# Patient Record
Sex: FEMALE | Race: BLACK OR AFRICAN AMERICAN | Employment: OTHER | URBAN - METROPOLITAN AREA
[De-identification: names, ages, dates, MRNs, and addresses within clinical notes are randomized per-mention and may not be internally consistent; named-entity substitution may affect disease eponyms.]

---

## 2017-06-24 ENCOUNTER — HOSPITAL ENCOUNTER (OUTPATIENT)
Dept: MRI IMAGING | Age: 62
End: 2017-06-24
Payer: COMMERCIAL

## 2017-06-24 ENCOUNTER — HOSPITAL ENCOUNTER (OUTPATIENT)
Dept: MRI IMAGING | Age: 62
Discharge: HOME | End: 2017-06-24
Payer: COMMERCIAL

## 2017-06-24 ENCOUNTER — HOSPITAL ENCOUNTER (OUTPATIENT)
Dept: ULTRASOUND IMAGING | Age: 62
Discharge: HOME | End: 2017-06-24
Payer: COMMERCIAL

## 2017-06-24 DIAGNOSIS — R42 DIZZINESS: ICD-10-CM

## 2017-06-24 DIAGNOSIS — R42 VERTIGO: ICD-10-CM

## 2017-06-24 PROCEDURE — 70551 MRI BRAIN STEM W/O DYE: CPT

## 2017-06-24 PROCEDURE — 93880 EXTRACRANIAL BILAT STUDY: CPT

## 2017-06-24 PROCEDURE — 70544 MR ANGIOGRAPHY HEAD W/O DYE: CPT

## 2017-07-29 ENCOUNTER — HOSPITAL ENCOUNTER (OUTPATIENT)
Dept: GENERAL RADIOLOGY | Age: 62
Discharge: HOME | End: 2017-07-29
Payer: COMMERCIAL

## 2017-07-29 DIAGNOSIS — R05.9 COUGH: ICD-10-CM

## 2017-07-29 PROCEDURE — 71020 XR CHEST STANDARD TWO VW: CPT

## 2018-03-30 ENCOUNTER — HOSPITAL ENCOUNTER (OUTPATIENT)
Dept: PREADMISSION TESTING | Age: 63
Discharge: HOME | End: 2018-04-03
Payer: COMMERCIAL

## 2018-03-30 VITALS
WEIGHT: 265.4 LBS | HEART RATE: 87 BPM | HEIGHT: 61 IN | SYSTOLIC BLOOD PRESSURE: 155 MMHG | BODY MASS INDEX: 50.11 KG/M2 | OXYGEN SATURATION: 96 % | TEMPERATURE: 98.6 F | DIASTOLIC BLOOD PRESSURE: 80 MMHG | RESPIRATION RATE: 16 BRPM

## 2018-03-30 DIAGNOSIS — L98.9 LESION OF NECK: ICD-10-CM

## 2018-03-30 DIAGNOSIS — Q21.12 PATENT FORAMEN OVALE: ICD-10-CM

## 2018-03-30 DIAGNOSIS — J34.89 LESION OF NOSE: ICD-10-CM

## 2018-03-30 DIAGNOSIS — Z95.828 PORT CATHETER IN PLACE: ICD-10-CM

## 2018-03-30 LAB
ANION GAP SERPL CALCULATED.3IONS-SCNC: 13 MEQ/L (ref 7–13)
BUN BLDV-MCNC: 14 MG/DL (ref 8–23)
CALCIUM SERPL-MCNC: 10.4 MG/DL (ref 8.6–10.2)
CHLORIDE BLD-SCNC: 105 MEQ/L (ref 98–107)
CO2: 24 MEQ/L (ref 22–29)
CREAT SERPL-MCNC: 0.54 MG/DL (ref 0.5–0.9)
EKG ATRIAL RATE: 84 BPM
EKG P AXIS: 53 DEGREES
EKG P-R INTERVAL: 150 MS
EKG Q-T INTERVAL: 350 MS
EKG QRS DURATION: 78 MS
EKG QTC CALCULATION (BAZETT): 413 MS
EKG R AXIS: -6 DEGREES
EKG T AXIS: 22 DEGREES
EKG VENTRICULAR RATE: 84 BPM
GFR AFRICAN AMERICAN: >60
GFR NON-AFRICAN AMERICAN: >60
GLUCOSE BLD-MCNC: 103 MG/DL (ref 74–109)
HCT VFR BLD CALC: 37.8 % (ref 37–47)
HEMOGLOBIN: 12.3 G/DL (ref 12–16)
MCH RBC QN AUTO: 25.6 PG (ref 27–31.3)
MCHC RBC AUTO-ENTMCNC: 32.5 % (ref 33–37)
MCV RBC AUTO: 78.8 FL (ref 82–100)
PDW BLD-RTO: 16 % (ref 11.5–14.5)
PLATELET # BLD: 154 K/UL (ref 130–400)
POTASSIUM SERPL-SCNC: 4.5 MEQ/L (ref 3.5–5.1)
RBC # BLD: 4.79 M/UL (ref 4.2–5.4)
SODIUM BLD-SCNC: 142 MEQ/L (ref 132–144)
WBC # BLD: 8.9 K/UL (ref 4.8–10.8)

## 2018-03-30 PROCEDURE — 85027 COMPLETE CBC AUTOMATED: CPT

## 2018-03-30 PROCEDURE — 93005 ELECTROCARDIOGRAM TRACING: CPT

## 2018-03-30 PROCEDURE — 80048 BASIC METABOLIC PNL TOTAL CA: CPT

## 2018-03-30 RX ORDER — SODIUM CHLORIDE 0.9 % (FLUSH) 0.9 %
10 SYRINGE (ML) INJECTION PRN
Status: CANCELLED | OUTPATIENT
Start: 2018-03-30

## 2018-03-30 RX ORDER — LIDOCAINE HYDROCHLORIDE 10 MG/ML
1 INJECTION, SOLUTION EPIDURAL; INFILTRATION; INTRACAUDAL; PERINEURAL
Status: CANCELLED | OUTPATIENT
Start: 2018-03-30 | End: 2018-03-30

## 2018-03-30 RX ORDER — SODIUM CHLORIDE, SODIUM LACTATE, POTASSIUM CHLORIDE, CALCIUM CHLORIDE 600; 310; 30; 20 MG/100ML; MG/100ML; MG/100ML; MG/100ML
INJECTION, SOLUTION INTRAVENOUS CONTINUOUS
Status: CANCELLED | OUTPATIENT
Start: 2018-03-30

## 2018-03-30 RX ORDER — SODIUM CHLORIDE 0.9 % (FLUSH) 0.9 %
10 SYRINGE (ML) INJECTION EVERY 12 HOURS SCHEDULED
Status: CANCELLED | OUTPATIENT
Start: 2018-03-30

## 2018-03-30 RX ORDER — ALBUTEROL SULFATE 90 UG/1
2 AEROSOL, METERED RESPIRATORY (INHALATION) EVERY 6 HOURS PRN
COMMUNITY
End: 2020-08-20

## 2018-03-30 ASSESSMENT — ENCOUNTER SYMPTOMS
HEARTBURN: 0
BACK PAIN: 0
VOMITING: 0
EYES NEGATIVE: 1
CONSTIPATION: 0
BLURRED VISION: 0
DIARRHEA: 0
STRIDOR: 0
SHORTNESS OF BREATH: 0
WHEEZING: 0
SORE THROAT: 0
ABDOMINAL PAIN: 0
DOUBLE VISION: 0
COUGH: 0
NAUSEA: 0

## 2018-04-02 PROCEDURE — 93010 ELECTROCARDIOGRAM REPORT: CPT | Performed by: INTERNAL MEDICINE

## 2018-04-06 ENCOUNTER — ANESTHESIA EVENT (OUTPATIENT)
Dept: OPERATING ROOM | Age: 63
End: 2018-04-06
Payer: COMMERCIAL

## 2018-04-06 ENCOUNTER — ANESTHESIA (OUTPATIENT)
Dept: OPERATING ROOM | Age: 63
End: 2018-04-06
Payer: COMMERCIAL

## 2018-04-06 ENCOUNTER — HOSPITAL ENCOUNTER (OUTPATIENT)
Age: 63
Setting detail: OUTPATIENT SURGERY
Discharge: HOME | End: 2018-04-06
Attending: SURGERY | Admitting: SURGERY
Payer: COMMERCIAL

## 2018-04-06 VITALS — DIASTOLIC BLOOD PRESSURE: 80 MMHG | SYSTOLIC BLOOD PRESSURE: 137 MMHG | OXYGEN SATURATION: 100 %

## 2018-04-06 VITALS
HEIGHT: 61 IN | OXYGEN SATURATION: 95 % | SYSTOLIC BLOOD PRESSURE: 147 MMHG | TEMPERATURE: 97.9 F | WEIGHT: 265 LBS | BODY MASS INDEX: 50.03 KG/M2 | RESPIRATION RATE: 16 BRPM | DIASTOLIC BLOOD PRESSURE: 71 MMHG | HEART RATE: 81 BPM

## 2018-04-06 LAB
INR BLD: 1
PROTHROMBIN TIME: 10.7 SEC (ref 8.1–13.7)

## 2018-04-06 PROCEDURE — 2500000003 HC RX 250 WO HCPCS: Performed by: NURSE ANESTHETIST, CERTIFIED REGISTERED

## 2018-04-06 PROCEDURE — 2580000003 HC RX 258: Performed by: SURGERY

## 2018-04-06 PROCEDURE — 6370000000 HC RX 637 (ALT 250 FOR IP): Performed by: NURSE ANESTHETIST, CERTIFIED REGISTERED

## 2018-04-06 PROCEDURE — 6360000002 HC RX W HCPCS: Performed by: NURSE ANESTHETIST, CERTIFIED REGISTERED

## 2018-04-06 PROCEDURE — 3700000001 HC ADD 15 MINUTES (ANESTHESIA): Performed by: SURGERY

## 2018-04-06 PROCEDURE — 2580000003 HC RX 258: Performed by: NURSE PRACTITIONER

## 2018-04-06 PROCEDURE — 85610 PROTHROMBIN TIME: CPT

## 2018-04-06 PROCEDURE — 7100000011 HC PHASE II RECOVERY - ADDTL 15 MIN: Performed by: SURGERY

## 2018-04-06 PROCEDURE — 2500000003 HC RX 250 WO HCPCS

## 2018-04-06 PROCEDURE — 3600000002 HC SURGERY LEVEL 2 BASE: Performed by: SURGERY

## 2018-04-06 PROCEDURE — 7100000001 HC PACU RECOVERY - ADDTL 15 MIN: Performed by: SURGERY

## 2018-04-06 PROCEDURE — 3700000000 HC ANESTHESIA ATTENDED CARE: Performed by: SURGERY

## 2018-04-06 PROCEDURE — 7100000010 HC PHASE II RECOVERY - FIRST 15 MIN: Performed by: SURGERY

## 2018-04-06 PROCEDURE — 2580000003 HC RX 258: Performed by: ANESTHESIOLOGY

## 2018-04-06 PROCEDURE — 3600000012 HC SURGERY LEVEL 2 ADDTL 15MIN: Performed by: SURGERY

## 2018-04-06 PROCEDURE — 6360000002 HC RX W HCPCS: Performed by: ANESTHESIOLOGY

## 2018-04-06 PROCEDURE — 7100000000 HC PACU RECOVERY - FIRST 15 MIN: Performed by: SURGERY

## 2018-04-06 RX ORDER — LIDOCAINE HYDROCHLORIDE 20 MG/ML
INJECTION, SOLUTION INFILTRATION; PERINEURAL PRN
Status: DISCONTINUED | OUTPATIENT
Start: 2018-04-06 | End: 2018-04-06 | Stop reason: SDUPTHER

## 2018-04-06 RX ORDER — PROPOFOL 10 MG/ML
INJECTION, EMULSION INTRAVENOUS PRN
Status: DISCONTINUED | OUTPATIENT
Start: 2018-04-06 | End: 2018-04-06 | Stop reason: SDUPTHER

## 2018-04-06 RX ORDER — SODIUM CHLORIDE, SODIUM LACTATE, POTASSIUM CHLORIDE, CALCIUM CHLORIDE 600; 310; 30; 20 MG/100ML; MG/100ML; MG/100ML; MG/100ML
INJECTION, SOLUTION INTRAVENOUS CONTINUOUS
Status: DISCONTINUED | OUTPATIENT
Start: 2018-04-06 | End: 2018-04-06 | Stop reason: HOSPADM

## 2018-04-06 RX ORDER — SODIUM CHLORIDE 0.9 % (FLUSH) 0.9 %
10 SYRINGE (ML) INJECTION PRN
Status: DISCONTINUED | OUTPATIENT
Start: 2018-04-06 | End: 2018-04-06 | Stop reason: HOSPADM

## 2018-04-06 RX ORDER — ALBUTEROL SULFATE 90 UG/1
AEROSOL, METERED RESPIRATORY (INHALATION) PRN
Status: DISCONTINUED | OUTPATIENT
Start: 2018-04-06 | End: 2018-04-06 | Stop reason: SDUPTHER

## 2018-04-06 RX ORDER — LIDOCAINE HYDROCHLORIDE 10 MG/ML
INJECTION, SOLUTION EPIDURAL; INFILTRATION; INTRACAUDAL; PERINEURAL
Status: COMPLETED
Start: 2018-04-06 | End: 2018-04-06

## 2018-04-06 RX ORDER — DIPHENHYDRAMINE HYDROCHLORIDE 50 MG/ML
12.5 INJECTION INTRAMUSCULAR; INTRAVENOUS
Status: DISCONTINUED | OUTPATIENT
Start: 2018-04-06 | End: 2018-04-06 | Stop reason: HOSPADM

## 2018-04-06 RX ORDER — LIDOCAINE HYDROCHLORIDE 10 MG/ML
1 INJECTION, SOLUTION EPIDURAL; INFILTRATION; INTRACAUDAL; PERINEURAL
Status: DISCONTINUED | OUTPATIENT
Start: 2018-04-06 | End: 2018-04-06 | Stop reason: HOSPADM

## 2018-04-06 RX ORDER — CIPROFLOXACIN 500 MG/5ML
500 KIT ORAL 2 TIMES DAILY
COMMUNITY
End: 2019-01-11 | Stop reason: ALTCHOICE

## 2018-04-06 RX ORDER — FENTANYL CITRATE 50 UG/ML
INJECTION, SOLUTION INTRAMUSCULAR; INTRAVENOUS PRN
Status: DISCONTINUED | OUTPATIENT
Start: 2018-04-06 | End: 2018-04-06 | Stop reason: SDUPTHER

## 2018-04-06 RX ORDER — DEXAMETHASONE SODIUM PHOSPHATE 10 MG/ML
INJECTION INTRAMUSCULAR; INTRAVENOUS PRN
Status: DISCONTINUED | OUTPATIENT
Start: 2018-04-06 | End: 2018-04-06 | Stop reason: SDUPTHER

## 2018-04-06 RX ORDER — FENTANYL CITRATE 50 UG/ML
50 INJECTION, SOLUTION INTRAMUSCULAR; INTRAVENOUS EVERY 10 MIN PRN
Status: DISCONTINUED | OUTPATIENT
Start: 2018-04-06 | End: 2018-04-06 | Stop reason: HOSPADM

## 2018-04-06 RX ORDER — SODIUM CHLORIDE 0.9 % (FLUSH) 0.9 %
10 SYRINGE (ML) INJECTION EVERY 12 HOURS SCHEDULED
Status: DISCONTINUED | OUTPATIENT
Start: 2018-04-06 | End: 2018-04-06 | Stop reason: HOSPADM

## 2018-04-06 RX ORDER — ROCURONIUM BROMIDE 10 MG/ML
INJECTION, SOLUTION INTRAVENOUS PRN
Status: DISCONTINUED | OUTPATIENT
Start: 2018-04-06 | End: 2018-04-06 | Stop reason: SDUPTHER

## 2018-04-06 RX ORDER — MEPERIDINE HYDROCHLORIDE 25 MG/ML
12.5 INJECTION INTRAMUSCULAR; INTRAVENOUS; SUBCUTANEOUS EVERY 5 MIN PRN
Status: DISCONTINUED | OUTPATIENT
Start: 2018-04-06 | End: 2018-04-06 | Stop reason: HOSPADM

## 2018-04-06 RX ORDER — METOCLOPRAMIDE HYDROCHLORIDE 5 MG/ML
10 INJECTION INTRAMUSCULAR; INTRAVENOUS
Status: DISCONTINUED | OUTPATIENT
Start: 2018-04-06 | End: 2018-04-06 | Stop reason: HOSPADM

## 2018-04-06 RX ORDER — ONDANSETRON 2 MG/ML
4 INJECTION INTRAMUSCULAR; INTRAVENOUS
Status: DISCONTINUED | OUTPATIENT
Start: 2018-04-06 | End: 2018-04-06 | Stop reason: HOSPADM

## 2018-04-06 RX ORDER — ONDANSETRON 2 MG/ML
INJECTION INTRAMUSCULAR; INTRAVENOUS PRN
Status: DISCONTINUED | OUTPATIENT
Start: 2018-04-06 | End: 2018-04-06 | Stop reason: SDUPTHER

## 2018-04-06 RX ORDER — MAGNESIUM HYDROXIDE 1200 MG/15ML
LIQUID ORAL CONTINUOUS PRN
Status: DISCONTINUED | OUTPATIENT
Start: 2018-04-06 | End: 2018-04-06 | Stop reason: HOSPADM

## 2018-04-06 RX ORDER — MIDAZOLAM HYDROCHLORIDE 1 MG/ML
INJECTION INTRAMUSCULAR; INTRAVENOUS PRN
Status: DISCONTINUED | OUTPATIENT
Start: 2018-04-06 | End: 2018-04-06 | Stop reason: SDUPTHER

## 2018-04-06 RX ORDER — SODIUM CHLORIDE, SODIUM LACTATE, POTASSIUM CHLORIDE, CALCIUM CHLORIDE 600; 310; 30; 20 MG/100ML; MG/100ML; MG/100ML; MG/100ML
INJECTION, SOLUTION INTRAVENOUS
Status: DISCONTINUED
Start: 2018-04-06 | End: 2018-04-06 | Stop reason: HOSPADM

## 2018-04-06 RX ORDER — LIDOCAINE HYDROCHLORIDE 10 MG/ML
1 INJECTION, SOLUTION EPIDURAL; INFILTRATION; INTRACAUDAL; PERINEURAL
Status: COMPLETED | OUTPATIENT
Start: 2018-04-06 | End: 2018-04-06

## 2018-04-06 RX ADMIN — SODIUM CHLORIDE, POTASSIUM CHLORIDE, SODIUM LACTATE AND CALCIUM CHLORIDE: 600; 310; 30; 20 INJECTION, SOLUTION INTRAVENOUS at 09:00

## 2018-04-06 RX ADMIN — SUGAMMADEX 200 MG: 100 INJECTION, SOLUTION INTRAVENOUS at 10:55

## 2018-04-06 RX ADMIN — LIDOCAINE HYDROCHLORIDE 0.1 ML: 10 INJECTION, SOLUTION EPIDURAL; INFILTRATION; INTRACAUDAL; PERINEURAL at 09:07

## 2018-04-06 RX ADMIN — ALBUTEROL SULFATE 3 PUFF: 90 AEROSOL, METERED RESPIRATORY (INHALATION) at 11:15

## 2018-04-06 RX ADMIN — ONDANSETRON 4 MG: 2 INJECTION INTRAMUSCULAR; INTRAVENOUS at 10:47

## 2018-04-06 RX ADMIN — ROCURONIUM BROMIDE 50 MG: 10 INJECTION INTRAVENOUS at 09:56

## 2018-04-06 RX ADMIN — SODIUM CHLORIDE, POTASSIUM CHLORIDE, SODIUM LACTATE AND CALCIUM CHLORIDE: 600; 310; 30; 20 INJECTION, SOLUTION INTRAVENOUS at 08:56

## 2018-04-06 RX ADMIN — LIDOCAINE HYDROCHLORIDE 100 MG: 20 INJECTION, SOLUTION INFILTRATION; PERINEURAL at 09:56

## 2018-04-06 RX ADMIN — DEXAMETHASONE SODIUM PHOSPHATE 10 MG: 10 INJECTION INTRAMUSCULAR; INTRAVENOUS at 10:04

## 2018-04-06 RX ADMIN — MIDAZOLAM HYDROCHLORIDE 2 MG: 1 INJECTION, SOLUTION INTRAMUSCULAR; INTRAVENOUS at 09:51

## 2018-04-06 RX ADMIN — FENTANYL CITRATE 50 MCG: 50 INJECTION, SOLUTION INTRAMUSCULAR; INTRAVENOUS at 11:51

## 2018-04-06 RX ADMIN — SUGAMMADEX 200 MG: 100 INJECTION, SOLUTION INTRAVENOUS at 11:19

## 2018-04-06 RX ADMIN — FENTANYL CITRATE 100 MCG: 50 INJECTION, SOLUTION INTRAMUSCULAR; INTRAVENOUS at 09:56

## 2018-04-06 RX ADMIN — PROPOFOL 200 MG: 10 INJECTION, EMULSION INTRAVENOUS at 09:56

## 2018-04-06 ASSESSMENT — PULMONARY FUNCTION TESTS
PIF_VALUE: 0
PIF_VALUE: 30
PIF_VALUE: 26
PIF_VALUE: 0
PIF_VALUE: 25
PIF_VALUE: 23
PIF_VALUE: 25
PIF_VALUE: 25
PIF_VALUE: 0
PIF_VALUE: 23
PIF_VALUE: 25
PIF_VALUE: 25
PIF_VALUE: 32
PIF_VALUE: 24
PIF_VALUE: 0
PIF_VALUE: 23
PIF_VALUE: 25
PIF_VALUE: 0
PIF_VALUE: 1
PIF_VALUE: 24
PIF_VALUE: 33
PIF_VALUE: 0
PIF_VALUE: 24
PIF_VALUE: 30
PIF_VALUE: 0
PIF_VALUE: 24
PIF_VALUE: 0
PIF_VALUE: 24
PIF_VALUE: 23
PIF_VALUE: 29
PIF_VALUE: 0
PIF_VALUE: 23
PIF_VALUE: 25
PIF_VALUE: 2
PIF_VALUE: 0
PIF_VALUE: 0
PIF_VALUE: 1
PIF_VALUE: 23
PIF_VALUE: 25
PIF_VALUE: 26
PIF_VALUE: 24
PIF_VALUE: 23
PIF_VALUE: 2
PIF_VALUE: 0
PIF_VALUE: 23
PIF_VALUE: 25
PIF_VALUE: 0
PIF_VALUE: 0
PIF_VALUE: 25
PIF_VALUE: 0
PIF_VALUE: 23
PIF_VALUE: 0
PIF_VALUE: 0
PIF_VALUE: 25
PIF_VALUE: 25
PIF_VALUE: 29
PIF_VALUE: 23
PIF_VALUE: 1
PIF_VALUE: 24
PIF_VALUE: 25
PIF_VALUE: 29
PIF_VALUE: 23
PIF_VALUE: 0
PIF_VALUE: 31
PIF_VALUE: 32
PIF_VALUE: 41
PIF_VALUE: 3
PIF_VALUE: 2
PIF_VALUE: 29
PIF_VALUE: 24
PIF_VALUE: 25
PIF_VALUE: 23
PIF_VALUE: 0
PIF_VALUE: 24
PIF_VALUE: 30
PIF_VALUE: 25
PIF_VALUE: 29
PIF_VALUE: 23
PIF_VALUE: 23
PIF_VALUE: 0
PIF_VALUE: 24
PIF_VALUE: 25
PIF_VALUE: 0
PIF_VALUE: 27
PIF_VALUE: 24
PIF_VALUE: 23
PIF_VALUE: 3
PIF_VALUE: 31
PIF_VALUE: 24
PIF_VALUE: 24

## 2018-04-06 ASSESSMENT — PAIN DESCRIPTION - PAIN TYPE
TYPE: SURGICAL PAIN
TYPE: SURGICAL PAIN

## 2018-04-06 ASSESSMENT — PAIN DESCRIPTION - LOCATION
LOCATION: NECK
LOCATION: NECK

## 2018-04-06 ASSESSMENT — PAIN SCALES - GENERAL
PAINLEVEL_OUTOF10: 5
PAINLEVEL_OUTOF10: 5
PAINLEVEL_OUTOF10: 2

## 2018-04-06 ASSESSMENT — PAIN DESCRIPTION - ORIENTATION
ORIENTATION: RIGHT
ORIENTATION: RIGHT

## 2018-06-07 ENCOUNTER — HOSPITAL ENCOUNTER (OUTPATIENT)
Dept: ULTRASOUND IMAGING | Age: 63
Discharge: HOME | End: 2018-06-09
Payer: COMMERCIAL

## 2018-06-07 ENCOUNTER — HOSPITAL ENCOUNTER (OUTPATIENT)
Dept: CT IMAGING | Age: 63
Discharge: HOME | End: 2018-06-09
Payer: COMMERCIAL

## 2018-06-07 DIAGNOSIS — R10.13 ABDOMINAL PAIN, EPIGASTRIC: ICD-10-CM

## 2018-06-07 DIAGNOSIS — R10.11 ABDOMINAL PAIN, RIGHT UPPER QUADRANT: ICD-10-CM

## 2018-06-07 DIAGNOSIS — R10.11 RUQ ABDOMINAL PAIN: ICD-10-CM

## 2018-06-07 PROCEDURE — 74176 CT ABD & PELVIS W/O CONTRAST: CPT

## 2018-06-07 PROCEDURE — 76705 ECHO EXAM OF ABDOMEN: CPT

## 2018-10-01 PROBLEM — D05.02 LOBULAR CARCINOMA IN SITU OF LEFT BREAST: Status: ACTIVE | Noted: 2018-10-01

## 2018-10-01 PROBLEM — C50.412 MALIGNANT NEOPLASM OF UPPER-OUTER QUADRANT OF LEFT FEMALE BREAST (HCC): Status: ACTIVE | Noted: 2018-10-01

## 2018-10-01 PROBLEM — Z17.0 ESTROGEN RECEPTOR POSITIVE: Status: ACTIVE | Noted: 2018-10-01

## 2018-10-01 PROBLEM — C50.111 MALIGNANT NEOPLASM OF CENTRAL PORTION OF RIGHT FEMALE BREAST (HCC): Status: ACTIVE | Noted: 2018-10-01

## 2018-10-01 PROBLEM — D50.0 IRON DEFICIENCY ANEMIA SECONDARY TO BLOOD LOSS (CHRONIC): Status: ACTIVE | Noted: 2018-10-01

## 2018-12-19 ENCOUNTER — HOSPITAL ENCOUNTER (OUTPATIENT)
Dept: GENERAL RADIOLOGY | Age: 63
Discharge: HOME | End: 2018-12-21
Payer: COMMERCIAL

## 2018-12-19 DIAGNOSIS — R05.9 COUGH: ICD-10-CM

## 2018-12-19 PROCEDURE — 71046 X-RAY EXAM CHEST 2 VIEWS: CPT

## 2019-01-11 DIAGNOSIS — Z17.0 ESTROGEN RECEPTOR POSITIVE: ICD-10-CM

## 2019-01-11 DIAGNOSIS — Z17.0 MALIGNANT NEOPLASM OF UPPER-OUTER QUADRANT OF LEFT BREAST IN FEMALE, ESTROGEN RECEPTOR POSITIVE (HCC): ICD-10-CM

## 2019-01-11 DIAGNOSIS — D05.02 LOBULAR CARCINOMA IN SITU OF LEFT BREAST: ICD-10-CM

## 2019-01-11 DIAGNOSIS — C50.412 MALIGNANT NEOPLASM OF UPPER-OUTER QUADRANT OF LEFT BREAST IN FEMALE, ESTROGEN RECEPTOR POSITIVE (HCC): ICD-10-CM

## 2019-01-11 DIAGNOSIS — Z17.0 MALIGNANT NEOPLASM OF CENTRAL PORTION OF RIGHT BREAST IN FEMALE, ESTROGEN RECEPTOR POSITIVE (HCC): ICD-10-CM

## 2019-01-11 DIAGNOSIS — D50.0 IRON DEFICIENCY ANEMIA SECONDARY TO BLOOD LOSS (CHRONIC): ICD-10-CM

## 2019-01-11 DIAGNOSIS — C50.111 MALIGNANT NEOPLASM OF CENTRAL PORTION OF RIGHT BREAST IN FEMALE, ESTROGEN RECEPTOR POSITIVE (HCC): ICD-10-CM

## 2019-01-11 LAB
ALBUMIN SERPL-MCNC: 4 G/DL (ref 3.9–4.9)
ALP BLD-CCNC: 159 U/L (ref 40–130)
ALT SERPL-CCNC: 11 U/L (ref 0–33)
ANION GAP SERPL CALCULATED.3IONS-SCNC: 10 MEQ/L (ref 7–13)
AST SERPL-CCNC: 15 U/L (ref 0–35)
BILIRUB SERPL-MCNC: <0.2 MG/DL (ref 0–1.2)
BUN BLDV-MCNC: 10 MG/DL (ref 8–23)
CALCIUM SERPL-MCNC: 10 MG/DL (ref 8.6–10.2)
CHLORIDE BLD-SCNC: 108 MEQ/L (ref 98–107)
CO2: 22 MEQ/L (ref 22–29)
CREAT SERPL-MCNC: 0.57 MG/DL (ref 0.5–0.9)
FERRITIN: 45 NG/ML (ref 13–150)
GFR AFRICAN AMERICAN: >60
GFR NON-AFRICAN AMERICAN: >60
GLOBULIN: 2.8 G/DL (ref 2.3–3.5)
GLUCOSE BLD-MCNC: 129 MG/DL (ref 74–109)
IRON SATURATION: 12 % (ref 11–46)
IRON: 35 UG/DL (ref 37–145)
POTASSIUM SERPL-SCNC: 4 MEQ/L (ref 3.5–5.1)
SODIUM BLD-SCNC: 140 MEQ/L (ref 132–144)
TOTAL IRON BINDING CAPACITY: 297 UG/DL (ref 178–450)
TOTAL PROTEIN: 6.8 G/DL (ref 6.4–8.1)

## 2019-01-15 LAB — CA 27-29: 22 U/ML (ref 0–38)

## 2019-03-11 PROBLEM — Q21.12 PATENT FORAMEN OVALE: Status: ACTIVE | Noted: 2019-03-11

## 2019-04-05 PROBLEM — Q21.12 PATENT FORAMEN OVALE: Status: RESOLVED | Noted: 2018-03-30 | Resolved: 2019-04-05

## 2019-04-05 PROBLEM — Z95.828 PORT-A-CATH IN PLACE: Status: RESOLVED | Noted: 2018-03-30 | Resolved: 2019-04-05

## 2019-04-05 PROBLEM — J34.89 LESION OF NOSE: Status: RESOLVED | Noted: 2018-03-30 | Resolved: 2019-04-05

## 2019-04-05 PROBLEM — L98.9 LESION OF NECK: Status: RESOLVED | Noted: 2018-03-30 | Resolved: 2019-04-05

## 2019-04-17 ENCOUNTER — HOSPITAL ENCOUNTER (OUTPATIENT)
Dept: GENERAL RADIOLOGY | Age: 64
Discharge: HOME | End: 2019-04-19
Payer: COMMERCIAL

## 2019-04-17 ENCOUNTER — HOSPITAL ENCOUNTER (OUTPATIENT)
Dept: WOMENS IMAGING | Age: 64
Discharge: HOME | End: 2019-04-19
Payer: COMMERCIAL

## 2019-04-17 DIAGNOSIS — M81.0 SENILE OSTEOPOROSIS: ICD-10-CM

## 2019-04-17 DIAGNOSIS — M25.551 PAIN OF BOTH HIP JOINTS: ICD-10-CM

## 2019-04-17 DIAGNOSIS — M25.552 PAIN OF BOTH HIP JOINTS: ICD-10-CM

## 2019-04-17 PROCEDURE — 77080 DXA BONE DENSITY AXIAL: CPT

## 2019-04-17 PROCEDURE — 73521 X-RAY EXAM HIPS BI 2 VIEWS: CPT

## 2019-04-19 DIAGNOSIS — D50.0 IRON DEFICIENCY ANEMIA SECONDARY TO BLOOD LOSS (CHRONIC): ICD-10-CM

## 2019-08-29 ENCOUNTER — HOSPITAL ENCOUNTER (OUTPATIENT)
Dept: PHYSICAL THERAPY | Age: 64
Setting detail: THERAPIES SERIES
Discharge: HOME | End: 2019-08-29
Payer: COMMERCIAL

## 2019-08-29 PROCEDURE — 97162 PT EVAL MOD COMPLEX 30 MIN: CPT

## 2019-08-29 ASSESSMENT — PAIN DESCRIPTION - ORIENTATION: ORIENTATION: RIGHT;LEFT

## 2019-08-29 ASSESSMENT — PAIN SCALES - GENERAL: PAINLEVEL_OUTOF10: 8

## 2019-08-29 ASSESSMENT — PAIN DESCRIPTION - DESCRIPTORS: DESCRIPTORS: ACHING

## 2019-08-29 ASSESSMENT — PAIN DESCRIPTION - LOCATION: LOCATION: LEG;BACK

## 2019-08-29 NOTE — PROGRESS NOTES
0930  Minutes: 42  Timed Code Treatment Minutes: 0 Minutes  Procedure Minutes: 42 PT evaluation minutes     Timed Activity Minutes Units   Ther Ex  0    Manual   0        Electronically signed by Mahin Law PT on 8/29/19 at 1:03 PM

## 2019-09-09 ENCOUNTER — HOSPITAL ENCOUNTER (OUTPATIENT)
Dept: PHYSICAL THERAPY | Age: 64
Setting detail: THERAPIES SERIES
Discharge: HOME | End: 2019-09-09
Payer: MEDICARE

## 2019-09-09 PROCEDURE — 97113 AQUATIC THERAPY/EXERCISES: CPT

## 2019-09-09 ASSESSMENT — PAIN DESCRIPTION - DESCRIPTORS: DESCRIPTORS: SORE

## 2019-09-09 ASSESSMENT — PAIN SCALES - GENERAL: PAINLEVEL_OUTOF10: 5

## 2019-09-09 ASSESSMENT — PAIN DESCRIPTION - ORIENTATION: ORIENTATION: RIGHT;LEFT;UPPER

## 2019-09-09 ASSESSMENT — PAIN DESCRIPTION - LOCATION: LOCATION: LEG

## 2019-09-09 NOTE — PROGRESS NOTES
[address] [address]  Outpatient Physical Therapy    Treatment Note        Date: 2019  Patient: Romeo Martel  : 1955  ACCT #: [de-identified]  Referring Practitioner: Caitlyn Torres MD  Diagnosis: Fibromyalgia    Visit Information:  PT Visit Information  Onset Date: (2019)  PT Insurance Information: [facility]  Total # of Visits to Date: 2  Plan of Care/Certification Expiration Date: 19  No Show: 1  Canceled Appointment: 0  Progress Note Counter:     Subjective: Pt reports showed up 1hr late for last appt. Today 5/10 pain both legs. HEP Compliance:  [] Good [x] Fair [] Poor [] Reports not doing due to:    Vital Signs  Patient Currently in Pain: Yes   Pain Screening  Patient Currently in Pain: Yes  Pain Assessment  Pain Level: 5  Pain Location: Leg  Pain Orientation: Right;Left;Upper  Pain Descriptors: Sore    OBJECTIVE:   Exercises  Exercise 1: Aquatics:  Exercise 2: ambulation: forward, lateral, retro x 3  Exercise 3: sink ex x 10  Exercise 4: hip circles*  Exercise 5: hamstring stretch B 30 sec x 3  Exercise 8: piriformis stretch 30 sec x 3        *Indicates exercise, modality, or manual techniques to be initiated when appropriate    Assessment: Body structures, Functions, Activity limitations: Decreased functional mobility , Decreased ROM, Increased Pain, Decreased strength, Decreased endurance  Assessment: Initiated aquatice exercises to improveFunctional mobility. Good tolerance to treatment. Verbal cues for core posture. Treatment Diagnosis: bilateral LE pain, decreased LE strength, impaired gait          Goals:  Short term goals  Time Frame for Short term goals: 4 weeks  Short term goal 1: Patient will report 50% improvement with ambulation in community. Short term goal 2: Patient will be independent with HEP.     Long term goals  Time Frame for Long term goals : 6 weeks  Long term goal 1: Patient will increase right SLR and

## 2019-09-13 ENCOUNTER — HOSPITAL ENCOUNTER (OUTPATIENT)
Dept: PHYSICAL THERAPY | Age: 64
Setting detail: THERAPIES SERIES
Discharge: HOME | End: 2019-09-13
Payer: MEDICARE

## 2019-09-13 PROCEDURE — 97113 AQUATIC THERAPY/EXERCISES: CPT

## 2019-09-13 ASSESSMENT — PAIN DESCRIPTION - LOCATION: LOCATION: LEG

## 2019-09-13 ASSESSMENT — PAIN DESCRIPTION - ORIENTATION: ORIENTATION: RIGHT;LEFT;UPPER

## 2019-09-13 ASSESSMENT — PAIN DESCRIPTION - DESCRIPTORS: DESCRIPTORS: CONSTANT;ACHING;SORE

## 2019-09-13 NOTE — PROGRESS NOTES
goals  Time Frame for Short term goals: 4 weeks  Short term goal 1: Patient will report 50% improvement with ambulation in community. Short term goal 2: Patient will be independent with HEP. Long term goals  Time Frame for Long term goals : 6 weeks  Long term goal 1: Patient will increase right SLR and internal and external rotation >/= 5 degrees for improved functional tolerance. Long term goal 2: Patient will increase strength in bilateral LEs >/= 4+/5 for improved functional tolerance. Long term goal 3: LEFS >/= 40/80 to demonstrate functional improvements. Progress toward goals:rom, strength    POST-PAIN       Pain Rating (0-10 pain scale):  0 /10   Location and pain description same as pre-treatment unless indicated. Action: [x] NA   [] Perform HEP  [] Meds as prescribed  [] Modalities as prescribed   [] Call Physician     Frequency/Duration:  Plan  Times per week: 2  Plan weeks: 6  Specific instructions for Next Treatment: aquatics for 4 weeks than progress to land  Current Treatment Recommendations: Strengthening, ROM, Functional Mobility Training, Endurance Training, Neuromuscular Re-education, Manual Therapy - Joint Manipulation, Manual Therapy - Soft Tissue Mobilization, Home Exercise Program, Safety Education & Training, Modalities, Patient/Caregiver Education & Training, Aquatics  Plan Comment: transfer care to Harri PT     Pt to continue current HEP. See objective section for any therapeutic exercise changes, additions or modifications this date.          PT Individual Minutes  Time In: 1301  Time Out: 2994  Minutes: 39  Timed Code Treatment Minutes: 39 Minutes  Procedure Minutes:0     Timed Activity Minutes Units   Aquatics 39 3            Signature:  Electronically signed by Tarsha Jarrett PTA on 9/13/19 at 1:48 PM

## 2019-09-16 ENCOUNTER — HOSPITAL ENCOUNTER (OUTPATIENT)
Dept: PHYSICAL THERAPY | Age: 64
Setting detail: THERAPIES SERIES
Discharge: HOME | End: 2019-09-16
Payer: MEDICARE

## 2019-09-16 PROCEDURE — 97113 AQUATIC THERAPY/EXERCISES: CPT

## 2019-09-16 ASSESSMENT — PAIN DESCRIPTION - DESCRIPTORS: DESCRIPTORS: CONSTANT;ACHING;SORE

## 2019-09-16 ASSESSMENT — PAIN SCALES - GENERAL: PAINLEVEL_OUTOF10: 5

## 2019-09-16 ASSESSMENT — PAIN DESCRIPTION - LOCATION: LOCATION: LEG

## 2019-09-16 ASSESSMENT — PAIN DESCRIPTION - ORIENTATION: ORIENTATION: LEFT;RIGHT;UPPER

## 2019-09-16 NOTE — PROGRESS NOTES
[address] [address]  Outpatient Physical Therapy    Treatment Note        Date: 2019  Patient: Brannon Singh  : 1955  ACCT #: [de-identified]  Referring Practitioner: Quay Romberg MD  Diagnosis: Fibromyalgia    Visit Information:  PT Visit Information  Onset Date: ((2019))  PT Insurance Information: [facility]  Total # of Visits to Date: 4  Plan of Care/Certification Expiration Date: 19  No Show: 1  Progress Note Due Date: 19  Canceled Appointment: 0  Progress Note Counter:     Subjective: Pt reporting pain level of 5/10 in LB and B LE's. Pt reports some soreness into the following day after pool sessions. HEP Compliance:  [x] Good [] Fair [] Poor [] Reports not doing due to:    Vital Signs  Patient Currently in Pain: Yes   Pain Screening  Patient Currently in Pain: Yes  Pain Assessment  Pain Assessment: 0-10  Pain Level: 5  Pain Location: Leg  Pain Orientation: Left;Right;Upper  Pain Descriptors: Constant; Aching; Sore    OBJECTIVE:   Exercises  Exercise 1: Aquatics:  Exercise 2: ambulation: forward, lateral, retro x 3  Exercise 3: sink ex x 12  Exercise 4: hip circles x 12 ea  Exercise 5: hamstring stretch B 30 sec x 3 in HT  Exercise 6: SLS, 10\" x 5, b/l, FT w/o ue support> 30\",Semi tandem approx 30\" x 1, b/l  Exercise 7: step ups x 10 ea F/L, B   Exercise 8: piriformis stretch 30 sec x 3 in HT     Strength: [x] NT  [] MMT completed:     ROM: [x] NT  [] ROM measurements:    Assessment: Body structures, Functions, Activity limitations: Decreased functional mobility , Decreased ROM, Increased Pain, Decreased strength, Decreased endurance  Assessment: Inc reps w/ most exs as well as added lat step ups to further challenge muscle endurance and strength. Pt w/ good bharat though expressed knee discomfort during high knee marches. Vcing provided to perform exs within comfortable range.    Treatment Diagnosis: bilateral LE pain, decreased LE

## 2019-09-19 ENCOUNTER — HOSPITAL ENCOUNTER (OUTPATIENT)
Dept: PHYSICAL THERAPY | Age: 64
Setting detail: THERAPIES SERIES
Discharge: HOME | End: 2019-09-19
Payer: MEDICARE

## 2019-09-19 PROCEDURE — 97113 AQUATIC THERAPY/EXERCISES: CPT

## 2019-09-19 ASSESSMENT — PAIN DESCRIPTION - ORIENTATION: ORIENTATION: RIGHT;LEFT

## 2019-09-19 ASSESSMENT — PAIN DESCRIPTION - LOCATION: LOCATION: LEG

## 2019-09-19 ASSESSMENT — PAIN DESCRIPTION - PAIN TYPE: TYPE: CHRONIC PAIN

## 2019-09-19 ASSESSMENT — PAIN SCALES - GENERAL: PAINLEVEL_OUTOF10: 3

## 2019-09-19 ASSESSMENT — PAIN DESCRIPTION - DESCRIPTORS: DESCRIPTORS: ACHING;SORE

## 2019-09-21 ENCOUNTER — HOSPITAL ENCOUNTER (OUTPATIENT)
Dept: MRI IMAGING | Age: 64
Discharge: HOME | End: 2019-09-23
Payer: MEDICARE

## 2019-09-21 DIAGNOSIS — M54.5 LOW BACK PAIN, UNSPECIFIED BACK PAIN LATERALITY, UNSPECIFIED CHRONICITY, WITH SCIATICA PRESENCE UNSPECIFIED: ICD-10-CM

## 2019-09-21 PROCEDURE — 72148 MRI LUMBAR SPINE W/O DYE: CPT

## 2019-09-23 ENCOUNTER — HOSPITAL ENCOUNTER (OUTPATIENT)
Dept: PHYSICAL THERAPY | Age: 64
Setting detail: THERAPIES SERIES
Discharge: HOME | End: 2019-09-23
Payer: MEDICARE

## 2019-09-23 PROCEDURE — 97113 AQUATIC THERAPY/EXERCISES: CPT

## 2019-09-23 ASSESSMENT — PAIN DESCRIPTION - ORIENTATION: ORIENTATION: LEFT;RIGHT

## 2019-09-23 ASSESSMENT — PAIN DESCRIPTION - DESCRIPTORS: DESCRIPTORS: SORE

## 2019-09-23 ASSESSMENT — PAIN DESCRIPTION - LOCATION: LOCATION: LEG

## 2019-09-23 ASSESSMENT — PAIN SCALES - GENERAL: PAINLEVEL_OUTOF10: 3

## 2019-09-26 ENCOUNTER — HOSPITAL ENCOUNTER (OUTPATIENT)
Dept: PHYSICAL THERAPY | Age: 64
Setting detail: THERAPIES SERIES
Discharge: HOME | End: 2019-09-26
Payer: MEDICARE

## 2019-09-26 PROCEDURE — 97113 AQUATIC THERAPY/EXERCISES: CPT

## 2019-09-26 ASSESSMENT — PAIN DESCRIPTION - DESCRIPTORS: DESCRIPTORS: SORE

## 2019-09-26 ASSESSMENT — PAIN DESCRIPTION - PAIN TYPE: TYPE: CHRONIC PAIN

## 2019-09-26 ASSESSMENT — PAIN DESCRIPTION - ORIENTATION: ORIENTATION: LEFT;RIGHT

## 2019-09-26 ASSESSMENT — PAIN DESCRIPTION - LOCATION: LOCATION: LEG

## 2019-09-26 ASSESSMENT — PAIN SCALES - GENERAL: PAINLEVEL_OUTOF10: 3

## 2019-09-26 NOTE — PROGRESS NOTES
[address] [address]  Outpatient Physical Therapy    Treatment Note        Date: 2019  Patient: Denton Jolly  : 1955  ACCT #: [de-identified]  Referring Practitioner: Marya Costello MD  Diagnosis: Fibromyalgia    Visit Information:  PT Visit Information  Onset Date: ((2019))  PT Insurance Information: [facility]  Total # of Visits to Date: 7  Plan of Care/Certification Expiration Date: 19  No Show: 1  Canceled Appointment: 0  Progress Note Counter:     Subjective: Pt reports 3/10 pain in bilateral thighs. No complaints of soreness following last session. HEP Compliance:  [x] Good [] Fair [] Poor [] Reports not doing due to:    Vital Signs  Patient Currently in Pain: Yes   Pain Screening  Patient Currently in Pain: Yes  Pain Assessment  Pain Level: 3  Pain Type: Chronic pain  Pain Location: Leg  Pain Orientation: Left;Right  Pain Descriptors: Sore    OBJECTIVE:   Exercises  Exercise 1: Aquatics:  Exercise 2: ambulation: forward, lateral, retro, march w/hold x 3  Exercise 3: sink ex x 18  Exercise 4: hip circles x 18 ea  Exercise 6: SLS, 10\" x 5, b/l, FT w/o ue support> 30\",Semi tandem approx 30\" x 1, b/l  Exercise 7: step ups x 12 ea F/L, B               *Indicates exercise, modality, or manual techniques to be initiated when appropriate    Assessment: Body structures, Functions, Activity limitations: Decreased functional mobility , Decreased ROM, Increased pain, Decreased endurance  Assessment: Increased laps, sink, circles and stepups with good tolerance. Verbal cues for core posture. Treatment Diagnosis: bilateral LE pain, decreased LE strength, impaired gait          Goals:  Short term goals  Time Frame for Short term goals: 4 weeks  Short term goal 1: Patient will report 50% improvement with ambulation in community. Short term goal 2: Patient will be independent with HEP.     Long term goals  Time Frame for Long term goals : 6

## 2019-09-30 ENCOUNTER — HOSPITAL ENCOUNTER (OUTPATIENT)
Dept: PHYSICAL THERAPY | Age: 64
Setting detail: THERAPIES SERIES
Discharge: HOME | End: 2019-09-30
Payer: MEDICARE

## 2019-09-30 PROCEDURE — 97110 THERAPEUTIC EXERCISES: CPT

## 2019-09-30 ASSESSMENT — PAIN DESCRIPTION - PAIN TYPE: TYPE: CHRONIC PAIN

## 2019-09-30 ASSESSMENT — PAIN DESCRIPTION - DESCRIPTORS: DESCRIPTORS: SORE

## 2019-09-30 ASSESSMENT — PAIN DESCRIPTION - ORIENTATION: ORIENTATION: RIGHT;LEFT

## 2019-09-30 ASSESSMENT — PAIN SCALES - GENERAL: PAINLEVEL_OUTOF10: 4

## 2019-09-30 ASSESSMENT — PAIN DESCRIPTION - LOCATION: LOCATION: LEG

## 2019-10-03 ENCOUNTER — HOSPITAL ENCOUNTER (OUTPATIENT)
Dept: PHYSICAL THERAPY | Age: 64
Setting detail: THERAPIES SERIES
Discharge: HOME | End: 2019-10-03
Payer: MEDICARE

## 2019-10-03 PROCEDURE — 97110 THERAPEUTIC EXERCISES: CPT

## 2019-10-03 PROCEDURE — 97140 MANUAL THERAPY 1/> REGIONS: CPT

## 2019-10-03 ASSESSMENT — PAIN SCALES - GENERAL: PAINLEVEL_OUTOF10: 3

## 2019-10-03 ASSESSMENT — PAIN DESCRIPTION - LOCATION: LOCATION: LEG

## 2019-10-03 ASSESSMENT — PAIN DESCRIPTION - DESCRIPTORS: DESCRIPTORS: SORE;ACHING

## 2019-10-03 ASSESSMENT — PAIN DESCRIPTION - PAIN TYPE: TYPE: CHRONIC PAIN

## 2019-10-03 ASSESSMENT — PAIN DESCRIPTION - ORIENTATION: ORIENTATION: LEFT;RIGHT

## 2019-10-07 ENCOUNTER — HOSPITAL ENCOUNTER (OUTPATIENT)
Dept: PHYSICAL THERAPY | Age: 64
Setting detail: THERAPIES SERIES
Discharge: HOME | End: 2019-10-07
Payer: MEDICARE

## 2019-10-07 PROCEDURE — 97140 MANUAL THERAPY 1/> REGIONS: CPT

## 2019-10-07 PROCEDURE — 97110 THERAPEUTIC EXERCISES: CPT

## 2019-10-07 ASSESSMENT — PAIN DESCRIPTION - PAIN TYPE: TYPE: CHRONIC PAIN

## 2019-10-07 ASSESSMENT — PAIN SCALES - GENERAL: PAINLEVEL_OUTOF10: 5

## 2019-10-07 ASSESSMENT — PAIN DESCRIPTION - DESCRIPTORS: DESCRIPTORS: ACHING;SORE

## 2019-10-07 ASSESSMENT — PAIN DESCRIPTION - LOCATION: LOCATION: LEG

## 2019-10-07 ASSESSMENT — PAIN DESCRIPTION - ORIENTATION: ORIENTATION: LEFT;RIGHT

## 2019-10-10 ENCOUNTER — HOSPITAL ENCOUNTER (OUTPATIENT)
Dept: PHYSICAL THERAPY | Age: 64
Setting detail: THERAPIES SERIES
Discharge: HOME | End: 2019-10-10
Payer: MEDICARE

## 2019-10-10 PROCEDURE — 97110 THERAPEUTIC EXERCISES: CPT

## 2019-10-10 PROCEDURE — 97140 MANUAL THERAPY 1/> REGIONS: CPT

## 2019-10-10 ASSESSMENT — PAIN DESCRIPTION - ORIENTATION: ORIENTATION: LEFT;RIGHT

## 2019-10-10 ASSESSMENT — PAIN SCALES - GENERAL: PAINLEVEL_OUTOF10: 3

## 2019-10-10 ASSESSMENT — PAIN DESCRIPTION - DESCRIPTORS: DESCRIPTORS: SORE;ACHING

## 2019-10-10 ASSESSMENT — PAIN DESCRIPTION - LOCATION: LOCATION: LEG

## 2019-10-14 ENCOUNTER — HOSPITAL ENCOUNTER (OUTPATIENT)
Dept: PHYSICAL THERAPY | Age: 64
Setting detail: THERAPIES SERIES
Discharge: HOME | End: 2019-10-14
Payer: MEDICARE

## 2019-10-14 PROCEDURE — 97110 THERAPEUTIC EXERCISES: CPT

## 2019-10-14 PROCEDURE — 97140 MANUAL THERAPY 1/> REGIONS: CPT

## 2019-10-14 ASSESSMENT — PAIN DESCRIPTION - ORIENTATION: ORIENTATION: LEFT;RIGHT

## 2019-10-14 ASSESSMENT — PAIN SCALES - GENERAL: PAINLEVEL_OUTOF10: 5

## 2019-10-14 ASSESSMENT — PAIN DESCRIPTION - LOCATION: LOCATION: LEG

## 2019-10-14 ASSESSMENT — PAIN DESCRIPTION - DESCRIPTORS: DESCRIPTORS: SORE;ACHING

## 2019-10-14 ASSESSMENT — PAIN DESCRIPTION - PAIN TYPE: TYPE: CHRONIC PAIN

## 2019-10-17 ENCOUNTER — HOSPITAL ENCOUNTER (OUTPATIENT)
Dept: PHYSICAL THERAPY | Age: 64
Setting detail: THERAPIES SERIES
Discharge: HOME | End: 2019-10-17
Payer: MEDICARE

## 2019-10-17 PROCEDURE — 97110 THERAPEUTIC EXERCISES: CPT

## 2019-10-17 ASSESSMENT — PAIN DESCRIPTION - PAIN TYPE: TYPE: CHRONIC PAIN

## 2019-10-17 ASSESSMENT — PAIN DESCRIPTION - LOCATION: LOCATION: LEG

## 2019-10-17 ASSESSMENT — PAIN DESCRIPTION - ORIENTATION: ORIENTATION: RIGHT;LEFT

## 2019-10-17 ASSESSMENT — PAIN DESCRIPTION - DESCRIPTORS: DESCRIPTORS: ACHING;SORE

## 2019-10-17 ASSESSMENT — PAIN SCALES - GENERAL: PAINLEVEL_OUTOF10: 3

## 2019-10-21 ENCOUNTER — HOSPITAL ENCOUNTER (OUTPATIENT)
Dept: PHYSICAL THERAPY | Age: 64
Setting detail: THERAPIES SERIES
Discharge: HOME | End: 2019-10-21
Payer: MEDICARE

## 2019-10-21 PROCEDURE — 97110 THERAPEUTIC EXERCISES: CPT

## 2019-10-21 ASSESSMENT — PAIN DESCRIPTION - LOCATION: LOCATION: LEG

## 2019-10-21 ASSESSMENT — PAIN DESCRIPTION - DESCRIPTORS: DESCRIPTORS: ACHING;SORE

## 2019-10-21 ASSESSMENT — PAIN DESCRIPTION - ORIENTATION: ORIENTATION: RIGHT;LEFT

## 2019-10-21 ASSESSMENT — PAIN DESCRIPTION - PAIN TYPE: TYPE: CHRONIC PAIN

## 2019-10-21 ASSESSMENT — PAIN SCALES - GENERAL: PAINLEVEL_OUTOF10: 6

## 2019-10-24 ENCOUNTER — HOSPITAL ENCOUNTER (OUTPATIENT)
Dept: PHYSICAL THERAPY | Age: 64
Setting detail: THERAPIES SERIES
Discharge: HOME | End: 2019-10-24
Payer: MEDICARE

## 2019-10-24 PROCEDURE — 97110 THERAPEUTIC EXERCISES: CPT

## 2019-10-24 ASSESSMENT — PAIN DESCRIPTION - PAIN TYPE: TYPE: CHRONIC PAIN

## 2019-10-24 ASSESSMENT — PAIN DESCRIPTION - LOCATION: LOCATION: LEG

## 2019-10-24 ASSESSMENT — PAIN DESCRIPTION - DESCRIPTORS: DESCRIPTORS: ACHING

## 2019-10-24 ASSESSMENT — PAIN SCALES - GENERAL: PAINLEVEL_OUTOF10: 3

## 2019-10-28 ENCOUNTER — HOSPITAL ENCOUNTER (OUTPATIENT)
Dept: PHYSICAL THERAPY | Age: 64
Setting detail: THERAPIES SERIES
Discharge: HOME | End: 2019-10-28
Payer: MEDICARE

## 2019-10-28 PROCEDURE — 97110 THERAPEUTIC EXERCISES: CPT

## 2019-10-28 ASSESSMENT — PAIN SCALES - GENERAL: PAINLEVEL_OUTOF10: 5

## 2019-10-28 ASSESSMENT — PAIN DESCRIPTION - DESCRIPTORS: DESCRIPTORS: ACHING

## 2019-10-28 ASSESSMENT — PAIN DESCRIPTION - ORIENTATION: ORIENTATION: RIGHT;LEFT

## 2019-10-28 ASSESSMENT — PAIN DESCRIPTION - LOCATION: LOCATION: LEG

## 2019-10-28 ASSESSMENT — PAIN DESCRIPTION - PAIN TYPE: TYPE: CHRONIC PAIN

## 2019-10-31 ENCOUNTER — HOSPITAL ENCOUNTER (OUTPATIENT)
Dept: PHYSICAL THERAPY | Age: 64
Setting detail: THERAPIES SERIES
Discharge: HOME | End: 2019-10-31
Payer: MEDICARE

## 2019-10-31 PROCEDURE — 97110 THERAPEUTIC EXERCISES: CPT

## 2019-10-31 ASSESSMENT — PAIN DESCRIPTION - LOCATION: LOCATION: LEG

## 2019-10-31 ASSESSMENT — PAIN DESCRIPTION - DESCRIPTORS: DESCRIPTORS: RADIATING

## 2019-10-31 ASSESSMENT — PAIN SCALES - GENERAL: PAINLEVEL_OUTOF10: 3

## 2019-10-31 ASSESSMENT — PAIN DESCRIPTION - PAIN TYPE: TYPE: CHRONIC PAIN

## 2019-10-31 ASSESSMENT — PAIN DESCRIPTION - ORIENTATION: ORIENTATION: RIGHT

## 2019-11-04 ENCOUNTER — HOSPITAL ENCOUNTER (OUTPATIENT)
Dept: PHYSICAL THERAPY | Age: 64
Setting detail: THERAPIES SERIES
Discharge: HOME | End: 2019-11-04
Payer: MEDICARE

## 2019-11-04 PROCEDURE — 97110 THERAPEUTIC EXERCISES: CPT

## 2019-11-04 ASSESSMENT — PAIN DESCRIPTION - PAIN TYPE: TYPE: CHRONIC PAIN

## 2019-11-04 ASSESSMENT — PAIN DESCRIPTION - ORIENTATION: ORIENTATION: RIGHT

## 2019-11-04 ASSESSMENT — PAIN SCALES - GENERAL: PAINLEVEL_OUTOF10: 5

## 2019-11-04 ASSESSMENT — PAIN DESCRIPTION - DESCRIPTORS: DESCRIPTORS: RADIATING

## 2019-11-04 ASSESSMENT — PAIN DESCRIPTION - LOCATION: LOCATION: LEG

## 2019-11-07 ENCOUNTER — HOSPITAL ENCOUNTER (OUTPATIENT)
Dept: PHYSICAL THERAPY | Age: 64
Setting detail: THERAPIES SERIES
Discharge: HOME | End: 2019-11-07
Payer: MEDICARE

## 2019-11-11 ENCOUNTER — HOSPITAL ENCOUNTER (OUTPATIENT)
Dept: PHYSICAL THERAPY | Age: 64
Setting detail: THERAPIES SERIES
Discharge: HOME | End: 2019-11-11
Payer: MEDICARE

## 2019-11-11 PROCEDURE — 97110 THERAPEUTIC EXERCISES: CPT

## 2019-11-11 ASSESSMENT — PAIN DESCRIPTION - DESCRIPTORS: DESCRIPTORS: ACHING

## 2019-11-11 ASSESSMENT — PAIN DESCRIPTION - PAIN TYPE: TYPE: CHRONIC PAIN

## 2019-11-11 ASSESSMENT — PAIN SCALES - GENERAL: PAINLEVEL_OUTOF10: 2

## 2019-11-11 ASSESSMENT — PAIN DESCRIPTION - ORIENTATION: ORIENTATION: RIGHT

## 2019-11-11 ASSESSMENT — PAIN DESCRIPTION - LOCATION: LOCATION: KNEE

## 2019-11-12 PROBLEM — E66.01 CLASS 3 SEVERE OBESITY DUE TO EXCESS CALORIES WITHOUT SERIOUS COMORBIDITY WITH BODY MASS INDEX (BMI) OF 40.0 TO 44.9 IN ADULT (HCC): Status: ACTIVE | Noted: 2019-11-12

## 2019-11-12 PROBLEM — M47.27 OSTEOARTHRITIS OF SPINE WITH RADICULOPATHY, LUMBOSACRAL REGION: Status: ACTIVE | Noted: 2019-11-12

## 2019-11-12 PROBLEM — E66.813 CLASS 3 SEVERE OBESITY DUE TO EXCESS CALORIES WITHOUT SERIOUS COMORBIDITY WITH BODY MASS INDEX (BMI) OF 40.0 TO 44.9 IN ADULT (HCC): Status: ACTIVE | Noted: 2019-11-12

## 2019-11-14 ENCOUNTER — HOSPITAL ENCOUNTER (OUTPATIENT)
Dept: PHYSICAL THERAPY | Age: 64
Setting detail: THERAPIES SERIES
Discharge: HOME | End: 2019-11-14
Payer: MEDICARE

## 2019-11-14 PROCEDURE — 97110 THERAPEUTIC EXERCISES: CPT

## 2019-11-14 PROCEDURE — 97140 MANUAL THERAPY 1/> REGIONS: CPT

## 2019-11-14 ASSESSMENT — PAIN DESCRIPTION - DESCRIPTORS: DESCRIPTORS: ACHING

## 2019-11-14 ASSESSMENT — PAIN SCALES - GENERAL: PAINLEVEL_OUTOF10: 4

## 2019-11-14 ASSESSMENT — PAIN DESCRIPTION - LOCATION: LOCATION: LEG

## 2019-11-14 ASSESSMENT — PAIN DESCRIPTION - ORIENTATION: ORIENTATION: RIGHT

## 2019-11-18 ENCOUNTER — HOSPITAL ENCOUNTER (OUTPATIENT)
Dept: PHYSICAL THERAPY | Age: 64
Setting detail: THERAPIES SERIES
Discharge: HOME | End: 2019-11-18
Payer: MEDICARE

## 2019-11-18 PROCEDURE — 97140 MANUAL THERAPY 1/> REGIONS: CPT

## 2019-11-18 PROCEDURE — 97110 THERAPEUTIC EXERCISES: CPT

## 2019-11-18 ASSESSMENT — PAIN DESCRIPTION - LOCATION: LOCATION: LEG

## 2019-11-18 ASSESSMENT — PAIN DESCRIPTION - ORIENTATION: ORIENTATION: RIGHT

## 2019-11-18 ASSESSMENT — PAIN DESCRIPTION - DESCRIPTORS: DESCRIPTORS: ACHING

## 2019-11-18 ASSESSMENT — PAIN SCALES - GENERAL: PAINLEVEL_OUTOF10: 3

## 2019-11-18 ASSESSMENT — PAIN DESCRIPTION - PAIN TYPE: TYPE: CHRONIC PAIN

## 2019-11-21 ENCOUNTER — HOSPITAL ENCOUNTER (OUTPATIENT)
Dept: PHYSICAL THERAPY | Age: 64
Setting detail: THERAPIES SERIES
Discharge: HOME | End: 2019-11-21
Payer: MEDICARE

## 2019-11-21 PROCEDURE — 97110 THERAPEUTIC EXERCISES: CPT

## 2019-12-12 PROBLEM — M48.061 FORAMINAL STENOSIS OF LUMBAR REGION: Status: ACTIVE | Noted: 2019-12-12

## 2019-12-20 ENCOUNTER — HOSPITAL ENCOUNTER (OUTPATIENT)
Dept: PREADMISSION TESTING | Age: 64
Discharge: HOME | End: 2019-12-24
Payer: MEDICARE

## 2019-12-20 VITALS
WEIGHT: 230.2 LBS | OXYGEN SATURATION: 99 % | SYSTOLIC BLOOD PRESSURE: 126 MMHG | HEART RATE: 75 BPM | BODY MASS INDEX: 43.46 KG/M2 | DIASTOLIC BLOOD PRESSURE: 68 MMHG | RESPIRATION RATE: 16 BRPM | TEMPERATURE: 97.5 F | HEIGHT: 61 IN

## 2019-12-20 DIAGNOSIS — Z87.891 FORMER SMOKER, STOPPED SMOKING IN DISTANT PAST: Chronic | ICD-10-CM

## 2019-12-20 DIAGNOSIS — M47.816 LUMBAR SPONDYLOSIS: ICD-10-CM

## 2019-12-20 LAB
ABO/RH: NORMAL
ANION GAP SERPL CALCULATED.3IONS-SCNC: 11 MEQ/L (ref 9–15)
ANTIBODY SCREEN: NORMAL
BUN BLDV-MCNC: 11 MG/DL (ref 8–23)
CALCIUM SERPL-MCNC: 10.1 MG/DL (ref 8.5–9.9)
CHLORIDE BLD-SCNC: 105 MEQ/L (ref 95–107)
CO2: 25 MEQ/L (ref 20–31)
CREAT SERPL-MCNC: 0.55 MG/DL (ref 0.5–0.9)
EKG ATRIAL RATE: 66 BPM
EKG P AXIS: 45 DEGREES
EKG P-R INTERVAL: 160 MS
EKG Q-T INTERVAL: 388 MS
EKG QRS DURATION: 72 MS
EKG QTC CALCULATION (BAZETT): 406 MS
EKG R AXIS: 2 DEGREES
EKG T AXIS: 14 DEGREES
EKG VENTRICULAR RATE: 66 BPM
GFR AFRICAN AMERICAN: >60
GFR NON-AFRICAN AMERICAN: >60
GLUCOSE BLD-MCNC: 84 MG/DL (ref 70–99)
HCT VFR BLD CALC: 35.3 % (ref 37–47)
HEMOGLOBIN: 11.5 G/DL (ref 12–16)
INR BLD: 2.1
MCH RBC QN AUTO: 25.5 PG (ref 27–31.3)
MCHC RBC AUTO-ENTMCNC: 32.6 % (ref 33–37)
MCV RBC AUTO: 78 FL (ref 82–100)
PDW BLD-RTO: 15.5 % (ref 11.5–14.5)
PLATELET # BLD: 178 K/UL (ref 130–400)
POTASSIUM SERPL-SCNC: 4.1 MEQ/L (ref 3.4–4.9)
PROTHROMBIN TIME: 24.4 SEC (ref 12.3–14.9)
RBC # BLD: 4.53 M/UL (ref 4.2–5.4)
SODIUM BLD-SCNC: 141 MEQ/L (ref 135–144)
WBC # BLD: 6.7 K/UL (ref 4.8–10.8)

## 2019-12-20 PROCEDURE — 86850 RBC ANTIBODY SCREEN: CPT

## 2019-12-20 PROCEDURE — 80048 BASIC METABOLIC PNL TOTAL CA: CPT

## 2019-12-20 PROCEDURE — 86900 BLOOD TYPING SEROLOGIC ABO: CPT

## 2019-12-20 PROCEDURE — 85610 PROTHROMBIN TIME: CPT

## 2019-12-20 PROCEDURE — 93005 ELECTROCARDIOGRAM TRACING: CPT | Performed by: NURSE PRACTITIONER

## 2019-12-20 PROCEDURE — 85027 COMPLETE CBC AUTOMATED: CPT

## 2019-12-20 PROCEDURE — 86901 BLOOD TYPING SEROLOGIC RH(D): CPT

## 2019-12-20 RX ORDER — SODIUM CHLORIDE 0.9 % (FLUSH) 0.9 %
10 SYRINGE (ML) INJECTION PRN
Status: CANCELLED | OUTPATIENT
Start: 2019-12-30

## 2019-12-20 RX ORDER — GABAPENTIN 300 MG/1
600 CAPSULE ORAL NIGHTLY
COMMUNITY
Start: 2019-12-12 | End: 2020-09-10

## 2019-12-20 RX ORDER — LIDOCAINE HYDROCHLORIDE 10 MG/ML
1 INJECTION, SOLUTION EPIDURAL; INFILTRATION; INTRACAUDAL; PERINEURAL
Status: CANCELLED | OUTPATIENT
Start: 2019-12-30 | End: 2019-12-30

## 2019-12-20 RX ORDER — SODIUM CHLORIDE 0.9 % (FLUSH) 0.9 %
10 SYRINGE (ML) INJECTION EVERY 12 HOURS SCHEDULED
Status: CANCELLED | OUTPATIENT
Start: 2019-12-30

## 2019-12-20 RX ORDER — SODIUM CHLORIDE, SODIUM LACTATE, POTASSIUM CHLORIDE, CALCIUM CHLORIDE 600; 310; 30; 20 MG/100ML; MG/100ML; MG/100ML; MG/100ML
INJECTION, SOLUTION INTRAVENOUS CONTINUOUS
Status: CANCELLED | OUTPATIENT
Start: 2019-12-30

## 2019-12-20 RX ORDER — IRBESARTAN 300 MG/1
300 TABLET ORAL DAILY
Status: ON HOLD | COMMUNITY
Start: 2019-12-09 | End: 2020-09-03 | Stop reason: HOSPADM

## 2019-12-20 ASSESSMENT — ENCOUNTER SYMPTOMS
CONSTIPATION: 0
NAUSEA: 0
DIARRHEA: 0
WHEEZING: 0
VOMITING: 0
BACK PAIN: 1
TROUBLE SWALLOWING: 0
ALLERGIC/IMMUNOLOGIC NEGATIVE: 1
SORE THROAT: 0
SHORTNESS OF BREATH: 0
ABDOMINAL PAIN: 0
EYES NEGATIVE: 1
CHEST TIGHTNESS: 0
COUGH: 0
STRIDOR: 0

## 2019-12-21 PROCEDURE — 93010 ELECTROCARDIOGRAM REPORT: CPT | Performed by: INTERNAL MEDICINE

## 2019-12-29 ENCOUNTER — ANESTHESIA EVENT (OUTPATIENT)
Dept: OPERATING ROOM | Age: 64
End: 2019-12-29
Payer: MEDICARE

## 2019-12-30 ENCOUNTER — HOSPITAL ENCOUNTER (OUTPATIENT)
Dept: GENERAL RADIOLOGY | Age: 64
Setting detail: OUTPATIENT SURGERY
Discharge: HOME | End: 2020-01-01
Attending: NEUROLOGICAL SURGERY
Payer: MEDICARE

## 2019-12-30 ENCOUNTER — ANESTHESIA (OUTPATIENT)
Dept: OPERATING ROOM | Age: 64
End: 2019-12-30
Payer: MEDICARE

## 2019-12-30 ENCOUNTER — HOSPITAL ENCOUNTER (OUTPATIENT)
Age: 64
Discharge: HOME | End: 2019-12-31
Attending: NEUROLOGICAL SURGERY | Admitting: NEUROLOGICAL SURGERY
Payer: MEDICARE

## 2019-12-30 VITALS
OXYGEN SATURATION: 100 % | DIASTOLIC BLOOD PRESSURE: 67 MMHG | SYSTOLIC BLOOD PRESSURE: 120 MMHG | TEMPERATURE: 96.6 F | RESPIRATION RATE: 26 BRPM

## 2019-12-30 PROBLEM — M48.061 SPINAL STENOSIS AT L4-L5 LEVEL: Status: ACTIVE | Noted: 2019-12-30

## 2019-12-30 LAB
INR BLD: 1
PROTHROMBIN TIME: 13 SEC (ref 12.3–14.9)

## 2019-12-30 PROCEDURE — 2720000010 HC SURG SUPPLY STERILE: Performed by: NEUROLOGICAL SURGERY

## 2019-12-30 PROCEDURE — 3209999900 FLUORO FOR SURGICAL PROCEDURES

## 2019-12-30 PROCEDURE — 7100000001 HC PACU RECOVERY - ADDTL 15 MIN: Performed by: NEUROLOGICAL SURGERY

## 2019-12-30 PROCEDURE — 2709999900 HC NON-CHARGEABLE SUPPLY: Performed by: NEUROLOGICAL SURGERY

## 2019-12-30 PROCEDURE — 6360000002 HC RX W HCPCS: Performed by: NURSE ANESTHETIST, CERTIFIED REGISTERED

## 2019-12-30 PROCEDURE — 2580000003 HC RX 258: Performed by: NURSE PRACTITIONER

## 2019-12-30 PROCEDURE — 2500000003 HC RX 250 WO HCPCS: Performed by: NURSE ANESTHETIST, CERTIFIED REGISTERED

## 2019-12-30 PROCEDURE — 6370000000 HC RX 637 (ALT 250 FOR IP): Performed by: NEUROLOGICAL SURGERY

## 2019-12-30 PROCEDURE — 6370000000 HC RX 637 (ALT 250 FOR IP): Performed by: INTERNAL MEDICINE

## 2019-12-30 PROCEDURE — 3700000000 HC ANESTHESIA ATTENDED CARE: Performed by: NEUROLOGICAL SURGERY

## 2019-12-30 PROCEDURE — 6360000002 HC RX W HCPCS: Performed by: ANESTHESIOLOGY

## 2019-12-30 PROCEDURE — 7100000000 HC PACU RECOVERY - FIRST 15 MIN: Performed by: NEUROLOGICAL SURGERY

## 2019-12-30 PROCEDURE — 2580000003 HC RX 258: Performed by: NEUROLOGICAL SURGERY

## 2019-12-30 PROCEDURE — 3700000001 HC ADD 15 MINUTES (ANESTHESIA): Performed by: NEUROLOGICAL SURGERY

## 2019-12-30 PROCEDURE — 6360000002 HC RX W HCPCS: Performed by: NURSE PRACTITIONER

## 2019-12-30 PROCEDURE — 85610 PROTHROMBIN TIME: CPT

## 2019-12-30 PROCEDURE — 3600000004 HC SURGERY LEVEL 4 BASE: Performed by: NEUROLOGICAL SURGERY

## 2019-12-30 PROCEDURE — 3600000014 HC SURGERY LEVEL 4 ADDTL 15MIN: Performed by: NEUROLOGICAL SURGERY

## 2019-12-30 PROCEDURE — 2500000003 HC RX 250 WO HCPCS: Performed by: NEUROLOGICAL SURGERY

## 2019-12-30 RX ORDER — TRAMADOL HYDROCHLORIDE 50 MG/1
50 TABLET ORAL EVERY 6 HOURS PRN
Status: DISCONTINUED | OUTPATIENT
Start: 2019-12-30 | End: 2019-12-31 | Stop reason: HOSPADM

## 2019-12-30 RX ORDER — SODIUM CHLORIDE 9 MG/ML
INJECTION, SOLUTION INTRAVENOUS CONTINUOUS
Status: DISCONTINUED | OUTPATIENT
Start: 2019-12-30 | End: 2019-12-31 | Stop reason: HOSPADM

## 2019-12-30 RX ORDER — ONDANSETRON 2 MG/ML
4 INJECTION INTRAMUSCULAR; INTRAVENOUS EVERY 6 HOURS PRN
Status: DISCONTINUED | OUTPATIENT
Start: 2019-12-30 | End: 2019-12-31 | Stop reason: HOSPADM

## 2019-12-30 RX ORDER — SODIUM CHLORIDE, SODIUM LACTATE, POTASSIUM CHLORIDE, CALCIUM CHLORIDE 600; 310; 30; 20 MG/100ML; MG/100ML; MG/100ML; MG/100ML
INJECTION, SOLUTION INTRAVENOUS CONTINUOUS
Status: DISCONTINUED | OUTPATIENT
Start: 2019-12-30 | End: 2019-12-30

## 2019-12-30 RX ORDER — MAGNESIUM HYDROXIDE 1200 MG/15ML
LIQUID ORAL CONTINUOUS PRN
Status: COMPLETED | OUTPATIENT
Start: 2019-12-30 | End: 2019-12-30

## 2019-12-30 RX ORDER — IRBESARTAN 150 MG/1
300 TABLET ORAL NIGHTLY
Status: DISCONTINUED | OUTPATIENT
Start: 2019-12-30 | End: 2019-12-31 | Stop reason: HOSPADM

## 2019-12-30 RX ORDER — MEPERIDINE HYDROCHLORIDE 25 MG/ML
12.5 INJECTION INTRAMUSCULAR; INTRAVENOUS; SUBCUTANEOUS EVERY 5 MIN PRN
Status: DISCONTINUED | OUTPATIENT
Start: 2019-12-30 | End: 2019-12-30 | Stop reason: HOSPADM

## 2019-12-30 RX ORDER — FOLIC ACID 1 MG/1
1 TABLET ORAL DAILY
Status: DISCONTINUED | OUTPATIENT
Start: 2019-12-30 | End: 2019-12-31 | Stop reason: HOSPADM

## 2019-12-30 RX ORDER — FENTANYL CITRATE 50 UG/ML
50 INJECTION, SOLUTION INTRAMUSCULAR; INTRAVENOUS EVERY 10 MIN PRN
Status: DISCONTINUED | OUTPATIENT
Start: 2019-12-30 | End: 2019-12-30 | Stop reason: HOSPADM

## 2019-12-30 RX ORDER — DIPHENHYDRAMINE HYDROCHLORIDE 50 MG/ML
12.5 INJECTION INTRAMUSCULAR; INTRAVENOUS
Status: DISCONTINUED | OUTPATIENT
Start: 2019-12-30 | End: 2019-12-30 | Stop reason: HOSPADM

## 2019-12-30 RX ORDER — ONDANSETRON 2 MG/ML
4 INJECTION INTRAMUSCULAR; INTRAVENOUS
Status: DISCONTINUED | OUTPATIENT
Start: 2019-12-30 | End: 2019-12-30 | Stop reason: HOSPADM

## 2019-12-30 RX ORDER — DILTIAZEM HYDROCHLORIDE 240 MG/1
240 CAPSULE, COATED, EXTENDED RELEASE ORAL DAILY
Status: DISCONTINUED | OUTPATIENT
Start: 2019-12-30 | End: 2019-12-31 | Stop reason: HOSPADM

## 2019-12-30 RX ORDER — MIDAZOLAM HYDROCHLORIDE 1 MG/ML
INJECTION INTRAMUSCULAR; INTRAVENOUS PRN
Status: DISCONTINUED | OUTPATIENT
Start: 2019-12-30 | End: 2019-12-30 | Stop reason: SDUPTHER

## 2019-12-30 RX ORDER — BUPIVACAINE HYDROCHLORIDE AND EPINEPHRINE 5; 5 MG/ML; UG/ML
INJECTION, SOLUTION EPIDURAL; INTRACAUDAL; PERINEURAL PRN
Status: DISCONTINUED | OUTPATIENT
Start: 2019-12-30 | End: 2019-12-30 | Stop reason: ALTCHOICE

## 2019-12-30 RX ORDER — PANTOPRAZOLE SODIUM 40 MG/1
40 TABLET, DELAYED RELEASE ORAL
Status: DISCONTINUED | OUTPATIENT
Start: 2019-12-31 | End: 2019-12-31 | Stop reason: HOSPADM

## 2019-12-30 RX ORDER — LIDOCAINE HYDROCHLORIDE 20 MG/ML
INJECTION, SOLUTION INTRAVENOUS PRN
Status: DISCONTINUED | OUTPATIENT
Start: 2019-12-30 | End: 2019-12-30 | Stop reason: SDUPTHER

## 2019-12-30 RX ORDER — PROPOFOL 10 MG/ML
INJECTION, EMULSION INTRAVENOUS PRN
Status: DISCONTINUED | OUTPATIENT
Start: 2019-12-30 | End: 2019-12-30 | Stop reason: SDUPTHER

## 2019-12-30 RX ORDER — ROCURONIUM BROMIDE 10 MG/ML
INJECTION, SOLUTION INTRAVENOUS PRN
Status: DISCONTINUED | OUTPATIENT
Start: 2019-12-30 | End: 2019-12-30 | Stop reason: SDUPTHER

## 2019-12-30 RX ORDER — SODIUM CHLORIDE 0.9 % (FLUSH) 0.9 %
10 SYRINGE (ML) INJECTION PRN
Status: DISCONTINUED | OUTPATIENT
Start: 2019-12-30 | End: 2019-12-30 | Stop reason: HOSPADM

## 2019-12-30 RX ORDER — DEXAMETHASONE SODIUM PHOSPHATE 10 MG/ML
INJECTION INTRAMUSCULAR; INTRAVENOUS PRN
Status: DISCONTINUED | OUTPATIENT
Start: 2019-12-30 | End: 2019-12-30 | Stop reason: SDUPTHER

## 2019-12-30 RX ORDER — METOCLOPRAMIDE HYDROCHLORIDE 5 MG/ML
10 INJECTION INTRAMUSCULAR; INTRAVENOUS
Status: DISCONTINUED | OUTPATIENT
Start: 2019-12-30 | End: 2019-12-30 | Stop reason: HOSPADM

## 2019-12-30 RX ORDER — GABAPENTIN 300 MG/1
300 CAPSULE ORAL NIGHTLY
Status: DISCONTINUED | OUTPATIENT
Start: 2019-12-30 | End: 2019-12-31 | Stop reason: HOSPADM

## 2019-12-30 RX ORDER — SODIUM CHLORIDE 0.9 % (FLUSH) 0.9 %
10 SYRINGE (ML) INJECTION EVERY 12 HOURS SCHEDULED
Status: DISCONTINUED | OUTPATIENT
Start: 2019-12-30 | End: 2019-12-31 | Stop reason: HOSPADM

## 2019-12-30 RX ORDER — DOCUSATE SODIUM 100 MG/1
100 CAPSULE, LIQUID FILLED ORAL 2 TIMES DAILY
Status: DISCONTINUED | OUTPATIENT
Start: 2019-12-30 | End: 2019-12-31 | Stop reason: HOSPADM

## 2019-12-30 RX ORDER — LIDOCAINE HYDROCHLORIDE 10 MG/ML
1 INJECTION, SOLUTION EPIDURAL; INFILTRATION; INTRACAUDAL; PERINEURAL
Status: DISCONTINUED | OUTPATIENT
Start: 2019-12-30 | End: 2019-12-30 | Stop reason: HOSPADM

## 2019-12-30 RX ORDER — OXYCODONE HYDROCHLORIDE 5 MG/1
5 TABLET ORAL EVERY 4 HOURS PRN
Status: DISCONTINUED | OUTPATIENT
Start: 2019-12-30 | End: 2019-12-30

## 2019-12-30 RX ORDER — SODIUM CHLORIDE 0.9 % (FLUSH) 0.9 %
10 SYRINGE (ML) INJECTION PRN
Status: DISCONTINUED | OUTPATIENT
Start: 2019-12-30 | End: 2019-12-31 | Stop reason: HOSPADM

## 2019-12-30 RX ORDER — ONDANSETRON 2 MG/ML
INJECTION INTRAMUSCULAR; INTRAVENOUS PRN
Status: DISCONTINUED | OUTPATIENT
Start: 2019-12-30 | End: 2019-12-30 | Stop reason: SDUPTHER

## 2019-12-30 RX ORDER — MONTELUKAST SODIUM 10 MG/1
10 TABLET ORAL NIGHTLY
Status: DISCONTINUED | OUTPATIENT
Start: 2019-12-30 | End: 2019-12-31 | Stop reason: HOSPADM

## 2019-12-30 RX ORDER — SODIUM CHLORIDE 0.9 % (FLUSH) 0.9 %
10 SYRINGE (ML) INJECTION EVERY 12 HOURS SCHEDULED
Status: DISCONTINUED | OUTPATIENT
Start: 2019-12-30 | End: 2019-12-30 | Stop reason: HOSPADM

## 2019-12-30 RX ORDER — FENTANYL CITRATE 50 UG/ML
INJECTION, SOLUTION INTRAMUSCULAR; INTRAVENOUS PRN
Status: DISCONTINUED | OUTPATIENT
Start: 2019-12-30 | End: 2019-12-30 | Stop reason: SDUPTHER

## 2019-12-30 RX ADMIN — SODIUM CHLORIDE, POTASSIUM CHLORIDE, SODIUM LACTATE AND CALCIUM CHLORIDE: 600; 310; 30; 20 INJECTION, SOLUTION INTRAVENOUS at 13:08

## 2019-12-30 RX ADMIN — FENTANYL CITRATE 50 MCG: 50 INJECTION, SOLUTION INTRAMUSCULAR; INTRAVENOUS at 15:46

## 2019-12-30 RX ADMIN — PROPOFOL 150 MG: 10 INJECTION, EMULSION INTRAVENOUS at 13:14

## 2019-12-30 RX ADMIN — ONDANSETRON 4 MG: 2 INJECTION INTRAMUSCULAR; INTRAVENOUS at 13:47

## 2019-12-30 RX ADMIN — MONTELUKAST 10 MG: 10 TABLET, FILM COATED ORAL at 20:13

## 2019-12-30 RX ADMIN — ROCURONIUM BROMIDE 50 MG: 10 INJECTION INTRAVENOUS at 13:14

## 2019-12-30 RX ADMIN — SODIUM CHLORIDE, POTASSIUM CHLORIDE, SODIUM LACTATE AND CALCIUM CHLORIDE: 600; 310; 30; 20 INJECTION, SOLUTION INTRAVENOUS at 14:15

## 2019-12-30 RX ADMIN — FENTANYL CITRATE 50 MCG: 50 INJECTION, SOLUTION INTRAMUSCULAR; INTRAVENOUS at 15:21

## 2019-12-30 RX ADMIN — ROCURONIUM BROMIDE 20 MG: 10 INJECTION INTRAVENOUS at 13:53

## 2019-12-30 RX ADMIN — DEXAMETHASONE SODIUM PHOSPHATE 10 MG: 10 INJECTION INTRAMUSCULAR; INTRAVENOUS at 13:26

## 2019-12-30 RX ADMIN — FENTANYL CITRATE 50 MCG: 50 INJECTION, SOLUTION INTRAMUSCULAR; INTRAVENOUS at 14:58

## 2019-12-30 RX ADMIN — FENTANYL CITRATE 50 MCG: 50 INJECTION, SOLUTION INTRAMUSCULAR; INTRAVENOUS at 14:34

## 2019-12-30 RX ADMIN — SUGAMMADEX 200 MG: 100 INJECTION, SOLUTION INTRAVENOUS at 14:23

## 2019-12-30 RX ADMIN — FENTANYL CITRATE 100 MCG: 50 INJECTION, SOLUTION INTRAMUSCULAR; INTRAVENOUS at 13:14

## 2019-12-30 RX ADMIN — SODIUM CHLORIDE, POTASSIUM CHLORIDE, SODIUM LACTATE AND CALCIUM CHLORIDE: 600; 310; 30; 20 INJECTION, SOLUTION INTRAVENOUS at 12:29

## 2019-12-30 RX ADMIN — DOCUSATE SODIUM 100 MG: 100 CAPSULE, LIQUID FILLED ORAL at 20:19

## 2019-12-30 RX ADMIN — MIDAZOLAM HYDROCHLORIDE 2 MG: 2 INJECTION, SOLUTION INTRAMUSCULAR; INTRAVENOUS at 13:08

## 2019-12-30 RX ADMIN — GABAPENTIN 300 MG: 300 CAPSULE ORAL at 20:13

## 2019-12-30 RX ADMIN — DILTIAZEM HYDROCHLORIDE 240 MG: 240 CAPSULE, COATED, EXTENDED RELEASE ORAL at 20:13

## 2019-12-30 RX ADMIN — LIDOCAINE HYDROCHLORIDE 60 MG: 20 INJECTION, SOLUTION INTRAVENOUS at 13:14

## 2019-12-30 RX ADMIN — TRAMADOL HYDROCHLORIDE 50 MG: 50 TABLET, FILM COATED ORAL at 19:02

## 2019-12-30 RX ADMIN — VANCOMYCIN HYDROCHLORIDE 1000 MG: 1 INJECTION, POWDER, LYOPHILIZED, FOR SOLUTION INTRAVENOUS at 12:29

## 2019-12-30 RX ADMIN — Medication 10 ML: at 20:15

## 2019-12-30 ASSESSMENT — PULMONARY FUNCTION TESTS
PIF_VALUE: 19
PIF_VALUE: 24
PIF_VALUE: 3
PIF_VALUE: 23
PIF_VALUE: 3
PIF_VALUE: 1
PIF_VALUE: 14
PIF_VALUE: 24
PIF_VALUE: 23
PIF_VALUE: 14
PIF_VALUE: 22
PIF_VALUE: 23
PIF_VALUE: 23
PIF_VALUE: 14
PIF_VALUE: 25
PIF_VALUE: 24
PIF_VALUE: 23
PIF_VALUE: 14
PIF_VALUE: 26
PIF_VALUE: 25
PIF_VALUE: 26
PIF_VALUE: 14
PIF_VALUE: 23
PIF_VALUE: 18
PIF_VALUE: 22
PIF_VALUE: 14
PIF_VALUE: 24
PIF_VALUE: 23
PIF_VALUE: 0
PIF_VALUE: 14
PIF_VALUE: 14
PIF_VALUE: 25
PIF_VALUE: 22
PIF_VALUE: 2
PIF_VALUE: 26
PIF_VALUE: 22
PIF_VALUE: 23
PIF_VALUE: 26
PIF_VALUE: 14
PIF_VALUE: 5
PIF_VALUE: 24
PIF_VALUE: 24
PIF_VALUE: 14
PIF_VALUE: 14
PIF_VALUE: 5
PIF_VALUE: 23
PIF_VALUE: 18
PIF_VALUE: 14
PIF_VALUE: 26
PIF_VALUE: 14
PIF_VALUE: 26
PIF_VALUE: 23
PIF_VALUE: 23
PIF_VALUE: 21
PIF_VALUE: 23
PIF_VALUE: 26
PIF_VALUE: 23
PIF_VALUE: 23
PIF_VALUE: 2
PIF_VALUE: 10
PIF_VALUE: 23
PIF_VALUE: 23
PIF_VALUE: 24
PIF_VALUE: 14
PIF_VALUE: 14
PIF_VALUE: 23
PIF_VALUE: 26
PIF_VALUE: 23
PIF_VALUE: 27
PIF_VALUE: 14
PIF_VALUE: 1
PIF_VALUE: 14
PIF_VALUE: 19
PIF_VALUE: 25
PIF_VALUE: 18
PIF_VALUE: 23
PIF_VALUE: 23
PIF_VALUE: 2
PIF_VALUE: 24
PIF_VALUE: 3
PIF_VALUE: 23
PIF_VALUE: 18
PIF_VALUE: 25
PIF_VALUE: 26
PIF_VALUE: 23
PIF_VALUE: 14
PIF_VALUE: 25
PIF_VALUE: 26
PIF_VALUE: 1
PIF_VALUE: 23
PIF_VALUE: 14
PIF_VALUE: 26
PIF_VALUE: 14
PIF_VALUE: 14
PIF_VALUE: 25
PIF_VALUE: 24
PIF_VALUE: 26
PIF_VALUE: 25
PIF_VALUE: 23
PIF_VALUE: 14
PIF_VALUE: 17
PIF_VALUE: 14
PIF_VALUE: 23

## 2019-12-30 ASSESSMENT — PAIN DESCRIPTION - LOCATION: LOCATION: SHOULDER;BACK

## 2019-12-30 ASSESSMENT — PAIN SCALES - GENERAL
PAINLEVEL_OUTOF10: 2
PAINLEVEL_OUTOF10: 2
PAINLEVEL_OUTOF10: 3
PAINLEVEL_OUTOF10: 4
PAINLEVEL_OUTOF10: 9
PAINLEVEL_OUTOF10: 9
PAINLEVEL_OUTOF10: 3

## 2019-12-30 ASSESSMENT — PAIN - FUNCTIONAL ASSESSMENT: PAIN_FUNCTIONAL_ASSESSMENT: 0-10

## 2019-12-30 ASSESSMENT — PAIN DESCRIPTION - PROGRESSION
CLINICAL_PROGRESSION: GRADUALLY IMPROVING
CLINICAL_PROGRESSION: GRADUALLY IMPROVING

## 2019-12-30 NOTE — BRIEF OP NOTE
Brief Postoperative Note  ______________________________________________________________    Patient: Karrie Lovell  YOB: 1955  MRN: [id number]  Date of Procedure: 12/30/2019    Pre-Op Diagnosis: RIGHT L4-5 STENOSIS    Post-Op Diagnosis: Same       Procedure(s):  RIGHT L4-5 MICRODECOMPRESSION    Anesthesia: General    Surgeon(s):  Jacqui Bernal MD        Estimated Blood Loss (mL): 50    Complications: None        Findings: stenosis    Birdie Vazquez MD  Date: 12/30/2019  Time: 2:46 PM

## 2019-12-30 NOTE — H&P
Percodan [Oxycodone-Aspirin] Hives    Pineapple Hives    Sulfa Antibiotics Hives    Dilaudid [Hydromorphone Hcl] Hives and Nausea And Vomiting         REVIEW OF SYSTEMS:  Review of Systems   Constitutional: Negative for fever. HENT: Negative for hearing loss. Respiratory: Negative for shortness of breath. Gastrointestinal: Negative for constipation, diarrhea and nausea. Genitourinary: Negative for difficulty urinating. Musculoskeletal: Negative for back pain and neck pain. Skin: Negative for rash. Neurological: Negative for headaches. Hematological: Does not bruise/bleed easily. Psychiatric/Behavioral: Negative for sleep disturbance. Kyung Gill MD [address] [place] [address] MRI ROOM 1 Tim Larson MD           Signed by      Signed Date/Time   Phone Pager   Anat Bowles 9/23/2019 15:18 [phone]          Read Date Phone Pager   Anat Bowles Sep 23, 2019 [phone]        No radiation information found for this patient   Narrative   EXAMINATION: MRI lumbar spine without contrast       CLINICAL HISTORY: Low back pain. Right leg pain       FINDINGS: Unenhanced scans obtained. T1 and T2-weighted sagittal and axial sequences and a STIR sagittal sequence were acquired. Today's study is compared with the CT scan of June 7, 2018. Please note there is a transitional vertebrae. The first 5 non rib bearing vertebral bodies will be labeled L1-L5. L5 appears to be partially sacralized. These are marked on the lateral T2-weighted sequence. The vertebral bodies are normal in signal and normal in height. There is hyperlordotic curvature at the lumbosacral junction. The conus is normal in caliber and signal and terminates at T12-L1. T12-L1, L1-L2: No disc protrusion or extradural defect of significance.        L2-L3: Bulging of the annulus and mild proliferative changes in the facet joints and ligaments posteriorly mildly narrow the central canal. No compression or displacement of nerve roots. L3-L4: Bulging of the annulus at this level is also present. Hypertrophic changes posteriorly are slightly more prominent at this level compared to the subjacent vertebrae above it. These findings account for mild to moderate symmetric central canal    stenosis. There is mild narrowing of the L3 nerve root foramina bilaterally as well. L4-L5: Degenerative changes at this level include a small annular tear posteriorly in the midline. There is also facet arthropathy bilaterally attributing to a subtle 3 mm anterolisthesis. No significant central canal or foraminal stenosis. L5-S1: Partial sacralization noted. No disc herniation or central canal stenosis. Bilateral facet arthropathy present       Prevertebral and paraspinal soft tissues unremarkable. Impression   TRANSITIONAL VERTEBRAE WITH PARTIAL SACRALIZATION OF L5. MILD CENTRAL CANAL STENOSIS IN THE MID LUMBAR SPINE AT L2-L3 AND L3-L4. HISTORY OF PRESENT ILLNESS:  Reshma Shelton,  1955, 75years old, 5 feet 1 inch, 234 pounds, BMI 44. Her physicians are Dr. Nelson Israel and Dr. Sasha Dominguez of rheumatology. She said that she is getting weaker in her legs, more so on the right than on the left. She cannot lift her right leg up. She is getting around with a cane. It has progressively worsened. She does have some pain associated with this down into her right lower extremity and across the back and the right buttock. She has been treated for breast cancer with medications in . She took them for 6 years. Three years ago, she also started more medications and that is about the time that her problems with her back and her right leg began. She has had a right mastectomy. She has also had a left mastectomy. She cannot do stairs because of the weakness in her legs and she is having a hard time getting around.   Both of them are weak but more so on the right, especially on the right, especially for results were reviewed. Questions were answered. The chart was reviewed. 25 Sam Ga, DO     12/2/2019 Bilateral L3-4 L4-5 L5-S1 lumbar facet joint injections. Dr. Gini Canela     The patient is continuing to have significant right lower extremity pain. Her back pain is much improved since her pain management injections as above. I have re-reviewed her MRI of the lumbar spine of showing a specific right L4-5 foraminal stenosis for the exit of the L4 nerve. 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 She sees Dr. Chika Davis. She does not have any diabetes. She has a patent foramen ovale requiring chronic use of warfarin. However, it can be stopped with bridging with Lovenox. Dr. Jamie Garland helped her with that for her back injections. TREATMENT AND RECOMMENDATIONS:  I have discussed with the patient she is a candidate for a right L4-5 foraminotomy, decompression. She may or may not require discectomy. I have discussed the procedure, indications and risks including small but still present chance of even something serious like death, paralysis, sensory loss, loss of bladder or bowel control, pain, bleeding, infection, CSF leak, spinal instability, chance of recurrence and so forth. Surgery is not a guarantee of normalcy. We cannot undo any permanent damage already done, cannot change the course of any of her other medical diseases. I have discussed alternative procedures, risks and benefits. I have answered all of her questions. She understands and agrees to proceed. The compression on the L4 nerve is from the superior articular facet of L5. I do not think there is a disc component. Plan is to do a decompression from medial to lateral.       The patient is walking with a cane because of her right lower extremity radicular pain syndrome. Her back pain is under much better control.   She is a candidate for decompression right L4-5. On 12/16/2019 reviewed MRI with radiologist at [facility] on the high-definition screens.   Has compression of the L4 nerve by the superior articular facet on the right at L4-5     Aaron Rowe MD

## 2019-12-31 VITALS
WEIGHT: 230 LBS | SYSTOLIC BLOOD PRESSURE: 141 MMHG | BODY MASS INDEX: 43.43 KG/M2 | TEMPERATURE: 97.7 F | HEIGHT: 61 IN | DIASTOLIC BLOOD PRESSURE: 59 MMHG | HEART RATE: 90 BPM | OXYGEN SATURATION: 99 % | RESPIRATION RATE: 18 BRPM

## 2019-12-31 LAB
ALBUMIN SERPL-MCNC: 3.4 G/DL (ref 3.5–4.6)
ALP BLD-CCNC: 126 U/L (ref 40–130)
ALT SERPL-CCNC: 44 U/L (ref 0–33)
ANION GAP SERPL CALCULATED.3IONS-SCNC: 10 MEQ/L (ref 9–15)
AST SERPL-CCNC: 29 U/L (ref 0–35)
BASOPHILS ABSOLUTE: 0 K/UL (ref 0–0.2)
BASOPHILS RELATIVE PERCENT: 0.1 %
BILIRUB SERPL-MCNC: 0.3 MG/DL (ref 0.2–0.7)
BILIRUBIN DIRECT: <0.2 MG/DL (ref 0–0.4)
BILIRUBIN, INDIRECT: ABNORMAL MG/DL (ref 0–0.6)
BUN BLDV-MCNC: 9 MG/DL (ref 8–23)
CALCIUM SERPL-MCNC: 10.2 MG/DL (ref 8.5–9.9)
CHLORIDE BLD-SCNC: 106 MEQ/L (ref 95–107)
CHOLESTEROL, TOTAL: 177 MG/DL (ref 0–199)
CO2: 23 MEQ/L (ref 20–31)
CREAT SERPL-MCNC: 0.5 MG/DL (ref 0.5–0.9)
EOSINOPHILS ABSOLUTE: 0 K/UL (ref 0–0.7)
EOSINOPHILS RELATIVE PERCENT: 0 %
FOLATE: 19.8 NG/ML (ref 7.3–26.1)
GFR AFRICAN AMERICAN: >60
GFR NON-AFRICAN AMERICAN: >60
GLUCOSE BLD-MCNC: 167 MG/DL (ref 70–99)
HBA1C MFR BLD: 5.7 % (ref 4.8–5.9)
HCT VFR BLD CALC: 32.4 % (ref 37–47)
HDLC SERPL-MCNC: 57 MG/DL (ref 40–59)
HEMOGLOBIN: 10.3 G/DL (ref 12–16)
IRON SATURATION: 7 % (ref 11–46)
IRON: 20 UG/DL (ref 37–145)
LDL CHOLESTEROL CALCULATED: 111 MG/DL (ref 0–129)
LYMPHOCYTES ABSOLUTE: 0.7 K/UL (ref 1–4.8)
LYMPHOCYTES RELATIVE PERCENT: 5.1 %
MAGNESIUM: 2.1 MG/DL (ref 1.7–2.4)
MCH RBC QN AUTO: 24.8 PG (ref 27–31.3)
MCHC RBC AUTO-ENTMCNC: 31.7 % (ref 33–37)
MCV RBC AUTO: 78.3 FL (ref 82–100)
MONOCYTES ABSOLUTE: 0.8 K/UL (ref 0.2–0.8)
MONOCYTES RELATIVE PERCENT: 5.8 %
NEUTROPHILS ABSOLUTE: 12.8 K/UL (ref 1.4–6.5)
NEUTROPHILS RELATIVE PERCENT: 89 %
PDW BLD-RTO: 15.8 % (ref 11.5–14.5)
PHOSPHORUS: 2.4 MG/DL (ref 2.3–4.8)
PLATELET # BLD: 165 K/UL (ref 130–400)
POTASSIUM SERPL-SCNC: 4 MEQ/L (ref 3.4–4.9)
RBC # BLD: 4.14 M/UL (ref 4.2–5.4)
SODIUM BLD-SCNC: 139 MEQ/L (ref 135–144)
TOTAL IRON BINDING CAPACITY: 290 UG/DL (ref 178–450)
TOTAL PROTEIN: 6.4 G/DL (ref 6.3–8)
TRIGL SERPL-MCNC: 43 MG/DL (ref 0–150)
TSH SERPL DL<=0.05 MIU/L-ACNC: 0.59 UIU/ML (ref 0.44–3.86)
VITAMIN B-12: 575 PG/ML (ref 232–1245)
WBC # BLD: 14.4 K/UL (ref 4.8–10.8)

## 2019-12-31 PROCEDURE — 83550 IRON BINDING TEST: CPT

## 2019-12-31 PROCEDURE — 84443 ASSAY THYROID STIM HORMONE: CPT

## 2019-12-31 PROCEDURE — 80061 LIPID PANEL: CPT

## 2019-12-31 PROCEDURE — 2580000003 HC RX 258: Performed by: NEUROLOGICAL SURGERY

## 2019-12-31 PROCEDURE — 83735 ASSAY OF MAGNESIUM: CPT

## 2019-12-31 PROCEDURE — 83036 HEMOGLOBIN GLYCOSYLATED A1C: CPT

## 2019-12-31 PROCEDURE — 84100 ASSAY OF PHOSPHORUS: CPT

## 2019-12-31 PROCEDURE — 82746 ASSAY OF FOLIC ACID SERUM: CPT

## 2019-12-31 PROCEDURE — 6370000000 HC RX 637 (ALT 250 FOR IP): Performed by: INTERNAL MEDICINE

## 2019-12-31 PROCEDURE — 36415 COLL VENOUS BLD VENIPUNCTURE: CPT

## 2019-12-31 PROCEDURE — 6370000000 HC RX 637 (ALT 250 FOR IP): Performed by: NEUROLOGICAL SURGERY

## 2019-12-31 PROCEDURE — 85025 COMPLETE CBC W/AUTO DIFF WBC: CPT

## 2019-12-31 PROCEDURE — 82607 VITAMIN B-12: CPT

## 2019-12-31 PROCEDURE — 80048 BASIC METABOLIC PNL TOTAL CA: CPT

## 2019-12-31 PROCEDURE — 6360000002 HC RX W HCPCS: Performed by: NEUROLOGICAL SURGERY

## 2019-12-31 PROCEDURE — 83540 ASSAY OF IRON: CPT

## 2019-12-31 PROCEDURE — 80076 HEPATIC FUNCTION PANEL: CPT

## 2019-12-31 RX ADMIN — DILTIAZEM HYDROCHLORIDE 240 MG: 240 CAPSULE, COATED, EXTENDED RELEASE ORAL at 08:32

## 2019-12-31 RX ADMIN — FOLIC ACID 1 MG: 1 TABLET ORAL at 08:32

## 2019-12-31 RX ADMIN — TRAMADOL HYDROCHLORIDE 50 MG: 50 TABLET, FILM COATED ORAL at 01:03

## 2019-12-31 RX ADMIN — VANCOMYCIN HYDROCHLORIDE 1500 MG: 5 INJECTION, POWDER, LYOPHILIZED, FOR SOLUTION INTRAVENOUS at 03:46

## 2019-12-31 RX ADMIN — PANTOPRAZOLE SODIUM 40 MG: 40 TABLET, DELAYED RELEASE ORAL at 06:36

## 2019-12-31 RX ADMIN — DOCUSATE SODIUM 100 MG: 100 CAPSULE, LIQUID FILLED ORAL at 08:32

## 2019-12-31 RX ADMIN — TRAMADOL HYDROCHLORIDE 50 MG: 50 TABLET, FILM COATED ORAL at 08:32

## 2019-12-31 ASSESSMENT — PAIN SCALES - GENERAL
PAINLEVEL_OUTOF10: 4
PAINLEVEL_OUTOF10: 1
PAINLEVEL_OUTOF10: 4

## 2019-12-31 ASSESSMENT — PAIN DESCRIPTION - PAIN TYPE: TYPE: SURGICAL PAIN;ACUTE PAIN

## 2019-12-31 ASSESSMENT — PAIN DESCRIPTION - LOCATION: LOCATION: BACK

## 2019-12-31 NOTE — OP NOTE
[address]                      [address], [address] [facility]                                OPERATIVE REPORT    PATIENT NAME: Maricruz Lozada                      :        1955  MED REC NO:   [id number]                            ROOM:       [id number]  ACCOUNT NO:   [de-identified]                           ADMIT DATE: 2019  PROVIDER:     Malena Hernandez MD    DATE OF PROCEDURE:  2019    PREOPERATIVE DIAGNOSIS:  Right L4-5 stenosis. POSTOPERATIVE DIAGNOSIS:  Right L4-5 stenosis. OPERATION PERFORMED:  Right L4-5 micro-decompression. SURGEON:  Malena Hernandez MD    DESCRIPTION OF PROCEDURE:  The patient was given general endotracheal  anesthesia in supine position. Turned to prone position. Back was  prepped and draped. Time-out, patient identified. Needle was placed. Lateral x-rays obtained to confirm level. Needle was withdrawn. Skin  was infiltrated with solution of 0.5% Marcaine with epinephrine. The  patient is obese. BMI of 44. Skin incision was made just to the right  side of spinous process at tip of L4. Dissection was carried down  through the thick subcutaneous fascia which was split just to the  lateral aspect of the L4 spinous process tip. The L4 lamina, facet  joint complex of L4-5 identified. Needle was placed. Lateral x-rays  were obtained to confirm level. Needle was withdrawn. Micro-retractor  was placed. Microscope was brought into field. With the use of the  microscope and high-speed bone dissector, I made an opening through the  lateral most inferior part of the inferior articular facet of L4. I  carried the dissection through the bone anteriorly until I encountered  the facet interface of L4 and L5 and the superior articular facet of L5. Identified the superior tip of L5. I then removed the superior tip of  L5 working medial to lateral and from superior to inferior until I could  feel the base of the pedicle of L5.   I worked laterally to make sure  that the L4 nerve root was completely decompressed. The excessive  ligamentum flavum was then removed to decompress the neural elements,  and I could visualize the dural sac and the axilla of the L4 nerve root  and the L4 nerve root traversing off the foramen completely free. The  wound was thoroughly irrigated. Retractor was removed. Fascia was  closed with 0 Vicryl suture. Subcutaneous tissues were closed with 2-0,  3-0, and 4-0 Vicryl sutures. EBL 10 mL. The patient tolerated well.         Aaron Rowe MD    D: 12/30/2019 14:55:26       T: 12/30/2019 14:59:14     [email or web address]  Job#: [id number]     Doc#: [id number]    CC:

## 2019-12-31 NOTE — DISCHARGE SUMMARY
[address]                      [address], [address] [facility]                               DISCHARGE SUMMARY    PATIENT NAMEGordy Don                      :        1955  MED REC NO:   [id number]                            ROOM:       [id number]  ACCOUNT NO:   [de-identified]                           ADMIT DATE: 2019  PROVIDER:     Stephanie Tran MD                      [facility] DATE:    On 2019, right L4-L5 micro-decompression. The patient tolerated  procedure well, anticipate discharge within this next day in good  condition. DISCHARGE DIAGNOSIS:  Right L4-L5 stenosis, improved. The patient has been instructed to keep the wound clean and dry about  three days, avoiding soaking or soap for a week. Recheck in a month. DISCHARGE PRESCRIPTION:  Hedda Sink #28 to take one q.i.d. p.r.n. pain. Recheck in one month. If she has any questions or problems, please  contact the office.         Darrius Quinteros MD    D: 2019 14:56:56       T: 2019 15:00:52     [email or web address]  Job#: [id number]     Doc#: [id number]    CC:

## 2019-12-31 NOTE — CONSULTS
[address]                      [address], [address] [facility]                                  CONSULTATION    PATIENT NAME: Emmanuel Andrews                      :        1955  MED REC NO:   [id number]                            ROOM:       [id number]  ACCOUNT NO:   [de-identified]                           ADMIT DATE: 2019  PROVIDER:     Leontine Peabody, MD    CONSULT DATE:  2019    HISTORY OF PRESENT ILLNESS:  A 67-year-old female admitted for L4-L5  micro-decompression surgery in a patient with a chronic low back pain  and also has degenerative joint disease, morbid obesity, and also with  history of hypertension, hypercholesterolemia, history of bilateral  breast carcinoma, status post chemotherapy and bilateral mastectomy;  also has history of a patent foramen ovale with TIA-like symptoms in the  past and also has a 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 The patient also has history of asthma, MRSA  infection in the past, and also has hepatitis C exposure, but has been  negative hepatitis viral load. The patient also has degenerative joint  disease, status post some joint replacement surgeries in the past and  also has a history of obstructive sleep apnea, but not on any CPAP or  BiPAP and seems to be doing well. However, with worsening low back pain  and recent MRI suggesting L4-L5 stenosis and now requiring an L4-L5  micro-decompression surgery, the patient was admitted for the procedure  and consultation was made for medical management. MEDICATIONS:  As per medication consultation sheet. ALLERGIES:  Also, as listed. FAMILY HISTORY:  Her father has hypertension and also did have a chronic  renal failure, on dialysis. Father also had two CVAs resulting in  left-sided hemiplegia. Her mother  at the age of 64, also had  pancreatic carcinoma and underwent Whipple's procedure.   Her sister has  a breast

## 2020-02-13 PROBLEM — D50.9 IRON DEFICIENCY ANEMIA, UNSPECIFIED: Status: ACTIVE | Noted: 2020-02-13

## 2020-02-13 PROBLEM — D63.1 ANEMIA OF CHRONIC RENAL FAILURE: Status: ACTIVE | Noted: 2020-02-13

## 2020-02-13 PROBLEM — N18.9 ANEMIA OF CHRONIC RENAL FAILURE: Status: ACTIVE | Noted: 2020-02-13

## 2020-02-21 ENCOUNTER — HOSPITAL ENCOUNTER (OUTPATIENT)
Dept: INFUSION THERAPY | Age: 65
Setting detail: INFUSION SERIES
Discharge: HOME | End: 2020-02-21
Payer: MEDICARE

## 2020-02-21 VITALS
TEMPERATURE: 97.8 F | SYSTOLIC BLOOD PRESSURE: 134 MMHG | RESPIRATION RATE: 16 BRPM | DIASTOLIC BLOOD PRESSURE: 61 MMHG | HEART RATE: 101 BPM

## 2020-02-21 DIAGNOSIS — D50.9 IRON DEFICIENCY ANEMIA, UNSPECIFIED IRON DEFICIENCY ANEMIA TYPE: Primary | ICD-10-CM

## 2020-02-21 DIAGNOSIS — D50.0 IRON DEFICIENCY ANEMIA SECONDARY TO BLOOD LOSS (CHRONIC): ICD-10-CM

## 2020-02-21 DIAGNOSIS — N18.9 ANEMIA OF CHRONIC RENAL FAILURE, UNSPECIFIED CKD STAGE: ICD-10-CM

## 2020-02-21 DIAGNOSIS — D63.1 ANEMIA OF CHRONIC RENAL FAILURE, UNSPECIFIED CKD STAGE: ICD-10-CM

## 2020-02-21 PROCEDURE — 96365 THER/PROPH/DIAG IV INF INIT: CPT

## 2020-02-21 PROCEDURE — 2580000003 HC RX 258: Performed by: INTERNAL MEDICINE

## 2020-02-21 PROCEDURE — 6360000002 HC RX W HCPCS: Performed by: INTERNAL MEDICINE

## 2020-02-21 RX ADMIN — IRON SUCROSE 200 MG: 20 INJECTION, SOLUTION INTRAVENOUS at 13:11

## 2020-02-21 ASSESSMENT — PAIN SCALES - GENERAL: PAINLEVEL_OUTOF10: 4

## 2020-07-22 ENCOUNTER — HOSPITAL ENCOUNTER (INPATIENT)
Age: 65
LOS: 3 days | Discharge: HOME | DRG: 378 | End: 2020-07-25
Attending: INTERNAL MEDICINE | Admitting: INTERNAL MEDICINE
Payer: MEDICARE

## 2020-07-22 ENCOUNTER — APPOINTMENT (OUTPATIENT)
Dept: CT IMAGING | Age: 65
DRG: 378 | End: 2020-07-22
Payer: MEDICARE

## 2020-07-22 PROBLEM — Z86.000 HISTORY OF CARCINOMA IN SITU OF BREAST: Status: ACTIVE | Noted: 2020-07-22

## 2020-07-22 PROBLEM — K92.2 LOWER GI BLEED: Status: ACTIVE | Noted: 2020-07-22

## 2020-07-22 PROBLEM — Z79.01 ANTICOAGULATED: Status: ACTIVE | Noted: 2020-07-22

## 2020-07-22 PROBLEM — I10 HTN (HYPERTENSION): Status: ACTIVE | Noted: 2020-07-22

## 2020-07-22 PROBLEM — Z86.73 HISTORY OF CARDIOEMBOLIC STROKE: Status: ACTIVE | Noted: 2020-07-22

## 2020-07-22 PROBLEM — M16.9 OSTEOARTHRITIS OF HIP: Status: ACTIVE | Noted: 2020-07-22

## 2020-07-22 LAB
ACANTHOCYTES: ABNORMAL
ALBUMIN SERPL-MCNC: 3.7 G/DL (ref 3.5–4.6)
ALP BLD-CCNC: 137 U/L (ref 40–130)
ALT SERPL-CCNC: 16 U/L (ref 0–33)
ANION GAP SERPL CALCULATED.3IONS-SCNC: 11 MEQ/L (ref 9–15)
ANISOCYTOSIS: ABNORMAL
APTT: 58.4 SEC (ref 24.4–36.8)
AST SERPL-CCNC: 17 U/L (ref 0–35)
BASOPHILS ABSOLUTE: 0.2 K/UL (ref 0–0.2)
BASOPHILS RELATIVE PERCENT: 2 %
BILIRUB SERPL-MCNC: <0.2 MG/DL (ref 0.2–0.7)
BUN BLDV-MCNC: 13 MG/DL (ref 8–23)
CALCIUM SERPL-MCNC: 9.9 MG/DL (ref 8.5–9.9)
CHLORIDE BLD-SCNC: 102 MEQ/L (ref 95–107)
CO2: 25 MEQ/L (ref 20–31)
CREAT SERPL-MCNC: 0.47 MG/DL (ref 0.5–0.9)
EOSINOPHILS ABSOLUTE: 0.4 K/UL (ref 0–0.7)
EOSINOPHILS RELATIVE PERCENT: 4.6 %
GFR AFRICAN AMERICAN: >60
GFR NON-AFRICAN AMERICAN: >60
GLOBULIN: 2.7 G/DL (ref 2.3–3.5)
GLUCOSE BLD-MCNC: 139 MG/DL (ref 70–99)
HCT VFR BLD CALC: 28.8 % (ref 37–47)
HCT VFR BLD CALC: 29.7 % (ref 37–47)
HEMOGLOBIN: 8.6 G/DL (ref 12–16)
HEMOGLOBIN: 9.2 G/DL (ref 12–16)
INR BLD: 2.9
LYMPHOCYTES ABSOLUTE: 1.9 K/UL (ref 1–4.8)
LYMPHOCYTES RELATIVE PERCENT: 21.4 %
MCH RBC QN AUTO: 22.8 PG (ref 27–31.3)
MCHC RBC AUTO-ENTMCNC: 31 % (ref 33–37)
MCV RBC AUTO: 73.5 FL (ref 82–100)
MICROCYTES: ABNORMAL
MONOCYTES ABSOLUTE: 1.1 K/UL (ref 0.2–0.8)
MONOCYTES RELATIVE PERCENT: 12.5 %
NEUTROPHILS ABSOLUTE: 5.2 K/UL (ref 1.4–6.5)
NEUTROPHILS RELATIVE PERCENT: 59.5 %
OVALOCYTES: ABNORMAL
PDW BLD-RTO: 36.7 % (ref 11.5–14.5)
PLATELET # BLD: 278 K/UL (ref 130–400)
POIKILOCYTES: ABNORMAL
POTASSIUM SERPL-SCNC: 4.5 MEQ/L (ref 3.4–4.9)
PROTHROMBIN TIME: 30.2 SEC (ref 12.3–14.9)
RBC # BLD: 4.04 M/UL (ref 4.2–5.4)
SLIDE REVIEW: ABNORMAL
SODIUM BLD-SCNC: 138 MEQ/L (ref 135–144)
TEAR DROP CELLS: ABNORMAL
TOTAL PROTEIN: 6.4 G/DL (ref 6.3–8)
WBC # BLD: 8.8 K/UL (ref 4.8–10.8)

## 2020-07-22 PROCEDURE — 85018 HEMOGLOBIN: CPT

## 2020-07-22 PROCEDURE — 6370000000 HC RX 637 (ALT 250 FOR IP): Performed by: NURSE PRACTITIONER

## 2020-07-22 PROCEDURE — 80053 COMPREHEN METABOLIC PANEL: CPT

## 2020-07-22 PROCEDURE — 6360000004 HC RX CONTRAST MEDICATION: Performed by: PHYSICIAN ASSISTANT

## 2020-07-22 PROCEDURE — 74177 CT ABD & PELVIS W/CONTRAST: CPT

## 2020-07-22 PROCEDURE — 36415 COLL VENOUS BLD VENIPUNCTURE: CPT

## 2020-07-22 PROCEDURE — 1210000000 HC MED SURG R&B

## 2020-07-22 PROCEDURE — 6360000002 HC RX W HCPCS: Performed by: PHYSICIAN ASSISTANT

## 2020-07-22 PROCEDURE — 85025 COMPLETE CBC W/AUTO DIFF WBC: CPT

## 2020-07-22 PROCEDURE — 85730 THROMBOPLASTIN TIME PARTIAL: CPT

## 2020-07-22 PROCEDURE — 85610 PROTHROMBIN TIME: CPT

## 2020-07-22 PROCEDURE — 2580000003 HC RX 258: Performed by: INTERNAL MEDICINE

## 2020-07-22 PROCEDURE — 85014 HEMATOCRIT: CPT

## 2020-07-22 PROCEDURE — 2580000003 HC RX 258: Performed by: PHYSICIAN ASSISTANT

## 2020-07-22 PROCEDURE — 99285 EMERGENCY DEPT VISIT HI MDM: CPT

## 2020-07-22 PROCEDURE — 96374 THER/PROPH/DIAG INJ IV PUSH: CPT

## 2020-07-22 RX ORDER — ACETAMINOPHEN 650 MG/1
650 SUPPOSITORY RECTAL EVERY 6 HOURS PRN
Status: DISCONTINUED | OUTPATIENT
Start: 2020-07-22 | End: 2020-07-25 | Stop reason: HOSPADM

## 2020-07-22 RX ORDER — ACETAMINOPHEN 325 MG/1
650 TABLET ORAL EVERY 6 HOURS PRN
Status: DISCONTINUED | OUTPATIENT
Start: 2020-07-22 | End: 2020-07-25 | Stop reason: HOSPADM

## 2020-07-22 RX ORDER — SODIUM CHLORIDE 0.9 % (FLUSH) 0.9 %
10 SYRINGE (ML) INJECTION EVERY 12 HOURS SCHEDULED
Status: DISCONTINUED | OUTPATIENT
Start: 2020-07-22 | End: 2020-07-23

## 2020-07-22 RX ORDER — SODIUM CHLORIDE 0.9 % (FLUSH) 0.9 %
10 SYRINGE (ML) INJECTION PRN
Status: DISCONTINUED | OUTPATIENT
Start: 2020-07-22 | End: 2020-07-23

## 2020-07-22 RX ORDER — SODIUM CHLORIDE 9 MG/ML
INJECTION, SOLUTION INTRAVENOUS CONTINUOUS
Status: DISCONTINUED | OUTPATIENT
Start: 2020-07-22 | End: 2020-07-24

## 2020-07-22 RX ORDER — FOLIC ACID 1 MG/1
1 TABLET ORAL DAILY
Status: DISCONTINUED | OUTPATIENT
Start: 2020-07-23 | End: 2020-07-25 | Stop reason: HOSPADM

## 2020-07-22 RX ORDER — 0.9 % SODIUM CHLORIDE 0.9 %
500 INTRAVENOUS SOLUTION INTRAVENOUS ONCE
Status: COMPLETED | OUTPATIENT
Start: 2020-07-22 | End: 2020-07-22

## 2020-07-22 RX ORDER — PROMETHAZINE HYDROCHLORIDE 12.5 MG/1
12.5 TABLET ORAL EVERY 6 HOURS PRN
Status: DISCONTINUED | OUTPATIENT
Start: 2020-07-22 | End: 2020-07-25 | Stop reason: HOSPADM

## 2020-07-22 RX ORDER — ONDANSETRON 2 MG/ML
4 INJECTION INTRAMUSCULAR; INTRAVENOUS ONCE
Status: COMPLETED | OUTPATIENT
Start: 2020-07-22 | End: 2020-07-22

## 2020-07-22 RX ORDER — GABAPENTIN 300 MG/1
300 CAPSULE ORAL NIGHTLY
Status: DISCONTINUED | OUTPATIENT
Start: 2020-07-22 | End: 2020-07-25 | Stop reason: HOSPADM

## 2020-07-22 RX ORDER — PANTOPRAZOLE SODIUM 40 MG/1
40 TABLET, DELAYED RELEASE ORAL
Status: DISCONTINUED | OUTPATIENT
Start: 2020-07-23 | End: 2020-07-23

## 2020-07-22 RX ORDER — ONDANSETRON 2 MG/ML
4 INJECTION INTRAMUSCULAR; INTRAVENOUS EVERY 6 HOURS PRN
Status: DISCONTINUED | OUTPATIENT
Start: 2020-07-22 | End: 2020-07-25 | Stop reason: HOSPADM

## 2020-07-22 RX ORDER — LOSARTAN POTASSIUM 50 MG/1
100 TABLET ORAL DAILY
Status: DISCONTINUED | OUTPATIENT
Start: 2020-07-23 | End: 2020-07-25 | Stop reason: HOSPADM

## 2020-07-22 RX ORDER — DULOXETIN HYDROCHLORIDE 60 MG/1
60 CAPSULE, DELAYED RELEASE ORAL DAILY
Status: ON HOLD | COMMUNITY
End: 2020-07-24 | Stop reason: HOSPADM

## 2020-07-22 RX ADMIN — SODIUM CHLORIDE 500 ML: 9 INJECTION, SOLUTION INTRAVENOUS at 18:37

## 2020-07-22 RX ADMIN — ONDANSETRON 4 MG: 2 INJECTION INTRAMUSCULAR; INTRAVENOUS at 18:37

## 2020-07-22 RX ADMIN — IOPAMIDOL 100 ML: 612 INJECTION, SOLUTION INTRAVENOUS at 19:10

## 2020-07-22 RX ADMIN — SODIUM CHLORIDE: 9 INJECTION, SOLUTION INTRAVENOUS at 22:49

## 2020-07-22 RX ADMIN — GABAPENTIN 300 MG: 300 CAPSULE ORAL at 22:49

## 2020-07-22 RX ADMIN — PHYTONADIONE 10 MG: 10 INJECTION, EMULSION INTRAMUSCULAR; INTRAVENOUS; SUBCUTANEOUS at 20:20

## 2020-07-22 ASSESSMENT — ENCOUNTER SYMPTOMS
DIARRHEA: 0
BLOOD IN STOOL: 1
VOMITING: 0
EYE DISCHARGE: 0
SHORTNESS OF BREATH: 0
ABDOMINAL PAIN: 0
RHINORRHEA: 0
COLOR CHANGE: 0
COUGH: 0
EYE REDNESS: 0
NAUSEA: 0
BACK PAIN: 0
CHEST TIGHTNESS: 0
SINUS PAIN: 0

## 2020-07-22 ASSESSMENT — PAIN DESCRIPTION - LOCATION: LOCATION: HIP

## 2020-07-22 ASSESSMENT — PAIN DESCRIPTION - ORIENTATION: ORIENTATION: RIGHT

## 2020-07-22 ASSESSMENT — PAIN SCALES - GENERAL: PAINLEVEL_OUTOF10: 7

## 2020-07-22 ASSESSMENT — PAIN DESCRIPTION - PAIN TYPE: TYPE: CHRONIC PAIN

## 2020-07-22 NOTE — ED TRIAGE NOTES
Patient arrived to ER via walk in with complaints of rectal bleeding x1 day. Patient states bleeding is bright red with clots. Patient has had x2 Iron transfusions this month. Patient is on Coumadin.

## 2020-07-22 NOTE — ED NOTES
Patient ambulated to bathroom with assistance of this RN and cane. Patient had steady gate.       Afton Spurling, RN  07/22/20 1934

## 2020-07-22 NOTE — ED NOTES
Patient resting in bed with call light in reach. Breathes are even and unlabored. Skin is warm and dry. Vital signs are stable. Patient denies any needs at this time.       Yudelka Santana RN  07/22/20 1920

## 2020-07-22 NOTE — ED PROVIDER NOTES
[address] [facility] ED  EMERGENCY DEPARTMENT ENCOUNTER      Pt Name: Rosario Easley  MRN: [id number]  [place] 1955  Date of evaluation: 7/22/2020  Provider: Ashanti Penaloza PA-C    CHIEF COMPLAINT       Chief Complaint   Patient presents with    Rectal Bleeding     x1 day, on coumadin          HISTORY OF PRESENT ILLNESS   (Location/Symptom, Timing/Onset, Context/Setting, Quality, Duration, Modifying Factors, Severity)  Note limiting factors. Rosario Easley is a 75 y.o. female who presents to the emergency department for evaluation of rectal bleeding that started today. Patient states that she had a bowel movement and there was bright red blood along with clots. Patient denies pain denies vomiting, fevers, diarrhea. Patient states she is on Coumadin for multiple years for patent ovale. Patient states last INR was 2.4. Patient states her normal INR is between 2.2 and 2.4. HPI    Nursing Notes were reviewed. REVIEW OF SYSTEMS    (2-9 systems for level 4, 10 or more for level 5)     Review of Systems   Constitutional: Negative for activity change, chills, fatigue and fever. HENT: Negative for congestion, hearing loss, rhinorrhea, sinus pain, sneezing and tinnitus. Eyes: Negative for discharge, redness and visual disturbance. Respiratory: Negative for cough, chest tightness and shortness of breath. Cardiovascular: Negative for chest pain and leg swelling. Gastrointestinal: Positive for blood in stool. Negative for abdominal pain, diarrhea, nausea and vomiting. Bright red blood and clots in stool. Endocrine: Negative for heat intolerance, polydipsia and polyuria. Genitourinary: Negative for dysuria, flank pain, hematuria and urgency. Musculoskeletal: Negative for back pain, joint swelling and myalgias. Skin: Negative for color change, rash and wound. Allergic/Immunologic: Negative for immunocompromised state.    Neurological: Negative for tremors, seizures, syncope, light-headedness and headaches. Hematological: Does not bruise/bleed easily. Psychiatric/Behavioral: Negative for agitation and behavioral problems. The patient is not nervous/anxious. Except as noted above the remainder of the review of systems was reviewed and negative. PAST MEDICAL HISTORY     Past Medical History:   Diagnosis Date    Arthritis     Asthma     Cancer ([facility] [address] 2002 & 2015    Breast RIGHT (T2/N0/M0) ER/TX (+) HER2/estela 3+ / chemo / left breast with mastectomy    GERD (gastroesophageal reflux disease)     Hiatal hernia     History of blood transfusion 2013    post op TKR    Hypercholesteremia     Hyperlipidemia     past trx / off meds > 5 yrs    Hypertension     meds > 20 yrs     Patent foramen ovale          SURGICAL HISTORY       Past Surgical History:   Procedure Laterality Date    BREAST SURGERY Bilateral     Mastectomy    CARDIAC CATHETERIZATION  2013    no blockage    COLONOSCOPY  08/31/2016    w/polypectomy     ENDOSCOPY, COLON, DIAGNOSTIC      HERNIA REPAIR  4509    umbilical    HYSTERECTOMY  1997    JOINT REPLACEMENT Bilateral     Knee    JOINT REPLACEMENT Bilateral 11/04/2013    LAMINECTOMY Right 12/30/2019    RIGHT L4-5 MICRODECOMPRESSION performed by Tootie Jaquez MD at [address] 0.6-1CM REMAINDR BODY N/A 4/6/2018    EXCISION CYST RT.  NECK AND EXCISION LESION NOSE performed by Kanika Jon MD at [address] [facility] VAD W/SUBQ PORT/ CTR/PRPH INSJ N/A 4/6/2018    REMOVAL VENOUS PORT performed by Kanika Jon MD at [facility] / has been removed    TONSILLECTOMY      at age 23   [address]  08/31/2016    w/polypectomy,bx     VOCAL CORD SURGERY  2003    excision polyps         CURRENT MEDICATIONS       Previous Medications    ALBUTEROL SULFATE  (90 BASE) MCG/ACT INHALER    Inhale 2 puffs into the lungs every 6 hours as needed for Wheezing    DILTIAZEM HCL (CARDIZEM PO)    Take 240 mg by mouth daily     ESOMEPRAZOLE (NEXIUM) 40 MG CAPSULE    TK 1 C PO QD    FOLIC ACID (FOLVITE) 1 MG TABLET    TK 1 T PO ONCE D    GABAPENTIN (NEURONTIN) 300 MG CAPSULE    Take 300 mg by mouth nightly. IRBESARTAN (AVAPRO) 300 MG TABLET    daily    MONTELUKAST (SINGULAIR) 10 MG TABLET    Take 10 mg by mouth nightly     MULTIPLE VITAMINS-MINERALS (MULTIVITAMIN ADULT PO)    Take by mouth    TORSEMIDE (DEMADEX) 20 MG TABLET    Take 20 mg by mouth as needed     TRAMADOL (ULTRAM) 50 MG TABLET    TK 1 T PO Q 4 TO 6 H PRN    WARFARIN (COUMADIN) 10 MG TABLET    Indications: 7.5 on ---Sun. / 10 mg --Sat        ALLERGIES     Darvon [propoxyphene]; Lipitor [atorvastatin]; Mobic [meloxicam]; Penicillins; Percodan [oxycodone-aspirin];  Pineapple; Sulfa antibiotics; and Dilaudid [hydromorphone hcl]    FAMILY HISTORY       Family History   Problem Relation Age of Onset    Heart Attack Mother     Kidney Disease Father     Heart Attack Father     Breast Cancer Sister     Lung Cancer Brother     Cancer Brother         lung cancer    High Blood Pressure Maternal Aunt     Diabetes Brother     Diabetes Sister     Liver Disease Sister     Other Sister         blind due to detached retina    Kidney Disease Sister     Thyroid Disease Daughter     High Cholesterol Daughter           SOCIAL HISTORY       Social History     Socioeconomic History    Marital status: Single     Spouse name: None    Number of children: None    Years of education: None    Highest education level: None   Occupational History    None   Social Needs    Financial resource strain: None    Food insecurity     Worry: None     Inability: None    Transportation needs     Medical: None     Non-medical: None   Tobacco Use    Smoking status: Former Smoker     Packs/day: 0.25     Years: 5.00     Pack years: 1.25     Types: Cigarettes     Last attempt to quit: 3/30/1978     Years since quittin.3    Smokeless tobacco: Never Used Substance and Sexual Activity    Alcohol use: No    Drug use: No    Sexual activity: None   Lifestyle    Physical activity     Days per week: None     Minutes per session: None    Stress: None   Relationships    Social connections     Talks on phone: None     Gets together: None     Attends [employer] service: None     Active member of club or organization: None     Attends meetings of clubs or organizations: None     Relationship status: None    Intimate partner violence     Fear of current or ex partner: None     Emotionally abused: None     Physically abused: None     Forced sexual activity: None   Other Topics Concern    None   Social History Narrative    None       SCREENINGS                        PHYSICAL EXAM    (up to 7 for level 4, 8 or more for level 5)     ED Triage Vitals [07/22/20 1758]   BP Temp Temp Source Pulse Resp SpO2 Height Weight   (!) 173/77 98.2 °F (36.8 °C) Oral 106 16 98 % 5' 1\" (1.549 m) 229 lb (103.9 kg)       Physical Exam  Vitals signs and nursing note reviewed. Constitutional:       General: She is not in acute distress. Appearance: Normal appearance. She is normal weight. HENT:      Head: Normocephalic. Right Ear: Tympanic membrane, ear canal and external ear normal.      Left Ear: Tympanic membrane, ear canal and external ear normal.      Nose: Nose normal.      Mouth/Throat:      Mouth: Mucous membranes are moist.      Pharynx: Oropharynx is clear. No oropharyngeal exudate or posterior oropharyngeal erythema. Eyes:      Conjunctiva/sclera: Conjunctivae normal.      Pupils: Pupils are equal, round, and reactive to light. Neck:      Musculoskeletal: Normal range of motion. No neck rigidity. Cardiovascular:      Rate and Rhythm: Normal rate and regular rhythm. Pulses: Normal pulses. Heart sounds: Normal heart sounds. No murmur. No friction rub. Pulmonary:      Effort: Pulmonary effort is normal. No respiratory distress.       Breath sounds: within normal limits       All other labs were within normal range or not returned as of this dictation. EMERGENCY DEPARTMENT COURSE and DIFFERENTIAL DIAGNOSIS/MDM:   Vitals:    Vitals:    07/22/20 1915 07/22/20 1930 07/22/20 2015 07/22/20 2030   BP: 130/71 128/70 (!) 105/92 128/75   Pulse: 90 88 86 88   Resp: 16 16 16 16   Temp:       TempSrc:       SpO2: 98% 99% 99% 96%   Weight:       Height:           68-year of age female who presents with bright red blood and clots in her stool. CBC, CMP, a PT, INR, PT, stool guaiac test, CT abdomen pelvis with IV contrast will be obtained. Patient will be reassessed. Stool guaiac test positive for blood. MDM        REASSESSMENT      On reassessment patient still asymptomatic. CT scan negative for any inflammatory or infectious process. Blood work notable for an INR of 2.9. Decision was made to admit patient for ongoing GI bleed. Patient was given 10 of vitamin K for INR of 2.9 ongoing GI bleed. Hospitalist was contacted and agreed to admit the patient patient was informed and agreeable to this plan. CRITICAL CARE TIME       CONSULTS:  None    PROCEDURES:  Unless otherwise noted below, none     Procedures        FINAL IMPRESSION      1. Lower GI bleed          DISPOSITION/PLAN   DISPOSITION Admitted 07/22/2020 08:08:48 PM      PATIENT REFERRED TO:  Armen Arndt MD  [facility] [address]  [place] [address]  [phone]            DISCHARGE MEDICATIONS:  New Prescriptions    No medications on file     Controlled Substances Monitoring:     No flowsheet data found.     (Please note that portions of this note were completed with a voice recognition program.  Efforts were made to edit the dictations but occasionally words are mis-transcribed.)    Jt Murray PA-C (electronically signed)             Jt Murray PA-C  07/22/20 2037

## 2020-07-23 LAB
ALBUMIN SERPL-MCNC: 3.6 G/DL (ref 3.5–4.6)
ALP BLD-CCNC: 118 U/L (ref 40–130)
ALT SERPL-CCNC: 16 U/L (ref 0–33)
ANION GAP SERPL CALCULATED.3IONS-SCNC: 9 MEQ/L (ref 9–15)
APTT: 42.2 SEC (ref 24.4–36.8)
AST SERPL-CCNC: 14 U/L (ref 0–35)
BASOPHILS ABSOLUTE: 0.1 K/UL (ref 0–0.2)
BASOPHILS RELATIVE PERCENT: 1 %
BILIRUB SERPL-MCNC: 0.4 MG/DL (ref 0.2–0.7)
BUN BLDV-MCNC: 9 MG/DL (ref 8–23)
CALCIUM SERPL-MCNC: 9.8 MG/DL (ref 8.5–9.9)
CHLORIDE BLD-SCNC: 107 MEQ/L (ref 95–107)
CO2: 24 MEQ/L (ref 20–31)
CREAT SERPL-MCNC: 0.4 MG/DL (ref 0.5–0.9)
EOSINOPHILS ABSOLUTE: 0.3 K/UL (ref 0–0.7)
EOSINOPHILS RELATIVE PERCENT: 4.2 %
GFR AFRICAN AMERICAN: >60
GFR AFRICAN AMERICAN: >60
GFR NON-AFRICAN AMERICAN: >60
GFR NON-AFRICAN AMERICAN: >60
GLOBULIN: 2.5 G/DL (ref 2.3–3.5)
GLUCOSE BLD-MCNC: 89 MG/DL (ref 70–99)
HCT VFR BLD CALC: 27 % (ref 37–47)
HCT VFR BLD CALC: 28.6 % (ref 37–47)
HCT VFR BLD CALC: 30.7 % (ref 37–47)
HCT VFR BLD CALC: 30.7 % (ref 37–47)
HEMOGLOBIN: 8.2 G/DL (ref 12–16)
HEMOGLOBIN: 8.9 G/DL (ref 12–16)
HEMOGLOBIN: 9.4 G/DL (ref 12–16)
HEMOGLOBIN: 9.4 G/DL (ref 12–16)
INR BLD: 1.4
INR BLD: 1.6
IRON SATURATION: 21 % (ref 11–46)
IRON: 54 UG/DL (ref 37–145)
LYMPHOCYTES ABSOLUTE: 1.4 K/UL (ref 1–4.8)
LYMPHOCYTES RELATIVE PERCENT: 17.5 %
MCH RBC QN AUTO: 23.1 PG (ref 27–31.3)
MCHC RBC AUTO-ENTMCNC: 31 % (ref 33–37)
MCV RBC AUTO: 74.5 FL (ref 82–100)
MONOCYTES ABSOLUTE: 0.9 K/UL (ref 0.2–0.8)
MONOCYTES RELATIVE PERCENT: 10.5 %
NEUTROPHILS ABSOLUTE: 5.4 K/UL (ref 1.4–6.5)
NEUTROPHILS RELATIVE PERCENT: 66.8 %
PDW BLD-RTO: 36.2 % (ref 11.5–14.5)
PERFORMED ON: ABNORMAL
PLATELET # BLD: 265 K/UL (ref 130–400)
POC CREATININE: 0.5 MG/DL (ref 0.6–1.2)
POC SAMPLE TYPE: ABNORMAL
POTASSIUM REFLEX MAGNESIUM: 4.1 MEQ/L (ref 3.4–4.9)
PROTHROMBIN TIME: 17 SEC (ref 12.3–14.9)
PROTHROMBIN TIME: 18.6 SEC (ref 12.3–14.9)
RBC # BLD: 3.84 M/UL (ref 4.2–5.4)
SODIUM BLD-SCNC: 140 MEQ/L (ref 135–144)
TOTAL IRON BINDING CAPACITY: 263 UG/DL (ref 178–450)
TOTAL PROTEIN: 6.1 G/DL (ref 6.3–8)
WBC # BLD: 8.2 K/UL (ref 4.8–10.8)

## 2020-07-23 PROCEDURE — 6360000002 HC RX W HCPCS: Performed by: INTERNAL MEDICINE

## 2020-07-23 PROCEDURE — 2580000003 HC RX 258: Performed by: INTERNAL MEDICINE

## 2020-07-23 PROCEDURE — 85014 HEMATOCRIT: CPT

## 2020-07-23 PROCEDURE — 83540 ASSAY OF IRON: CPT

## 2020-07-23 PROCEDURE — 85730 THROMBOPLASTIN TIME PARTIAL: CPT

## 2020-07-23 PROCEDURE — 83550 IRON BINDING TEST: CPT

## 2020-07-23 PROCEDURE — 36415 COLL VENOUS BLD VENIPUNCTURE: CPT

## 2020-07-23 PROCEDURE — 6370000000 HC RX 637 (ALT 250 FOR IP): Performed by: NURSE PRACTITIONER

## 2020-07-23 PROCEDURE — 6370000000 HC RX 637 (ALT 250 FOR IP): Performed by: SPECIALIST

## 2020-07-23 PROCEDURE — 99222 1ST HOSP IP/OBS MODERATE 55: CPT | Performed by: SPECIALIST

## 2020-07-23 PROCEDURE — 85610 PROTHROMBIN TIME: CPT

## 2020-07-23 PROCEDURE — 1210000000 HC MED SURG R&B

## 2020-07-23 PROCEDURE — 80053 COMPREHEN METABOLIC PANEL: CPT

## 2020-07-23 PROCEDURE — 85025 COMPLETE CBC W/AUTO DIFF WBC: CPT

## 2020-07-23 PROCEDURE — 85018 HEMOGLOBIN: CPT

## 2020-07-23 PROCEDURE — C9113 INJ PANTOPRAZOLE SODIUM, VIA: HCPCS | Performed by: INTERNAL MEDICINE

## 2020-07-23 RX ORDER — PANTOPRAZOLE SODIUM 40 MG/10ML
40 INJECTION, POWDER, LYOPHILIZED, FOR SOLUTION INTRAVENOUS EVERY 12 HOURS
Status: DISCONTINUED | OUTPATIENT
Start: 2020-07-23 | End: 2020-07-25 | Stop reason: HOSPADM

## 2020-07-23 RX ORDER — SODIUM CHLORIDE 0.9 % (FLUSH) 0.9 %
10 SYRINGE (ML) INJECTION PRN
Status: DISCONTINUED | OUTPATIENT
Start: 2020-07-23 | End: 2020-07-25 | Stop reason: HOSPADM

## 2020-07-23 RX ORDER — 0.9 % SODIUM CHLORIDE 0.9 %
20 INTRAVENOUS SOLUTION INTRAVENOUS ONCE
Status: DISCONTINUED | OUTPATIENT
Start: 2020-07-23 | End: 2020-07-23

## 2020-07-23 RX ORDER — SODIUM CHLORIDE 0.9 % (FLUSH) 0.9 %
10 SYRINGE (ML) INJECTION EVERY 12 HOURS SCHEDULED
Status: DISCONTINUED | OUTPATIENT
Start: 2020-07-23 | End: 2020-07-25 | Stop reason: HOSPADM

## 2020-07-23 RX ORDER — SODIUM CHLORIDE 9 MG/ML
10 INJECTION INTRAVENOUS EVERY 12 HOURS
Status: DISCONTINUED | OUTPATIENT
Start: 2020-07-23 | End: 2020-07-25 | Stop reason: HOSPADM

## 2020-07-23 RX ADMIN — PANTOPRAZOLE SODIUM 40 MG: 40 INJECTION, POWDER, FOR SOLUTION INTRAVENOUS at 17:16

## 2020-07-23 RX ADMIN — LOSARTAN POTASSIUM 100 MG: 50 TABLET, FILM COATED ORAL at 09:49

## 2020-07-23 RX ADMIN — SODIUM CHLORIDE: 9 INJECTION, SOLUTION INTRAVENOUS at 14:12

## 2020-07-23 RX ADMIN — POLYETHYLENE GLYCOL 3350, SODIUM SULFATE ANHYDROUS, SODIUM BICARBONATE, SODIUM CHLORIDE, POTASSIUM CHLORIDE 2000 ML: 236; 22.74; 6.74; 5.86; 2.97 POWDER, FOR SOLUTION ORAL at 14:12

## 2020-07-23 RX ADMIN — PHYTONADIONE 10 MG: 10 INJECTION, EMULSION INTRAMUSCULAR; INTRAVENOUS; SUBCUTANEOUS at 04:38

## 2020-07-23 RX ADMIN — Medication 10 ML: at 17:17

## 2020-07-23 RX ADMIN — Medication 10 ML: at 04:38

## 2020-07-23 RX ADMIN — GABAPENTIN 300 MG: 300 CAPSULE ORAL at 20:42

## 2020-07-23 RX ADMIN — PANTOPRAZOLE SODIUM 40 MG: 40 INJECTION, POWDER, FOR SOLUTION INTRAVENOUS at 04:37

## 2020-07-23 RX ADMIN — ACETAMINOPHEN 650 MG: 325 TABLET ORAL at 20:42

## 2020-07-23 RX ADMIN — FOLIC ACID 1 MG: 1 TABLET ORAL at 09:49

## 2020-07-23 ASSESSMENT — PAIN DESCRIPTION - PAIN TYPE: TYPE: CHRONIC PAIN

## 2020-07-23 ASSESSMENT — PAIN DESCRIPTION - LOCATION: LOCATION: HIP

## 2020-07-23 ASSESSMENT — PAIN SCALES - GENERAL: PAINLEVEL_OUTOF10: 5

## 2020-07-23 ASSESSMENT — PAIN DESCRIPTION - DESCRIPTORS: DESCRIPTORS: ACHING

## 2020-07-23 ASSESSMENT — PAIN DESCRIPTION - FREQUENCY: FREQUENCY: INTERMITTENT

## 2020-07-23 ASSESSMENT — PAIN DESCRIPTION - ORIENTATION: ORIENTATION: RIGHT

## 2020-07-23 NOTE — PROGRESS NOTES
0900- Shift assessment completed. Pt denies N/V and pain. Bowel sounds active. Tolerating clear liquid diet. No further needs at this time. Call light is within reach. 1500- Pt started and finished her Golytly prep, tolerated well. Pt had a large, watery, red BM. Will continue to monitor.     E. Electronically signed by Bindu Wadsworth RN on 7/23/2020 at 4:07 PM

## 2020-07-23 NOTE — H&P
or ex partner: Not on file     Emotionally abused: Not on file     Physically abused: Not on file     Forced sexual activity: Not on file   Other Topics Concern    Not on file   Social History Narrative    Not on file     MEDICATIONS:   Prior to Admission medications    Medication Sig Start Date End Date Taking? Authorizing Provider   gabapentin (NEURONTIN) 300 MG capsule Take 300 mg by mouth nightly. 12/12/19   Historical Provider, MD   irbesartan (AVAPRO) 300 MG tablet daily 12/9/19   Historical Provider, MD   torsemide (DEMADEX) 20 MG tablet Take 20 mg by mouth as needed     Historical Provider, MD   Multiple Vitamins-Minerals (MULTIVITAMIN ADULT PO) Take by mouth    Historical Provider, MD   albuterol sulfate  (90 Base) MCG/ACT inhaler Inhale 2 puffs into the lungs every 6 hours as needed for Wheezing    Historical Provider, MD   folic acid (FOLVITE) 1 MG tablet TK 1 T PO ONCE D 8/13/16   Historical Provider, MD   warfarin (COUMADIN) 10 MG tablet Indications: 7.5 on M-W-F-Sun. / 10 mg T-TH-Sat  7/6/16   Historical Provider, MD   esomeprazole (NEXIUM) 40 MG capsule TK 1 C PO QD 6/24/16   Historical Provider, MD   DiltiaZEM HCl (CARDIZEM PO) Take 240 mg by mouth daily     Historical Provider, MD   montelukast (SINGULAIR) 10 MG tablet Take 10 mg by mouth nightly  8/5/16   Historical Provider, MD   traMADol (ULTRAM) 50 MG tablet TK 1 T PO Q 4 TO 6 H PRN 6/24/16   Historical Provider, MD       ALLERGIES: Darvon [propoxyphene]; Lipitor [atorvastatin]; Mobic [meloxicam]; Penicillins; Percodan [oxycodone-aspirin];  Pineapple; Sulfa antibiotics; and Dilaudid [hydromorphone hcl]    REVIEW OF SYSTEM:   Review of Systems - History obtained from the patient  General ROS: Hx breast cancer s/p mastectomy,  PFO, iron deficiency  Psychological ROS: negative for - anxiety, depression or memory difficulties  Ophthalmic ROS: negative for - blurry vision or double vision  ENT ROS: negative for - headaches, sinus pain or sore throat  Hematological and Lymphatic ROS: negative for - bruising, jaundice   Positive embolic event, on coumadin  Respiratory ROS: no cough, shortness of breath, or wheezing  Cardiovascular ROS: no chest pain or dyspnea on exertion  Gastrointestinal ROS: positive for - melena  negative for - abdominal pain, constipation, diarrhea, hematemesis or nausea/vomiting  Musculoskeletal ROS: negative for - gait disturbance  Neurological ROS: no TIA or stroke symptoms  Dermatological ROS: negative for - pruritus or rash  OBJECTIVE  PHYSICAL EXAM: /75   Pulse 88   Temp 98.2 °F (36.8 °C) (Oral)   Resp 16   Ht 5' 1\" (1.549 m)   Wt 229 lb (103.9 kg)   LMP 12/20/1997   SpO2 96%   BMI 43.27 kg/m²     CONSTITUTIONAL:  awake, alert, cooperative, no apparent distress, and appears stated age  EYES:  pupils equal, round and reactive to light and sclera clear  ENT:  Normocephalic, without obvious abnormality, good dentition. NECK:  supple, symmetrical, trachea midline, no lymphadenopathy, thyroid not enlarged, symmetric, no tenderness, no jugular venous distension and no carotid bruits  LUNGS:  No increased work of breathing, good air exchange, clear to auscultation bilaterally, no crackles or wheezing  CARDIOVASCULAR:  Normal apical impulse, regular rate and rhythm, normal S1 and S2, no S3 or S4, and no murmur noted  ABDOMEN:  normal bowel sounds, soft, non-distended and non-tender  MUSCULOSKELETAL:  there is no redness, warmth, or swelling of the joints  NEUROLOGIC:  A&O x 3  SKIN:  no bruising or bleeding and no rashes    DATA:     Diagnostic tests reviewed for today's visit:    Most recent labs and imaging results reviewed.      LABS:    Recent Results (from the past 24 hour(s))   Comprehensive Metabolic Panel    Collection Time: 07/22/20  6:30 PM   Result Value Ref Range    Sodium 138 135 - 144 mEq/L    Potassium 4.5 3.4 - 4.9 mEq/L    Chloride 102 95 - 107 mEq/L    CO2 25 20 - 31 mEq/L    Anion Gap 11 9 - 15 mEq/L Glucose 139 (H) 70 - 99 mg/dL    BUN 13 8 - 23 mg/dL    CREATININE 0.47 (L) 0.50 - 0.90 mg/dL    GFR Non-African American >60.0 >60    GFR  >60.0 >60    Calcium 9.9 8.5 - 9.9 mg/dL    Total Protein 6.4 6.3 - 8.0 g/dL    Alb 3.7 3.5 - 4.6 g/dL    Total Bilirubin <0.2 0.2 - 0.7 mg/dL    Alkaline Phosphatase 137 (H) 40 - 130 U/L    ALT 16 0 - 33 U/L    AST 17 0 - 35 U/L    Globulin 2.7 2.3 - 3.5 g/dL   CBC Auto Differential    Collection Time: 07/22/20  6:30 PM   Result Value Ref Range    WBC 8.8 4.8 - 10.8 K/uL    RBC 4.04 (L) 4.20 - 5.40 M/uL    Hemoglobin 9.2 (L) 12.0 - 16.0 g/dL    Hematocrit 29.7 (L) 37.0 - 47.0 %    MCV 73.5 (L) 82.0 - 100.0 fL    MCH 22.8 (L) 27.0 - 31.3 pg    MCHC 31.0 (L) 33.0 - 37.0 %    RDW 36.7 (H) 11.5 - 14.5 %    Platelets 561 667 - 409 K/uL    SLIDE REVIEW see below     Neutrophils % 59.5 %    Lymphocytes % 21.4 %    Monocytes % 12.5 %    Eosinophils % 4.6 %    Basophils % 2.0 %    Neutrophils Absolute 5.2 1.4 - 6.5 K/uL    Lymphocytes Absolute 1.9 1.0 - 4.8 K/uL    Monocytes Absolute 1.1 (H) 0.2 - 0.8 K/uL    Eosinophils Absolute 0.4 0.0 - 0.7 K/uL    Basophils Absolute 0.2 0.0 - 0.2 K/uL    Anisocytosis 2+     Microcytes 2+     Poikilocytes 2+     Acanthocytes 1+     Ovalocytes 1+     Tear Drop Cells 1+    APTT    Collection Time: 07/22/20  6:30 PM   Result Value Ref Range    aPTT 58.4 (H) 24.4 - 36.8 sec   Protime-INR    Collection Time: 07/22/20  6:30 PM   Result Value Ref Range    Protime 30.2 (H) 12.3 - 14.9 sec    INR 2.9        IMAGING:  Ct Abdomen Pelvis W Iv Contrast Additional Contrast? None    Result Date: 7/22/2020  EXAMINATION: CT ABDOMEN PELVIS W IV CONTRAST DATE AND TIME:7/22/2020 6:30 PM CLINICAL HISTORY: Acute abdominal pain. Epigastric pain. GI bleed  COMPARISON:  January 7, 2018 TECHNIQUE: Contiguous axial CT sections of the abdomen and pelvis. 100 cc's of IV contrast given. .  All CT scans at this facility use dose modulation, iterative reconstruction, and/or weight based dosing when appropriate to reduce radiation dose to as low as reasonably achievable. FINDINGS    Liver: Negative     Spleen: Negative    Pancreas: Negative   Gallbladder: No calcified gallstones. Normal gallbladder wall. No pericholecystic fluid. Kidney: No solid renal  lesions, or hydronephrosis. No renal or ureteral stone. Adrenal glands are negative. Bowel: The bowel is not dilated. There is no evidence of diverticulitis or colitis. Appendix: There  is no CT evidence for appendicitis. Nodes: No lymphadenopathy. Aorta: No aneurysm    Peritoneum: No free fluid or free air. The abdominal wall is intact. Pelvis: No abnormal soft tissue mass. The bladder is normal.   Bones: No acute osseous abnormalities. Lung bases:Visualized lung bases show no significant pathology       NO ACUTE PATHOLOGY IN THE ABDOMEN OR PELVIS.       VTE Prophylaxis: held  Bleeding risk      ASSESSMENT AND PLAN  Active Problems:    Lower GI bleed  - started today w BM. No hx hemorrhoids, No abdominal pain or bloating  Afebrile. Mild anemia. 9.2 / 29.7  Her INR was 2.9  CT abd/pelvis is unremarkable. BP stable. FOBT positive. Plan: admit  GI consult  NPO after MN  Serial H/H. Patent foramen ovale  Discovered during treatment for breast cancer. Found on HUSAM after she had sudden vision loss during port placement for chemo. She is on coumadin   No issues. Plan: monitor. Iron deficiency anemia, unspecified  Received iron infusion x 2 recently for Hgb 7.2    Baseline Hgb looks to be 10-11,  No excess fatigue, no bruising or SOB. Plan: f/u PCP      HTN (hypertension) irbesartan and cardizem. Hx embolic stroke. No MI. States stable on meds. Labs unremarkable. No HA, blurred vision, CP, or dizziness. Has chronic 1-2+ LE edema. Plan: continue meds as tolerated.        Osteoarthritis of hip  Due for hip replacement 8/2  No recent falls,  No significant pain on exam.   Plan: f/u as scheduled      History of carcinoma in situ of breast  Treated w  Mastectomy  Chemo. Plan: f/u PCP      History of cardioembolic stroke  After port placement  States sudden vision loss,   Now recovered. Anticoagulated. Coumadin   INR usually therapeutic. Plan: coumadin on hold   Receiving vitamin K   Resume when safe to do so      Anticoagulated  cpumadin  S/p embolic stroke. No residual.   Compliant. Plan: hold for now.   Resume when same for INR goal 2-3        Plan of care discussed with: patient and family    SIGNATURE: ROSE Flores - CNP  DATE: July 22, 2020  TIME: 8:50 PM     Tash Vázquez MD - supervising physician

## 2020-07-23 NOTE — PLAN OF CARE
Problem: Falls - Risk of:  Goal: Will remain free from falls  Description: Will remain free from falls  Outcome: Ongoing     Problem: Fluid Volume - Imbalance:  Goal: Will show no signs and symptoms of excessive bleeding  Description: Will show no signs and symptoms of excessive bleeding  Outcome: Ongoing

## 2020-07-23 NOTE — CONSULTS
2018    EXCISION CYST RT.  NECK AND EXCISION LESION NOSE performed by Sarah Krishnamurthy MD at [address] CTR VAD W/SUBQ PORT/ CTR/PRPH INSJ N/A 2018    REMOVAL VENOUS PORT performed by Sarah Krishnamurthy MD at [facility] / has been removed    TONSILLECTOMY      at age 23   [address]  2016    w/polypectomy,bx     VOCAL CORD SURGERY      excision polyps       Family History  Family History   Problem Relation Age of Onset    Heart Attack Mother     Kidney Disease Father     Heart Attack Father     Breast Cancer Sister     Lung Cancer Brother     Cancer Brother         lung cancer    High Blood Pressure Maternal Aunt     Diabetes Brother     Diabetes Sister     Liver Disease Sister     Other Sister         blind due to detached retina    Kidney Disease Sister     Thyroid Disease Daughter     High Cholesterol Daughter      [] Unable to obtain due to ventilated and/ or neurologic status    Social History     Socioeconomic History    Marital status: Single     Spouse name: Not on file    Number of children: Not on file    Years of education: Not on file    Highest education level: Not on file   Occupational History    Not on file   Social Needs    Financial resource strain: Not on file    Food insecurity     Worry: Not on file     Inability: Not on file    Transportation needs     Medical: Not on file     Non-medical: Not on file   Tobacco Use    Smoking status: Former Smoker     Packs/day: 0.25     Years: 5.00     Pack years: 1.25     Types: Cigarettes     Last attempt to quit: 3/30/1978     Years since quittin.3    Smokeless tobacco: Never Used   Substance and Sexual Activity    Alcohol use: No    Drug use: No    Sexual activity: Not on file   Lifestyle    Physical activity     Days per week: Not on file     Minutes per session: Not on file    Stress: Not on file   Relationships    Social connections     Talks on phone: Not on file     Gets together: Not on file     Attends Faith service: Not on file     Active member of club or organization: Not on file     Attends meetings of clubs or organizations: Not on file     Relationship status: Not on file    Intimate partner violence     Fear of current or ex partner: Not on file     Emotionally abused: Not on file     Physically abused: Not on file     Forced sexual activity: Not on file   Other Topics Concern    Not on file   Social History Narrative    Not on file      [] Unable to obtain due to ventilated and/ or neurologic status      Home Medications:      Medications Prior to Admission: DULoxetine (CYMBALTA) 60 MG extended release capsule, Take 60 mg by mouth daily  gabapentin (NEURONTIN) 300 MG capsule, Take 300 mg by mouth nightly.   irbesartan (AVAPRO) 300 MG tablet, Take 300 mg by mouth daily   Multiple Vitamins-Minerals (MULTIVITAMIN ADULT PO), Take by mouth daily   albuterol sulfate  (90 Base) MCG/ACT inhaler, Inhale 2 puffs into the lungs every 6 hours as needed for Wheezing  folic acid (FOLVITE) 1 MG tablet, Take 1 mg by mouth daily   warfarin (COUMADIN) 10 MG tablet, Indications: 7.5 on D-E-Zt-F-Sun. / 10 mg T-Sat   esomeprazole (NEXIUM) 40 MG capsule, TK 1 C PO QD  DiltiaZEM HCl (CARDIZEM PO), Take 240 mg by mouth daily   montelukast (SINGULAIR) 10 MG tablet, Take 10 mg by mouth nightly   torsemide (DEMADEX) 20 MG tablet, Take 20 mg by mouth as needed   [DISCONTINUED] traMADol (ULTRAM) 50 MG tablet, TK 1 T PO Q 4 TO 6 H PRN    Current Hospital Medications:     Scheduled Meds:   pantoprazole  40 mg Intravenous Q12H    And    sodium chloride (PF)  10 mL Intravenous Q12H    sodium chloride flush  10 mL Intravenous 2 times per day    folic acid  1 mg Oral Daily    gabapentin  300 mg Oral Nightly    losartan  100 mg Oral Daily     Continuous Infusions:   sodium chloride 75 mL/hr at 07/22/20 4299     PRN Meds:.sodium chloride flush, acetaminophen **OR** acetaminophen, promethazine **OR** ondansetron  .  sodium chloride 75 mL/hr at 07/22/20 0270        Allergies: Allergies   Allergen Reactions    Darvon [Propoxyphene] Hives    Lipitor [Atorvastatin]      Muscle pain      Mobic [Meloxicam] Hives    Penicillins Hives    Percodan [Oxycodone-Aspirin] Hives    Pineapple Hives    Sulfa Antibiotics Hives    Dilaudid [Hydromorphone Hcl] Hives and Nausea And Vomiting        Review of Systems:       [x] CV, Resp, Neuro, , and all other systems reviewed and negative other than listed in HPI.      [] Unable to obtain due to ventilated and/ or neurologic status      Objective Findings:     Vitals:   Vitals:    07/22/20 2015 07/22/20 2030 07/22/20 2104 07/22/20 2203   BP: (!) 105/92 128/75 (!) 166/74    Pulse: 86 88 86    Resp: 16 16 16    Temp:   98.4 °F (36.9 °C)    TempSrc:   Oral    SpO2: 99% 96% 97%    Weight:    236 lb (107 kg)   Height:    5' 1\" (1.549 m)        Physical Examination:  General: In no acute distress  HEENT: Normocephalic,  scleral icterus. None  Neck: No jugular venous distention. Heart: Regular, no murmur, no rub/gallop. No right ventricular heave. Lungs: Clear to ascultation, no rales/wheezing/rhonchi. Good chest wall excursion. Abdomen: Distension none obese,  Soft, tenderness none, Scars present bowel sounds hypoactive  Extremities: No clubbing/cyanosis, no edema. Skin: Warm, dry, normal turgor, no rash, no bruise, no petichiae. Neuro: No myoclonus or tremor.    Psych: Normal affect    Results/ Medications reviewed 7/23/2020, 7:18 AM     Laboratory, Microbiology, Pathology, Radiology, Cardiology, Medications and Transcriptions reviewed  Scheduled Meds:   pantoprazole  40 mg Intravenous Q12H    And    sodium chloride (PF)  10 mL Intravenous Q12H    sodium chloride flush  10 mL Intravenous 2 times per day    folic acid  1 mg Oral Daily    gabapentin  300 mg Oral Nightly    losartan  100 mg Oral Daily     Continuous Infusions:   sodium chloride 75 mL/hr at 07/22/20 2249       Recent Labs     07/22/20  1830 07/22/20  2155 07/23/20  0326   WBC 8.8  --  8.2   HGB 9.2* 8.6* 8.9*   HCT 29.7* 28.8* 28.6*   MCV 73.5*  --  74.5*     --  265     Recent Labs     07/22/20 1830 07/23/20  0326    140   K 4.5 4.1    107   CO2 25 24   BUN 13 9   CREATININE 0.47* 0.40*     Recent Labs     07/22/20  1830 07/23/20  0326   AST 17 14   ALT 16 16   BILITOT <0.2 0.4   ALKPHOS 137* 118     No results for input(s): LIPASE, AMYLASE in the last 72 hours. Recent Labs     07/22/20 1830 07/23/20  0326 07/23/20  0327   PROT 6.4 6.1*  --    INR 2.9  --  1.6     Ct Abdomen Pelvis W Iv Contrast Additional Contrast? None    Result Date: 7/22/2020  EXAMINATION: CT ABDOMEN PELVIS W IV CONTRAST DATE AND TIME:7/22/2020 6:30 PM CLINICAL HISTORY: Acute abdominal pain. Epigastric pain. GI bleed  COMPARISON:  January 7, 2018 TECHNIQUE: Contiguous axial CT sections of the abdomen and pelvis. 100 cc's of IV contrast given. .  All CT scans at this facility use dose modulation, iterative reconstruction, and/or weight based dosing when appropriate to reduce radiation dose to as low as reasonably achievable. FINDINGS    Liver: Negative     Spleen: Negative    Pancreas: Negative   Gallbladder: No calcified gallstones. Normal gallbladder wall. No pericholecystic fluid. Kidney: No solid renal  lesions, or hydronephrosis. No renal or ureteral stone. Adrenal glands are negative. Bowel: The bowel is not dilated. There is no evidence of diverticulitis or colitis. Appendix: There  is no CT evidence for appendicitis. Nodes: No lymphadenopathy. Aorta: No aneurysm    Peritoneum: No free fluid or free air. The abdominal wall is intact. Pelvis: No abnormal soft tissue mass. The bladder is normal.   Bones: No acute osseous abnormalities. Lung bases:Visualized lung bases show no significant pathology       NO ACUTE PATHOLOGY IN THE ABDOMEN OR PELVIS. 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 Plan: Will schedule colonoscopy a.m. Comments: Thank you for allowing us to participate in the care of this patient. Will continue to follow. Please call if questions or concerns arise.     Electronically signed by Paola Benavides MD on 7/23/2020 at 7:18 AM

## 2020-07-24 ENCOUNTER — ANCILLARY PROCEDURE (OUTPATIENT)
Dept: ENDOSCOPY | Age: 65
DRG: 378 | End: 2020-07-24
Payer: MEDICARE

## 2020-07-24 ENCOUNTER — ANESTHESIA (OUTPATIENT)
Dept: ENDOSCOPY | Age: 65
DRG: 378 | End: 2020-07-24
Payer: MEDICARE

## 2020-07-24 ENCOUNTER — ANESTHESIA EVENT (OUTPATIENT)
Dept: ENDOSCOPY | Age: 65
DRG: 378 | End: 2020-07-24
Payer: MEDICARE

## 2020-07-24 VITALS
SYSTOLIC BLOOD PRESSURE: 110 MMHG | RESPIRATION RATE: 17 BRPM | DIASTOLIC BLOOD PRESSURE: 56 MMHG | OXYGEN SATURATION: 100 %

## 2020-07-24 LAB
ALBUMIN SERPL-MCNC: 3.7 G/DL (ref 3.5–4.6)
ALP BLD-CCNC: 122 U/L (ref 40–130)
ALT SERPL-CCNC: 18 U/L (ref 0–33)
ANION GAP SERPL CALCULATED.3IONS-SCNC: 15 MEQ/L (ref 9–15)
ANISOCYTOSIS: ABNORMAL
AST SERPL-CCNC: 19 U/L (ref 0–35)
ATYPICAL LYMPHOCYTE RELATIVE PERCENT: 1 %
BASOPHILS ABSOLUTE: 0.1 K/UL (ref 0–0.2)
BASOPHILS RELATIVE PERCENT: 1 %
BILIRUB SERPL-MCNC: 0.4 MG/DL (ref 0.2–0.7)
BILIRUBIN DIRECT: <0.2 MG/DL (ref 0–0.4)
BILIRUBIN, INDIRECT: NORMAL MG/DL (ref 0–0.6)
BUN BLDV-MCNC: 4 MG/DL (ref 8–23)
CALCIUM SERPL-MCNC: 10.3 MG/DL (ref 8.5–9.9)
CHLORIDE BLD-SCNC: 106 MEQ/L (ref 95–107)
CO2: 22 MEQ/L (ref 20–31)
CREAT SERPL-MCNC: 0.4 MG/DL (ref 0.5–0.9)
EOSINOPHILS ABSOLUTE: 0.4 K/UL (ref 0–0.7)
EOSINOPHILS RELATIVE PERCENT: 5 %
FOLATE: >20 NG/ML (ref 7.3–26.1)
GFR AFRICAN AMERICAN: >60
GFR NON-AFRICAN AMERICAN: >60
GLOBULIN: 3.2 G/DL (ref 2.3–3.5)
GLUCOSE BLD-MCNC: 77 MG/DL (ref 70–99)
HBA1C MFR BLD: 4.9 % (ref 4.8–5.9)
HCT VFR BLD CALC: 31.7 % (ref 37–47)
HCT VFR BLD CALC: 31.8 % (ref 37–47)
HEMATOLOGY PATH CONSULT: YES
HEMOGLOBIN: 9.6 G/DL (ref 12–16)
HEMOGLOBIN: 9.7 G/DL (ref 12–16)
HYPOCHROMIA: ABNORMAL
IRON SATURATION: 15 % (ref 11–46)
IRON: 43 UG/DL (ref 37–145)
LYMPHOCYTES ABSOLUTE: 1.2 K/UL (ref 1–4.8)
LYMPHOCYTES RELATIVE PERCENT: 15 %
MAGNESIUM: 2.1 MG/DL (ref 1.7–2.4)
MCH RBC QN AUTO: 23.3 PG (ref 27–31.3)
MCHC RBC AUTO-ENTMCNC: 30.2 % (ref 33–37)
MCV RBC AUTO: 77 FL (ref 82–100)
MICROCYTES: ABNORMAL
MONOCYTES ABSOLUTE: 0.6 K/UL (ref 0.2–0.8)
MONOCYTES RELATIVE PERCENT: 7.6 %
NEUTROPHILS ABSOLUTE: 5.3 K/UL (ref 1.4–6.5)
NEUTROPHILS RELATIVE PERCENT: 71 %
PDW BLD-RTO: 36.1 % (ref 11.5–14.5)
PHOSPHORUS: 2.9 MG/DL (ref 2.3–4.8)
PLATELET # BLD: 320 K/UL (ref 130–400)
PLATELET SLIDE REVIEW: ADEQUATE
POIKILOCYTES: ABNORMAL
POTASSIUM REFLEX MAGNESIUM: 3.9 MEQ/L (ref 3.4–4.9)
POTASSIUM SERPL-SCNC: 3.9 MEQ/L (ref 3.4–4.9)
RBC # BLD: 4.13 M/UL (ref 4.2–5.4)
SARS-COV-2, NAAT: NOT DETECTED
SCHISTOCYTES: ABNORMAL
SODIUM BLD-SCNC: 143 MEQ/L (ref 135–144)
TOTAL IRON BINDING CAPACITY: 289 UG/DL (ref 178–450)
TOTAL PROTEIN: 6.9 G/DL (ref 6.3–8)
TSH SERPL DL<=0.05 MIU/L-ACNC: 2.65 UIU/ML (ref 0.44–3.86)
VITAMIN B-12: 878 PG/ML (ref 232–1245)
VITAMIN D 25-HYDROXY: 24.5 NG/ML (ref 30–100)
WBC # BLD: 7.5 K/UL (ref 4.8–10.8)

## 2020-07-24 PROCEDURE — 6370000000 HC RX 637 (ALT 250 FOR IP): Performed by: SPECIALIST

## 2020-07-24 PROCEDURE — 83735 ASSAY OF MAGNESIUM: CPT

## 2020-07-24 PROCEDURE — 0DJ08ZZ INSPECTION OF UPPER INTESTINAL TRACT, VIA NATURAL OR ARTIFICIAL OPENING ENDOSCOPIC: ICD-10-PCS | Performed by: SPECIALIST

## 2020-07-24 PROCEDURE — 45380 COLONOSCOPY AND BIOPSY: CPT | Performed by: SPECIALIST

## 2020-07-24 PROCEDURE — 82607 VITAMIN B-12: CPT

## 2020-07-24 PROCEDURE — U0002 COVID-19 LAB TEST NON-CDC: HCPCS

## 2020-07-24 PROCEDURE — 85014 HEMATOCRIT: CPT

## 2020-07-24 PROCEDURE — 7100000010 HC PHASE II RECOVERY - FIRST 15 MIN: Performed by: SPECIALIST

## 2020-07-24 PROCEDURE — 45390 COLONOSCOPY W/RESECTION: CPT | Performed by: SPECIALIST

## 2020-07-24 PROCEDURE — 84100 ASSAY OF PHOSPHORUS: CPT

## 2020-07-24 PROCEDURE — 3700000000 HC ANESTHESIA ATTENDED CARE: Performed by: SPECIALIST

## 2020-07-24 PROCEDURE — 83550 IRON BINDING TEST: CPT

## 2020-07-24 PROCEDURE — C9113 INJ PANTOPRAZOLE SODIUM, VIA: HCPCS | Performed by: INTERNAL MEDICINE

## 2020-07-24 PROCEDURE — 6370000000 HC RX 637 (ALT 250 FOR IP): Performed by: INTERNAL MEDICINE

## 2020-07-24 PROCEDURE — 84443 ASSAY THYROID STIM HORMONE: CPT

## 2020-07-24 PROCEDURE — 85025 COMPLETE CBC W/AUTO DIFF WBC: CPT

## 2020-07-24 PROCEDURE — 45385 COLONOSCOPY W/LESION REMOVAL: CPT | Performed by: SPECIALIST

## 2020-07-24 PROCEDURE — 2500000003 HC RX 250 WO HCPCS: Performed by: NURSE ANESTHETIST, CERTIFIED REGISTERED

## 2020-07-24 PROCEDURE — 83540 ASSAY OF IRON: CPT

## 2020-07-24 PROCEDURE — 88342 IMHCHEM/IMCYTCHM 1ST ANTB: CPT

## 2020-07-24 PROCEDURE — 82746 ASSAY OF FOLIC ACID SERUM: CPT

## 2020-07-24 PROCEDURE — 2709999900 HC NON-CHARGEABLE SUPPLY: Performed by: SPECIALIST

## 2020-07-24 PROCEDURE — 45381 COLONOSCOPY SUBMUCOUS NJX: CPT | Performed by: SPECIALIST

## 2020-07-24 PROCEDURE — 7100000011 HC PHASE II RECOVERY - ADDTL 15 MIN: Performed by: SPECIALIST

## 2020-07-24 PROCEDURE — 88305 TISSUE EXAM BY PATHOLOGIST: CPT

## 2020-07-24 PROCEDURE — 43239 EGD BIOPSY SINGLE/MULTIPLE: CPT | Performed by: SPECIALIST

## 2020-07-24 PROCEDURE — 0DJD8ZZ INSPECTION OF LOWER INTESTINAL TRACT, VIA NATURAL OR ARTIFICIAL OPENING ENDOSCOPIC: ICD-10-PCS | Performed by: SPECIALIST

## 2020-07-24 PROCEDURE — 85018 HEMOGLOBIN: CPT

## 2020-07-24 PROCEDURE — 82248 BILIRUBIN DIRECT: CPT

## 2020-07-24 PROCEDURE — 1210000000 HC MED SURG R&B

## 2020-07-24 PROCEDURE — 2580000003 HC RX 258: Performed by: INTERNAL MEDICINE

## 2020-07-24 PROCEDURE — 6360000002 HC RX W HCPCS: Performed by: NURSE ANESTHETIST, CERTIFIED REGISTERED

## 2020-07-24 PROCEDURE — 83036 HEMOGLOBIN GLYCOSYLATED A1C: CPT

## 2020-07-24 PROCEDURE — 99222 1ST HOSP IP/OBS MODERATE 55: CPT | Performed by: SURGERY

## 2020-07-24 PROCEDURE — 80053 COMPREHEN METABOLIC PANEL: CPT

## 2020-07-24 PROCEDURE — 6360000002 HC RX W HCPCS: Performed by: INTERNAL MEDICINE

## 2020-07-24 PROCEDURE — 82306 VITAMIN D 25 HYDROXY: CPT

## 2020-07-24 PROCEDURE — 3609017100 HC EGD: Performed by: SPECIALIST

## 2020-07-24 PROCEDURE — 3609027000 HC COLONOSCOPY: Performed by: SPECIALIST

## 2020-07-24 PROCEDURE — 2580000003 HC RX 258: Performed by: SPECIALIST

## 2020-07-24 PROCEDURE — 6370000000 HC RX 637 (ALT 250 FOR IP): Performed by: NURSE PRACTITIONER

## 2020-07-24 PROCEDURE — 3700000001 HC ADD 15 MINUTES (ANESTHESIA): Performed by: SPECIALIST

## 2020-07-24 PROCEDURE — 36415 COLL VENOUS BLD VENIPUNCTURE: CPT

## 2020-07-24 RX ORDER — SODIUM CHLORIDE 0.9 % (FLUSH) 0.9 %
SYRINGE (ML) INJECTION PRN
Status: DISCONTINUED | OUTPATIENT
Start: 2020-07-24 | End: 2020-07-24 | Stop reason: ALTCHOICE

## 2020-07-24 RX ORDER — GLYCOPYRROLATE 1 MG/5 ML
SYRINGE (ML) INTRAVENOUS PRN
Status: DISCONTINUED | OUTPATIENT
Start: 2020-07-24 | End: 2020-07-24 | Stop reason: SDUPTHER

## 2020-07-24 RX ORDER — MAGNESIUM HYDROXIDE 1200 MG/15ML
LIQUID ORAL PRN
Status: DISCONTINUED | OUTPATIENT
Start: 2020-07-24 | End: 2020-07-24 | Stop reason: ALTCHOICE

## 2020-07-24 RX ORDER — LIDOCAINE HYDROCHLORIDE 20 MG/ML
INJECTION, SOLUTION INFILTRATION; PERINEURAL PRN
Status: DISCONTINUED | OUTPATIENT
Start: 2020-07-24 | End: 2020-07-24 | Stop reason: SDUPTHER

## 2020-07-24 RX ORDER — PROPOFOL 10 MG/ML
INJECTION, EMULSION INTRAVENOUS PRN
Status: DISCONTINUED | OUTPATIENT
Start: 2020-07-24 | End: 2020-07-24 | Stop reason: SDUPTHER

## 2020-07-24 RX ORDER — SIMETHICONE 20 MG/.3ML
EMULSION ORAL PRN
Status: DISCONTINUED | OUTPATIENT
Start: 2020-07-24 | End: 2020-07-24 | Stop reason: ALTCHOICE

## 2020-07-24 RX ORDER — TRAMADOL HYDROCHLORIDE 50 MG/1
50 TABLET ORAL EVERY 4 HOURS PRN
Qty: 42 TABLET | Refills: 0 | Status: SHIPPED | OUTPATIENT
Start: 2020-07-24 | End: 2020-07-31

## 2020-07-24 RX ORDER — TRAMADOL HYDROCHLORIDE 50 MG/1
50 TABLET ORAL EVERY 4 HOURS PRN
Status: DISCONTINUED | OUTPATIENT
Start: 2020-07-24 | End: 2020-07-25 | Stop reason: HOSPADM

## 2020-07-24 RX ADMIN — Medication 0.2 MG: at 13:43

## 2020-07-24 RX ADMIN — Medication 10 ML: at 05:21

## 2020-07-24 RX ADMIN — PANTOPRAZOLE SODIUM 40 MG: 40 INJECTION, POWDER, FOR SOLUTION INTRAVENOUS at 16:02

## 2020-07-24 RX ADMIN — TRAMADOL HYDROCHLORIDE 50 MG: 50 TABLET, FILM COATED ORAL at 22:38

## 2020-07-24 RX ADMIN — SODIUM CHLORIDE: 9 INJECTION, SOLUTION INTRAVENOUS at 16:02

## 2020-07-24 RX ADMIN — LIDOCAINE HYDROCHLORIDE 60 MG: 20 INJECTION, SOLUTION INFILTRATION; PERINEURAL at 13:17

## 2020-07-24 RX ADMIN — PROPOFOL 800 MG: 10 INJECTION, EMULSION INTRAVENOUS at 13:17

## 2020-07-24 RX ADMIN — POLYETHYLENE GLYCOL 3350, SODIUM SULFATE ANHYDROUS, SODIUM BICARBONATE, SODIUM CHLORIDE, POTASSIUM CHLORIDE 2000 ML: 236; 22.74; 6.74; 5.86; 2.97 POWDER, FOR SOLUTION ORAL at 06:15

## 2020-07-24 RX ADMIN — Medication 10 ML: at 16:02

## 2020-07-24 RX ADMIN — PANTOPRAZOLE SODIUM 40 MG: 40 INJECTION, POWDER, FOR SOLUTION INTRAVENOUS at 05:21

## 2020-07-24 RX ADMIN — TRAMADOL HYDROCHLORIDE 50 MG: 50 TABLET, FILM COATED ORAL at 16:18

## 2020-07-24 RX ADMIN — GABAPENTIN 300 MG: 300 CAPSULE ORAL at 20:31

## 2020-07-24 RX ADMIN — SODIUM CHLORIDE 200 MG: 9 INJECTION, SOLUTION INTRAVENOUS at 22:39

## 2020-07-24 RX ADMIN — SODIUM CHLORIDE: 9 INJECTION, SOLUTION INTRAVENOUS at 05:21

## 2020-07-24 ASSESSMENT — PULMONARY FUNCTION TESTS
PIF_VALUE: 0

## 2020-07-24 ASSESSMENT — PAIN DESCRIPTION - PAIN TYPE: TYPE: CHRONIC PAIN

## 2020-07-24 ASSESSMENT — PAIN SCALES - GENERAL
PAINLEVEL_OUTOF10: 6
PAINLEVEL_OUTOF10: 6

## 2020-07-24 ASSESSMENT — PAIN - FUNCTIONAL ASSESSMENT: PAIN_FUNCTIONAL_ASSESSMENT: 0-10

## 2020-07-24 ASSESSMENT — PAIN DESCRIPTION - FREQUENCY: FREQUENCY: INTERMITTENT

## 2020-07-24 ASSESSMENT — PAIN DESCRIPTION - LOCATION: LOCATION: HIP

## 2020-07-24 ASSESSMENT — PAIN DESCRIPTION - ORIENTATION: ORIENTATION: RIGHT

## 2020-07-24 ASSESSMENT — PAIN DESCRIPTION - DESCRIPTORS: DESCRIPTORS: ACHING

## 2020-07-24 NOTE — PLAN OF CARE
Problem: Pain:  Goal: Pain level will decrease  Description: Pain level will decrease  Outcome: Ongoing  Goal: Control of acute pain  Description: Control of acute pain  Outcome: Ongoing  Goal: Control of chronic pain  Description: Control of chronic pain  Outcome: Ongoing     Problem: Falls - Risk of:  Goal: Will remain free from falls  Description: Will remain free from falls  Outcome: Ongoing  Goal: Absence of physical injury  Description: Absence of physical injury  Outcome: Ongoing     Problem: Discharge Planning:  Goal: Discharged to appropriate level of care  Description: Discharged to appropriate level of care  Outcome: Ongoing     Problem:  Bowel Function - Altered:  Goal: Bowel elimination is within specified parameters  Description: Bowel elimination is within specified parameters  Outcome: Ongoing     Problem: Fluid Volume - Imbalance:  Goal: Will show no signs and symptoms of excessive bleeding  Description: Will show no signs and symptoms of excessive bleeding  Outcome: Ongoing  Goal: Absence of imbalanced fluid volume signs and symptoms  Description: Absence of imbalanced fluid volume signs and symptoms  Outcome: Ongoing     Problem: Nausea/Vomiting:  Goal: Absence of nausea/vomiting  Description: Absence of nausea/vomiting  Outcome: Ongoing  Goal: Able to drink  Description: Able to drink  Outcome: Ongoing  Goal: Able to eat  Description: Able to eat  Outcome: Ongoing  Goal: Ability to achieve adequate nutritional intake will improve  Description: Ability to achieve adequate nutritional intake will improve  Outcome: Ongoing

## 2020-07-24 NOTE — ANESTHESIA PRE PROCEDURE
Arsenio Kruse MD   10 mL at 07/24/20 0521    sodium chloride flush 0.9 % injection 10 mL  10 mL Intravenous 2 times per day Tiffanie Archer MD        sodium chloride flush 0.9 % injection 10 mL  10 mL Intravenous PRN Tiffanie Archer MD        sodium chloride flush 0.9 % injection 10 mL  10 mL Intravenous 2 times per day Tiffanie Archer MD        sodium chloride flush 0.9 % injection 10 mL  10 mL Intravenous PRN Tiffanie Archer MD        acetaminophen (TYLENOL) tablet 650 mg  650 mg Oral Q6H PRN Jennifer Darting, APRN - CNP   650 mg at 07/23/20 2042    Or    acetaminophen (TYLENOL) suppository 650 mg  650 mg Rectal Q6H PRN Jennifer Darting, APRN - CNP        promethazine (PHENERGAN) tablet 12.5 mg  12.5 mg Oral Q6H PRN Jennifer Darting, APRN - CNP        Or    ondansetron [facility]) injection 4 mg  4 mg Intravenous Q6H PRN Jennifer Darting, APRN - CNP        folic acid (FOLVITE) tablet 1 mg  1 mg Oral Daily Jennifer Darting, APRN - CNP   1 mg at 07/23/20 8611    gabapentin (NEURONTIN) capsule 300 mg  300 mg Oral Nightly Jennifer Darting, APRN - CNP   300 mg at 07/23/20 2042    losartan (COZAAR) tablet 100 mg  100 mg Oral Daily Moise Alvarez, APRN - CNP   100 mg at 07/23/20 0949    0.9 % sodium chloride infusion   Intravenous Continuous Mikhail Villegas MD 75 mL/hr at 07/24/20 5867         Allergies:     Allergies   Allergen Reactions    Darvon [Propoxyphene] Hives    Lipitor [Atorvastatin]      Muscle pain      Mobic [Meloxicam] Hives    Penicillins Hives    Percodan [Oxycodone-Aspirin] Hives    Pineapple Hives    Sulfa Antibiotics Hives    Dilaudid [Hydromorphone Hcl] Hives and Nausea And Vomiting       Problem List:    Patient Active Problem List   Diagnosis Code    Estrogen receptor positive Z17.0    Malignant neoplasm of central portion of right female breast ([facility] [address] C50.111    Malignant neoplasm of upper-outer quadrant of left female breast ([facility] [address] C50.412    Lobular carcinoma in situ of left breast D05.02    Iron deficiency anemia secondary to blood loss (chronic) D50.0    Patent foramen ovale Q21.1    Class 3 severe obesity due to excess calories without serious comorbidity with body mass index (BMI) of 40.0 to 44.9 in adult (HCC) E66.01, Z68.41    Osteoarthritis of spine with radiculopathy, lumbosacral region M47.27    Foraminal stenosis of lumbar region M48.061    Lumbar spondylosis M47.816    Former smoker, stopped smoking in distant past Z87.891    Spinal stenosis at L4-L5 level M48.061    Iron deficiency anemia, unspecified D50.9    Anemia of chronic renal failure N18.9, D63.1    Lower GI bleed K92.2    HTN (hypertension) I10    Osteoarthritis of hip M16.9    History of carcinoma in situ of breast Z86.000    History of cardioembolic stroke J34.84    Anticoagulated Z79.01       Past Medical History:        Diagnosis Date    Arthritis     Asthma     Cancer ([facility] [address] 2002 & 2015    Breast RIGHT (T2/N0/M0) ER/DC (+) HER2/estela 3+ / chemo / left breast with mastectomy    GERD (gastroesophageal reflux disease)     Hiatal hernia     History of blood transfusion 2013    post op TKR    Hypercholesteremia     Hyperlipidemia     past trx / off meds > 5 yrs    Hypertension     meds > 20 yrs     Patent foramen ovale        Past Surgical History:        Procedure Laterality Date    BREAST SURGERY Bilateral     Mastectomy    CARDIAC CATHETERIZATION  2013    no blockage    COLONOSCOPY  08/31/2016    w/polypectomy     ENDOSCOPY, COLON, DIAGNOSTIC      HERNIA REPAIR  7391    umbilical    HYSTERECTOMY  1997    JOINT REPLACEMENT Bilateral     Knee    JOINT REPLACEMENT Bilateral 11/04/2013    LAMINECTOMY Right 12/30/2019    RIGHT L4-5 MICRODECOMPRESSION performed by Kanu Krishnamurthy MD at [address] 0.6-1CM REMAINDR BODY N/A 4/6/2018    EXCISION CYST RT.  NECK AND EXCISION LESION NOSE performed by Pollo Williamson MD at [facility]    [place] [facility] CTR VAD W/SUBQ PORT/ CTR/PRPH INSJ N/A 2018    REMOVAL VENOUS PORT performed by Gerson Harding MD at [facility] / has been removed    TONSILLECTOMY      at age 23   [address]  2016    w/polypectomy,bx     VOCAL CORD SURGERY  2003    excision polyps       Social History:    Social History     Tobacco Use    Smoking status: Former Smoker     Packs/day: 0.25     Years: 5.00     Pack years: 1.25     Types: Cigarettes     Last attempt to quit: 3/30/1978     Years since quittin.3    Smokeless tobacco: Never Used   Substance Use Topics    Alcohol use: No                                Counseling given: Not Answered      Vital Signs (Current):   Vitals:    20 2104 20 2203 20 0728 20 1929   BP: (!) 166/74  136/62 (!) 140/64   Pulse: 86  79 75   Resp: 16  17 18   Temp: 36.9 °C (98.4 °F)  36.6 °C (97.9 °F) 36.8 °C (98.2 °F)   TempSrc: Oral  Oral Oral   SpO2: 97%  100% 100%   Weight:  236 lb (107 kg)     Height:  5' 1\" (1.549 m)                                                BP Readings from Last 3 Encounters:   20 (!) 140/64   20 134/61   19 (!) 141/59       NPO Status:                                                                                 BMI:   Wt Readings from Last 3 Encounters:   20 236 lb (107 kg)   20 223 lb (101.2 kg)   20 230 lb (104.3 kg)     Body mass index is 44.59 kg/m².     CBC:   Lab Results   Component Value Date    WBC 7.5 2020    RBC 4.13 2020    RBC 4.99 2012    HGB 9.6 2020    HCT 31.8 2020    MCV 77.0 2020    RDW 36.1 2020     2020       CMP:   Lab Results   Component Value Date     2020    K 4.1 2020     2020    CO2 24 2020    BUN 9 2020    CREATININE 0.40 2020    GFRAA >60.0 2020    LABGLOM >60.0 2020    GLUCOSE 89 2020    GLUCOSE 78 05/19/2012    PROT 6.1 07/23/2020    CALCIUM 9.8 07/23/2020    BILITOT 0.4 07/23/2020    ALKPHOS 118 07/23/2020    AST 14 07/23/2020    ALT 16 07/23/2020       POC Tests: No results for input(s): POCGLU, POCNA, POCK, POCCL, POCBUN, POCHEMO, POCHCT in the last 72 hours. Coags:   Lab Results   Component Value Date    PROTIME 17.0 07/23/2020    PROTIME 26.6 07/02/2019    INR 1.4 07/23/2020    APTT 42.2 07/23/2020       HCG (If Applicable): No results found for: PREGTESTUR, PREGSERUM, HCG, HCGQUANT     ABGs: No results found for: PHART, PO2ART, VJG4JBM, QXY9ZJZ, BEART, T6BXCPPM     Type & Screen (If Applicable):  No results found for: LABABO, LABRH    Drug/Infectious Status (If Applicable):  No results found for: HIV, HEPCAB    COVID-19 Screening (If Applicable): No results found for: COVID19      Anesthesia Evaluation    Airway: Mallampati: II  TM distance: >3 FB   Neck ROM: full  Mouth opening: > = 3 FB Dental:          Pulmonary:normal exam    (+) sleep apnea:  asthma:                            Cardiovascular:    (+) hypertension:, hyperlipidemia      ECG reviewed  Rhythm: regular  Rate: normal                 ROS comment: PFO with history of strokes caused by cardioembolic clots     Neuro/Psych:   (+) CVA:, neuromuscular disease:, TIA,             GI/Hepatic/Renal:   (+) hiatal hernia, GERD:, bowel prep, morbid obesity          Endo/Other:    (+) blood dyscrasia: anemia, arthritis:., malignancy/cancer. Abdominal:   (+) obese,         Vascular:   + PVD, aortic or cerebral, . Anesthesia Plan      MAC     ASA 4       Induction: intravenous. Anesthetic plan and risks discussed with patient. Plan discussed with attending.                   ROSE Gonzalez - CRNA   7/24/2020

## 2020-07-24 NOTE — CONSULTS
hernia repair (1997); Hysterectomy (1997); joint replacement (Bilateral); joint replacement (Bilateral, 11/04/2013); Tonsillectomy; Vocal Cord Surgery (2003); and laminectomy (Right, 12/30/2019). Medications  Prior to Admission medications    Medication Sig Start Date End Date Taking? Authorizing Provider   DULoxetine (CYMBALTA) 60 MG extended release capsule Take 60 mg by mouth daily   Yes Historical Provider, MD   gabapentin (NEURONTIN) 300 MG capsule Take 300 mg by mouth nightly.  12/12/19  Yes Historical Provider, MD   irbesartan (AVAPRO) 300 MG tablet Take 300 mg by mouth daily  12/9/19  Yes Historical Provider, MD   Multiple Vitamins-Minerals (MULTIVITAMIN ADULT PO) Take by mouth daily    Yes Historical Provider, MD   albuterol sulfate  (90 Base) MCG/ACT inhaler Inhale 2 puffs into the lungs every 6 hours as needed for Wheezing   Yes Historical Provider, MD   folic acid (FOLVITE) 1 MG tablet Take 1 mg by mouth daily  8/13/16  Yes Historical Provider, MD   warfarin (COUMADIN) 10 MG tablet Indications: 7.5 on U-X-Wb-F-Sun. / 10 mg T-Sat  7/6/16  Yes Historical Provider, MD   esomeprazole (NEXIUM) 40 MG capsule TK 1 C PO QD 6/24/16  Yes Historical Provider, MD   DiltiaZEM HCl (CARDIZEM PO) Take 240 mg by mouth daily    Yes Historical Provider, MD   montelukast (SINGULAIR) 10 MG tablet Take 10 mg by mouth nightly  8/5/16  Yes Historical Provider, MD   torsemide (DEMADEX) 20 MG tablet Take 20 mg by mouth as needed     Historical Provider, MD    Scheduled Meds:   pantoprazole  40 mg Intravenous Q12H    And    sodium chloride (PF)  10 mL Intravenous Q12H    sodium chloride flush  10 mL Intravenous 2 times per day    sodium chloride flush  10 mL Intravenous 2 times per day    folic acid  1 mg Oral Daily    gabapentin  300 mg Oral Nightly    losartan  100 mg Oral Daily     Continuous Infusions:   sodium chloride 75 mL/hr at 07/24/20 1602     PRN Meds:.traMADol, sodium chloride flush, sodium chloride flush, acetaminophen **OR** acetaminophen, promethazine **OR** ondansetron  Allergies  is allergic to darvon [propoxyphene]; lipitor [atorvastatin]; mobic [meloxicam]; penicillins; percodan [oxycodone-aspirin]; pineapple; sulfa antibiotics; and dilaudid [hydromorphone hcl]. Family History  family history includes Breast Cancer in her sister; Cancer in her brother; Diabetes in her brother and sister; Heart Attack in her father and mother; High Blood Pressure in her maternal aunt; High Cholesterol in her daughter; Kidney Disease in her father and sister; Liver Disease in her sister; [place] in her brother; Other in her sister; Thyroid Disease in her daughter. Social History   reports that she quit smoking about 42 years ago. Her smoking use included cigarettes. She has a 1.25 pack-year smoking history. She has never used smokeless tobacco. She reports that she does not drink alcohol or use drugs. Review of Systems:  14 systems were reviewed and negative other than [place]    OBJECTIVE  CURRENT VITALS:  height is 5' 1\" (1.549 m) and weight is 236 lb (107 kg). Her oral temperature is 97.7 °F (36.5 °C). Her blood pressure is 158/68 (abnormal) and her pulse is 93. Her respiration is 18 and oxygen saturation is 98%. Temperature Range (24h):Temp: 97.7 °F (36.5 °C) Temp  Av.4 °F (36.3 °C)  Min: 97 °F (36.1 °C)  Max: 97.7 °F (36.5 °C)  BP Range (08A): Systolic (23LIB), OLV:177 , Min:110 , WDN:721     Diastolic (64UQA), BTC:82, Min:56, Max:134    Pulse Range (24h): Pulse  Av.8  Min: 83  Max: 109  Respiration Range (24h): Resp  Av.3  Min: 0  Max: 32  Current Pulse Ox (24h):  SpO2: 98 %  Pulse Ox Range (24h):  SpO2  Av.9 %  Min: 98 %  Max: 100 %  Oxygen Amount and Delivery: O2 Flow Rate (L/min): 3 L/min  CONSTITUTIONAL: Alert and oriented times 3, no acute distress and cooperative to examination with proper mood and affect. SKIN: Skin color, texture, turgor normal. No rashes or lesions. , no bruising  LYMPH: no cervical nodes, no inguinal nodes  HEENT: Head is normocephalic, atraumatic. PERRLA, Mucous membranes are moist.   NECK: Supple, symmetrical, trachea midline, no adenopathy, thyroid symmetric, not enlarged and no tenderness, skin normal.  CHEST/LUNGS: chest symmetric with normal A/P diameter, normal respiratory rate and rhythm, lungs clear to auscultation without wheezes, rales or rhonchi. No accessory muscle use. CARDIOVASCULAR: Heart sounds are normal.  Regular rate and rhythm without murmur  ABDOMEN: Normal shape. .  Normal bowel sounds. No bruits. Soft, nondistended, no masses or organomegaly. no evidence of hernia. Tenderness: absent. RECTAL: deferred, not clinically indicated  NEUROLOGIC: There are no focalizing motor or sensory deficits. CN II-XII are grossly intact. Magi Simpler EXTREMITIES: no cyanosis, no clubbing and no edema.   PYSCH:  Mood, affect and judgement are normal, alert and oriented x 3  LABS:  Recent Labs     07/22/20  1830 07/22/20  1846  07/23/20  0326 07/23/20  0327  07/23/20  0702  07/23/20  2126 07/24/20  0631 07/24/20  1531   WBC 8.8  --   --  8.2  --   --   --   --   --  7.5  --    HGB 9.2*  --    < > 8.9*  --    < >  --    < > 8.2* 9.6* 9.7*   HCT 29.7*  --    < > 28.6*  --    < >  --    < > 27.0* 31.8* 31.7*     --   --  265  --   --   --   --   --  320  --      --   --  140  --   --   --   --   --  143  --    K 4.5  --   --  4.1  --   --   --   --   --  3.9  3.9  --      --   --  107  --   --   --   --   --  106  --    CO2 25  --   --  24  --   --   --   --   --  22  --    BUN 13  --   --  9  --   --   --   --   --  4*  --    CREATININE 0.47* 0.5*  --  0.40*  --   --   --   --   --  0.40*  --    MG  --   --   --   --   --   --   --   --   --  2.1  --    PHOS  --   --   --   --   --   --   --   --   --  2.9  --    CALCIUM 9.9  --   --  9.8  --   --   --   --   --  10.3*  --    INR 2.9  --   --   --  1.6  --  1.4  --   --   --   --    AST 17  --   -- 14  --   --   --   --   --  19  --    ALT 16  --   --  16  --   --   --   --   --  18  --    BILITOT <0.2  --   --  0.4  --   --   --   --   --  0.4  --    BILIDIR  --   --   --   --   --   --   --   --   --  <0.2  --     < > = values in this interval not displayed. RADIOLOGY:  I have personally reviewed the following films:  CT scan abd/pelvis: essentially normal    Thank you for the interesting evaluation. Further recommendations to follow.     Electronically signed by Tima Velasquez MD on 7/24/20 at 7:55 PM EDT

## 2020-07-24 NOTE — PROGRESS NOTES
Pt. Scheduled to go down for a colonoscopy and EGD today at 11:15am with Dr. Ronel Faustin. Procedure time was confirmed with Nursing supervisor, Tamera Delgado. Pt. Started second round of GoLytely bowel prep at 0615. She stated the first round of prep took her about an hour to finish. Pre-op bath given. Consent is signed and in the chart. Kristen-op checklist complete. Pt. Had one BM this shift and stated it was loose and brown without blood or clots. Will continue to monitor.

## 2020-07-24 NOTE — PROGRESS NOTES
Progress Note  7/23/2020 9:47 PM  Subjective:   Admit Date: 7/22/2020  PCP: Gina Khoury MD  Interval History: Known to my practice admitted for BRBPR with blood clots of a day duration has been noted to have Fe deficiency anemia and a previous colonoscopy on 8/31/16 was positive for diverticulosis and 1 polyp at the sigmoid colon and 2 polyps at the rectum, EGD then was also normal.     PAST MEDICAL HISTORY:    Past Medical History:   Diagnosis Date    Arthritis     Asthma     Cancer ([address] 2002 & 2015    Breast RIGHT (T2/N0/M0) ER/FL (+) HER2/estela 3+ / chemo / left breast with mastectomy    GERD (gastroesophageal reflux disease)     Hiatal hernia     History of blood transfusion 2013    post op TKR    Hypercholesteremia     Hyperlipidemia     past trx / off meds > 5 yrs    Hypertension     meds > 20 yrs     Patent foramen ovale      PAST SURGICAL HISTORY:    Past Surgical History:   Procedure Laterality Date    BREAST SURGERY Bilateral     Mastectomy    CARDIAC CATHETERIZATION  2013    no blockage    COLONOSCOPY  08/31/2016    w/polypectomy     ENDOSCOPY, COLON, DIAGNOSTIC      HERNIA REPAIR  9655    umbilical    HYSTERECTOMY  1997    JOINT REPLACEMENT Bilateral     Knee    JOINT REPLACEMENT Bilateral 11/04/2013    LAMINECTOMY Right 12/30/2019    RIGHT L4-5 MICRODECOMPRESSION performed by Sherrill Centeno MD at [address] 0.6-1CM REMAINDR BODY N/A 4/6/2018    EXCISION CYST RT.  NECK AND EXCISION LESION NOSE performed by Gerard Wilson MD at [address] [facility] VAD W/SUBQ PORT/ CTR/PRPH INSJ N/A 4/6/2018    REMOVAL VENOUS PORT performed by Gerard Wilson MD at [facility] / has been removed    TONSILLECTOMY      at age 23   [address]  08/31/2016    w/polypectomy,bx     VOCAL CORD SURGERY  2003    excision polyps     FAMILY HISTORY:    Family History   Problem Relation Age of Onset    Heart Attack Mother     Kidney Disease Father     Heart Attack Father     Breast Cancer Sister     Lung Cancer Brother     Cancer Brother         lung cancer    High Blood Pressure Maternal Aunt     Diabetes Brother     Diabetes Sister     Liver Disease Sister     Other Sister         blind due to detached retina    Kidney Disease Sister     Thyroid Disease Daughter     High Cholesterol Daughter      SOCIAL HISTORY:    Social History     Socioeconomic History    Marital status: Single     Spouse name: Not on file    Number of children: Not on file    Years of education: Not on file    Highest education level: Not on file   Occupational History    Not on file   Social Needs    Financial resource strain: Not on file    Food insecurity     Worry: Not on file     Inability: Not on file    Transportation needs     Medical: Not on file     Non-medical: Not on file   Tobacco Use    Smoking status: Former Smoker     Packs/day: 0.25     Years: 5.00     Pack years: 1.25     Types: Cigarettes     Last attempt to quit: 3/30/1978     Years since quittin.3    Smokeless tobacco: Never Used   Substance and Sexual Activity    Alcohol use: No    Drug use: No    Sexual activity: Not on file   Lifestyle    Physical activity     Days per week: Not on file     Minutes per session: Not on file    Stress: Not on file   Relationships    Social connections     Talks on phone: Not on file     Gets together: Not on file     Attends Spiritism service: Not on file     Active member of club or organization: Not on file     Attends meetings of clubs or organizations: Not on file     Relationship status: Not on file    Intimate partner violence     Fear of current or ex partner: Not on file     Emotionally abused: Not on file     Physically abused: Not on file     Forced sexual activity: Not on file   Other Topics Concern    Not on file   Social History Narrative    Not on file     MEDICATIONS:   Prior to Admission medications    Medication Sig Start Date End Date Taking? Authorizing Provider   DULoxetine (CYMBALTA) 60 MG extended release capsule Take 60 mg by mouth daily   Yes Historical Provider, MD   gabapentin (NEURONTIN) 300 MG capsule Take 300 mg by mouth nightly. 12/12/19  Yes Historical Provider, MD   irbesartan (AVAPRO) 300 MG tablet Take 300 mg by mouth daily  12/9/19  Yes Historical Provider, MD   Multiple Vitamins-Minerals (MULTIVITAMIN ADULT PO) Take by mouth daily    Yes Historical Provider, MD   albuterol sulfate  (90 Base) MCG/ACT inhaler Inhale 2 puffs into the lungs every 6 hours as needed for Wheezing   Yes Historical Provider, MD   folic acid (FOLVITE) 1 MG tablet Take 1 mg by mouth daily  8/13/16  Yes Historical Provider, MD   warfarin (COUMADIN) 10 MG tablet Indications: 7.5 on B-U-Mx-F-Sun. / 10 mg T-Sat  7/6/16  Yes Historical Provider, MD   esomeprazole (NEXIUM) 40 MG capsule TK 1 C PO QD 6/24/16  Yes Historical Provider, MD   DiltiaZEM HCl (CARDIZEM PO) Take 240 mg by mouth daily    Yes Historical Provider, MD   montelukast (SINGULAIR) 10 MG tablet Take 10 mg by mouth nightly  8/5/16  Yes Historical Provider, MD   torsemide (DEMADEX) 20 MG tablet Take 20 mg by mouth as needed     Historical Provider, MD       ALLERGIES: Darvon [propoxyphene]; Lipitor [atorvastatin]; Mobic [meloxicam]; Penicillins; Percodan [oxycodone-aspirin];  Pineapple; Sulfa antibiotics; and Dilaudid [hydromorphone hcl]    REVIEW OF SYSTEM:   Review of Systems - History obtained from the patient  General ROS: Hx breast cancer s/p mastectomy,  PFO, iron deficiency  Psychological ROS: negative for - anxiety, depression or memory difficulties  Ophthalmic ROS: negative for - blurry vision or double vision  ENT ROS: negative for - headaches, sinus pain or sore throat  Hematological and Lymphatic ROS: negative for - bruising, jaundice   Positive embolic event, on coumadin  Respiratory ROS: no cough, shortness of breath, or wheezing  Cardiovascular ROS: no chest pain or dyspnea on exertion  Gastrointestinal ROS: positive for - melena  negative for - abdominal pain, constipation, diarrhea, hematemesis or nausea/vomiting  Musculoskeletal ROS: negative for - gait disturbance  Neurological ROS: no TIA or stroke symptoms  Dermatological ROS: negative for - pruritus or rash  OBJECTIVE  PHYSICAL EXAM: BP (!) 140/64   Pulse 75   Temp 98.2 °F (36.8 °C) (Oral)   Resp 18   Ht 5' 1\" (1.549 m)   Wt 236 lb (107 kg)   LMP 12/20/1997   SpO2 100%   BMI 44.59 kg/m²     CONSTITUTIONAL:  awake, alert, cooperative, no apparent distress, and appears stated age  EYES:  pupils equal, round and reactive to light and sclera clear  ENT:  Normocephalic, without obvious abnormality, good dentition. NECK:  supple, symmetrical, trachea midline, no lymphadenopathy, thyroid not enlarged, symmetric, no tenderness, no jugular venous distension and no carotid bruits  LUNGS:  No increased work of breathing, good air exchange, clear to auscultation bilaterally, no crackles or wheezing  CARDIOVASCULAR:  Normal apical impulse, regular rate and rhythm, normal S1 and S2, no S3 or S4, and no murmur noted  ABDOMEN:  normal bowel sounds, soft, non-distended and non-tender  MUSCULOSKELETAL:  there is no redness, warmth, or swelling of the joints  NEUROLOGIC:  A&O x 3  SKIN:  no bruising or bleeding and no rashes    DATA:     Diagnostic tests reviewed for today's visit:    Most recent labs and imaging results reviewed.      LABS:    Recent Results (from the past 24 hour(s))   Iron and TIBC    Collection Time: 07/23/20  3:26 AM   Result Value Ref Range    Iron 54 37 - 145 ug/dL    TIBC 263 178 - 450 ug/dL    Iron Saturation 21 11 - 46 %   Comprehensive Metabolic Panel w/ Reflex to MG    Collection Time: 07/23/20  3:26 AM   Result Value Ref Range    Sodium 140 135 - 144 mEq/L    Potassium reflex Magnesium 4.1 3.4 - 4.9 mEq/L    Chloride 107 95 - 107 mEq/L    CO2 24 20 - 31 mEq/L    Anion Gap 9 9 - 15 mEq/L Glucose 89 70 - 99 mg/dL    BUN 9 8 - 23 mg/dL    CREATININE 0.40 (L) 0.50 - 0.90 mg/dL    GFR Non-African American >60.0 >60    GFR  >60.0 >60    Calcium 9.8 8.5 - 9.9 mg/dL    Total Protein 6.1 (L) 6.3 - 8.0 g/dL    Alb 3.6 3.5 - 4.6 g/dL    Total Bilirubin 0.4 0.2 - 0.7 mg/dL    Alkaline Phosphatase 118 40 - 130 U/L    ALT 16 0 - 33 U/L    AST 14 0 - 35 U/L    Globulin 2.5 2.3 - 3.5 g/dL   CBC auto differential    Collection Time: 07/23/20  3:26 AM   Result Value Ref Range    WBC 8.2 4.8 - 10.8 K/uL    RBC 3.84 (L) 4.20 - 5.40 M/uL    Hemoglobin 8.9 (L) 12.0 - 16.0 g/dL    Hematocrit 28.6 (L) 37.0 - 47.0 %    MCV 74.5 (L) 82.0 - 100.0 fL    MCH 23.1 (L) 27.0 - 31.3 pg    MCHC 31.0 (L) 33.0 - 37.0 %    RDW 36.2 (H) 11.5 - 14.5 %    Platelets 881 601 - 022 K/uL    Neutrophils % 66.8 %    Lymphocytes % 17.5 %    Monocytes % 10.5 %    Eosinophils % 4.2 %    Basophils % 1.0 %    Neutrophils Absolute 5.4 1.4 - 6.5 K/uL    Lymphocytes Absolute 1.4 1.0 - 4.8 K/uL    Monocytes Absolute 0.9 (H) 0.2 - 0.8 K/uL    Eosinophils Absolute 0.3 0.0 - 0.7 K/uL    Basophils Absolute 0.1 0.0 - 0.2 K/uL   Protime-INR    Collection Time: 07/23/20  3:27 AM   Result Value Ref Range    Protime 18.6 (H) 12.3 - 14.9 sec    INR 1.6    APTT    Collection Time: 07/23/20  3:27 AM   Result Value Ref Range    aPTT 42.2 (H) 24.4 - 36.8 sec   Hemoglobin and Hematocrit, Blood    Collection Time: 07/23/20  7:01 AM   Result Value Ref Range    Hemoglobin 9.4 (L) 12.0 - 16.0 g/dL    Hematocrit 30.7 (L) 37.0 - 47.0 %   Protime-INR    Collection Time: 07/23/20  7:02 AM   Result Value Ref Range    Protime 17.0 (H) 12.3 - 14.9 sec    INR 1.4    Hemoglobin and Hematocrit, Blood    Collection Time: 07/23/20  4:10 PM   Result Value Ref Range    Hemoglobin 9.4 (L) 12.0 - 16.0 g/dL    Hematocrit 30.7 (L) 37.0 - 47.0 %       IMAGING:  Ct Abdomen Pelvis W Iv Contrast Additional Contrast? None    Result Date: 7/22/2020  EXAMINATION: CT ABDOMEN PELVIS W IV CONTRAST DATE AND TIME:7/22/2020 6:30 PM CLINICAL HISTORY: Acute abdominal pain. Epigastric pain. GI bleed  COMPARISON:  January 7, 2018 TECHNIQUE: Contiguous axial CT sections of the abdomen and pelvis. 100 cc's of IV contrast given. .  All CT scans at this facility use dose modulation, iterative reconstruction, and/or weight based dosing when appropriate to reduce radiation dose to as low as reasonably achievable. FINDINGS    Liver: Negative     Spleen: Negative    Pancreas: Negative   Gallbladder: No calcified gallstones. Normal gallbladder wall. No pericholecystic fluid. Kidney: No solid renal  lesions, or hydronephrosis. No renal or ureteral stone. Adrenal glands are negative. Bowel: The bowel is not dilated. There is no evidence of diverticulitis or colitis. Appendix: There  is no CT evidence for appendicitis. Nodes: No lymphadenopathy. Aorta: No aneurysm    Peritoneum: No free fluid or free air. The abdominal wall is intact. Pelvis: No abnormal soft tissue mass. The bladder is normal.   Bones: No acute osseous abnormalities.      Lung bases:Visualized lung bases show no significant pathology       NO ACUTE PATHOLOGY IN THE ABDOMEN OR PELVIS.       VTE Prophylaxis: held  Bleeding risk      Intake/Output Summary (Last 24 hours) at 7/23/2020 2147  Last data filed at 7/23/2020 0544  Gross per 24 hour   Intake 573 ml   Output 500 ml   Net 73 ml     Medications:      sodium chloride 75 mL/hr at 07/23/20 1412      pantoprazole  40 mg Intravenous Q12H    And    sodium chloride (PF)  10 mL Intravenous Q12H    sodium chloride flush  10 mL Intravenous 2 times per day    polyethylene glycol  2,000 mL Oral See Admin Instructions    sodium chloride flush  10 mL Intravenous 2 times per day    folic acid  1 mg Oral Daily    gabapentin  300 mg Oral Nightly    losartan  100 mg Oral Daily     Recent Labs     07/22/20  1830  07/23/20  0326 07/23/20  0701 07/23/20  1610   WBC 8.8  -- 8.2  --   --    HGB 9.2*   < > 8.9* 9.4* 9.4*     --  265  --   --     < > = values in this interval not displayed. Recent Labs     07/22/20  1830 07/22/20  1846 07/23/20  0326     --  140   K 4.5  --  4.1     --  107   CO2 25  --  24   BUN 13  --  9   CREATININE 0.47* 0.5* 0.40*   GLUCOSE 139*  --  89     Recent Labs     07/22/20  1830 07/23/20  0326   AST 17 14   ALT 16 16   BILITOT <0.2 0.4   ALKPHOS 137* 118     Troponin T: No results for input(s): TROPONINI in the last 72 hours. Pro-BNP: No results for input(s): BNP in the last 72 hours. INR:   Recent Labs     07/22/20 1830 07/23/20  0327 07/23/20  0702   INR 2.9 1.6 1.4       Objective:     Vitals:    07/22/20 2104 07/22/20 2203 07/23/20 0728 07/23/20 1929   BP: (!) 166/74  136/62 (!) 140/64   Pulse: 86  79 75   Resp: 16  17 18   Temp: 98.4 °F (36.9 °C)  97.9 °F (36.6 °C) 98.2 °F (36.8 °C)   TempSrc: Oral  Oral Oral   SpO2: 97%  100% 100%   Weight:  236 lb (107 kg)     Height:  5' 1\" (1.549 m)       General appearance: alert and cooperative with exam  Lungs: clear to auscultation bilaterally  Heart: regular rate and rhythm, S1, S2 normal, no murmur, click, rub or gallop  Abdomen: soft, non-tender; bowel sounds normal; no masses,  no organomegaly  Extremities: extremities normal, atraumatic, no cyanosis or edema  Neurologic: No obvious focal neurologic deficits. Assessment and Plan:   1. Lower GI bleeding with possible Upper GI bleed with chronic Fe deficiency anemia   2. Bilateral breast Ca s/p Bilateral mastectomy s/p chemo and hormonal therapy  3. PFO with bouts of CVA with no residual and TIA on coumadin as no closure recommended. 4. HTN  5. Hypercholesterolemia. 6. Morbid Obesity  7. S/P Bilateral TKR  8. ROSEMARIE  9. Fe deficiency anemia  10. S/P L4 and L5 foraminotomy  11. Chemotherapy induced Peripheral Neuropathy  12. History of Asthma  13. With Normal LHC in 2013 after an abnormal stress lexiscan  14.  DJD and eventual possible bilateral THR. Advance Directive: Full Code  DVT prophylaxis with enoxaparin 40 mg sub-Q daily. Discharge planning: Home    Active Problems:    Patent foramen ovale    Iron deficiency anemia, unspecified    Lower GI bleed    HTN (hypertension)    Osteoarthritis of hip    History of carcinoma in situ of breast    History of cardioembolic stroke    Anticoagulated  Resolved Problems:    * No resolved hospital problems. *  Plan:  1. For Colonoscopy and will Request GI for EGD as with chronic Fe deficiency anemia in am  2. Continue with H and H q 6 hrs for trending  3. Correct coagulopathy  4. Complete blood work in am  5. PPI  6. Resume home meds  7. Resume anticoagulation post GI work-up pending results.   8. SCD's    Salvador Chowdhury MD

## 2020-07-25 VITALS
RESPIRATION RATE: 18 BRPM | HEIGHT: 61 IN | OXYGEN SATURATION: 99 % | HEART RATE: 85 BPM | SYSTOLIC BLOOD PRESSURE: 122 MMHG | BODY MASS INDEX: 44.56 KG/M2 | DIASTOLIC BLOOD PRESSURE: 63 MMHG | WEIGHT: 236 LBS | TEMPERATURE: 98.2 F

## 2020-07-25 LAB
ALBUMIN SERPL-MCNC: 4.1 G/DL (ref 3.5–4.6)
ALP BLD-CCNC: 120 U/L (ref 40–130)
ALT SERPL-CCNC: 17 U/L (ref 0–33)
ANION GAP SERPL CALCULATED.3IONS-SCNC: 14 MEQ/L (ref 9–15)
ANISOCYTOSIS: ABNORMAL
AST SERPL-CCNC: 16 U/L (ref 0–35)
ATYPICAL LYMPHOCYTE RELATIVE PERCENT: 5 %
BASOPHILS ABSOLUTE: 0 K/UL (ref 0–0.2)
BASOPHILS RELATIVE PERCENT: 0.9 %
BILIRUB SERPL-MCNC: 0.4 MG/DL (ref 0.2–0.7)
BUN BLDV-MCNC: 4 MG/DL (ref 8–23)
CALCIUM SERPL-MCNC: 10.5 MG/DL (ref 8.5–9.9)
CHLORIDE BLD-SCNC: 106 MEQ/L (ref 95–107)
CO2: 24 MEQ/L (ref 20–31)
CREAT SERPL-MCNC: 0.44 MG/DL (ref 0.5–0.9)
EOSINOPHILS ABSOLUTE: 0.2 K/UL (ref 0–0.7)
EOSINOPHILS RELATIVE PERCENT: 2 %
GFR AFRICAN AMERICAN: >60
GFR NON-AFRICAN AMERICAN: >60
GLOBULIN: 2.6 G/DL (ref 2.3–3.5)
GLUCOSE BLD-MCNC: 98 MG/DL (ref 70–99)
HCT VFR BLD CALC: 30.6 % (ref 37–47)
HEMATOLOGY PATH CONSULT: NO
HEMOGLOBIN: 9.3 G/DL (ref 12–16)
HYPOCHROMIA: ABNORMAL
INR BLD: 1
LYMPHOCYTES ABSOLUTE: 1.5 K/UL (ref 1–4.8)
LYMPHOCYTES RELATIVE PERCENT: 8 %
MACROCYTES: ABNORMAL
MCH RBC QN AUTO: 23 PG (ref 27–31.3)
MCHC RBC AUTO-ENTMCNC: 30.5 % (ref 33–37)
MCV RBC AUTO: 75.4 FL (ref 82–100)
MICROCYTES: ABNORMAL
MONOCYTES ABSOLUTE: 0.5 K/UL (ref 0.2–0.8)
MONOCYTES RELATIVE PERCENT: 3.8 %
NEUTROPHILS ABSOLUTE: 9.4 K/UL (ref 1.4–6.5)
NEUTROPHILS RELATIVE PERCENT: 82 %
PDW BLD-RTO: 36.4 % (ref 11.5–14.5)
PLATELET # BLD: 338 K/UL (ref 130–400)
PLATELET SLIDE REVIEW: ADEQUATE
POIKILOCYTES: ABNORMAL
POLYCHROMASIA: ABNORMAL
POTASSIUM REFLEX MAGNESIUM: 3.7 MEQ/L (ref 3.4–4.9)
POTASSIUM SERPL-SCNC: 3.7 MEQ/L (ref 3.4–4.9)
PROTHROMBIN TIME: 13.5 SEC (ref 12.3–14.9)
RBC # BLD: 4.06 M/UL (ref 4.2–5.4)
SCHISTOCYTES: ABNORMAL
SMUDGE CELLS: 0.9
SODIUM BLD-SCNC: 144 MEQ/L (ref 135–144)
TEAR DROP CELLS: ABNORMAL
TOTAL PROTEIN: 6.7 G/DL (ref 6.3–8)
WBC # BLD: 11.5 K/UL (ref 4.8–10.8)

## 2020-07-25 PROCEDURE — 6360000002 HC RX W HCPCS: Performed by: INTERNAL MEDICINE

## 2020-07-25 PROCEDURE — 36415 COLL VENOUS BLD VENIPUNCTURE: CPT

## 2020-07-25 PROCEDURE — 2700000000 HC OXYGEN THERAPY PER DAY

## 2020-07-25 PROCEDURE — C9113 INJ PANTOPRAZOLE SODIUM, VIA: HCPCS | Performed by: INTERNAL MEDICINE

## 2020-07-25 PROCEDURE — 6370000000 HC RX 637 (ALT 250 FOR IP): Performed by: NURSE PRACTITIONER

## 2020-07-25 PROCEDURE — 80053 COMPREHEN METABOLIC PANEL: CPT

## 2020-07-25 PROCEDURE — 2580000003 HC RX 258: Performed by: INTERNAL MEDICINE

## 2020-07-25 PROCEDURE — 85610 PROTHROMBIN TIME: CPT

## 2020-07-25 PROCEDURE — 85025 COMPLETE CBC W/AUTO DIFF WBC: CPT

## 2020-07-25 PROCEDURE — 6370000000 HC RX 637 (ALT 250 FOR IP): Performed by: INTERNAL MEDICINE

## 2020-07-25 RX ADMIN — FOLIC ACID 1 MG: 1 TABLET ORAL at 08:44

## 2020-07-25 RX ADMIN — PANTOPRAZOLE SODIUM 40 MG: 40 INJECTION, POWDER, FOR SOLUTION INTRAVENOUS at 03:57

## 2020-07-25 RX ADMIN — LOSARTAN POTASSIUM 100 MG: 50 TABLET, FILM COATED ORAL at 08:43

## 2020-07-25 RX ADMIN — TRAMADOL HYDROCHLORIDE 50 MG: 50 TABLET, FILM COATED ORAL at 08:50

## 2020-07-25 RX ADMIN — Medication 10 ML: at 03:57

## 2020-07-25 ASSESSMENT — PAIN SCALES - GENERAL: PAINLEVEL_OUTOF10: 5

## 2020-07-25 NOTE — PROGRESS NOTES
Assumed care of pt at 0700, assessment performed. Pt complains of pain in hip, chronic, medicated per MAR. Plan for DC today. Per Dr. Sindhu Thornton, pt to bring paper prescription for Ultram to the office on Monday for him to sign. Pt left resting in bed with call light within reach. Will continue to monitor.  Electronically signed by Clark Freeman RN on 7/25/2020 at 11:26 AM

## 2020-07-25 NOTE — PROGRESS NOTES
Progress Note  7/24/2020 9:58 PM  Subjective:   Admit Date: 7/22/2020  PCP: Xi Coy MD  Interval History: Long discussion with patient regarding plan of care. Seen by Dr Rosenda Figueroa and patient is scheduled for an office follow-up on Monday, Fe level is 43 and H and H trends up at 9.7. No obvious bleeding for now and will continue to hold coumadin for now. Cymbalta will be tapered off and Ultram was started for hip pain as patient is suppose to be scheduled for surgery of the right hip on the 13th of this month. IV venofer will be given tonight. Awaiting surgical plans and coumadin bridging will be started after a week off coumadin, and pending surgical plans. Will follow-up Labs and PT/INR in am.      Diet NPO Time Specified Exceptions are: Ice Chips  DIET LOW FIBER; Intake/Output Summary (Last 24 hours) at 7/24/2020 2158  Last data filed at 7/24/2020 1350  Gross per 24 hour   Intake 1435 ml   Output 450 ml   Net 985 ml     Medications:      iron sucrose  200 mg Intravenous Once    pantoprazole  40 mg Intravenous Q12H    And    sodium chloride (PF)  10 mL Intravenous Q12H    sodium chloride flush  10 mL Intravenous 2 times per day    sodium chloride flush  10 mL Intravenous 2 times per day    folic acid  1 mg Oral Daily    gabapentin  300 mg Oral Nightly    losartan  100 mg Oral Daily     Recent Labs     07/22/20  1830  07/23/20  0326  07/23/20  2126 07/24/20  0631 07/24/20  1531   WBC 8.8  --  8.2  --   --  7.5  --    HGB 9.2*   < > 8.9*   < > 8.2* 9.6* 9.7*     --  265  --   --  320  --     < > = values in this interval not displayed.      Recent Labs     07/22/20  1830 07/22/20  1846 07/23/20  0326 07/24/20  0631     --  140 143   K 4.5  --  4.1 3.9  3.9     --  107 106   CO2 25  --  24 22   BUN 13  --  9 4*   CREATININE 0.47* 0.5* 0.40* 0.40*   GLUCOSE 139*  --  89 77     Recent Labs     07/22/20 1830 07/23/20  0326 07/24/20  0631   AST 17 14 19   ALT 16 16 18   BILITOT <0.2 0.4 0.4   ALKPHOS 137* 118 122     Troponin T: No results for input(s): TROPONINI in the last 72 hours. Pro-BNP: No results for input(s): BNP in the last 72 hours. INR:   Recent Labs     07/22/20  1830 07/23/20  0327 07/23/20  0702   INR 2.9 1.6 1.4       Objective:     Vitals:    07/24/20 1401 07/24/20 1407 07/24/20 1516 07/24/20 2015   BP: 120/67 117/64 (!) 158/68 (!) 144/69   Pulse: 109 106 93 96   Resp: 18 18 18 18   Temp:   97.7 °F (36.5 °C) 98.2 °F (36.8 °C)   TempSrc:   Oral Oral   SpO2: 100% 98% 98% 100%   Weight:       Height:         General appearance: alert and cooperative with exam  Lungs: clear to auscultation bilaterally  Heart: regular rate and rhythm, S1, S2 normal, no murmur, click, rub or gallop  Abdomen: soft, non-tender; bowel sounds normal; no masses,  no organomegaly  Extremities: extremities normal, atraumatic, no cyanosis or edema  Neurologic: No obvious focal neurologic deficits. Assessment and Plan:   1. Lower GI bleeding with a 4-5 cm tubular adenoma on the ascending colon with chronic Fe deficiency anemia   2. Bilateral breast Ca s/p Bilateral mastectomy s/p chemo and hormonal therapy  3. PFO with bouts of CVA with no residual and TIA on coumadin as no closure recommended. 4. HTN  5. Hypercholesterolemia. 6. Morbid Obesity  7. S/P Bilateral TKR  8. ROSEMARIE  9. Fe deficiency anemia  10. S/P L4 and L5 foraminotomy  11. Chemotherapy induced Peripheral Neuropathy  12. History of Asthma  13. With Normal LHC in 2013 after an abnormal stress lexiscan  14. DJD and eventual possible bilateral THR. Advance Directive: Full Code  DVT prophylaxis with enoxaparin 40 mg sub-Q daily.    Discharge planning: Home    Active Problems:    Patent foramen ovale    Iron deficiency anemia, unspecified    Lower GI bleed    HTN (hypertension)    Osteoarthritis of hip    History of carcinoma in situ of breast    History of cardioembolic stroke    Anticoagulated  Resolved Problems:    * No resolved hospital problems. *  1. 4-5 cm tubular adenoma on the ascending colon  2. Continue with H and H q 6 hrs for trending dc'd today  3. Correct coagulopathy  4. Blood work in am  5. PPI  6. Resume home meds  7. Resume anticoagulation when cleared by GI and awaiting surgical plans  8. SCD's  9. Taper down Cymbalta  10 IV Venofer 200 mg IVPB x 1 tonite  11. DC IVF  12. Increase activity  13.  DC home in am    Ericka Huerta MD

## 2020-07-27 LAB — HEMATOLOGY PATH CONSULT: NORMAL

## 2020-07-28 NOTE — DISCHARGE SUMMARY
[address]                      [address], [address] [facility]                               DISCHARGE SUMMARY    PATIENT NAMEJose A Gifford                      :        1955  MED REC NO:   [id number]                            ROOM:       O180  ACCOUNT NO:   [de-identified]                           ADMIT DATE: 2020  PROVIDER:     Jean-Paul Daigle MD               [address] [facility] DATE: 2020    DISCHARGE DIAGNOSES:  Lower GI bleeding with 4 cm to 5 cm tubular  adenoma of the ascending colon, presenting also with chronic iron  deficiency anemia. SUMMARY:  Apparently, the patient is now scheduled for possible right  hemicolectomy as an outpatient. The patient also has previous  colonoscopy about three years prior which showed only diverticulosis,  three polyps removed, two from the sigmoid and one from the rectum all  of which were benign. The patient also has bilateral breast cancer,  status post bilateral mastectomy, status post chemotherapy and hormonal  therapy in the past, also with patent foramen ovale with bouts of CVA  with no residual defect and TIA, also on Coumadin. Has no gross event  at this point. 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 The  patient also has a longstanding history of hypertension,  hypercholesterolemia, morbid obesity, status post bilateral total knee  replacement, obstructive sleep apnea, status post L4 and L5 foraminotomy  and also with chemotherapy-induced peripheral neuropathy. The patient  also has history of asthma with normal left heart catheterization in  , after an abnormal stress Lexiscan and also with degenerative joint  disease and eventual possible bilateral total hip replacement. Apparently this was probably followed after the patient had a right  hemicolectomy for a huge ascending colon tubular adenoma.   Hemoglobin  and hematocrit have been trending up and her bleeding has been stable. The patient wants to be discharged to home with followup with Surgery  and also we will put her on hold Coumadin for now, unclear of the  surgical plan has been made and after time has been given enough for  healing of recent lower GI bleeding that this patient has. The patient  also has gastroesophageal reflux disease and also has been on chronic  pain and depression and has been Cymbalta which will be tapered down at  this time, primarily because of use of other pain medications. Otherwise, IV iron was also given and the patient will be discharged to  home to follow up in the office in about two weeks and also with Surgery  as per schedule and we will resume Coumadin as an outpatient in about to  two weeks, pending surgical plan. Otherwise plan of care discussed with  the patient as well as with the discharging RN. Time spent in the care  of this patient is about 39 minutes.         Aleshia Parker MD    D: 07/27/2020 1:37:29       T: 07/28/2020 0:47:12     [id number]  Job#: [id number]     Doc#: [id number]    CC:

## 2020-07-30 ENCOUNTER — OFFICE VISIT (OUTPATIENT)
Dept: SURGERY | Age: 65
End: 2020-07-30
Payer: MEDICARE

## 2020-07-30 ENCOUNTER — PREP FOR PROCEDURE (OUTPATIENT)
Dept: SURGERY | Age: 65
End: 2020-07-30

## 2020-07-30 VITALS
TEMPERATURE: 98.5 F | WEIGHT: 228 LBS | DIASTOLIC BLOOD PRESSURE: 84 MMHG | SYSTOLIC BLOOD PRESSURE: 160 MMHG | HEIGHT: 61 IN | BODY MASS INDEX: 43.05 KG/M2

## 2020-07-30 PROBLEM — D12.2 ADENOMATOUS POLYP OF ASCENDING COLON: Status: ACTIVE | Noted: 2020-07-30

## 2020-07-30 PROCEDURE — 4040F PNEUMOC VAC/ADMIN/RCVD: CPT | Performed by: SURGERY

## 2020-07-30 PROCEDURE — 99213 OFFICE O/P EST LOW 20 MIN: CPT | Performed by: SURGERY

## 2020-07-30 PROCEDURE — G8417 CALC BMI ABV UP PARAM F/U: HCPCS | Performed by: SURGERY

## 2020-07-30 PROCEDURE — G8399 PT W/DXA RESULTS DOCUMENT: HCPCS | Performed by: SURGERY

## 2020-07-30 PROCEDURE — 3017F COLORECTAL CA SCREEN DOC REV: CPT | Performed by: SURGERY

## 2020-07-30 PROCEDURE — 1123F ACP DISCUSS/DSCN MKR DOCD: CPT | Performed by: SURGERY

## 2020-07-30 PROCEDURE — 1111F DSCHRG MED/CURRENT MED MERGE: CPT | Performed by: SURGERY

## 2020-07-30 PROCEDURE — 1036F TOBACCO NON-USER: CPT | Performed by: SURGERY

## 2020-07-30 PROCEDURE — G8427 DOCREV CUR MEDS BY ELIG CLIN: HCPCS | Performed by: SURGERY

## 2020-07-30 PROCEDURE — 1090F PRES/ABSN URINE INCON ASSESS: CPT | Performed by: SURGERY

## 2020-07-30 RX ORDER — DILTIAZEM HYDROCHLORIDE 240 MG/1
CAPSULE, EXTENDED RELEASE ORAL
Status: ON HOLD | COMMUNITY
Start: 2020-05-12 | End: 2020-09-03 | Stop reason: HOSPADM

## 2020-07-30 RX ORDER — WARFARIN SODIUM 5 MG/1
TABLET ORAL
COMMUNITY
Start: 2020-05-12 | End: 2020-08-20

## 2020-07-30 RX ORDER — DULOXETIN HYDROCHLORIDE 30 MG/1
CAPSULE, DELAYED RELEASE ORAL
COMMUNITY
Start: 2020-07-25 | End: 2020-08-19 | Stop reason: ALTCHOICE

## 2020-07-30 ASSESSMENT — ENCOUNTER SYMPTOMS
ABDOMINAL DISTENTION: 0
COLOR CHANGE: 0
RECTAL PAIN: 0
ANAL BLEEDING: 1
SHORTNESS OF BREATH: 0
NAUSEA: 0
BLOOD IN STOOL: 0
ABDOMINAL PAIN: 0
RHINORRHEA: 0
CHEST TIGHTNESS: 0
ALLERGIC/IMMUNOLOGIC NEGATIVE: 1

## 2020-07-30 NOTE — PROGRESS NOTES
Subjective:      Patient ID: Sharon Macedo is a 77 y.o. female. HPI   Sharon Macedo is a 77 y.o. female who is seen at the request of Dr Arcelia Sheehan for an abnormal colonoscopy. The colonoscopy showed an abnormality in the the ascending colon A 5 cm mass was present. The biopsy shows a tubulovillous adenoma. Complete colonoscopy to the cecum was done. A stricture/obstruction was not found. Patient does have symptoms. Symptoms include pain,  rectal bleeding and anemia. She denies pain. The symptoms do effect your everyday life style. The patient does not have a family/personel history of colon cancer. She had a CAT scan of her abdomen and pelvis done that was essentially normal.  Her last hemoglobin was 9.3 and she has had no further bleeding. Patient is on coumadin for a patent ovale. The Coumadin was stopped during recent hospitalization. She was discharged yesterday. She is here now to schedule an elective colectomy. She was supposed to have a total joint replacement done by Dr. Nyasia Johnson in the near future. She is also scheduled for preoperative clearance next week by her cardiologist.  She will keep that appointment and will schedule surgery according to his recommendations. He will need to determine if she needs to be bridged being off of her Coumadin. Review of Systems   Constitutional: Negative for activity change, appetite change and unexpected weight change. HENT: Negative for congestion, nosebleeds, rhinorrhea and sneezing. Eyes: Negative for visual disturbance. Respiratory: Negative for chest tightness and shortness of breath. Cardiovascular: Negative for chest pain and leg swelling. Gastrointestinal: Positive for anal bleeding. Negative for abdominal distention, abdominal pain, blood in stool, nausea and rectal pain. Endocrine: Negative. Genitourinary: Negative for difficulty urinating. Musculoskeletal: Positive for gait problem. Skin: Negative for color change. Allergic/Immunologic: Negative. Neurological: Negative for seizures, light-headedness, numbness and headaches. Hematological: Does not bruise/bleed easily. Psychiatric/Behavioral: Negative for sleep disturbance. 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 / left breast with mastectomy    GERD (gastroesophageal reflux disease)     Hiatal hernia     History of blood transfusion 2013    post op TKR    Hypercholesteremia     Hyperlipidemia     past trx / off meds > 5 yrs    Hypertension     meds > 20 yrs     Patent foramen ovale      Past Surgical History:   Procedure Laterality Date    BREAST SURGERY Bilateral     Mastectomy    CARDIAC CATHETERIZATION  2013    no blockage    COLONOSCOPY  08/31/2016    w/polypectomy     COLONOSCOPY N/A 7/24/2020    COLONOSCOPY DIAGNOSTIC performed by Tiffanie Archer MD at [address] [facility], DIAGNOSTIC     [facility]    umbilical   [address] Bilateral     Knee    JOINT REPLACEMENT Bilateral 11/04/2013    LAMINECTOMY Right 12/30/2019    RIGHT L4-5 MICRODECOMPRESSION performed by Jaz Basilio MD at [address] 0.6-1CM REMAINDR BODY N/A 4/6/2018    EXCISION CYST RT.  NECK AND EXCISION LESION NOSE performed by Stefany Fernandez MD at [address] [facility] VAD W/SUBQ PORT/ CTR/PRPH INSJ N/A 4/6/2018    REMOVAL VENOUS PORT performed by Stefany Fernandez MD at [facility] / has been removed    TONSILLECTOMY      at age 23   [address]  08/31/2016    w/polypectomy,bx     UPPER GASTROINTESTINAL ENDOSCOPY N/A 7/24/2020    EGD DIAGNOSTIC ONLY performed by Tiffanie Archer MD at [address] [place] [address]  2003    excision polyps     Social History     Tobacco Use    Smoking status: Former Smoker     Packs/day: 0.25 Years: 5.00     Pack years: 1.25     Types: Cigarettes     Last attempt to quit: 3/30/1978     Years since quittin.3    Smokeless tobacco: Never Used   Substance Use Topics    Alcohol use: No    Drug use: No     Family History   Problem Relation Age of Onset    Heart Attack Mother     Kidney Disease Father     Heart Attack Father     Breast Cancer Sister     Lung Cancer Brother     Cancer Brother         lung cancer    High Blood Pressure Maternal Aunt     Diabetes Brother     Diabetes Sister     Liver Disease Sister     Other Sister         blind due to detached retina    Kidney Disease Sister     Thyroid Disease Daughter     High Cholesterol Daughter      Darvon [propoxyphene]; Lipitor [atorvastatin]; Mobic [meloxicam]; Penicillins; Percodan [oxycodone-aspirin]; Pineapple; Sulfa antibiotics; and Dilaudid [hydromorphone hcl]  Allergies   Allergen Reactions    Darvon [Propoxyphene] Hives    Lipitor [Atorvastatin]      Muscle pain      Mobic [Meloxicam] Hives    Penicillins Hives    Percodan [Oxycodone-Aspirin] Hives    Pineapple Hives    Sulfa Antibiotics Hives    Dilaudid [Hydromorphone Hcl] Hives and Nausea And Vomiting     Current Outpatient Medications   Medication Sig Dispense Refill    CARTIA  MG extended release capsule TK ONE C PO D      DULoxetine (CYMBALTA) 30 MG extended release capsule TK ONE C PO MWF      warfarin (COUMADIN) 5 MG tablet       traMADol (ULTRAM) 50 MG tablet Take 1 tablet by mouth every 4 hours as needed for Pain for up to 7 days. 42 tablet 0    gabapentin (NEURONTIN) 300 MG capsule Take 300 mg by mouth nightly.       irbesartan (AVAPRO) 300 MG tablet Take 300 mg by mouth daily       torsemide (DEMADEX) 20 MG tablet Take 20 mg by mouth as needed       Multiple Vitamins-Minerals (MULTIVITAMIN ADULT PO) Take by mouth daily       albuterol sulfate  (90 Base) MCG/ACT inhaler Inhale 2 puffs into the lungs every 6 hours as needed for Wheezing  folic acid (FOLVITE) 1 MG tablet Take 1 mg by mouth daily   1    esomeprazole (NEXIUM) 40 MG capsule TK 1 C PO QD  2    DiltiaZEM HCl (CARDIZEM PO) Take 240 mg by mouth daily       montelukast (SINGULAIR) 10 MG tablet Take 10 mg by mouth nightly   1     No current facility-administered medications for this visit. BP (!) 160/84   Temp 98.5 °F (36.9 °C) (Temporal)   Ht 5' 1\" (1.549 m)   Wt 228 lb (103.4 kg)   LMP 12/20/1997   BMI 43.08 kg/m²     Objective:   Physical Exam  Constitutional:       General: She is not in acute distress. Appearance: Normal appearance. She is obese. HENT:      Mouth/Throat:      Mouth: Mucous membranes are moist.      Pharynx: Oropharynx is clear. Eyes:      Pupils: Pupils are equal, round, and reactive to light. Neck:      Comments: Neck is supple with out masses, no thyromegaly, trachea midline  Cardiovascular:      Rate and Rhythm: Normal rate and regular rhythm. Heart sounds: No murmur. Pulmonary:      Effort: Pulmonary effort is normal. No respiratory distress. Breath sounds: Normal breath sounds. Abdominal:      Palpations: There is no hepatomegaly or splenomegaly. Tenderness: There is no abdominal tenderness. Hernia: No hernia is present. Musculoskeletal:      Comments: Normal gait   Skin:     Findings: No bruising, lesion or rash. Neurological:      Mental Status: She is alert and oriented to person, place, and time. Psychiatric:         Mood and Affect: Mood normal.         Judgment: Judgment normal.       Assessment:      Ascending colon mass  Patient takes Coumadin      Plan:      right hemicolectomy with possible colostomy  The options of therapy(surgical and non surgical) were discussed with the patient and family. The procedure of colon resection with possible colostomy was discussed.  The potential risks and complications including but not exclusive to infection(including anastomotic leakage and intra abdominal abscess), blood loss needing transfusions, wound problems, blood clots and other organ injury( including ureter damage) were discussed. The patient understands and all questions were answered to their satisfaction. The patient is agreeable to proceed with surgery. Surgery will be scheduled at the patient's convience. An epidural to supplement general anesthesia was discussed. Pre operative clearance is needed cardiology. They will determine if she needs bridging of her Coumadin. The patient was counseled at length about the risks of talat Covid-19 in the perioperative period and any recovery window from their procedure. The patient was made aware that talat Covid-19  may worsen their prognosis for recovering from their procedure  and lend to a higher morbidity and/or mortality risk. The patient was given the options of postponing their procedure. All material risks, benefits, and alternatives were discussed. The patient does wish to proceed with the procedure at this time.         Pool Mtz MD

## 2020-07-31 RX ORDER — ERYTHROMYCIN 250 MG/1
CAPSULE, DELAYED RELEASE ORAL
Qty: 3 CAPSULE | Refills: 0 | Status: ON HOLD | OUTPATIENT
Start: 2020-07-31 | End: 2020-09-03 | Stop reason: HOSPADM

## 2020-07-31 RX ORDER — POTASSIUM CHLORIDE 20 MEQ/1
TABLET, EXTENDED RELEASE ORAL
Qty: 3 TABLET | Refills: 0 | Status: ON HOLD | OUTPATIENT
Start: 2020-07-31 | End: 2020-09-03 | Stop reason: HOSPADM

## 2020-07-31 RX ORDER — NEOMYCIN SULFATE 500 MG/1
TABLET ORAL
Qty: 6 TABLET | Refills: 0 | Status: SHIPPED | OUTPATIENT
Start: 2020-07-31 | End: 2020-08-10

## 2020-08-19 ENCOUNTER — NURSE ONLY (OUTPATIENT)
Dept: PRIMARY CARE CLINIC | Age: 65
End: 2020-08-19

## 2020-08-19 ENCOUNTER — HOSPITAL ENCOUNTER (OUTPATIENT)
Dept: PREADMISSION TESTING | Age: 65
Discharge: HOME | DRG: 330 | End: 2020-08-23
Payer: MEDICARE

## 2020-08-19 VITALS
WEIGHT: 235.2 LBS | HEIGHT: 60 IN | TEMPERATURE: 98.6 F | OXYGEN SATURATION: 98 % | RESPIRATION RATE: 16 BRPM | HEART RATE: 85 BPM | BODY MASS INDEX: 46.17 KG/M2 | SYSTOLIC BLOOD PRESSURE: 116 MMHG | DIASTOLIC BLOOD PRESSURE: 60 MMHG

## 2020-08-19 LAB
ABO/RH: NORMAL
ANION GAP SERPL CALCULATED.3IONS-SCNC: 10 MEQ/L (ref 9–15)
ANTIBODY SCREEN: NORMAL
BUN BLDV-MCNC: 11 MG/DL (ref 8–23)
CALCIUM SERPL-MCNC: 10.2 MG/DL (ref 8.5–9.9)
CHLORIDE BLD-SCNC: 105 MEQ/L (ref 95–107)
CO2: 22 MEQ/L (ref 20–31)
CREAT SERPL-MCNC: 0.39 MG/DL (ref 0.5–0.9)
GFR AFRICAN AMERICAN: >60
GFR NON-AFRICAN AMERICAN: >60
GLUCOSE BLD-MCNC: 96 MG/DL (ref 70–99)
HCT VFR BLD CALC: 31.8 % (ref 37–47)
HEMOGLOBIN: 10.2 G/DL (ref 12–16)
MCH RBC QN AUTO: 24.5 PG (ref 27–31.3)
MCHC RBC AUTO-ENTMCNC: 32.2 % (ref 33–37)
MCV RBC AUTO: 76.3 FL (ref 82–100)
PDW BLD-RTO: 30.1 % (ref 11.5–14.5)
PLATELET # BLD: 146 K/UL (ref 130–400)
PLATELET SLIDE REVIEW: ADEQUATE
POTASSIUM SERPL-SCNC: 3.9 MEQ/L (ref 3.4–4.9)
RBC # BLD: 4.17 M/UL (ref 4.2–5.4)
SODIUM BLD-SCNC: 137 MEQ/L (ref 135–144)
WBC # BLD: 4.9 K/UL (ref 4.8–10.8)

## 2020-08-19 PROCEDURE — 86900 BLOOD TYPING SEROLOGIC ABO: CPT

## 2020-08-19 PROCEDURE — 86850 RBC ANTIBODY SCREEN: CPT

## 2020-08-19 PROCEDURE — 86901 BLOOD TYPING SEROLOGIC RH(D): CPT

## 2020-08-19 PROCEDURE — 85027 COMPLETE CBC AUTOMATED: CPT

## 2020-08-19 PROCEDURE — U0003 INFECTIOUS AGENT DETECTION BY NUCLEIC ACID (DNA OR RNA); SEVERE ACUTE RESPIRATORY SYNDROME CORONAVIRUS 2 (SARS-COV-2) (CORONAVIRUS DISEASE [COVID-19]), AMPLIFIED PROBE TECHNIQUE, MAKING USE OF HIGH THROUGHPUT TECHNOLOGIES AS DESCRIBED BY CMS-2020-01-R: HCPCS

## 2020-08-19 PROCEDURE — 80048 BASIC METABOLIC PNL TOTAL CA: CPT

## 2020-08-19 RX ORDER — CIPROFLOXACIN 2 MG/ML
400 INJECTION, SOLUTION INTRAVENOUS ONCE
Status: CANCELLED | OUTPATIENT
Start: 2020-08-25

## 2020-08-19 RX ORDER — CLINDAMYCIN PHOSPHATE 600 MG/50ML
600 INJECTION INTRAVENOUS
Status: CANCELLED | OUTPATIENT
Start: 2020-08-25 | End: 2020-08-25

## 2020-08-19 RX ORDER — TRAMADOL HYDROCHLORIDE 50 MG/1
50 TABLET ORAL EVERY 6 HOURS PRN
COMMUNITY

## 2020-08-19 RX ORDER — ALVIMOPAN 12 MG/1
12 CAPSULE ORAL ONCE
Status: CANCELLED | OUTPATIENT
Start: 2020-08-25 | End: 2020-08-31

## 2020-08-20 ENCOUNTER — HOSPITAL ENCOUNTER (INPATIENT)
Age: 65
LOS: 12 days | Discharge: HOME | DRG: 330 | End: 2020-09-03
Attending: EMERGENCY MEDICINE | Admitting: INTERNAL MEDICINE
Payer: MEDICARE

## 2020-08-20 ENCOUNTER — HOSPITAL ENCOUNTER (EMERGENCY)
Age: 65
Discharge: HOME | DRG: 330 | End: 2020-08-20
Payer: MEDICARE

## 2020-08-20 VITALS
RESPIRATION RATE: 18 BRPM | SYSTOLIC BLOOD PRESSURE: 158 MMHG | DIASTOLIC BLOOD PRESSURE: 94 MMHG | WEIGHT: 233 LBS | HEIGHT: 61 IN | BODY MASS INDEX: 43.99 KG/M2 | HEART RATE: 76 BPM | TEMPERATURE: 98.2 F | OXYGEN SATURATION: 98 %

## 2020-08-20 PROBLEM — K92.2 GASTROINTESTINAL HEMORRHAGE: Status: ACTIVE | Noted: 2020-08-20

## 2020-08-20 LAB
ABO/RH: NORMAL
ALBUMIN SERPL-MCNC: 3.9 G/DL (ref 3.5–4.6)
ALBUMIN SERPL-MCNC: 4 G/DL (ref 3.5–4.6)
ALP BLD-CCNC: 169 U/L (ref 40–130)
ALP BLD-CCNC: 181 U/L (ref 40–130)
ALT SERPL-CCNC: 14 U/L (ref 0–33)
ALT SERPL-CCNC: 16 U/L (ref 0–33)
ANION GAP SERPL CALCULATED.3IONS-SCNC: 10 MEQ/L (ref 9–15)
ANION GAP SERPL CALCULATED.3IONS-SCNC: 9 MEQ/L (ref 9–15)
ANISOCYTOSIS: ABNORMAL
ANISOCYTOSIS: ABNORMAL
ANTIBODY SCREEN: NORMAL
APTT: 58.7 SEC (ref 24.4–36.8)
APTT: 76.1 SEC (ref 24.4–36.8)
AST SERPL-CCNC: 17 U/L (ref 0–35)
AST SERPL-CCNC: 23 U/L (ref 0–35)
ATYPICAL LYMPHOCYTE RELATIVE PERCENT: 1 %
BACTERIA: NEGATIVE /HPF
BASOPHILS ABSOLUTE: 0.1 K/UL (ref 0–0.2)
BASOPHILS RELATIVE PERCENT: 1.1 %
BASOPHILS RELATIVE PERCENT: 1.3 %
BASOPHILS RELATIVE PERCENT: 1.4 %
BASOPHILS RELATIVE PERCENT: 2 %
BILIRUB SERPL-MCNC: <0.2 MG/DL (ref 0.2–0.7)
BILIRUB SERPL-MCNC: <0.2 MG/DL (ref 0.2–0.7)
BILIRUBIN URINE: NEGATIVE
BLOOD, URINE: ABNORMAL
BUN BLDV-MCNC: 11 MG/DL (ref 8–23)
BUN BLDV-MCNC: 13 MG/DL (ref 8–23)
CALCIUM SERPL-MCNC: 10.2 MG/DL (ref 8.5–9.9)
CALCIUM SERPL-MCNC: 10.2 MG/DL (ref 8.5–9.9)
CHLORIDE BLD-SCNC: 105 MEQ/L (ref 95–107)
CHLORIDE BLD-SCNC: 105 MEQ/L (ref 95–107)
CLARITY: CLEAR
CO2: 23 MEQ/L (ref 20–31)
CO2: 24 MEQ/L (ref 20–31)
COLOR: YELLOW
CREAT SERPL-MCNC: 0.42 MG/DL (ref 0.5–0.9)
CREAT SERPL-MCNC: 0.47 MG/DL (ref 0.5–0.9)
EOSINOPHILS ABSOLUTE: 0.2 K/UL (ref 0–0.7)
EOSINOPHILS ABSOLUTE: 0.3 K/UL (ref 0–0.7)
EOSINOPHILS RELATIVE PERCENT: 3.8 %
EOSINOPHILS RELATIVE PERCENT: 4 %
EOSINOPHILS RELATIVE PERCENT: 4 %
EOSINOPHILS RELATIVE PERCENT: 5.6 %
EPITHELIAL CELLS, UA: ABNORMAL /HPF (ref 0–5)
GFR AFRICAN AMERICAN: >60
GFR AFRICAN AMERICAN: >60
GFR NON-AFRICAN AMERICAN: >60
GFR NON-AFRICAN AMERICAN: >60
GLOBULIN: 3.3 G/DL (ref 2.3–3.5)
GLOBULIN: 3.3 G/DL (ref 2.3–3.5)
GLUCOSE BLD-MCNC: 90 MG/DL (ref 70–99)
GLUCOSE BLD-MCNC: 90 MG/DL (ref 70–99)
GLUCOSE URINE: NEGATIVE MG/DL
HCT VFR BLD CALC: 30.3 % (ref 37–47)
HCT VFR BLD CALC: 30.6 % (ref 37–47)
HCT VFR BLD CALC: 32.9 % (ref 37–47)
HCT VFR BLD CALC: 33.3 % (ref 37–47)
HEMOGLOBIN: 10.4 G/DL (ref 12–16)
HEMOGLOBIN: 10.5 G/DL (ref 12–16)
HEMOGLOBIN: 9.6 G/DL (ref 12–16)
HEMOGLOBIN: 9.6 G/DL (ref 12–16)
HYALINE CASTS: ABNORMAL /HPF (ref 0–5)
HYPOCHROMIA: ABNORMAL
INR BLD: 2.4
INR BLD: 3
KETONES, URINE: NEGATIVE MG/DL
LACTIC ACID: 1.3 MMOL/L (ref 0.5–2.2)
LEUKOCYTE ESTERASE, URINE: NEGATIVE
LIPASE: 18 U/L (ref 12–95)
LYMPHOCYTES ABSOLUTE: 1.3 K/UL (ref 1–4.8)
LYMPHOCYTES ABSOLUTE: 1.3 K/UL (ref 1–4.8)
LYMPHOCYTES ABSOLUTE: 1.4 K/UL (ref 1–4.8)
LYMPHOCYTES ABSOLUTE: 1.6 K/UL (ref 1–4.8)
LYMPHOCYTES RELATIVE PERCENT: 20.9 %
LYMPHOCYTES RELATIVE PERCENT: 23.2 %
LYMPHOCYTES RELATIVE PERCENT: 23.5 %
LYMPHOCYTES RELATIVE PERCENT: 25 %
MAGNESIUM: 2.2 MG/DL (ref 1.7–2.4)
MCH RBC QN AUTO: 23.7 PG (ref 27–31.3)
MCH RBC QN AUTO: 23.8 PG (ref 27–31.3)
MCH RBC QN AUTO: 24 PG (ref 27–31.3)
MCH RBC QN AUTO: 24.2 PG (ref 27–31.3)
MCHC RBC AUTO-ENTMCNC: 31.4 % (ref 33–37)
MCHC RBC AUTO-ENTMCNC: 31.5 % (ref 33–37)
MCHC RBC AUTO-ENTMCNC: 31.5 % (ref 33–37)
MCHC RBC AUTO-ENTMCNC: 31.6 % (ref 33–37)
MCV RBC AUTO: 75.4 FL (ref 82–100)
MCV RBC AUTO: 75.5 FL (ref 82–100)
MCV RBC AUTO: 76.6 FL (ref 82–100)
MCV RBC AUTO: 76.6 FL (ref 82–100)
MICROCYTES: ABNORMAL
MONOCYTES ABSOLUTE: 0.4 K/UL (ref 0.2–0.8)
MONOCYTES ABSOLUTE: 0.6 K/UL (ref 0.2–0.8)
MONOCYTES ABSOLUTE: 0.7 K/UL (ref 0.2–0.8)
MONOCYTES ABSOLUTE: 0.7 K/UL (ref 0.2–0.8)
MONOCYTES RELATIVE PERCENT: 10.4 %
MONOCYTES RELATIVE PERCENT: 10.6 %
MONOCYTES RELATIVE PERCENT: 11.2 %
MONOCYTES RELATIVE PERCENT: 6.7 %
NEUTROPHILS ABSOLUTE: 3.3 K/UL (ref 1.4–6.5)
NEUTROPHILS ABSOLUTE: 3.5 K/UL (ref 1.4–6.5)
NEUTROPHILS ABSOLUTE: 3.8 K/UL (ref 1.4–6.5)
NEUTROPHILS ABSOLUTE: 4 K/UL (ref 1.4–6.5)
NEUTROPHILS RELATIVE PERCENT: 58.3 %
NEUTROPHILS RELATIVE PERCENT: 60.9 %
NEUTROPHILS RELATIVE PERCENT: 62 %
NEUTROPHILS RELATIVE PERCENT: 63.8 %
NITRITE, URINE: NEGATIVE
OVALOCYTES: ABNORMAL
PDW BLD-RTO: 29.2 % (ref 11.5–14.5)
PDW BLD-RTO: 29.4 % (ref 11.5–14.5)
PDW BLD-RTO: 29.6 % (ref 11.5–14.5)
PDW BLD-RTO: 29.7 % (ref 11.5–14.5)
PH UA: 5 (ref 5–9)
PLATELET # BLD: 154 K/UL (ref 130–400)
PLATELET # BLD: 155 K/UL (ref 130–400)
PLATELET # BLD: 160 K/UL (ref 130–400)
PLATELET # BLD: 164 K/UL (ref 130–400)
PLATELET SLIDE REVIEW: ADEQUATE
PLATELET SLIDE REVIEW: NORMAL
POIKILOCYTES: ABNORMAL
POIKILOCYTES: ABNORMAL
POTASSIUM SERPL-SCNC: 4.2 MEQ/L (ref 3.4–4.9)
POTASSIUM SERPL-SCNC: 4.5 MEQ/L (ref 3.4–4.9)
PROTEIN UA: NEGATIVE MG/DL
PROTHROMBIN TIME: 26.3 SEC (ref 12.3–14.9)
PROTHROMBIN TIME: 31.2 SEC (ref 12.3–14.9)
RBC # BLD: 3.95 M/UL (ref 4.2–5.4)
RBC # BLD: 4 M/UL (ref 4.2–5.4)
RBC # BLD: 4.37 M/UL (ref 4.2–5.4)
RBC # BLD: 4.41 M/UL (ref 4.2–5.4)
RBC UA: ABNORMAL /HPF (ref 0–5)
SMUDGE CELLS: 5.8
SODIUM BLD-SCNC: 138 MEQ/L (ref 135–144)
SODIUM BLD-SCNC: 138 MEQ/L (ref 135–144)
SPECIFIC GRAVITY UA: 1.01 (ref 1–1.03)
TEAR DROP CELLS: ABNORMAL
TOTAL PROTEIN: 7.2 G/DL (ref 6.3–8)
TOTAL PROTEIN: 7.3 G/DL (ref 6.3–8)
TROPONIN: <0.01 NG/ML (ref 0–0.01)
URINE REFLEX TO CULTURE: ABNORMAL
UROBILINOGEN, URINE: 0.2 E.U./DL
VACUOLATED NEUTROPHILS: PRESENT
WBC # BLD: 5.5 K/UL (ref 4.8–10.8)
WBC # BLD: 6 K/UL (ref 4.8–10.8)
WBC # BLD: 6.1 K/UL (ref 4.8–10.8)
WBC # BLD: 6.3 K/UL (ref 4.8–10.8)
WBC UA: ABNORMAL /HPF (ref 0–5)

## 2020-08-20 PROCEDURE — 6360000002 HC RX W HCPCS: Performed by: EMERGENCY MEDICINE

## 2020-08-20 PROCEDURE — 86850 RBC ANTIBODY SCREEN: CPT

## 2020-08-20 PROCEDURE — 80053 COMPREHEN METABOLIC PANEL: CPT

## 2020-08-20 PROCEDURE — 85610 PROTHROMBIN TIME: CPT

## 2020-08-20 PROCEDURE — 99285 EMERGENCY DEPT VISIT HI MDM: CPT

## 2020-08-20 PROCEDURE — 85025 COMPLETE CBC W/AUTO DIFF WBC: CPT

## 2020-08-20 PROCEDURE — 99283 EMERGENCY DEPT VISIT LOW MDM: CPT

## 2020-08-20 PROCEDURE — 96374 THER/PROPH/DIAG INJ IV PUSH: CPT

## 2020-08-20 PROCEDURE — 85730 THROMBOPLASTIN TIME PARTIAL: CPT

## 2020-08-20 PROCEDURE — 83605 ASSAY OF LACTIC ACID: CPT

## 2020-08-20 PROCEDURE — 2580000003 HC RX 258: Performed by: INTERNAL MEDICINE

## 2020-08-20 PROCEDURE — 6370000000 HC RX 637 (ALT 250 FOR IP): Performed by: INTERNAL MEDICINE

## 2020-08-20 PROCEDURE — 96375 TX/PRO/DX INJ NEW DRUG ADDON: CPT

## 2020-08-20 PROCEDURE — 2580000003 HC RX 258: Performed by: PHYSICIAN ASSISTANT

## 2020-08-20 PROCEDURE — 94664 DEMO&/EVAL PT USE INHALER: CPT

## 2020-08-20 PROCEDURE — 2580000003 HC RX 258: Performed by: EMERGENCY MEDICINE

## 2020-08-20 PROCEDURE — 86901 BLOOD TYPING SEROLOGIC RH(D): CPT

## 2020-08-20 PROCEDURE — 83735 ASSAY OF MAGNESIUM: CPT

## 2020-08-20 PROCEDURE — G0378 HOSPITAL OBSERVATION PER HR: HCPCS

## 2020-08-20 PROCEDURE — 86900 BLOOD TYPING SEROLOGIC ABO: CPT

## 2020-08-20 PROCEDURE — 36415 COLL VENOUS BLD VENIPUNCTURE: CPT

## 2020-08-20 PROCEDURE — 83690 ASSAY OF LIPASE: CPT

## 2020-08-20 PROCEDURE — 81001 URINALYSIS AUTO W/SCOPE: CPT

## 2020-08-20 PROCEDURE — 6370000000 HC RX 637 (ALT 250 FOR IP): Performed by: PHYSICIAN ASSISTANT

## 2020-08-20 PROCEDURE — 84484 ASSAY OF TROPONIN QUANT: CPT

## 2020-08-20 RX ORDER — SODIUM CHLORIDE, SODIUM LACTATE, POTASSIUM CHLORIDE, CALCIUM CHLORIDE 600; 310; 30; 20 MG/100ML; MG/100ML; MG/100ML; MG/100ML
INJECTION, SOLUTION INTRAVENOUS CONTINUOUS
Status: CANCELLED | OUTPATIENT
Start: 2020-08-25

## 2020-08-20 RX ORDER — MONTELUKAST SODIUM 10 MG/1
10 TABLET ORAL NIGHTLY
Status: DISCONTINUED | OUTPATIENT
Start: 2020-08-20 | End: 2020-09-03 | Stop reason: HOSPADM

## 2020-08-20 RX ORDER — TRAMADOL HYDROCHLORIDE 50 MG/1
50 TABLET ORAL EVERY 6 HOURS PRN
Status: DISCONTINUED | OUTPATIENT
Start: 2020-08-20 | End: 2020-08-25

## 2020-08-20 RX ORDER — ERYTHROMYCIN ETHYLSUCCINATE 400 MG/1
400 TABLET ORAL DAILY
Status: DISCONTINUED | OUTPATIENT
Start: 2020-08-20 | End: 2020-08-25

## 2020-08-20 RX ORDER — TORSEMIDE 20 MG/1
20 TABLET ORAL DAILY
Status: DISCONTINUED | OUTPATIENT
Start: 2020-08-20 | End: 2020-09-03 | Stop reason: HOSPADM

## 2020-08-20 RX ORDER — SODIUM CHLORIDE 0.9 % (FLUSH) 0.9 %
10 SYRINGE (ML) INJECTION PRN
Status: CANCELLED | OUTPATIENT
Start: 2020-08-25

## 2020-08-20 RX ORDER — 0.9 % SODIUM CHLORIDE 0.9 %
1000 INTRAVENOUS SOLUTION INTRAVENOUS ONCE
Status: COMPLETED | OUTPATIENT
Start: 2020-08-20 | End: 2020-08-20

## 2020-08-20 RX ORDER — MORPHINE SULFATE 4 MG/ML
4 INJECTION, SOLUTION INTRAMUSCULAR; INTRAVENOUS EVERY 4 HOURS PRN
Status: DISCONTINUED | OUTPATIENT
Start: 2020-08-20 | End: 2020-08-25

## 2020-08-20 RX ORDER — M-VIT,TX,IRON,MINS/CALC/FOLIC 27MG-0.4MG
1 TABLET ORAL DAILY
Status: DISCONTINUED | OUTPATIENT
Start: 2020-08-20 | End: 2020-09-03 | Stop reason: HOSPADM

## 2020-08-20 RX ORDER — SODIUM CHLORIDE 0.9 % (FLUSH) 0.9 %
10 SYRINGE (ML) INJECTION PRN
Status: DISCONTINUED | OUTPATIENT
Start: 2020-08-20 | End: 2020-08-25

## 2020-08-20 RX ORDER — PHYTONADIONE 5 MG/1
5 TABLET ORAL ONCE
Status: COMPLETED | OUTPATIENT
Start: 2020-08-20 | End: 2020-08-20

## 2020-08-20 RX ORDER — FOLIC ACID 1 MG/1
1 TABLET ORAL DAILY
Status: DISCONTINUED | OUTPATIENT
Start: 2020-08-20 | End: 2020-08-25

## 2020-08-20 RX ORDER — LOSARTAN POTASSIUM 50 MG/1
100 TABLET ORAL DAILY
Status: DISCONTINUED | OUTPATIENT
Start: 2020-08-20 | End: 2020-09-03 | Stop reason: HOSPADM

## 2020-08-20 RX ORDER — PANTOPRAZOLE SODIUM 40 MG/1
40 TABLET, DELAYED RELEASE ORAL
Status: DISCONTINUED | OUTPATIENT
Start: 2020-08-21 | End: 2020-08-30

## 2020-08-20 RX ORDER — ACETAMINOPHEN 325 MG/1
650 TABLET ORAL EVERY 4 HOURS PRN
Status: DISCONTINUED | OUTPATIENT
Start: 2020-08-20 | End: 2020-09-03 | Stop reason: HOSPADM

## 2020-08-20 RX ORDER — MORPHINE SULFATE 2 MG/ML
4 INJECTION, SOLUTION INTRAMUSCULAR; INTRAVENOUS
Status: DISCONTINUED | OUTPATIENT
Start: 2020-08-20 | End: 2020-08-25

## 2020-08-20 RX ORDER — LIDOCAINE HYDROCHLORIDE 10 MG/ML
1 INJECTION, SOLUTION EPIDURAL; INFILTRATION; INTRACAUDAL; PERINEURAL
Status: CANCELLED | OUTPATIENT
Start: 2020-08-25 | End: 2020-08-25

## 2020-08-20 RX ORDER — POTASSIUM CHLORIDE 20 MEQ/1
20 TABLET, EXTENDED RELEASE ORAL
Status: DISCONTINUED | OUTPATIENT
Start: 2020-08-21 | End: 2020-08-25

## 2020-08-20 RX ORDER — DILTIAZEM HYDROCHLORIDE 240 MG/1
240 CAPSULE, COATED, EXTENDED RELEASE ORAL DAILY
Status: DISCONTINUED | OUTPATIENT
Start: 2020-08-20 | End: 2020-09-03 | Stop reason: HOSPADM

## 2020-08-20 RX ORDER — ONDANSETRON 2 MG/ML
4 INJECTION INTRAMUSCULAR; INTRAVENOUS EVERY 6 HOURS PRN
Status: DISCONTINUED | OUTPATIENT
Start: 2020-08-20 | End: 2020-08-25 | Stop reason: SDUPTHER

## 2020-08-20 RX ORDER — SODIUM CHLORIDE 0.9 % (FLUSH) 0.9 %
10 SYRINGE (ML) INJECTION EVERY 12 HOURS SCHEDULED
Status: CANCELLED | OUTPATIENT
Start: 2020-08-25

## 2020-08-20 RX ORDER — ALBUTEROL SULFATE 2.5 MG/3ML
2.5 SOLUTION RESPIRATORY (INHALATION) EVERY 6 HOURS PRN
Status: DISCONTINUED | OUTPATIENT
Start: 2020-08-20 | End: 2020-09-03 | Stop reason: HOSPADM

## 2020-08-20 RX ORDER — GABAPENTIN 300 MG/1
600 CAPSULE ORAL NIGHTLY
Status: DISCONTINUED | OUTPATIENT
Start: 2020-08-20 | End: 2020-09-03 | Stop reason: HOSPADM

## 2020-08-20 RX ORDER — ONDANSETRON 2 MG/ML
4 INJECTION INTRAMUSCULAR; INTRAVENOUS ONCE
Status: COMPLETED | OUTPATIENT
Start: 2020-08-20 | End: 2020-08-20

## 2020-08-20 RX ORDER — SODIUM CHLORIDE 0.9 % (FLUSH) 0.9 %
10 SYRINGE (ML) INJECTION EVERY 12 HOURS SCHEDULED
Status: DISCONTINUED | OUTPATIENT
Start: 2020-08-20 | End: 2020-08-25

## 2020-08-20 RX ADMIN — SODIUM CHLORIDE 1000 ML: 9 INJECTION, SOLUTION INTRAVENOUS at 12:45

## 2020-08-20 RX ADMIN — ONDANSETRON 4 MG: 2 INJECTION INTRAMUSCULAR; INTRAVENOUS at 12:41

## 2020-08-20 RX ADMIN — GABAPENTIN 600 MG: 300 CAPSULE ORAL at 21:24

## 2020-08-20 RX ADMIN — MORPHINE SULFATE 4 MG: 2 INJECTION, SOLUTION INTRAMUSCULAR; INTRAVENOUS at 12:42

## 2020-08-20 RX ADMIN — DILTIAZEM HYDROCHLORIDE 240 MG: 240 CAPSULE, COATED, EXTENDED RELEASE ORAL at 16:39

## 2020-08-20 RX ADMIN — ACETAMINOPHEN 650 MG: 325 TABLET, FILM COATED ORAL at 18:38

## 2020-08-20 RX ADMIN — SODIUM CHLORIDE 1000 ML: 9 INJECTION, SOLUTION INTRAVENOUS at 06:41

## 2020-08-20 RX ADMIN — PHYTONADIONE 5 MG: 5 TABLET ORAL at 07:58

## 2020-08-20 RX ADMIN — MONTELUKAST 10 MG: 10 TABLET, FILM COATED ORAL at 21:24

## 2020-08-20 RX ADMIN — Medication 10 ML: at 21:25

## 2020-08-20 ASSESSMENT — PAIN DESCRIPTION - DESCRIPTORS
DESCRIPTORS: ITCHING;DULL
DESCRIPTORS: ACHING

## 2020-08-20 ASSESSMENT — PAIN DESCRIPTION - PAIN TYPE
TYPE: CHRONIC PAIN
TYPE: ACUTE PAIN
TYPE: CHRONIC PAIN

## 2020-08-20 ASSESSMENT — ENCOUNTER SYMPTOMS
ANAL BLEEDING: 1
VOMITING: 0
SHORTNESS OF BREATH: 0
DIARRHEA: 0
RHINORRHEA: 0
BLOOD IN STOOL: 1
COUGH: 0
ABDOMINAL PAIN: 0
BACK PAIN: 0
BLOOD IN STOOL: 1
SORE THROAT: 0
EYE PAIN: 0
COUGH: 0
ABDOMINAL PAIN: 1
NAUSEA: 1
BACK PAIN: 0
DIARRHEA: 0
VOMITING: 0
PHOTOPHOBIA: 0
NAUSEA: 0
SORE THROAT: 0
SHORTNESS OF BREATH: 0

## 2020-08-20 ASSESSMENT — PAIN SCALES - GENERAL
PAINLEVEL_OUTOF10: 4
PAINLEVEL_OUTOF10: 0
PAINLEVEL_OUTOF10: 4
PAINLEVEL_OUTOF10: 4
PAINLEVEL_OUTOF10: 0
PAINLEVEL_OUTOF10: 4
PAINLEVEL_OUTOF10: 4

## 2020-08-20 ASSESSMENT — PAIN DESCRIPTION - ORIENTATION: ORIENTATION: RIGHT

## 2020-08-20 ASSESSMENT — PAIN DESCRIPTION - FREQUENCY
FREQUENCY: CONTINUOUS
FREQUENCY: CONTINUOUS

## 2020-08-20 ASSESSMENT — PAIN DESCRIPTION - LOCATION
LOCATION: ABDOMEN
LOCATION: HIP

## 2020-08-20 NOTE — ED TRIAGE NOTES
Pt presents to the ER with complaints of bright red blood rectal bleeding  Patient states that she was discharged here this morning around 0800 and told to come back to the Er if symptoms worsened  Patient states that when she got home she has had 5 bowel movements at home that were bright red blood  Patient is supposed to get surgery 8/25 with Dr Ryder Jackson for a colectomy  Patient complaining of mild nausea and upper abd pain  Bowel sounds active

## 2020-08-20 NOTE — ACP (ADVANCE CARE PLANNING)
Advance Care Planning     Advance Care Planning Activator (Inpatient)  Conversation Note      Date of ACP Conversation: 8/20/2020    Conversation Conducted with: Patient with Decision Making Capacity    ACP Activator: [address] makes decisions on behalf of the incapacitated patient: Decision Maker is asked to consider and make decisions based on patient values, known preferences, or best interests. Current Designated Health Care Decision Maker:   (If there is a valid [place] named in the [employer] [employer]\" box in the ACP activity, but it is not visible above, be sure to open that field and then select the health care decision maker relationship (ie \"primary\") in the blank space to the right of the name.) Validate  this information as still accurate & up-to-date; edit [place] field as needed.)    Note: Assess and validate information in current ACP documents, as indicated. If no Decision Maker listed above or available through scanned documents, then:    If no Authorized Decision Maker has previously been identified, then patient chooses [place]:  \"Who would you like to name as your primary health care decision-maker? \"               Name: Candance Malta        Relationship: sister          Phone number: [phone]  Dora NapierFleischer this person be reached easily? \" Yes  \"Who would you like to name as your back-up decision maker? \"   Name: Tristan Dawkins           Phone number: [phone]  Dora Fleischer this person be reached easily? \" Yes    Note: If the relationship of these Decision-Makers to the patient does NOT follow your state's Next of Kin hierarchy, recommend that patient complete ACP document that meets state-specific requirements to allow them to act on the patient's behalf when appropriate. Care Preferences    Ventilation:   \"If you were in your present state of health and suddenly became very ill and were unable to breathe on your own, what would your preference be about the use of a ventilator (breathing machine) if it were available to you? \"      Would the patient desire the use of ventilator (breathing machine)?: yes    \"If your health worsens and it becomes clear that your chance of recovery is unlikely, what would your preference be about the use of a ventilator (breathing machine) if it were available to you? \"     Would the patient desire the use of ventilator (breathing machine)?: Yes      Resuscitation  \"CPR works best to restart the heart when there is a sudden event, like a heart attack, in someone who is otherwise healthy. Unfortunately, CPR does not typically restart the heart for people who have serious health conditions or who are very sick. \"    \"In the event your heart stopped as a result of an underlying serious health condition, would you want attempts to be made to restart your heart (answer \"yes\" for attempt to resuscitate) or would you prefer a natural death (answer \"no\" for do not attempt to resuscitate)? \" yes      NOTE: If the patient has a valid advance directive AND now provides care preference(s) that are inconsistent with that prior directive, advise the patient to consider either: creating a new advance directive that complies with state-specific requirements; or, if that is not possible, orally revoking that prior directive in accordance with state-specific requirements, which must be documented in the EHR. [x] Yes   [] No   Educated Patient / Laila Whelan regarding differences between Advance Directives and portable DNR orders. Pt does have a living will with wishes as above.     Length of ACP Conversation in minutes:  10    Conversation Outcomes:  [x] ACP discussion completed  [] Existing advance directive reviewed with patient; no changes to patient's previously recorded wishes  [] New Advance Directive completed  [] Portable Do Not Rescitate prepared for Provider review and signature  []

## 2020-08-20 NOTE — ED NOTES
D/C instructions given to patient, no questions ask and she verbalized understanding about her follow up appointments and returning to the ER if needed.  Patient ambulated from ED without any complications     Chente Beck RN  08/20/20 8659

## 2020-08-20 NOTE — ED PROVIDER NOTES
[facility] ED  EMERGENCY DEPARTMENT ENCOUNTER      Pt Name: Patrick Malin  MRN: [id number]  Bonny Cline 1955  Date of evaluation: 8/20/2020  Provider: Nadya Lynn PA-C      HISTORY OF PRESENT ILLNESS    Patrick Malin is a 77 y.o. female who presents to the Emergency Department with two episodes of rectal bleeding with soft formed stool. Color red. She has no pain. She has a known mass that she is scheduled for colectomy on 8/25 by Dr. Lesia Cardona. She denies sob, dizziness. She stopped her coumadin 2 days ago and started with 40mg subq lovenox last night. REVIEW OF SYSTEMS       Review of Systems   Constitutional: Negative for chills, diaphoresis, fatigue and fever. HENT: Negative for congestion, rhinorrhea and sore throat. Eyes: Negative for photophobia and pain. Respiratory: Negative for cough and shortness of breath. Cardiovascular: Negative for chest pain and palpitations. Gastrointestinal: Positive for anal bleeding and blood in stool. Negative for abdominal pain, diarrhea, nausea and vomiting. Genitourinary: Negative for dysuria and flank pain. Musculoskeletal: Negative for back pain. Skin: Negative for rash. Neurological: Negative for dizziness, light-headedness and headaches. Psychiatric/Behavioral: Negative. All other systems reviewed and are negative.         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+ / chemo / left breast with mastectomy    GERD (gastroesophageal reflux disease)     Hiatal hernia     History of blood transfusion 2013    post op TKR    Hypercholesteremia     Hyperlipidemia     past trx / off meds > 5 yrs    Hypertension     meds > 20 yrs     Patent foramen ovale          SURGICAL HISTORY       Past Surgical History:   Procedure Laterality Date    BREAST SURGERY Bilateral     Mastectomy    CARDIAC CATHETERIZATION  2013    no blockage    COLONOSCOPY  08/31/2016    w/polypectomy     COLONOSCOPY N/A 7/24/2020    COLONOSCOPY DIAGNOSTIC performed by Albina Vanegas MD at [address] [facility], DIAGNOSTIC     [address]    umbilical    HYSTERECTOMY  1997    JOINT REPLACEMENT Bilateral     Knee    JOINT REPLACEMENT Bilateral 11/04/2013    LAMINECTOMY Right 12/30/2019    RIGHT L4-5 MICRODECOMPRESSION performed by Tootie Jaquez MD at [address] 0.6-1CM REMAINDR BODY N/A 4/6/2018    EXCISION CYST RT. NECK AND EXCISION LESION NOSE performed by Kanika Jon MD at [address] [facility] VAD W/SUBQ PORT/ CTR/PRPH INSJ N/A 4/6/2018    REMOVAL VENOUS PORT performed by Kanika Jon MD at [facility] / has been removed    TONSILLECTOMY      at age 23   [address] [facility] [address]  08/31/2016    w/polypectomy,bx     UPPER GASTROINTESTINAL ENDOSCOPY N/A 7/24/2020    EGD DIAGNOSTIC ONLY performed by Albina Vanegas MD at [place] [address]    excision polyps         CURRENT MEDICATIONS       Previous Medications    ALBUTEROL SULFATE  (90 BASE) MCG/ACT INHALER    Inhale 2 puffs into the lungs every 6 hours as needed for Wheezing    CARTIA  MG EXTENDED RELEASE CAPSULE    TK ONE C PO D    DILTIAZEM HCL (CARDIZEM PO)    Take 240 mg by mouth daily     ERYTHROMYCIN (SHAE-CAP) 250 MG CPEP EXTENDED RELEASE CAPSULE    1 tab po at 1 PM, 4 PM and 10 PM the day before surgery    ESOMEPRAZOLE (NEXIUM) 40 MG CAPSULE    TK 1 C PO QD    FOLIC ACID (FOLVITE) 1 MG TABLET    Take 1 mg by mouth daily     GABAPENTIN (NEURONTIN) 300 MG CAPSULE    Take 600 mg by mouth nightly.      IRBESARTAN (AVAPRO) 300 MG TABLET    Take 300 mg by mouth daily     MONTELUKAST (SINGULAIR) 10 MG TABLET    Take 10 mg by mouth nightly     MULTIPLE VITAMINS-MINERALS (MULTIVITAMIN ADULT PO)    Take by mouth daily     POTASSIUM CHLORIDE All other labs were within normal range or not returned as of this dictation. EMERGENCY DEPARTMENT COURSE and DIFFERENTIALDIAGNOSIS/MDM:   Vitals:    Vitals:    08/20/20 0604   BP: (!) 148/78   Pulse: 96   Resp: 18   Temp: 98.2 °F (36.8 °C)   TempSrc: Oral   SpO2: 98%   Weight: 233 lb (105.7 kg)   Height: 5' 1\" (1.549 m)            Pt is nontoxic and no acute distress. Vitals are wnl. She is has no sob or dizziness. She has no abdominal pain so imaging was not ordered today. Her H&H is stable at 10. 5. her inr is 3.0. spoke to Dr. Louis Mena, recommend 5mg vit k po and to stop her lovenox for a day or 2 and to return for continued bleeding or becoming symptomatic. Pt is made aware of this and verbalized understanding. Pt is insturcted to return to the ed for any new worsening or concerning symptoms. Pt verbalized understanding. Pt stable and ready for d/c       PROCEDURES:  Unless otherwise noted below, none     Procedures      FINAL IMPRESSION      1. Rectal bleeding    2.  Elevated INR          DISPOSITION/PLAN   DISPOSITION Decision To Discharge 08/20/2020 07:57:13 AM          Rae Morelos (electronically signed)  Attending Emergency Physician       Murray Easley PA-C  08/20/20 2030

## 2020-08-20 NOTE — ED NOTES
Report given to Cisco Garcia on [facility], [facility]  08/20/20 6716

## 2020-08-20 NOTE — CARE COORDINATION
[facility] Case Management Initial Discharge Assessment    Met with Patient to discuss discharge plan. PCP: Daria Aguilar MD                                Date of Last Visit: 1 week    If no PCP, list provided? N/A    Discharge Planning    Living Arrangements: independently at home    Who do you live with? daughter    Who helps you with your care:  self    If lives at home:     Do you have any barriers navigating in your home? no    Patient can perform ADL? Yes    Current Services (outpatient and in home) :  None    Dialysis: No    Is transportation available to get to your appointments? Yes    DME Equipment:  yes - cane    Respiratory equipment: None    Respiratory provider:  no     Pharmacy:  yes - [facility]    Consult with Medication Assistance Program?  No      Does Patient Have a High-Risk for Readmission Diagnosis (CHF, PN, MI, COPD)? No      Initial Discharge Plan? (Note: please see concurrent daily documentation for any updates after initial note). CM to assess for further d/c needs and referrals.     Electronically signed by Lyubov Molina on 8/20/2020 at 3:47 PM

## 2020-08-20 NOTE — ED NOTES
Patient is medicated. Blood work along with urine is sent to lab via tube system.       Yanet Cline RN  08/20/20 0310

## 2020-08-20 NOTE — ED PROVIDER NOTES
[address] [facility] ED  eMERGENCYdEPARTMENT eNCOUnter      Pt Name: Kasandra Soler  MRN: [id number]  Hafsagfstephanie 1955  Date of evaluation: 8/20/2020  Speedy Arcos MD    CHIEF COMPLAINT           HPI  Kasandra Soler is a 77 y.o. female per chart review has a h/o asthma, breast Ca s/p mastectomy, HTN, Hpl presents to the ED with GI bleed. Pt notes 6 episodes of BRBPR since 4 am.  Pt also notes gradual onset, moderate, constant, diffuse, cramping ab pain. +N/-v.  Pt has a known polyp in her colon that she is getting ready to have surgery for. ROS  Review of Systems   Constitutional: Negative for activity change, chills and fever. HENT: Negative for ear pain and sore throat. Eyes: Negative for visual disturbance. Respiratory: Negative for cough and shortness of breath. Cardiovascular: Negative for chest pain, palpitations and leg swelling. Gastrointestinal: Positive for abdominal pain, blood in stool and nausea. Negative for diarrhea and vomiting. Genitourinary: Negative for dysuria. Musculoskeletal: Negative for back pain. Skin: Negative for rash. Neurological: Negative for dizziness and weakness. Except as noted above the remainder of the review of systems was reviewed and negative.        PAST MEDICAL HISTORY     Past Medical History:   Diagnosis Date    Arthritis     Asthma     Cancer ([facility] [address] 2002 & 2015    Breast RIGHT (T2/N0/M0) ER/VT (+) HER2/estela 3+ / chemo / left breast with mastectomy    GERD (gastroesophageal reflux disease)     Hiatal hernia     History of blood transfusion 2013    post op TKR    Hypercholesteremia     Hyperlipidemia     past trx / off meds > 5 yrs    Hypertension     meds > 20 yrs     Patent foramen ovale          SURGICAL HISTORY       Past Surgical History:   Procedure Laterality Date    BREAST SURGERY Bilateral     Mastectomy    CARDIAC CATHETERIZATION  2013    no blockage    COLONOSCOPY  08/31/2016    w/polypectomy     COLONOSCOPY N/A 7/24/2020    COLONOSCOPY DIAGNOSTIC performed by Paola Benavides MD at [facility], DIAGNOSTIC     [address]    umbilical    HYSTERECTOMY  1997    JOINT REPLACEMENT Bilateral     Knee    JOINT REPLACEMENT Bilateral 11/04/2013    LAMINECTOMY Right 12/30/2019    RIGHT L4-5 MICRODECOMPRESSION performed by Jimmy Crowder MD at [address] 0.6-1CM REMAINDR BODY N/A 4/6/2018    EXCISION CYST RT. NECK AND EXCISION LESION NOSE performed by Ammon Feldman MD at [address] [facility] VAD W/SUBQ PORT/ CTR/PRPH INSJ N/A 4/6/2018    REMOVAL VENOUS PORT performed by Ammon Feldman MD at [facility] / has been removed    TONSILLECTOMY      at age 23   [address]  08/31/2016    w/polypectomy,bx     UPPER GASTROINTESTINAL ENDOSCOPY N/A 7/24/2020    EGD DIAGNOSTIC ONLY performed by Paola Benavides MD at [address]  2003    excision polyps         CURRENTMEDICATIONS       Previous Medications    ALBUTEROL SULFATE  (90 BASE) MCG/ACT INHALER    Inhale 2 puffs into the lungs every 6 hours as needed for Wheezing    CARTIA  MG EXTENDED RELEASE CAPSULE    TK ONE C PO D    DILTIAZEM HCL (CARDIZEM PO)    Take 240 mg by mouth daily     ERYTHROMYCIN (SHAE-CAP) 250 MG CPEP EXTENDED RELEASE CAPSULE    1 tab po at 1 PM, 4 PM and 10 PM the day before surgery    ESOMEPRAZOLE (NEXIUM) 40 MG CAPSULE    TK 1 C PO QD    FOLIC ACID (FOLVITE) 1 MG TABLET    Take 1 mg by mouth daily     GABAPENTIN (NEURONTIN) 300 MG CAPSULE    Take 600 mg by mouth nightly.      IRBESARTAN (AVAPRO) 300 MG TABLET    Take 300 mg by mouth daily     MONTELUKAST (SINGULAIR) 10 MG TABLET    Take 10 mg by mouth nightly     MULTIPLE VITAMINS-MINERALS (MULTIVITAMIN ADULT PO)    Take by mouth daily     POTASSIUM CHLORIDE (KLOR-CON M) 20 MEQ EXTENDED RELEASE TABLET    TAKE 1 PO TID THE DAY BEFORE SURGERY    TORSEMIDE (DEMADEX) 20 MG TABLET    Take 20 mg by mouth as needed     TRAMADOL (ULTRAM) 50 MG TABLET    Take 50 mg by mouth every 6 hours as needed for Pain. WARFARIN (COUMADIN) 5 MG TABLET           ALLERGIES     Darvon [propoxyphene]; Lipitor [atorvastatin]; Mobic [meloxicam]; Penicillins; Percodan [oxycodone-aspirin];  Pineapple; Sulfa antibiotics; and Dilaudid [hydromorphone hcl]    FAMILY HISTORY       Family History   Problem Relation Age of Onset    Heart Attack Mother     Kidney Disease Father     Heart Attack Father     Breast Cancer Sister     Lung Cancer Brother     Cancer Brother         lung cancer    High Blood Pressure Maternal Aunt     Diabetes Brother     Diabetes Sister     Liver Disease Sister     Other Sister         blind due to detached retina    Kidney Disease Sister     Thyroid Disease Daughter     High Cholesterol Daughter           SOCIAL HISTORY       Social History     Socioeconomic History    Marital status: Single     Spouse name: None    Number of children: None    Years of education: None    Highest education level: None   Occupational History    None   Social Needs    Financial resource strain: None    Food insecurity     Worry: None     Inability: None    Transportation needs     Medical: None     Non-medical: None   Tobacco Use    Smoking status: Former Smoker     Packs/day: 0.25     Years: 5.00     Pack years: 1.25     Types: Cigarettes     Last attempt to quit: 3/30/1978     Years since quittin.4    Smokeless tobacco: Never Used   Substance and Sexual Activity    Alcohol use: No    Drug use: No    Sexual activity: None   Lifestyle    Physical activity     Days per week: None     Minutes per session: None    Stress: None   Relationships    Social connections     Talks on phone: None     Gets together: None     Attends Mandaen service: None     Active member of club or organization: None     Attends meetings of clubs or organizations: None     Relationship Gastrointestinal hemorrhage, unspecified gastrointestinal hemorrhage type    2.  Polyp of colon, unspecified part of colon, unspecified type          DISPOSITION/PLAN   DISPOSITION Decision To Admit 08/20/2020 01:34:32 PM        DISCHARGE MEDICATIONS:  [unfilled]         Chrissie Mccarthy MD(electronically signed)  Attending Emergency Physician            Chrissie Mccarthy MD  08/20/20 2342

## 2020-08-20 NOTE — ED NOTES
Pt returned to room, stable gait, sitting on side of ER cot, with sister at bed side.       Lyndon Nowak RN  08/20/20 [id number]

## 2020-08-20 NOTE — ED NOTES
Pt stable, pt resting in ER cot with sister at bed side. Medication administer.  NS running        Raghu Moncada, RN  08/20/20 7201

## 2020-08-21 LAB
ALBUMIN SERPL-MCNC: 3.8 G/DL (ref 3.5–4.6)
ALP BLD-CCNC: 146 U/L (ref 40–130)
ALT SERPL-CCNC: 14 U/L (ref 0–33)
ANION GAP SERPL CALCULATED.3IONS-SCNC: 8 MEQ/L (ref 9–15)
ANISOCYTOSIS: ABNORMAL
AST SERPL-CCNC: 15 U/L (ref 0–35)
ATYPICAL LYMPHOCYTE RELATIVE PERCENT: 4 %
BASOPHILS ABSOLUTE: 0.1 K/UL (ref 0–0.2)
BASOPHILS RELATIVE PERCENT: 0.8 %
BASOPHILS RELATIVE PERCENT: 1 %
BASOPHILS RELATIVE PERCENT: 1.3 %
BILIRUB SERPL-MCNC: 0.3 MG/DL (ref 0.2–0.7)
BILIRUBIN DIRECT: <0.2 MG/DL (ref 0–0.4)
BILIRUBIN, INDIRECT: ABNORMAL MG/DL (ref 0–0.6)
BUN BLDV-MCNC: 10 MG/DL (ref 8–23)
CALCIUM SERPL-MCNC: 9.9 MG/DL (ref 8.5–9.9)
CHLORIDE BLD-SCNC: 108 MEQ/L (ref 95–107)
CO2: 24 MEQ/L (ref 20–31)
CREAT SERPL-MCNC: 0.35 MG/DL (ref 0.5–0.9)
EOSINOPHILS ABSOLUTE: 0.2 K/UL (ref 0–0.7)
EOSINOPHILS ABSOLUTE: 0.3 K/UL (ref 0–0.7)
EOSINOPHILS ABSOLUTE: 0.4 K/UL (ref 0–0.7)
EOSINOPHILS RELATIVE PERCENT: 3 %
EOSINOPHILS RELATIVE PERCENT: 4.5 %
EOSINOPHILS RELATIVE PERCENT: 5.2 %
FOLATE: >20 NG/ML (ref 7.3–26.1)
GFR AFRICAN AMERICAN: >60
GFR NON-AFRICAN AMERICAN: >60
GLUCOSE BLD-MCNC: 77 MG/DL (ref 70–99)
HCT VFR BLD CALC: 30 % (ref 37–47)
HCT VFR BLD CALC: 30 % (ref 37–47)
HCT VFR BLD CALC: 30.9 % (ref 37–47)
HEMATOLOGY PATH CONSULT: YES
HEMOGLOBIN: 9.4 G/DL (ref 12–16)
HEMOGLOBIN: 9.5 G/DL (ref 12–16)
HEMOGLOBIN: 9.7 G/DL (ref 12–16)
HYPOCHROMIA: ABNORMAL
INR BLD: 1.2
IRON SATURATION: 11 % (ref 11–46)
IRON: 33 UG/DL (ref 37–145)
LYMPHOCYTES ABSOLUTE: 1.2 K/UL (ref 1–4.8)
LYMPHOCYTES ABSOLUTE: 1.4 K/UL (ref 1–4.8)
LYMPHOCYTES ABSOLUTE: 1.6 K/UL (ref 1–4.8)
LYMPHOCYTES RELATIVE PERCENT: 19.4 %
LYMPHOCYTES RELATIVE PERCENT: 20 %
LYMPHOCYTES RELATIVE PERCENT: 22.2 %
MAGNESIUM: 2.2 MG/DL (ref 1.7–2.4)
MCH RBC QN AUTO: 23.9 PG (ref 27–31.3)
MCH RBC QN AUTO: 24.1 PG (ref 27–31.3)
MCH RBC QN AUTO: 24.3 PG (ref 27–31.3)
MCHC RBC AUTO-ENTMCNC: 31.3 % (ref 33–37)
MCHC RBC AUTO-ENTMCNC: 31.5 % (ref 33–37)
MCHC RBC AUTO-ENTMCNC: 31.6 % (ref 33–37)
MCV RBC AUTO: 76.1 FL (ref 82–100)
MCV RBC AUTO: 76.2 FL (ref 82–100)
MCV RBC AUTO: 77.3 FL (ref 82–100)
MICROCYTES: ABNORMAL
MONOCYTES ABSOLUTE: 0.6 K/UL (ref 0.2–0.8)
MONOCYTES ABSOLUTE: 0.8 K/UL (ref 0.2–0.8)
MONOCYTES ABSOLUTE: 0.8 K/UL (ref 0.2–0.8)
MONOCYTES RELATIVE PERCENT: 10.6 %
MONOCYTES RELATIVE PERCENT: 11.3 %
MONOCYTES RELATIVE PERCENT: 12.5 %
NEUTROPHILS ABSOLUTE: 3.6 K/UL (ref 1.4–6.5)
NEUTROPHILS ABSOLUTE: 4 K/UL (ref 1.4–6.5)
NEUTROPHILS ABSOLUTE: 4.2 K/UL (ref 1.4–6.5)
NEUTROPHILS RELATIVE PERCENT: 60 %
NEUTROPHILS RELATIVE PERCENT: 62 %
NEUTROPHILS RELATIVE PERCENT: 62.8 %
OVALOCYTES: ABNORMAL
PDW BLD-RTO: 29.4 % (ref 11.5–14.5)
PDW BLD-RTO: 29.8 % (ref 11.5–14.5)
PDW BLD-RTO: 29.9 % (ref 11.5–14.5)
PHOSPHORUS: 2.6 MG/DL (ref 2.3–4.8)
PLATELET # BLD: 158 K/UL (ref 130–400)
PLATELET # BLD: 169 K/UL (ref 130–400)
PLATELET # BLD: 196 K/UL (ref 130–400)
PLATELET SLIDE REVIEW: NORMAL
POIKILOCYTES: ABNORMAL
POTASSIUM SERPL-SCNC: 4 MEQ/L (ref 3.4–4.9)
PROTHROMBIN TIME: 15.5 SEC (ref 12.3–14.9)
RBC # BLD: 3.93 M/UL (ref 4.2–5.4)
RBC # BLD: 3.94 M/UL (ref 4.2–5.4)
RBC # BLD: 4 M/UL (ref 4.2–5.4)
SARS-COV-2: NOT DETECTED
SCHISTOCYTES: ABNORMAL
SODIUM BLD-SCNC: 140 MEQ/L (ref 135–144)
SOURCE: NORMAL
TEAR DROP CELLS: ABNORMAL
TOTAL IRON BINDING CAPACITY: 288 UG/DL (ref 178–450)
TOTAL PROTEIN: 6.6 G/DL (ref 6.3–8)
TSH SERPL DL<=0.05 MIU/L-ACNC: 2.13 UIU/ML (ref 0.44–3.86)
VITAMIN B-12: 564 PG/ML (ref 232–1245)
WBC # BLD: 5.8 K/UL (ref 4.8–10.8)
WBC # BLD: 6.3 K/UL (ref 4.8–10.8)
WBC # BLD: 7 K/UL (ref 4.8–10.8)

## 2020-08-21 PROCEDURE — 36415 COLL VENOUS BLD VENIPUNCTURE: CPT

## 2020-08-21 PROCEDURE — 85610 PROTHROMBIN TIME: CPT

## 2020-08-21 PROCEDURE — 82607 VITAMIN B-12: CPT

## 2020-08-21 PROCEDURE — 80048 BASIC METABOLIC PNL TOTAL CA: CPT

## 2020-08-21 PROCEDURE — 2580000003 HC RX 258: Performed by: INTERNAL MEDICINE

## 2020-08-21 PROCEDURE — 82746 ASSAY OF FOLIC ACID SERUM: CPT

## 2020-08-21 PROCEDURE — 85025 COMPLETE CBC W/AUTO DIFF WBC: CPT

## 2020-08-21 PROCEDURE — 83735 ASSAY OF MAGNESIUM: CPT

## 2020-08-21 PROCEDURE — G0378 HOSPITAL OBSERVATION PER HR: HCPCS

## 2020-08-21 PROCEDURE — 83540 ASSAY OF IRON: CPT

## 2020-08-21 PROCEDURE — 6370000000 HC RX 637 (ALT 250 FOR IP): Performed by: INTERNAL MEDICINE

## 2020-08-21 PROCEDURE — 84100 ASSAY OF PHOSPHORUS: CPT

## 2020-08-21 PROCEDURE — 84443 ASSAY THYROID STIM HORMONE: CPT

## 2020-08-21 PROCEDURE — 80076 HEPATIC FUNCTION PANEL: CPT

## 2020-08-21 PROCEDURE — 83550 IRON BINDING TEST: CPT

## 2020-08-21 RX ORDER — PHYTONADIONE 5 MG/1
5 TABLET ORAL ONCE
Status: COMPLETED | OUTPATIENT
Start: 2020-08-21 | End: 2020-08-21

## 2020-08-21 RX ORDER — SODIUM CHLORIDE 9 MG/ML
INJECTION, SOLUTION INTRAVENOUS CONTINUOUS
Status: DISCONTINUED | OUTPATIENT
Start: 2020-08-21 | End: 2020-08-25

## 2020-08-21 RX ADMIN — SODIUM CHLORIDE: 9 INJECTION, SOLUTION INTRAVENOUS at 02:26

## 2020-08-21 RX ADMIN — TRAMADOL HYDROCHLORIDE 50 MG: 50 TABLET, FILM COATED ORAL at 00:24

## 2020-08-21 RX ADMIN — GABAPENTIN 600 MG: 300 CAPSULE ORAL at 20:26

## 2020-08-21 RX ADMIN — TRAMADOL HYDROCHLORIDE 50 MG: 50 TABLET, FILM COATED ORAL at 14:30

## 2020-08-21 RX ADMIN — LOSARTAN POTASSIUM 100 MG: 50 TABLET, FILM COATED ORAL at 08:15

## 2020-08-21 RX ADMIN — PHYTONADIONE 5 MG: 5 TABLET ORAL at 02:25

## 2020-08-21 RX ADMIN — DILTIAZEM HYDROCHLORIDE 240 MG: 240 CAPSULE, COATED, EXTENDED RELEASE ORAL at 08:15

## 2020-08-21 RX ADMIN — MONTELUKAST 10 MG: 10 TABLET, FILM COATED ORAL at 20:26

## 2020-08-21 RX ADMIN — ACETAMINOPHEN 650 MG: 325 TABLET, FILM COATED ORAL at 23:59

## 2020-08-21 RX ADMIN — TRAMADOL HYDROCHLORIDE 50 MG: 50 TABLET, FILM COATED ORAL at 20:26

## 2020-08-21 RX ADMIN — PANTOPRAZOLE SODIUM 40 MG: 40 TABLET, DELAYED RELEASE ORAL at 05:15

## 2020-08-21 ASSESSMENT — PAIN SCALES - GENERAL
PAINLEVEL_OUTOF10: 5
PAINLEVEL_OUTOF10: 5
PAINLEVEL_OUTOF10: 10
PAINLEVEL_OUTOF10: 5

## 2020-08-21 NOTE — PROGRESS NOTES
Pt IV infiltrated and Dr Edita Mckeon said OK to leave IV out and hold the fluids.  Electronically signed by Tristan Noble RN on 8/21/2020 at 3:18 PM

## 2020-08-21 NOTE — H&P
left heart catheterization, which showed no significant  stenosis. History of hiatal hernia and also has a total hip replacement  surgery scheduled, but apparently has been now delayed because of this  lower GI bleeding. The patient also has a history of  hypercholesterolemia, dyslipidemia, and may have some restrictive COPD  secondary to obesity. Otherwise, with her recurring bleeding, the  patient was advised hospitalization for further monitoring. Hemoglobin  and hematocrit are pending. FAMILY HISTORY:  Father had hypertension and ended up being on dialysis. He also had two CVAs resulting in left-sided hemiplegia. Her mother   at age of 64 of an MI. She had had pancreatic cancer and underwent  Whipple's procedure. Her sister has also had breast cancer. Diabetes  mellitus also runs in her family. Her daughter was diagnosed with  hypercholesterolemia at age 16 with familial hypercholesterolemia. SOCIAL HISTORY:  The patient is a former smoker of half a pack per day  for six years and has since then quit smoking. Otherwise, denies use or  abuse of alcohol or use or abuse of any illicit drugs. REVIEW OF SYSTEMS:  Presently denies any headache. No dizziness. No  chest pain. No shortness of breath. There is no nausea, no vomiting,  no diarrhea. No dysuria, no gross hematuria. Positive for bright red  blood per rectum, but no melena. The rest of the 12-point review of  systems has been negative. PHYSICAL EXAMINATION:  VITAL SIGNS:  Blood pressure is 121/51, pulse is about 84 per minute,  respiratory rate of 17, temperature is 97.9 degrees Fahrenheit, and O2  sat is 100% room air. HEENT:  Pinkish conjunctivae. Anicteric sclerae. NECK:  There is no jugular venous distention. No lymphadenopathy. Neck  is supple. No bruit. HEART:  S1 and S2, regular rate and rhythm. LUNGS:  Good air entry. Clear breath sounds. No wheezing. No  crackles. ABDOMEN:  Globular and soft.   No hepatosplenomegaly. Positive bowel  sounds. EXTREMITIES:  There is no evidence of pedal edema. Leg raise, flexion,  and extension elicit no pain. Range of motion seems to be okay,  although the patient has some worsening hip joint pain requiring total  hip replacement, which apparently is postponed for now. ASSESSMENT:  Lower GI bleeding in a patient with 5 to 6 cm mass at the  ascending colon consistent with tubulovillous adenoma. History of  diverticulosis also on colonoscopy. Also now with recurrent bleeding  with associated anemia secondary to acute blood loss and also anemia is  secondary to iron deficiency. 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  The  patient is also hypercoagulable secondary to history of bilateral breast  malignancies, status post mastectomies. Also has patent foramen ovale  as another potential source for any thromboembolic events in the future. Basically on Coumadin and has apparently been tapered off or stopped  within the last two days now. Being bridged with Lovenox, supposed to  be up to two days prior to surgery which is scheduled on 08/25/2020. The patient also has gastroesophageal reflux disease. She had bilateral  total knee replacements in the past.  Hypercholesterolemia,  dyslipidemia, hypertension, and also with patent foramen ovale. PLAN:  We will check hemoglobin and hematocrit every six hours for  trending. We will do a complete CBC with diff, Chem-8, magnesium,  phosphorus, serum iron, F84, and folic acid levels in a.m. Otherwise,  the rest of the home medications will be continued as reviewed. We will  continue to hold Coumadin for now as well as Lovenox and reverse INR for  now in the setting of lower GI bleeding. Otherwise, the rest of the  home medications will be continued as reviewed. Transfuse for any  hemoglobin lower than 8 gm percent.   Otherwise, plan of care discussed  with the patient as well as receiving RN. Time spent in the care of this patient is about an hour and 3 minutes.         Carla Awad MD    D: 08/21/2020 1:01:02       T: 08/21/2020 1:10:30     [email or web address]  Job#: [id number]     Doc#: [id number]    CC:

## 2020-08-21 NOTE — PROGRESS NOTES
Progress Note  8/20/2020 11:52 PM  Subjective:   Admit Date: 8/20/2020  PCP: Elizabeth El MD  Interval History: Re-admitted due to recurring lower GI Bleeding , patient is scheduled for Right Hemicolectomy on 8-25 for huge tubular adenoma with bleeding and diverticulosis has H/O PFO and previous CVA and SVC thrombus in the past on coumadin , presently off meds and on Lovenox bridging prior to surgery on 8/25. Was put on hold for now and 5 mg of Vitamin K was given and was admitted for further observation. DIET GENERAL;  No intake or output data in the 24 hours ending 08/20/20 1542  Medications:      dilTIAZem  240 mg Oral Daily    erythromycin ethylsuccinate  400 mg Oral Daily    [START ON 8/21/2020] pantoprazole  40 mg Oral QAM AC    folic acid  1 mg Oral Daily    gabapentin  600 mg Oral Nightly    losartan  100 mg Oral Daily    montelukast  10 mg Oral Nightly    therapeutic multivitamin-minerals  1 tablet Oral Daily    [START ON 8/21/2020] potassium chloride  20 mEq Oral Daily with breakfast    torsemide  20 mg Oral Daily    sodium chloride flush  10 mL Intravenous 2 times per day     Recent Labs     08/20/20  1230 08/20/20  1619 08/20/20  2114   WBC 6.3 5.5 6.1   HGB 10.4* 9.6* 9.6*    154 164     Recent Labs     08/19/20  1219 08/20/20  0615 08/20/20  1230    138 138   K 3.9 4.2 4.5    105 105   CO2 22 24 23   BUN 11 13 11   CREATININE 0.39* 0.47* 0.42*   GLUCOSE 96 90 90     Recent Labs     08/20/20  0615 08/20/20  1230   AST 17 23   ALT 14 16   BILITOT <0.2 <0.2   ALKPHOS 181* 169*     Troponin T:   Recent Labs     08/20/20  0615   TROPONINI <0.010     Pro-BNP: No results for input(s): BNP in the last 72 hours.   INR:   Recent Labs     08/20/20  0615 08/20/20  1230   INR 3.0 2.4       Objective:     Vitals:    08/20/20 1352 08/20/20 1517 08/20/20 1600 08/20/20 1927   BP: 132/62 (!) 151/71 (!) 148/70 (!) 121/51   Pulse: 89 89 81 84   Resp: 18 18  17   Temp:   98.2 °F (36.8 °C) 97.9 °F (36.6 °C)   TempSrc:    Oral   SpO2: 99% 100% 100%    Weight:       Height:         General appearance: alert and cooperative with exam  Lungs: clear to auscultation bilaterally  Heart: regular rate and rhythm, S1, S2 normal, no murmur, click, rub or gallop  Abdomen: soft, non-tender; bowel sounds normal; no masses,  no organomegaly  Extremities: extremities normal, atraumatic, no cyanosis or edema  Neurologic: No obvious focal neurologic deficits. Assessment and Plan:   1. Lower GI bleeding with a 4-5 cm tubular adenoma on the ascending colon with chronic Fe deficiency anemia   2. Bilateral breast Ca s/p Bilateral mastectomy s/p chemo and hormonal therapy  3. PFO with bouts of CVA with no residual and TIA on coumadin as no closure recommended. 4. HTN  5. Hypercholesterolemia. 6. Morbid Obesity  7. S/P Bilateral TKR  8. ROSEMARIE  9. Fe deficiency anemia  10. S/P L4 and L5 foraminotomy  11. Chemotherapy induced Peripheral Neuropathy  12. History of Asthma  13. With Normal LHC in 2013 after an abnormal stress lexiscan  14. DJD and eventual possible bilateral THR. Advance Directive: Full Code  DVT prophylaxis with enoxaparin 40 mg sub-Q daily. Discharge planning: Home    Active Problems:    Gastrointestinal hemorrhage  Resolved Problems:    * No resolved hospital problems. *  1. 4-5 cm tubular adenoma on the ascending colon  2. Continue with H and H q 6 hrs for trending  3. Correct coagulopathy  4. Blood work in am  5. PPI  6. Resume home meds  7. Resume anticoagulation when off bleeding  8. SCD's  9. Taper down Cymbalta  10 IV Venofer 200 mg IVPB x 1 tonite  11. Start on IVF  12.  Increase activity      Tammy Bass MD

## 2020-08-21 NOTE — PROGRESS NOTES
Pt sitting at the side of bed resting quietly. medicated with tramadol PRN for hip pain. Hourly rounds continue. Call light in reach.

## 2020-08-21 NOTE — PROGRESS NOTES
[facility] [place] Respiratory Therapy Evaluation   Current Order: Albuterol Q6 PRN     Home Regimen: None     Ordering Physician: Brenda Pennington  Re-evaluation Date:  ---     Diagnosis: Gastrointestinal hemorrhage      Patient Status: Stable / Unstable + Physician notified    The following MDI Criteria must be met in order to convert aerosol to MDI with spacer. If unable to meet, MDI will be converted to aerosol:  []  Patient able to demonstrate the ability to use MDI effectively  []  Patient alert and cooperative  []  Patient able to take deep breath with 5-10 second hold  []  Medication(s) available in this delivery method   []  Peak flow greater than or equal to 200 ml/min            Current Order Substituted To  (same drug, same frequency)   Aerosol to MDI [] Albuterol Sulfate 0.083% unit dose by aerosol Albuterol Sulfate MDI 2 puffs by inhalation with spacer    [] Levalbuterol 1.25 mg unit dose by aerosol Levalbuterol MDI 2 puffs by inhalation with spacer    [] Levalbuterol 0.63 mg unit dose by aerosol Levalbuterol MDI 2 puffs by inhalation with spacer    [] Ipratropium Bromide 0.02% unit dose by aerosol Ipratropium Bromide MDI 2 puffs by inhalation with spacer    [] Duoneb (Ipratropium + Albuterol) unit dose by aerosol Ipratropium MDI + Albuterol MDI 2 puffs by inhalation w/spacer   MDI to Aerosol [] Albuterol Sulfate MDI Albuterol Sulfate 0.083% unit dose by aerosol    [] Levalbuterol MDI 2 puffs by inhalation Levalbuterol 1.25 mg unit dose by aerosol    [] Ipratropium Bromide MDI by inhalation Ipratropium Bromide 0.02% unit dose by aerosol    [] Combivent (Ipratropium + Albuterol) MDI by inhalation Duoneb (Ipratropium + Albuterol) unit dose by aerosol   Treatment Assessment [Frequency/Schedule]:  Change frequency to: _______No Changes___________________________________________per Protocol, P&T, MEC      Points 0 1 2 3 4   Pulmonary Status  Non-Smoker  []   Smoking history   < 20 pack years  []   Smoking history  ?  [facility] pack years  []   Pulmonary Disorder  (acute or chronic)  [x]   Severe or Chronic w/ Exacerbation  []     Surgical Status No []   Surgeries     General [x]   Surgery Lower []   Abdominal Thoracic or []   Upper Abdominal Thoracic with  PulmonaryDisorder  []     Chest X-ray Clear/Not  Ordered     [x]  Chronic Changes  Results Pending  []  Infiltrates, atelectasis, pleural effusion, or edema  []  Infiltrates in more than one lobe []  Infiltrate + Atelectasis, &/or pleural effusion  []    Respiratory Pattern Regular,  RR = 12-20 [x]  Increased,  RR = 21-25 []  HANSON, irregular,  or RR = 26-30 []  Decreased FEV1  or RR = 31-35 []  Severe SOB, use  of accessory muscles, or RR ? 35  []    Mental Status Alert, oriented,  Cooperative [x]  Confused but Follows commands []  Lethargic or unable to follow commands []  Obtunded  []  Comatose  []    Breath Sounds Clear to  auscultation  []  Decreased unilaterally or  in bases only [x]  Decreased  bilaterally  []  Crackles or intermittent wheezes []  Wheezes []    Cough Strong, Spontan., & nonproductive [x]  Strong,  spontaneous, &  productive []  Weak,  Nonproductive []  Weak, productive or  with wheezes []  No spontaneous  cough or may require suctioning []    Level of Activity Ambulatory [x]  Ambulatory w/ Assist  []  Non-ambulatory []  Paraplegic []  Quadriplegic []    Total    Score:___5____     Triage Score:___5_____      Tri       Triage:     1. (>20) Freq: Q3    2. (16-20) Freq: Q4   3. (11-15) Freq: QID & Albuterol Q2 PRN    4. (6-10) Freq: TID & Albuterol Q2 PRN    5. (0-5) Freq Q4prn

## 2020-08-22 PROBLEM — D64.9 ANEMIA: Status: ACTIVE | Noted: 2020-08-22

## 2020-08-22 LAB
ANION GAP SERPL CALCULATED.3IONS-SCNC: 8 MEQ/L (ref 9–15)
ANISOCYTOSIS: ABNORMAL
BASOPHILS ABSOLUTE: 0.1 K/UL (ref 0–0.2)
BASOPHILS RELATIVE PERCENT: 1 %
BASOPHILS RELATIVE PERCENT: 1.2 %
BASOPHILS RELATIVE PERCENT: 1.2 %
BASOPHILS RELATIVE PERCENT: 1.6 %
BUN BLDV-MCNC: 13 MG/DL (ref 8–23)
CALCIUM SERPL-MCNC: 10 MG/DL (ref 8.5–9.9)
CHLORIDE BLD-SCNC: 106 MEQ/L (ref 95–107)
CO2: 24 MEQ/L (ref 20–31)
CREAT SERPL-MCNC: 0.39 MG/DL (ref 0.5–0.9)
EOSINOPHILS ABSOLUTE: 0.2 K/UL (ref 0–0.7)
EOSINOPHILS ABSOLUTE: 0.4 K/UL (ref 0–0.7)
EOSINOPHILS ABSOLUTE: 0.4 K/UL (ref 0–0.7)
EOSINOPHILS ABSOLUTE: 0.5 K/UL (ref 0–0.7)
EOSINOPHILS RELATIVE PERCENT: 3 %
EOSINOPHILS RELATIVE PERCENT: 6.1 %
EOSINOPHILS RELATIVE PERCENT: 6.5 %
EOSINOPHILS RELATIVE PERCENT: 6.6 %
GFR AFRICAN AMERICAN: >60
GFR NON-AFRICAN AMERICAN: >60
GLUCOSE BLD-MCNC: 85 MG/DL (ref 70–99)
HCT VFR BLD CALC: 28.8 % (ref 37–47)
HCT VFR BLD CALC: 29.5 % (ref 37–47)
HCT VFR BLD CALC: 29.7 % (ref 37–47)
HCT VFR BLD CALC: 29.8 % (ref 37–47)
HEMATOLOGY PATH CONSULT: NO
HEMOGLOBIN: 9.2 G/DL (ref 12–16)
HEMOGLOBIN: 9.2 G/DL (ref 12–16)
HEMOGLOBIN: 9.3 G/DL (ref 12–16)
HEMOGLOBIN: 9.4 G/DL (ref 12–16)
HYPOCHROMIA: ABNORMAL
LYMPHOCYTES ABSOLUTE: 1.3 K/UL (ref 1–4.8)
LYMPHOCYTES ABSOLUTE: 1.4 K/UL (ref 1–4.8)
LYMPHOCYTES ABSOLUTE: 1.5 K/UL (ref 1–4.8)
LYMPHOCYTES ABSOLUTE: 1.6 K/UL (ref 1–4.8)
LYMPHOCYTES RELATIVE PERCENT: 19.7 %
LYMPHOCYTES RELATIVE PERCENT: 19.8 %
LYMPHOCYTES RELATIVE PERCENT: 23 %
LYMPHOCYTES RELATIVE PERCENT: 23.9 %
MAGNESIUM: 2.1 MG/DL (ref 1.7–2.4)
MCH RBC QN AUTO: 23.8 PG (ref 27–31.3)
MCH RBC QN AUTO: 24 PG (ref 27–31.3)
MCH RBC QN AUTO: 24.4 PG (ref 27–31.3)
MCH RBC QN AUTO: 24.6 PG (ref 27–31.3)
MCHC RBC AUTO-ENTMCNC: 31.2 % (ref 33–37)
MCHC RBC AUTO-ENTMCNC: 31.3 % (ref 33–37)
MCHC RBC AUTO-ENTMCNC: 31.6 % (ref 33–37)
MCHC RBC AUTO-ENTMCNC: 32.2 % (ref 33–37)
MCV RBC AUTO: 76.1 FL (ref 82–100)
MCV RBC AUTO: 76.4 FL (ref 82–100)
MCV RBC AUTO: 77 FL (ref 82–100)
MCV RBC AUTO: 77.2 FL (ref 82–100)
MICROCYTES: ABNORMAL
MONOCYTES ABSOLUTE: 0.3 K/UL (ref 0.2–0.8)
MONOCYTES ABSOLUTE: 0.7 K/UL (ref 0.2–0.8)
MONOCYTES ABSOLUTE: 0.8 K/UL (ref 0.2–0.8)
MONOCYTES ABSOLUTE: 0.9 K/UL (ref 0.2–0.8)
MONOCYTES RELATIVE PERCENT: 11 %
MONOCYTES RELATIVE PERCENT: 12.7 %
MONOCYTES RELATIVE PERCENT: 12.8 %
MONOCYTES RELATIVE PERCENT: 4.9 %
MYELOCYTE PERCENT: 1 %
NEUTROPHILS ABSOLUTE: 3.7 K/UL (ref 1.4–6.5)
NEUTROPHILS ABSOLUTE: 4 K/UL (ref 1.4–6.5)
NEUTROPHILS ABSOLUTE: 4.3 K/UL (ref 1.4–6.5)
NEUTROPHILS ABSOLUTE: 4.5 K/UL (ref 1.4–6.5)
NEUTROPHILS RELATIVE PERCENT: 55.6 %
NEUTROPHILS RELATIVE PERCENT: 59.4 %
NEUTROPHILS RELATIVE PERCENT: 61.9 %
NEUTROPHILS RELATIVE PERCENT: 68 %
PDW BLD-RTO: 29.2 % (ref 11.5–14.5)
PDW BLD-RTO: 29.7 % (ref 11.5–14.5)
PDW BLD-RTO: 29.9 % (ref 11.5–14.5)
PDW BLD-RTO: 30.2 % (ref 11.5–14.5)
PHOSPHORUS: 3 MG/DL (ref 2.3–4.8)
PLATELET # BLD: 171 K/UL (ref 130–400)
PLATELET # BLD: 177 K/UL (ref 130–400)
PLATELET SLIDE REVIEW: ADEQUATE
POIKILOCYTES: ABNORMAL
POTASSIUM SERPL-SCNC: 4.3 MEQ/L (ref 3.4–4.9)
RBC # BLD: 3.76 M/UL (ref 4.2–5.4)
RBC # BLD: 3.85 M/UL (ref 4.2–5.4)
RBC # BLD: 3.86 M/UL (ref 4.2–5.4)
RBC # BLD: 3.87 M/UL (ref 4.2–5.4)
SCHISTOCYTES: ABNORMAL
SODIUM BLD-SCNC: 138 MEQ/L (ref 135–144)
WBC # BLD: 6.5 K/UL (ref 4.8–10.8)
WBC # BLD: 6.5 K/UL (ref 4.8–10.8)
WBC # BLD: 6.6 K/UL (ref 4.8–10.8)
WBC # BLD: 7.3 K/UL (ref 4.8–10.8)

## 2020-08-22 PROCEDURE — 85025 COMPLETE CBC W/AUTO DIFF WBC: CPT

## 2020-08-22 PROCEDURE — 1210000000 HC MED SURG R&B

## 2020-08-22 PROCEDURE — 84100 ASSAY OF PHOSPHORUS: CPT

## 2020-08-22 PROCEDURE — 83735 ASSAY OF MAGNESIUM: CPT

## 2020-08-22 PROCEDURE — 99221 1ST HOSP IP/OBS SF/LOW 40: CPT | Performed by: COLON & RECTAL SURGERY

## 2020-08-22 PROCEDURE — 80048 BASIC METABOLIC PNL TOTAL CA: CPT

## 2020-08-22 PROCEDURE — 36415 COLL VENOUS BLD VENIPUNCTURE: CPT

## 2020-08-22 PROCEDURE — 6370000000 HC RX 637 (ALT 250 FOR IP): Performed by: INTERNAL MEDICINE

## 2020-08-22 RX ORDER — CYANOCOBALAMIN 1000 UG/ML
1000 INJECTION INTRAMUSCULAR; SUBCUTANEOUS ONCE
Status: DISCONTINUED | OUTPATIENT
Start: 2020-08-22 | End: 2020-08-25

## 2020-08-22 RX ORDER — TORSEMIDE 20 MG/1
40 TABLET ORAL 2 TIMES DAILY
Status: DISCONTINUED | OUTPATIENT
Start: 2020-08-22 | End: 2020-08-28

## 2020-08-22 RX ADMIN — GABAPENTIN 600 MG: 300 CAPSULE ORAL at 21:35

## 2020-08-22 RX ADMIN — TRAMADOL HYDROCHLORIDE 50 MG: 50 TABLET, FILM COATED ORAL at 15:45

## 2020-08-22 RX ADMIN — TRAMADOL HYDROCHLORIDE 50 MG: 50 TABLET, FILM COATED ORAL at 21:35

## 2020-08-22 RX ADMIN — LOSARTAN POTASSIUM 100 MG: 50 TABLET, FILM COATED ORAL at 09:37

## 2020-08-22 RX ADMIN — TRAMADOL HYDROCHLORIDE 50 MG: 50 TABLET, FILM COATED ORAL at 03:07

## 2020-08-22 RX ADMIN — PANTOPRAZOLE SODIUM 40 MG: 40 TABLET, DELAYED RELEASE ORAL at 07:12

## 2020-08-22 RX ADMIN — MONTELUKAST 10 MG: 10 TABLET, FILM COATED ORAL at 21:35

## 2020-08-22 RX ADMIN — TRAMADOL HYDROCHLORIDE 50 MG: 50 TABLET, FILM COATED ORAL at 09:37

## 2020-08-22 RX ADMIN — DILTIAZEM HYDROCHLORIDE 240 MG: 240 CAPSULE, COATED, EXTENDED RELEASE ORAL at 09:36

## 2020-08-22 ASSESSMENT — PAIN SCALES - GENERAL
PAINLEVEL_OUTOF10: 5
PAINLEVEL_OUTOF10: 8
PAINLEVEL_OUTOF10: 3
PAINLEVEL_OUTOF10: 5
PAINLEVEL_OUTOF10: 3

## 2020-08-22 ASSESSMENT — PAIN DESCRIPTION - LOCATION
LOCATION: HIP
LOCATION: HIP

## 2020-08-22 ASSESSMENT — PAIN DESCRIPTION - PAIN TYPE
TYPE: CHRONIC PAIN
TYPE: CHRONIC PAIN

## 2020-08-22 ASSESSMENT — PAIN DESCRIPTION - DESCRIPTORS: DESCRIPTORS: ACHING;DULL

## 2020-08-22 ASSESSMENT — PAIN DESCRIPTION - ORIENTATION
ORIENTATION: RIGHT
ORIENTATION: RIGHT

## 2020-08-22 NOTE — PLAN OF CARE
Problem: Bleeding:  Goal: Will show no signs and symptoms of excessive bleeding  Description: Will show no signs and symptoms of excessive bleeding  Outcome: Ongoing     Problem: Pain:  Goal: Pain level will decrease  Description: Pain level will decrease  Outcome: Ongoing  Goal: Control of acute pain  Description: Control of acute pain  Outcome: Ongoing  Goal: Control of chronic pain  Description: Control of chronic pain  Outcome: Ongoing

## 2020-08-22 NOTE — PROGRESS NOTES
Progress Note  8/21/2020 10:39 PM  Subjective:   Admit Date: 8/20/2020  PCP: Daria Aguilar MD  Interval History: Still with BRBPR, Hemoglobin stable at 9.7 from 9.4  Off IVF as with difficult access. For planned surgery on Tuesday 8/25 and will need the bowel prep day prior. Will hold off any anticoagulation at this time with potential for recurring bleeding with bridging lovenox. Patient is aware of risk benefit ratio especially with forthcoming surgery. INR is down to 1.2 Patient to bring home prep on 8/23. Surgery will be consulted with patients status at this time. Patient is cleared both medically and with cardiology for planned surgery. DIET GENERAL;     Intake/Output Summary (Last 24 hours) at 8/21/2020 9469  Last data filed at 8/21/2020 0442  Gross per 24 hour   Intake 676 ml   Output 600 ml   Net 76 ml     Medications:      sodium chloride Stopped (08/21/20 1518)      dilTIAZem  240 mg Oral Daily    erythromycin ethylsuccinate  400 mg Oral Daily    pantoprazole  40 mg Oral QAM AC    folic acid  1 mg Oral Daily    gabapentin  600 mg Oral Nightly    losartan  100 mg Oral Daily    montelukast  10 mg Oral Nightly    therapeutic multivitamin-minerals  1 tablet Oral Daily    potassium chloride  20 mEq Oral Daily with breakfast    torsemide  20 mg Oral Daily    sodium chloride flush  10 mL Intravenous 2 times per day     Recent Labs     08/21/20  0705 08/21/20  1141 08/21/20  1828   WBC 5.8 6.3 7.0   HGB 9.5* 9.4* 9.7*    169 196     Recent Labs     08/20/20  0615 08/20/20  1230 08/21/20  0705    138 140   K 4.2 4.5 4.0    105 108*   CO2 24 23 24   BUN 13 11 10   CREATININE 0.47* 0.42* 0.35*   GLUCOSE 90 90 77     Recent Labs     08/20/20  0615 08/20/20  1230 08/21/20  0705   AST 17 23 15   ALT 14 16 14   BILITOT <0.2 <0.2 0.3   ALKPHOS 181* 169* 146*     Troponin T:   Recent Labs     08/20/20  0615   TROPONINI <0.010     Pro-BNP: No results for input(s): BNP in the last 72 hours.  INR:   Recent Labs     08/20/20  0615 08/20/20  1230 08/21/20  0705   INR 3.0 2.4 1.2       Objective:     Vitals:    08/20/20 1517 08/20/20 1600 08/20/20 1927 08/21/20 0708   BP: (!) 151/71 (!) 148/70 (!) 121/51 (!) 111/53   Pulse: 89 81 84 71   Resp: 18  17    Temp:  98.2 °F (36.8 °C) 97.9 °F (36.6 °C) 97.7 °F (36.5 °C)   TempSrc:   Oral    SpO2: 100% 100%  100%   Weight:       Height:         General appearance: alert and cooperative with exam  Lungs: clear to auscultation bilaterally  Heart: regular rate and rhythm, S1, S2 normal, no murmur, click, rub or gallop  Abdomen: soft, non-tender; bowel sounds normal; no masses,  no organomegaly  Extremities: extremities normal, atraumatic, no cyanosis or edema  Neurologic: No obvious focal neurologic deficits. Assessment and Plan:   1. Lower GI bleeding with a 4-5 cm tubular adenoma on the ascending colon with chronic Fe deficiency anemia   2. Bilateral breast Ca s/p Bilateral mastectomy s/p chemo and hormonal therapy  3. PFO with bouts of CVA with no residual and TIA on coumadin as no closure recommended. 4. HTN  5. Hypercholesterolemia. 6. Morbid Obesity  7. S/P Bilateral TKR  8. ROSEMARIE  9. Fe deficiency anemia  10. S/P L4 and L5 foraminotomy  11. Chemotherapy induced Peripheral Neuropathy  12. History of Asthma  13. With Normal LHC in 2013 after an abnormal stress lexiscan  14. DJD and eventual possible bilateral THR. Advance Directive: Full Code  DVT prophylaxis with enoxaparin 40 mg sub-Q daily. Discharge planning: home    Active Problems:    Gastrointestinal hemorrhage  Resolved Problems:    * No resolved hospital problems. *  1. 4-5 cm tubular adenoma on the ascending colon  2. Continue with H and H q 6 hrs for trending  3. Correct coagulopathy  4. Blood work in am  5. PPI  6. Resume home meds  7. Will continue to hold coumadin   8. SCD's  9. Taper down Cymbalta  10 IV Venofer 200 mg IVPB x 1 tonite  11. IVF to  Hold for now    12.  Increase

## 2020-08-22 NOTE — PROGRESS NOTES
hours.  INR:   Recent Labs     08/20/20  0615 08/20/20  1230 08/21/20  0705   INR 3.0 2.4 1.2       Objective:     Vitals:    08/20/20 1927 08/21/20 0708 08/21/20 2021 08/22/20 0711   BP: (!) 121/51 (!) 111/53 (!) 131/50 (!) 141/63   Pulse: 84 71 76 84   Resp: 17   18   Temp: 97.9 °F (36.6 °C) 97.7 °F (36.5 °C) 98.1 °F (36.7 °C) 97.3 °F (36.3 °C)   TempSrc: Oral   Oral   SpO2:  100% 100% 100%   Weight:       Height:         General appearance: alert and cooperative with exam  Lungs: clear to auscultation bilaterally  Heart: regular rate and rhythm, S1, S2 normal, no murmur, click, rub or gallop  Abdomen: soft, non-tender; bowel sounds normal; no masses,  no organomegaly  Extremities: extremities normal, atraumatic, no cyanosis or edema  Neurologic: No obvious focal neurologic deficits. Assessment and Plan:   1. Lower GI bleeding with a 4-5 cm tubular adenoma on the ascending colon with chronic Fe deficiency anemia   2. Bilateral breast Ca s/p Bilateral mastectomy s/p chemo and hormonal therapy  3. PFO with bouts of CVA with no residual and TIA on coumadin as no closure recommended. 4. HTN  5. Hypercholesterolemia. 6. Morbid Obesity  7. S/P Bilateral TKR  8. ROSEMARIE  9. Fe deficiency anemia  10. S/P L4 and L5 foraminotomy  11. Chemotherapy induced Peripheral Neuropathy  12. History of Asthma  13. With Normal LHC in 2013 after an abnormal stress lexiscan  14. DJD and eventual possible bilateral THR. Advance Directive: Full Code  DVT prophylaxis with enoxaparin 40 mg sub-Q daily. Discharge planning: home    Active Problems:    Gastrointestinal hemorrhage    Anemia  Resolved Problems:    * No resolved hospital problems. *  1. 4-5 cm tubular adenoma on the ascending colon  2. Continue with H and H q 6 hrs for trending  3. Correct coagulopathy  4. Blood work in am  5. PPI  6. Resume home meds  7. Will continue to hold coumadin   8. SCD's  9. Taper down Cymbalta  10 IV Venofer 200 mg IVPB x 1 tonite  11. IVF to  Hold

## 2020-08-22 NOTE — CONSULTS
Department of General Surgery - Adult  Surgical Service colorectal surgery  Attending Consult Note      Reason for Consult: Lower GI bleed      CHIEF COMPLAINT: Rectal bleeding    History Obtained From:  patient, electronic medical record    HISTORY OF PRESENT ILLNESS:                The patient is a 77 y.o. female who presents with a a sending colon polyp biopsied as a tubulovillous adenoma. Patient is seen by Dr. Angelia Albert last month and scheduled for elective right colectomy Tuesday. She has a patent foramen ovale and is currently on Coumadin. This was held on admission. Her INR is currently 1.2. She is on Lovenox bridge. She is currently comfortable. She has no abdominal pain. Her IV is infiltrated. Her hemoglobin is stable at 29. I talked to her about appropriate Lovenox bridge preoperatively.     Past Medical History:        Diagnosis Date    Arthritis     Asthma     Cancer ([facility] [address] 2002 & 2015    Breast RIGHT (T2/N0/M0) ER/IA (+) HER2/estela 3+ / chemo / left breast with mastectomy    GERD (gastroesophageal reflux disease)     Hiatal hernia     History of blood transfusion 2013    post op TKR    Hypercholesteremia     Hyperlipidemia     past trx / off meds > 5 yrs    Hypertension     meds > 20 yrs     Patent foramen ovale      Past Surgical History:        Procedure Laterality Date    BREAST SURGERY Bilateral     Mastectomy    CARDIAC CATHETERIZATION  2013    no blockage    COLONOSCOPY  08/31/2016    w/polypectomy     COLONOSCOPY N/A 7/24/2020    COLONOSCOPY DIAGNOSTIC performed by Dariel Butler MD at [address] [facility], DIAGNOSTIC     [facility]    umbilical   [address] Bilateral     Knee    JOINT REPLACEMENT Bilateral 11/04/2013    LAMINECTOMY Right 12/30/2019    RIGHT L4-5 MICRODECOMPRESSION performed by Jauna Crum MD at [address] 0.6-1CM REMAINDR BODY N/A 4/6/2018    EXCISION CYST RT. NECK AND EXCISION LESION NOSE performed by Sarah Benton MD at [address] VAD W/SUBQ PORT/ CTR/PRPH INSJ N/A 4/6/2018    REMOVAL VENOUS PORT performed by Sarah Benton MD at [facility] / has been removed    TONSILLECTOMY      at age 23   [address] [facility] [address]  08/31/2016    w/polypectomy,bx     UPPER GASTROINTESTINAL ENDOSCOPY N/A 7/24/2020    EGD DIAGNOSTIC ONLY performed by Crow Morris MD at [address] [place] [address]  2003    excision polyps     Current Medications:   Current Facility-Administered Medications: 0.9 % sodium chloride infusion, , Intravenous, Continuous  morphine (PF) injection 4 mg, 4 mg, Intravenous, Q15 Min PRN  albuterol (PROVENTIL) nebulizer solution 2.5 mg, 2.5 mg, Nebulization, Q6H PRN  dilTIAZem (CARDIZEM CD) extended release capsule 240 mg, 240 mg, Oral, Daily  erythromycin ethylsuccinate (EES) tablet 400 mg, 400 mg, Oral, Daily  pantoprazole (PROTONIX) tablet 40 mg, 40 mg, Oral, QAM AC  folic acid (FOLVITE) tablet 1 mg, 1 mg, Oral, Daily  gabapentin (NEURONTIN) capsule 600 mg, 600 mg, Oral, Nightly  losartan (COZAAR) tablet 100 mg, 100 mg, Oral, Daily  montelukast (SINGULAIR) tablet 10 mg, 10 mg, Oral, Nightly  therapeutic multivitamin-minerals 1 tablet, 1 tablet, Oral, Daily  potassium chloride (KLOR-CON M) extended release tablet 20 mEq, 20 mEq, Oral, Daily with breakfast  torsemide (DEMADEX) tablet 20 mg, 20 mg, Oral, Daily  traMADol (ULTRAM) tablet 50 mg, 50 mg, Oral, Q6H PRN  morphine injection 4 mg, 4 mg, Intravenous, Q4H PRN  ondansetron (ZOFRAN) injection 4 mg, 4 mg, Intravenous, Q6H PRN  sodium chloride flush 0.9 % injection 10 mL, 10 mL, Intravenous, 2 times per day  sodium chloride flush 0.9 % injection 10 mL, 10 mL, Intravenous, PRN  acetaminophen (TYLENOL) tablet 650 mg, 650 mg, Oral, Q4H PRN  Allergies:  Darvon [propoxyphene]; Lipitor [atorvastatin]; Mobic [meloxicam]; Penicillins;  Percodan [oxycodone-aspirin]; Pineapple; Sulfa antibiotics; and Dilaudid [hydromorphone hcl]    Social History:   TOBACCO:   reports that she quit smoking about 42 years ago. Her smoking use included cigarettes. She has a 1.25 pack-year smoking history. She has never used smokeless tobacco.  ETOH:   reports no history of alcohol use. DRUGS:   reports no history of drug use. Family History:       Problem Relation Age of Onset    Heart Attack Mother     Kidney Disease Father     Heart Attack Father     Breast Cancer Sister     Lung Cancer Brother     Cancer Brother         lung cancer    High Blood Pressure Maternal Aunt     Diabetes Brother     Diabetes Sister     Liver Disease Sister     Other Sister         blind due to detached retina    Kidney Disease Sister     Thyroid Disease Daughter     High Cholesterol Daughter        REVIEW OF SYSTEMS:    CONSTITUTIONAL:  negative  EYES:  negative  HEENT:  negative  RESPIRATORY:  negative  CARDIOVASCULAR:  negative  GASTROINTESTINAL:  negative for nausea, vomiting, diarrhea, constipation and abdominal pain  MUSCULOSKELETAL:  negative  NEUROLOGICAL:  negative  BEHAVIOR/PSYCH:  negative    PHYSICAL EXAM:    VITALS:  BP (!) 141/63   Pulse 84   Temp 97.3 °F (36.3 °C) (Oral)   Resp 18   Ht 5' 1\" (1.549 m)   Wt 233 lb (105.7 kg)   LMP 12/20/1997   SpO2 100%   BMI 44.02 kg/m²   CONSTITUTIONAL:  awake, alert, cooperative, no apparent distress, and appears stated age  EYES:  Lids and lashes normal, pupils equal, round and reactive to light, extra ocular muscles intact, sclera clear, conjunctiva normal  ENT:  Normocephalic, without obvious abnormality, atraumatic, sinuses nontender on palpation, external ears without lesions, oral pharynx with moist mucus membranes, tonsils without erythema or exudates, gums normal and good dentition.   NECK:  Supple, symmetrical, trachea midline, no adenopathy, thyroid symmetric, not enlarged and no tenderness, skin normal  BACK:  Symmetric, no curvature, spinous processes are non-tender on palpation, paraspinous muscles are non-tender on palpation, no costal vertebral tenderness  LUNGS:  No increased work of breathing, good air exchange, clear to auscultation bilaterally, no crackles or wheezing  CARDIOVASCULAR:  Normal apical impulse, regular rate and rhythm, normal S1 and S2, no S3 or S4, and no murmur noted  ABDOMEN: Abdomen soft nondistended and nontender  MUSCULOSKELETAL:  There is no redness, warmth, or swelling of the joints. Full range of motion noted. Motor strength is 5 out of 5 all extremities bilaterally. Tone is normal.  NEUROLOGIC: Awake alert and oriented  SKIN:  no bruising or bleeding  DATA:    CBC:   Lab Results   Component Value Date    WBC 6.5 08/22/2020    RBC 3.87 08/22/2020    RBC 4.99 05/19/2012    HGB 9.2 08/22/2020    HCT 29.5 08/22/2020    MCV 76.1 08/22/2020    MCH 23.8 08/22/2020    MCHC 31.3 08/22/2020    RDW 29.9 08/22/2020     08/22/2020    MPV 11.5 03/14/2015     BMP:    Lab Results   Component Value Date     08/22/2020    K 4.3 08/22/2020    K 3.7 07/25/2020     08/22/2020    CO2 24 08/22/2020    BUN 13 08/22/2020    LABALBU 3.8 08/21/2020    LABALBU 4.1 05/19/2012    CREATININE 0.39 08/22/2020    CALCIUM 10.0 08/22/2020    GFRAA >60.0 08/22/2020    LABGLOM >60.0 08/22/2020    GLUCOSE 85 08/22/2020    GLUCOSE 78 05/19/2012       IMPRESSION/RECOMMENDATIONS:      I reviewed CAT scan and endoscopy reports from 1 month ago. From surgical standpoint, can be discharged since hemoglobin is stable and INR is normal with twice daily Lovenox bridge until the day before surgery. I discussed bridge instructions if she were to go home. If she does not go home, Lovenox bridge should be 1 mg/kg daily divided twice daily    Patient can report to surgery electively on Tuesday as scheduled. No acute surgical interventions needed.     If primary physician desires patient to stay in hospital, Dr. Raymon Mckeon will see the patient Monday.

## 2020-08-23 LAB
ANION GAP SERPL CALCULATED.3IONS-SCNC: 9 MEQ/L (ref 9–15)
ANISOCYTOSIS: ABNORMAL
BASOPHILS ABSOLUTE: 0.1 K/UL (ref 0–0.2)
BASOPHILS RELATIVE PERCENT: 0.8 %
BASOPHILS RELATIVE PERCENT: 1.1 %
BASOPHILS RELATIVE PERCENT: 1.1 %
BASOPHILS RELATIVE PERCENT: 1.2 %
BUN BLDV-MCNC: 14 MG/DL (ref 8–23)
CALCIUM SERPL-MCNC: 9.9 MG/DL (ref 8.5–9.9)
CHLORIDE BLD-SCNC: 105 MEQ/L (ref 95–107)
CO2: 23 MEQ/L (ref 20–31)
CREAT SERPL-MCNC: 0.43 MG/DL (ref 0.5–0.9)
EOSINOPHILS ABSOLUTE: 0.4 K/UL (ref 0–0.7)
EOSINOPHILS RELATIVE PERCENT: 4.7 %
EOSINOPHILS RELATIVE PERCENT: 5.1 %
EOSINOPHILS RELATIVE PERCENT: 5.7 %
EOSINOPHILS RELATIVE PERCENT: 6.4 %
GFR AFRICAN AMERICAN: >60
GFR NON-AFRICAN AMERICAN: >60
GLUCOSE BLD-MCNC: 81 MG/DL (ref 70–99)
HCT VFR BLD CALC: 27.7 % (ref 37–47)
HCT VFR BLD CALC: 29 % (ref 37–47)
HCT VFR BLD CALC: 30.9 % (ref 37–47)
HCT VFR BLD CALC: 31.8 % (ref 37–47)
HEMOGLOBIN: 10.1 G/DL (ref 12–16)
HEMOGLOBIN: 8.9 G/DL (ref 12–16)
HEMOGLOBIN: 9 G/DL (ref 12–16)
HEMOGLOBIN: 9.8 G/DL (ref 12–16)
HYPOCHROMIA: ABNORMAL
LYMPHOCYTES ABSOLUTE: 1.3 K/UL (ref 1–4.8)
LYMPHOCYTES ABSOLUTE: 1.4 K/UL (ref 1–4.8)
LYMPHOCYTES ABSOLUTE: 1.4 K/UL (ref 1–4.8)
LYMPHOCYTES ABSOLUTE: 1.5 K/UL (ref 1–4.8)
LYMPHOCYTES RELATIVE PERCENT: 17.1 %
LYMPHOCYTES RELATIVE PERCENT: 19.9 %
LYMPHOCYTES RELATIVE PERCENT: 20.8 %
LYMPHOCYTES RELATIVE PERCENT: 21.7 %
MACROCYTES: ABNORMAL
MAGNESIUM: 2.2 MG/DL (ref 1.7–2.4)
MCH RBC QN AUTO: 23.8 PG (ref 27–31.3)
MCH RBC QN AUTO: 24.2 PG (ref 27–31.3)
MCH RBC QN AUTO: 24.3 PG (ref 27–31.3)
MCH RBC QN AUTO: 24.6 PG (ref 27–31.3)
MCHC RBC AUTO-ENTMCNC: 30.9 % (ref 33–37)
MCHC RBC AUTO-ENTMCNC: 31.7 % (ref 33–37)
MCHC RBC AUTO-ENTMCNC: 31.7 % (ref 33–37)
MCHC RBC AUTO-ENTMCNC: 32.1 % (ref 33–37)
MCV RBC AUTO: 76.5 FL (ref 82–100)
MCV RBC AUTO: 76.6 FL (ref 82–100)
MCV RBC AUTO: 76.8 FL (ref 82–100)
MCV RBC AUTO: 76.8 FL (ref 82–100)
MICROCYTES: ABNORMAL
MONOCYTES ABSOLUTE: 0.8 K/UL (ref 0.2–0.8)
MONOCYTES ABSOLUTE: 1 K/UL (ref 0.2–0.8)
MONOCYTES RELATIVE PERCENT: 11.6 %
MONOCYTES RELATIVE PERCENT: 12 %
MONOCYTES RELATIVE PERCENT: 12.3 %
MONOCYTES RELATIVE PERCENT: 12.6 %
NEUTROPHILS ABSOLUTE: 3.9 K/UL (ref 1.4–6.5)
NEUTROPHILS ABSOLUTE: 4 K/UL (ref 1.4–6.5)
NEUTROPHILS ABSOLUTE: 4.3 K/UL (ref 1.4–6.5)
NEUTROPHILS ABSOLUTE: 5.5 K/UL (ref 1.4–6.5)
NEUTROPHILS RELATIVE PERCENT: 59 %
NEUTROPHILS RELATIVE PERCENT: 60.8 %
NEUTROPHILS RELATIVE PERCENT: 61 %
NEUTROPHILS RELATIVE PERCENT: 65.1 %
PDW BLD-RTO: 29.3 % (ref 11.5–14.5)
PDW BLD-RTO: 29.4 % (ref 11.5–14.5)
PDW BLD-RTO: 29.6 % (ref 11.5–14.5)
PDW BLD-RTO: 29.8 % (ref 11.5–14.5)
PHOSPHORUS: 3.4 MG/DL (ref 2.3–4.8)
PLATELET # BLD: 140 K/UL (ref 130–400)
PLATELET # BLD: 182 K/UL (ref 130–400)
PLATELET # BLD: 190 K/UL (ref 130–400)
PLATELET # BLD: 211 K/UL (ref 130–400)
PLATELET SLIDE REVIEW: ADEQUATE
POIKILOCYTES: ABNORMAL
POTASSIUM SERPL-SCNC: 4.3 MEQ/L (ref 3.4–4.9)
RBC # BLD: 3.61 M/UL (ref 4.2–5.4)
RBC # BLD: 3.77 M/UL (ref 4.2–5.4)
RBC # BLD: 4.04 M/UL (ref 4.2–5.4)
RBC # BLD: 4.15 M/UL (ref 4.2–5.4)
SODIUM BLD-SCNC: 137 MEQ/L (ref 135–144)
TEAR DROP CELLS: ABNORMAL
WBC # BLD: 6.6 K/UL (ref 4.8–10.8)
WBC # BLD: 6.7 K/UL (ref 4.8–10.8)
WBC # BLD: 7.1 K/UL (ref 4.8–10.8)
WBC # BLD: 8.4 K/UL (ref 4.8–10.8)

## 2020-08-23 PROCEDURE — 6370000000 HC RX 637 (ALT 250 FOR IP): Performed by: INTERNAL MEDICINE

## 2020-08-23 PROCEDURE — 99231 SBSQ HOSP IP/OBS SF/LOW 25: CPT | Performed by: COLON & RECTAL SURGERY

## 2020-08-23 PROCEDURE — 1210000000 HC MED SURG R&B

## 2020-08-23 PROCEDURE — 85025 COMPLETE CBC W/AUTO DIFF WBC: CPT

## 2020-08-23 PROCEDURE — 84100 ASSAY OF PHOSPHORUS: CPT

## 2020-08-23 PROCEDURE — 80048 BASIC METABOLIC PNL TOTAL CA: CPT

## 2020-08-23 PROCEDURE — 36415 COLL VENOUS BLD VENIPUNCTURE: CPT

## 2020-08-23 PROCEDURE — 83735 ASSAY OF MAGNESIUM: CPT

## 2020-08-23 RX ADMIN — TRAMADOL HYDROCHLORIDE 50 MG: 50 TABLET, FILM COATED ORAL at 04:07

## 2020-08-23 RX ADMIN — TRAMADOL HYDROCHLORIDE 50 MG: 50 TABLET, FILM COATED ORAL at 17:17

## 2020-08-23 RX ADMIN — GABAPENTIN 600 MG: 300 CAPSULE ORAL at 19:47

## 2020-08-23 RX ADMIN — PANTOPRAZOLE SODIUM 40 MG: 40 TABLET, DELAYED RELEASE ORAL at 05:46

## 2020-08-23 RX ADMIN — MONTELUKAST 10 MG: 10 TABLET, FILM COATED ORAL at 19:47

## 2020-08-23 RX ADMIN — TRAMADOL HYDROCHLORIDE 50 MG: 50 TABLET, FILM COATED ORAL at 10:33

## 2020-08-23 RX ADMIN — TORSEMIDE 40 MG: 20 TABLET ORAL at 19:46

## 2020-08-23 RX ADMIN — LOSARTAN POTASSIUM 100 MG: 50 TABLET, FILM COATED ORAL at 08:28

## 2020-08-23 RX ADMIN — TORSEMIDE 40 MG: 20 TABLET ORAL at 08:28

## 2020-08-23 RX ADMIN — DILTIAZEM HYDROCHLORIDE 240 MG: 240 CAPSULE, COATED, EXTENDED RELEASE ORAL at 08:28

## 2020-08-23 ASSESSMENT — PAIN SCALES - GENERAL
PAINLEVEL_OUTOF10: 2
PAINLEVEL_OUTOF10: 3
PAINLEVEL_OUTOF10: 5

## 2020-08-23 ASSESSMENT — PAIN DESCRIPTION - LOCATION
LOCATION: HIP

## 2020-08-23 ASSESSMENT — PAIN DESCRIPTION - PAIN TYPE
TYPE: ACUTE PAIN
TYPE: ACUTE PAIN
TYPE: CHRONIC PAIN
TYPE: CHRONIC PAIN

## 2020-08-23 ASSESSMENT — PAIN DESCRIPTION - DESCRIPTORS: DESCRIPTORS: ACHING;DULL

## 2020-08-23 ASSESSMENT — PAIN DESCRIPTION - ORIENTATION
ORIENTATION: RIGHT

## 2020-08-23 NOTE — CARE COORDINATION
PATIENT FOR JUAN COLECTOMY ON TUES, PICC ON Monday. WILL NEED TO RE EVALUATE DC PLAN POST OPERATIVELY . CM/LSW TO FOLLOW.

## 2020-08-23 NOTE — PROGRESS NOTES
Pt Name: Patt Moody  Medical Record Number: [id number]  Date of Birth 1955   Admit date 2020 12:16 PM  Today's Date: 2020     ASSESSMENT  1. Hospital day # 1  2 hospital day #2 cecal tumor  3. Lovenox bridge stopped per primary    PLAN  1. Sara See to see tomorrow regarding his elective right colectomy  2. Bowel prep instructions to follow tomorrow    SUBJECTIVE  Chief complaint: Follow-up cecal tumor  Afebrile, vital signs are stable. She denies any nausea or vomiting,. She is tolerating a DIET GENERAL;. Her pain is well controlled on current medications. She has been ambulating in the halls. has a past medical history of Arthritis, Asthma, Cancer ([address] GERD (gastroesophageal reflux disease), Hiatal hernia, History of blood transfusion, Hypercholesteremia, Hyperlipidemia, Hypertension, and Patent foramen ovale. CURRENT MEDS  Scheduled Meds:   torsemide  40 mg Oral BID    iron sucrose  300 mg Intravenous Once    cyanocobalamin  1,000 mcg Intramuscular Once    dilTIAZem  240 mg Oral Daily    erythromycin ethylsuccinate  400 mg Oral Daily    pantoprazole  40 mg Oral QAM AC    folic acid  1 mg Oral Daily    gabapentin  600 mg Oral Nightly    losartan  100 mg Oral Daily    montelukast  10 mg Oral Nightly    therapeutic multivitamin-minerals  1 tablet Oral Daily    potassium chloride  20 mEq Oral Daily with breakfast    torsemide  20 mg Oral Daily    sodium chloride flush  10 mL Intravenous 2 times per day     Continuous Infusions:   sodium chloride Stopped (20 1518)     PRN Meds:.morphine, albuterol, traMADol, morphine, ondansetron, sodium chloride flush, acetaminophen    OBJECTIVE  CURRENT VITALS:  height is 5' 1\" (1.549 m) and weight is 233 lb (105.7 kg). Her oral temperature is 97.7 °F (36.5 °C). Her blood pressure is 136/61 and her pulse is 77. Her respiration is 16 and oxygen saturation is 99%.    Temperature Range (24h):Temp: 97.7 °F (36.5 °C) Temp  Av.9 °F (36.6 °C)  Min: 97.7 °F (36.5 °C)  Max: 98.1 °F (36.7 °C)  BP Range (19W): Systolic (54UZY), ZOT:367 , Min:136 , JXO:344     Diastolic (20LPX), HXX:52, Min:49, Max:61    Pulse Range (24h): Pulse  Av  Min: 77  Max: 83  Respiration Range (24h): Resp  Av  Min: 16  Max: 18    GENERAL: alert, no distress  LUNGS: clear to ausculation, without wheezes, rales or rhonci  HEART: normal rate and regular rhythm  ABDOMEN: soft, non-tender, non-distended, bowel sounds present in all 4 quadrants and no guarding or peritoneal signs  EXTERMITY: no cyanosis, clubbing or edema  In: 500 [P.O.:500]  Out: -   Date 20 0000 - 20 2359   Shift 2677-3190 7887-4998 0878-4420 24 Hour Total   INTAKE   P.O.(mL/kg/hr) 500(0.6)   500   Shift Total(mL/kg) 500(4.7)   500(4.7)   OUTPUT   Shift Total(mL/kg)       Weight (kg) 105.7 105.7 105.7 105.7       LABS  Recent Labs     20  0705  20  0643 20  1449 20  1906 20  0037 20  0634   WBC 5.8   < > 6.5 6.6 7.3 6.7  --    HGB 9.5*   < > 9.2* 9.4* 9.3* 9.0*  --    HCT 30.0*   < > 29.5* 29.8* 29.7* 29.0*  --       < > 171 171 177 182  --      --  138  --   --   --  137   K 4.0  --  4.3  --   --   --  4.3   *  --  106  --   --   --  105   CO2 24  --  24  --   --   --  23   BUN 10  --  13  --   --   --  14   CREATININE 0.35*  --  0.39*  --   --   --  0.43*   MG 2.2  --  2.1  --   --   --  2.2   PHOS 2.6  --  3.0  --   --   --  3.4   CALCIUM 9.9  --  10.0*  --   --   --  9.9    < > = values in this interval not displayed. Recent Labs     20  1230 20  0705   INR 2.4 1.2     Recent Labs     20  1230 20  0705   AST 23 15   ALT 16 14   BILITOT <0.2 0.3   BILIDIR  --  <0.2       RADIOLOGY  No results found.   Electronically signed by Jeffrey Romo MD on 2020 at 9:34 AM

## 2020-08-23 NOTE — FLOWSHEET NOTE
Pt reporting BRB in stool, per pt, last BM yesterday. Current Hgb 9.3 Q6 CBC. Pt ordered weight based Lovenox. Perfect serve to Dr. Lucina Gayle per pt request to ask about receiving Lovenox despite GI bleed. Dr. Reyna Kellogg stated to hold Lovenox. Dr. Lucina Gayle also made aware of no IV access, pt will receive PICC Monday. Pt c/o R hip pain, PRN pain medication administered as ordered.

## 2020-08-23 NOTE — PLAN OF CARE
Problem: Bleeding:  Goal: Will show no signs and symptoms of excessive bleeding  Description: Will show no signs and symptoms of excessive bleeding  Outcome: Ongoing     Problem: Pain:  Goal: Pain level will decrease  Description: Pain level will decrease  Outcome: Ongoing     Problem: Falls - Risk of:  Goal: Will remain free from falls  Description: Will remain free from falls  Outcome: Ongoing

## 2020-08-24 ENCOUNTER — APPOINTMENT (OUTPATIENT)
Dept: INTERVENTIONAL RADIOLOGY/VASCULAR | Age: 65
DRG: 330 | End: 2020-08-24
Payer: MEDICARE

## 2020-08-24 LAB
ANION GAP SERPL CALCULATED.3IONS-SCNC: 11 MEQ/L (ref 9–15)
ANISOCYTOSIS: ABNORMAL
ATYPICAL LYMPHOCYTE RELATIVE PERCENT: 1 %
BASOPHILS ABSOLUTE: 0.1 K/UL (ref 0–0.2)
BASOPHILS ABSOLUTE: 0.1 K/UL (ref 0–0.2)
BASOPHILS RELATIVE PERCENT: 0.9 %
BASOPHILS RELATIVE PERCENT: 1 %
BUN BLDV-MCNC: 14 MG/DL (ref 8–23)
CALCIUM SERPL-MCNC: 10.6 MG/DL (ref 8.5–9.9)
CHLORIDE BLD-SCNC: 102 MEQ/L (ref 95–107)
CO2: 28 MEQ/L (ref 20–31)
CREAT SERPL-MCNC: 0.51 MG/DL (ref 0.5–0.9)
EOSINOPHILS ABSOLUTE: 0.3 K/UL (ref 0–0.7)
EOSINOPHILS ABSOLUTE: 0.5 K/UL (ref 0–0.7)
EOSINOPHILS RELATIVE PERCENT: 3 %
EOSINOPHILS RELATIVE PERCENT: 5.2 %
GFR AFRICAN AMERICAN: >60
GFR NON-AFRICAN AMERICAN: >60
GLUCOSE BLD-MCNC: 106 MG/DL (ref 70–99)
HCT VFR BLD CALC: 32.8 % (ref 37–47)
HCT VFR BLD CALC: 32.8 % (ref 37–47)
HEMATOLOGY PATH CONSULT: NORMAL
HEMOGLOBIN: 10.2 G/DL (ref 12–16)
HEMOGLOBIN: 10.3 G/DL (ref 12–16)
HYPOCHROMIA: ABNORMAL
INR BLD: 1
LYMPHOCYTES ABSOLUTE: 1.1 K/UL (ref 1–4.8)
LYMPHOCYTES ABSOLUTE: 1.5 K/UL (ref 1–4.8)
LYMPHOCYTES RELATIVE PERCENT: 11 %
LYMPHOCYTES RELATIVE PERCENT: 16.7 %
MAGNESIUM: 2.1 MG/DL (ref 1.7–2.4)
MCH RBC QN AUTO: 23.8 PG (ref 27–31.3)
MCH RBC QN AUTO: 24 PG (ref 27–31.3)
MCHC RBC AUTO-ENTMCNC: 31.1 % (ref 33–37)
MCHC RBC AUTO-ENTMCNC: 31.4 % (ref 33–37)
MCV RBC AUTO: 76.5 FL (ref 82–100)
MCV RBC AUTO: 76.5 FL (ref 82–100)
MICROCYTES: ABNORMAL
MONOCYTES ABSOLUTE: 0.6 K/UL (ref 0.2–0.8)
MONOCYTES ABSOLUTE: 1.1 K/UL (ref 0.2–0.8)
MONOCYTES RELATIVE PERCENT: 12.5 %
MONOCYTES RELATIVE PERCENT: 6.6 %
NEUTROPHILS ABSOLUTE: 5.9 K/UL (ref 1.4–6.5)
NEUTROPHILS ABSOLUTE: 6.8 K/UL (ref 1.4–6.5)
NEUTROPHILS RELATIVE PERCENT: 64.7 %
NEUTROPHILS RELATIVE PERCENT: 77 %
OVALOCYTES: ABNORMAL
PDW BLD-RTO: 28.8 % (ref 11.5–14.5)
PDW BLD-RTO: 29.4 % (ref 11.5–14.5)
PHOSPHORUS: 3.9 MG/DL (ref 2.3–4.8)
PLATELET # BLD: 201 K/UL (ref 130–400)
PLATELET # BLD: 213 K/UL (ref 130–400)
PLATELET SLIDE REVIEW: NORMAL
POIKILOCYTES: ABNORMAL
POTASSIUM SERPL-SCNC: 3.7 MEQ/L (ref 3.4–4.9)
PROTHROMBIN TIME: 13.2 SEC (ref 12.3–14.9)
RBC # BLD: 4.28 M/UL (ref 4.2–5.4)
RBC # BLD: 4.29 M/UL (ref 4.2–5.4)
SODIUM BLD-SCNC: 141 MEQ/L (ref 135–144)
TEAR DROP CELLS: ABNORMAL
WBC # BLD: 8.8 K/UL (ref 4.8–10.8)
WBC # BLD: 9 K/UL (ref 4.8–10.8)

## 2020-08-24 PROCEDURE — 36573 INSJ PICC RS&I 5 YR+: CPT

## 2020-08-24 PROCEDURE — 85025 COMPLETE CBC W/AUTO DIFF WBC: CPT

## 2020-08-24 PROCEDURE — 2580000003 HC RX 258: Performed by: INTERNAL MEDICINE

## 2020-08-24 PROCEDURE — 2500000003 HC RX 250 WO HCPCS: Performed by: INTERNAL MEDICINE

## 2020-08-24 PROCEDURE — 1210000000 HC MED SURG R&B

## 2020-08-24 PROCEDURE — 80048 BASIC METABOLIC PNL TOTAL CA: CPT

## 2020-08-24 PROCEDURE — 83735 ASSAY OF MAGNESIUM: CPT

## 2020-08-24 PROCEDURE — 6360000002 HC RX W HCPCS: Performed by: INTERNAL MEDICINE

## 2020-08-24 PROCEDURE — 02HV33Z INSERTION OF INFUSION DEVICE INTO SUPERIOR VENA CAVA, PERCUTANEOUS APPROACH: ICD-10-PCS | Performed by: INTERNAL MEDICINE

## 2020-08-24 PROCEDURE — 85610 PROTHROMBIN TIME: CPT

## 2020-08-24 PROCEDURE — 99222 1ST HOSP IP/OBS MODERATE 55: CPT | Performed by: SURGERY

## 2020-08-24 PROCEDURE — 84100 ASSAY OF PHOSPHORUS: CPT

## 2020-08-24 PROCEDURE — 2709999900 IR PICC WO SQ PORT/PUMP > 5 YEARS

## 2020-08-24 PROCEDURE — 36415 COLL VENOUS BLD VENIPUNCTURE: CPT

## 2020-08-24 PROCEDURE — 6370000000 HC RX 637 (ALT 250 FOR IP): Performed by: INTERNAL MEDICINE

## 2020-08-24 RX ORDER — SODIUM CHLORIDE 9 MG/ML
250 INJECTION, SOLUTION INTRAVENOUS ONCE
Status: COMPLETED | OUTPATIENT
Start: 2020-08-24 | End: 2020-08-24

## 2020-08-24 RX ORDER — SODIUM CHLORIDE 0.9 % (FLUSH) 0.9 %
10 SYRINGE (ML) INJECTION EVERY 12 HOURS SCHEDULED
Status: DISCONTINUED | OUTPATIENT
Start: 2020-08-24 | End: 2020-08-25

## 2020-08-24 RX ORDER — SODIUM CHLORIDE 0.9 % (FLUSH) 0.9 %
10 SYRINGE (ML) INJECTION PRN
Status: DISCONTINUED | OUTPATIENT
Start: 2020-08-24 | End: 2020-08-25

## 2020-08-24 RX ORDER — CIPROFLOXACIN 2 MG/ML
400 INJECTION, SOLUTION INTRAVENOUS
Status: DISCONTINUED | OUTPATIENT
Start: 2020-08-25 | End: 2020-08-26

## 2020-08-24 RX ORDER — LIDOCAINE HYDROCHLORIDE 20 MG/ML
5 INJECTION, SOLUTION INFILTRATION; PERINEURAL ONCE
Status: COMPLETED | OUTPATIENT
Start: 2020-08-24 | End: 2020-08-24

## 2020-08-24 RX ADMIN — SODIUM CHLORIDE 200 MG: 9 INJECTION, SOLUTION INTRAVENOUS at 16:43

## 2020-08-24 RX ADMIN — LOSARTAN POTASSIUM 100 MG: 50 TABLET, FILM COATED ORAL at 09:57

## 2020-08-24 RX ADMIN — TRAMADOL HYDROCHLORIDE 50 MG: 50 TABLET, FILM COATED ORAL at 06:40

## 2020-08-24 RX ADMIN — ONDANSETRON 4 MG: 2 INJECTION INTRAMUSCULAR; INTRAVENOUS at 18:06

## 2020-08-24 RX ADMIN — LIDOCAINE HYDROCHLORIDE 10 ML: 20 INJECTION, SOLUTION INFILTRATION; PERINEURAL at 12:06

## 2020-08-24 RX ADMIN — SODIUM CHLORIDE: 9 INJECTION, SOLUTION INTRAVENOUS at 18:15

## 2020-08-24 RX ADMIN — DILTIAZEM HYDROCHLORIDE 240 MG: 240 CAPSULE, COATED, EXTENDED RELEASE ORAL at 09:57

## 2020-08-24 RX ADMIN — Medication 10 ML: at 21:09

## 2020-08-24 RX ADMIN — TRAMADOL HYDROCHLORIDE 50 MG: 50 TABLET, FILM COATED ORAL at 21:05

## 2020-08-24 RX ADMIN — MONTELUKAST 10 MG: 10 TABLET, FILM COATED ORAL at 21:06

## 2020-08-24 RX ADMIN — TRAMADOL HYDROCHLORIDE 50 MG: 50 TABLET, FILM COATED ORAL at 14:03

## 2020-08-24 RX ADMIN — PANTOPRAZOLE SODIUM 40 MG: 40 TABLET, DELAYED RELEASE ORAL at 06:40

## 2020-08-24 RX ADMIN — TRAMADOL HYDROCHLORIDE 50 MG: 50 TABLET, FILM COATED ORAL at 00:15

## 2020-08-24 RX ADMIN — SODIUM CHLORIDE 250 ML: 9 INJECTION, SOLUTION INTRAVENOUS at 12:07

## 2020-08-24 RX ADMIN — ONDANSETRON 4 MG: 2 INJECTION INTRAMUSCULAR; INTRAVENOUS at 23:53

## 2020-08-24 RX ADMIN — GABAPENTIN 600 MG: 300 CAPSULE ORAL at 21:05

## 2020-08-24 RX ADMIN — TORSEMIDE 40 MG: 20 TABLET ORAL at 13:25

## 2020-08-24 ASSESSMENT — PAIN SCALES - GENERAL
PAINLEVEL_OUTOF10: 6
PAINLEVEL_OUTOF10: 3
PAINLEVEL_OUTOF10: 0
PAINLEVEL_OUTOF10: 7
PAINLEVEL_OUTOF10: 6
PAINLEVEL_OUTOF10: 6

## 2020-08-24 NOTE — PROGRESS NOTES
Patient completed prep this afternoon and has had multiple bowel movements still brown but light with flex in the stool.

## 2020-08-24 NOTE — PROGRESS NOTES
Pt Name: Ha Savage  Medical Record Number: [id number]  Date of Birth 1955   Admit date 8/20/2020 12:16 PM  Today's Date: 8/24/2020     ASSESSMENT  1. Hospital day # 2  2. Ascending colon mass  3. Lower GI bleed, stable    PLAN  1.  right hemicolectomy with possible colostomy tomorrow   The options of therapy(surgical and non surgical) were discussed with the patient and family. The procedure of colon resection with possible colostomy was discussed. The potential risks and complications including but not exclusive to infection(including anastomotic leakage and intra abdominal abscess), blood loss needing transfusions, wound problems, blood clots and other organ injury( including ureter damage) were discussed. The patient understands and all questions were answered to their satisfaction. The patient is agreeable to proceed with surgery. Surgery will be scheduled at the patient's convience. An epidural to supplement general anesthesia was discussed   2. Okay for her to take the mannitol and antibiotics that were prescribed to her preoperatively as an outpatient. SUBJECTIVE  Chief complaint: None  Afebrile, vital signs are stable. She denies any nausea or vomiting, has passed flatus and had a bowel movement. She is tolerating a DIET CLEAR LIQUID;  Diet NPO, After Midnight. Her pain is well controlled on current medications. She has been ambulating in the halls. She is scheduled to have elective right colectomy tomorrow for ascending colon polyps. Apparently her cardiologist did not feel she needed to be bridged with Lovenox after stopping her Coumadin. has a past medical history of Arthritis, Asthma, Cancer ([address] GERD (gastroesophageal reflux disease), Hiatal hernia, History of blood transfusion, Hypercholesteremia, Hyperlipidemia, Hypertension, and Patent foramen ovale.     CURRENT MEDS  Scheduled Meds:   lidocaine  5 mL Intradermal Once    sodium chloride flush  10 mL Intravenous 2 times per day  iron sucrose  200 mg Intravenous Once    [START ON 2020] ciprofloxacin  400 mg Intravenous 60 Min Pre-Op    [START ON 2020] metroNIDAZOLE  500 mg Intravenous 60 Min Pre-Op    torsemide  40 mg Oral BID    cyanocobalamin  1,000 mcg Intramuscular Once    dilTIAZem  240 mg Oral Daily    erythromycin ethylsuccinate  400 mg Oral Daily    pantoprazole  40 mg Oral QAM AC    folic acid  1 mg Oral Daily    gabapentin  600 mg Oral Nightly    losartan  100 mg Oral Daily    montelukast  10 mg Oral Nightly    therapeutic multivitamin-minerals  1 tablet Oral Daily    potassium chloride  20 mEq Oral Daily with breakfast    torsemide  20 mg Oral Daily    sodium chloride flush  10 mL Intravenous 2 times per day     Continuous Infusions:   sodium chloride      sodium chloride Stopped (20 1518)     PRN Meds:.sodium chloride flush, morphine, albuterol, traMADol, morphine, ondansetron, sodium chloride flush, acetaminophen    OBJECTIVE  CURRENT VITALS:  height is 5' 1\" (1.549 m) and weight is 233 lb (105.7 kg). Her oral temperature is 98.7 °F (37.1 °C). Her blood pressure is 105/90 (abnormal) and her pulse is 86. Her respiration is 16 and oxygen saturation is 99%.    Temperature Range (24h):Temp: 98.7 °F (37.1 °C) Temp  Av.7 °F (37.1 °C)  Min: 98.7 °F (37.1 °C)  Max: 98.7 °F (37.1 °C)  BP Range (63L): Systolic (03JFJ), UUM:619 , Min:105 , VWX:800     Diastolic (89PSN), UDU:11, Min:90, Max:90    Pulse Range (24h): Pulse  Av  Min: 86  Max: 86  Respiration Range (24h): Resp  Av  Min: 16  Max: 16    GENERAL: alert, no distress  LUNGS: clear to ausculation, without wheezes, rales or rhonci  HEART: normal rate and regular rhythm  ABDOMEN: soft, non-tender, non-distended, bowel sounds present in all 4 quadrants and no guarding or peritoneal signs  EXTERMITY: no cyanosis, clubbing or edema    In: 300 [P.O.:300]  Out: 2300 [Urine:2300]  Date 20 0000 - 20 2359   B[place] 3748-6925 1977-4254 9876-5809 24 Hour Total   INTAKE   P.O.(mL/kg/hr) 300(0.4)   300   Shift Total(mL/kg) 300(2.8)   300(2.8)   OUTPUT   Urine(mL/kg/hr) 2300(2.7)   2300   Shift Total(mL/kg) 0101(79.6)   2145(95.3)   Weight (kg) 105.7 105.7 105.7 105.7       LABS  Recent Labs     08/22/20  0643  08/23/20  0634 08/23/20  1158 08/23/20  1732 08/24/20  0015   WBC 6.5   < > 6.6 7.1 8.4 9.0   HGB 9.2*   < > 8.9* 10.1* 9.8* 10.2*   HCT 29.5*   < > 27.7* 31.8* 30.9* 32.8*      < > 140 190 211 201     --  137  --   --   --    K 4.3  --  4.3  --   --   --      --  105  --   --   --    CO2 24  --  23  --   --   --    BUN 13  --  14  --   --   --    CREATININE 0.39*  --  0.43*  --   --   --    MG 2.1  --  2.2  --   --   --    PHOS 3.0  --  3.4  --   --   --    CALCIUM 10.0*  --  9.9  --   --   --     < > = values in this interval not displayed. No results for input(s): PTT, INR in the last 72 hours. Invalid input(s): PT  No results for input(s): AST, ALT, BILITOT, BILIDIR, AMYLASE, LIPASE, LDH, LACTA in the last 72 hours. RADIOLOGY  No results found.   Electronically signed by Layla Meeks MD on 8/24/2020 at 10:41 AM

## 2020-08-24 NOTE — PROGRESS NOTES
Progress Note  8/23/2020 11:43 PM  Subjective:   Admit Date: 8/20/2020  PCP: Mk Agustin MD  Interval History: H and H better and BRBPR better off Lovenox and risk and benefit ratio discussed with patient and is agreable. For PICC line and bowel prep in am and IV Fe infusion when access is available, Surgery on Tuesday. DIET GENERAL; Intake/Output Summary (Last 24 hours) at 8/23/2020 2343  Last data filed at 8/23/2020 1313  Gross per 24 hour   Intake 740 ml   Output --   Net 740 ml     Medications:      sodium chloride Stopped (08/21/20 1518)      torsemide  40 mg Oral BID    iron sucrose  300 mg Intravenous Once    cyanocobalamin  1,000 mcg Intramuscular Once    dilTIAZem  240 mg Oral Daily    erythromycin ethylsuccinate  400 mg Oral Daily    pantoprazole  40 mg Oral QAM AC    folic acid  1 mg Oral Daily    gabapentin  600 mg Oral Nightly    losartan  100 mg Oral Daily    montelukast  10 mg Oral Nightly    therapeutic multivitamin-minerals  1 tablet Oral Daily    potassium chloride  20 mEq Oral Daily with breakfast    torsemide  20 mg Oral Daily    sodium chloride flush  10 mL Intravenous 2 times per day     Recent Labs     08/23/20  0634 08/23/20  1158 08/23/20  1732   WBC 6.6 7.1 8.4   HGB 8.9* 10.1* 9.8*    190 211     Recent Labs     08/21/20  0705 08/22/20  0643 08/23/20  0634    138 137   K 4.0 4.3 4.3   * 106 105   CO2 24 24 23   BUN 10 13 14   CREATININE 0.35* 0.39* 0.43*   GLUCOSE 77 85 81     Recent Labs     08/21/20  0705   AST 15   ALT 14   BILITOT 0.3   ALKPHOS 146*     Troponin T: No results for input(s): TROPONINI in the last 72 hours. Pro-BNP: No results for input(s): BNP in the last 72 hours.   INR:   Recent Labs     08/21/20  0705   INR 1.2       Objective:     Vitals:    08/22/20 0711 08/22/20 1959 08/23/20 0758 08/23/20 1938   BP: (!) 141/63 (!) 143/49 136/61 (!) 105/90   Pulse: 84 83 77 86   Resp: 18 18 16 16   Temp: 97.3 °F (36.3 °C) 98.1 °F (36.7 °C) 97.7 °F (36.5 °C) 98.7 °F (37.1 °C)   TempSrc: Oral Oral Oral Oral   SpO2: 100% 100% 99%    Weight:       Height:         General appearance: alert and cooperative with exam  Lungs: clear to auscultation bilaterally  Heart: regular rate and rhythm, S1, S2 normal, no murmur, click, rub or gallop  Abdomen: soft, non-tender; bowel sounds normal; no masses,  no organomegaly  Extremities: extremities normal, atraumatic, no cyanosis or edema  Neurologic: No obvious focal neurologic deficits. Assessment and Plan:   1. Lower GI bleeding with a 4-5 cm tubular adenoma on the ascending colon with chronic Fe deficiency anemia   2. Bilateral breast Ca s/p Bilateral mastectomy s/p chemo and hormonal therapy  3. PFO with bouts of CVA with no residual and TIA on coumadin as no closure recommended. 4. HTN  5. Hypercholesterolemia. 6. Morbid Obesity  7. S/P Bilateral TKR  8. ROSEMARIE  9. Fe deficiency anemia  10. S/P L4 and L5 foraminotomy  11. Chemotherapy induced Peripheral Neuropathy  12. History of Asthma  13. With Normal LHC in 2013 after an abnormal stress lexiscan  14. DJD and eventual possible bilateral THR. Advance Directive: Full Code  DVT prophylaxis with enoxaparin 40 mg sub-Q daily. Discharge planning: home    Active Problems:    Gastrointestinal hemorrhage    Anemia  Resolved Problems:    * No resolved hospital problems. *  1. 4-5 cm tubular adenoma on the ascending colon  2. Continue with H and H q 6 hrs for trending  3. Correct coagulopathy  4. Blood work in am  5. PPI  6. Resume home meds  7. Will continue to hold coumadin   8. SCD's  9. Taper down Cymbalta  10 IV Venofer 200 mg IVPB x 1 tonite  11. IVF to  Hold for now    12. Increase activity  13. Torsemide 40 mg po BID x 4 doses  14.  For PICC line in am and bowel prep and IV Fe when access is available    Preston Dash MD

## 2020-08-24 NOTE — PROGRESS NOTES
Spiritual Care Services     Summary of Visit:      Spiritual Assessment/Intervention/Outcomes:    Encounter Summary  Services provided to[de-identified] Patient  Referral/Consult From[de-identified] Rounding  Support System: Children, Family members  Continue Visiting: No  Complexity of Encounter: Low  Length of Encounter: 15 minutes  Routine  Type: Initial  Assessment: Calm, Approachable  Intervention: [place], Sustaining presence/ Ministry of presence  Outcome: Receptive                          Values / Beliefs  Do you have any ethnic, cultural, sacramental, or spiritual Islam needs you would like us to be aware of while you are in the hospital?: No    Care Plan:        [id number] [address]   Electronically signed by Omid Otoole on 8/24/20 at 2:55 PM EDT     To reach a  for emotional and spiritual support, place an [facility] consult request.   If a  is needed immediately, dial 0 and ask to page the on-call .

## 2020-08-25 ENCOUNTER — APPOINTMENT (OUTPATIENT)
Dept: GENERAL RADIOLOGY | Age: 65
DRG: 330 | End: 2020-08-25
Payer: MEDICARE

## 2020-08-25 ENCOUNTER — ANESTHESIA (OUTPATIENT)
Dept: OPERATING ROOM | Age: 65
DRG: 330 | End: 2020-08-25
Payer: MEDICARE

## 2020-08-25 ENCOUNTER — ANESTHESIA EVENT (OUTPATIENT)
Dept: OPERATING ROOM | Age: 65
DRG: 330 | End: 2020-08-25
Payer: MEDICARE

## 2020-08-25 VITALS — DIASTOLIC BLOOD PRESSURE: 55 MMHG | TEMPERATURE: 96.1 F | OXYGEN SATURATION: 100 % | SYSTOLIC BLOOD PRESSURE: 113 MMHG

## 2020-08-25 PROBLEM — D12.2 ADENOMA OF ASCENDING COLON: Status: ACTIVE | Noted: 2020-08-25

## 2020-08-25 LAB
ABO/RH: NORMAL
ANION GAP SERPL CALCULATED.3IONS-SCNC: 6 MEQ/L (ref 9–15)
ANISOCYTOSIS: ABNORMAL
ANTIBODY SCREEN: NORMAL
BASOPHILS ABSOLUTE: 0.1 K/UL (ref 0–0.2)
BASOPHILS RELATIVE PERCENT: 1.5 %
BUN BLDV-MCNC: 10 MG/DL (ref 8–23)
CALCIUM SERPL-MCNC: 10 MG/DL (ref 8.5–9.9)
CHLORIDE BLD-SCNC: 103 MEQ/L (ref 95–107)
CO2: 29 MEQ/L (ref 20–31)
CREAT SERPL-MCNC: 0.56 MG/DL (ref 0.5–0.9)
EOSINOPHILS ABSOLUTE: 0.3 K/UL (ref 0–0.7)
EOSINOPHILS RELATIVE PERCENT: 3.4 %
GFR AFRICAN AMERICAN: >60
GFR NON-AFRICAN AMERICAN: >60
GLUCOSE BLD-MCNC: 95 MG/DL (ref 70–99)
HCT VFR BLD CALC: 30.7 % (ref 37–47)
HEMOGLOBIN: 9.5 G/DL (ref 12–16)
HYPOCHROMIA: ABNORMAL
LYMPHOCYTES ABSOLUTE: 1.1 K/UL (ref 1–4.8)
LYMPHOCYTES RELATIVE PERCENT: 13.4 %
MAGNESIUM: 1.8 MG/DL (ref 1.7–2.4)
MCH RBC QN AUTO: 23.9 PG (ref 27–31.3)
MCHC RBC AUTO-ENTMCNC: 31.1 % (ref 33–37)
MCV RBC AUTO: 77 FL (ref 82–100)
MICROCYTES: ABNORMAL
MONOCYTES ABSOLUTE: 0.8 K/UL (ref 0.2–0.8)
MONOCYTES RELATIVE PERCENT: 10.3 %
NEUTROPHILS ABSOLUTE: 5.8 K/UL (ref 1.4–6.5)
NEUTROPHILS RELATIVE PERCENT: 71.4 %
PDW BLD-RTO: 28.6 % (ref 11.5–14.5)
PHOSPHORUS: 3.2 MG/DL (ref 2.3–4.8)
PLATELET # BLD: 163 K/UL (ref 130–400)
PLATELET SLIDE REVIEW: ADEQUATE
POLYCHROMASIA: ABNORMAL
POTASSIUM SERPL-SCNC: 3.5 MEQ/L (ref 3.4–4.9)
RBC # BLD: 3.98 M/UL (ref 4.2–5.4)
SODIUM BLD-SCNC: 138 MEQ/L (ref 135–144)
WBC # BLD: 8.2 K/UL (ref 4.8–10.8)

## 2020-08-25 PROCEDURE — 62325 NJX INTERLAMINAR CRV/THRC: CPT | Performed by: ANESTHESIOLOGY

## 2020-08-25 PROCEDURE — 83735 ASSAY OF MAGNESIUM: CPT

## 2020-08-25 PROCEDURE — 86850 RBC ANTIBODY SCREEN: CPT

## 2020-08-25 PROCEDURE — 1210000000 HC MED SURG R&B

## 2020-08-25 PROCEDURE — 86901 BLOOD TYPING SEROLOGIC RH(D): CPT

## 2020-08-25 PROCEDURE — 2580000003 HC RX 258: Performed by: SURGERY

## 2020-08-25 PROCEDURE — 2580000003 HC RX 258: Performed by: ANESTHESIOLOGY

## 2020-08-25 PROCEDURE — 2500000003 HC RX 250 WO HCPCS: Performed by: SURGERY

## 2020-08-25 PROCEDURE — 6360000002 HC RX W HCPCS: Performed by: SURGERY

## 2020-08-25 PROCEDURE — 71045 X-RAY EXAM CHEST 1 VIEW: CPT

## 2020-08-25 PROCEDURE — 6360000002 HC RX W HCPCS: Performed by: ANESTHESIOLOGY

## 2020-08-25 PROCEDURE — 88329 PATH CONSLTJ DRG SURG: CPT

## 2020-08-25 PROCEDURE — 6370000000 HC RX 637 (ALT 250 FOR IP): Performed by: SURGERY

## 2020-08-25 PROCEDURE — 85025 COMPLETE CBC W/AUTO DIFF WBC: CPT

## 2020-08-25 PROCEDURE — 7100000001 HC PACU RECOVERY - ADDTL 15 MIN: Performed by: SURGERY

## 2020-08-25 PROCEDURE — 44160 REMOVAL OF COLON: CPT | Performed by: SURGERY

## 2020-08-25 PROCEDURE — 84100 ASSAY OF PHOSPHORUS: CPT

## 2020-08-25 PROCEDURE — 94150 VITAL CAPACITY TEST: CPT

## 2020-08-25 PROCEDURE — 2709999900 HC NON-CHARGEABLE SUPPLY: Performed by: SURGERY

## 2020-08-25 PROCEDURE — 0DTF0ZZ RESECTION OF RIGHT LARGE INTESTINE, OPEN APPROACH: ICD-10-PCS | Performed by: SURGERY

## 2020-08-25 PROCEDURE — 88307 TISSUE EXAM BY PATHOLOGIST: CPT

## 2020-08-25 PROCEDURE — 36415 COLL VENOUS BLD VENIPUNCTURE: CPT

## 2020-08-25 PROCEDURE — 2500000003 HC RX 250 WO HCPCS: Performed by: ANESTHESIOLOGY

## 2020-08-25 PROCEDURE — 3600000014 HC SURGERY LEVEL 4 ADDTL 15MIN: Performed by: SURGERY

## 2020-08-25 PROCEDURE — 7100000000 HC PACU RECOVERY - FIRST 15 MIN: Performed by: SURGERY

## 2020-08-25 PROCEDURE — 80048 BASIC METABOLIC PNL TOTAL CA: CPT

## 2020-08-25 PROCEDURE — 3700000001 HC ADD 15 MINUTES (ANESTHESIA): Performed by: SURGERY

## 2020-08-25 PROCEDURE — 3600000004 HC SURGERY LEVEL 4 BASE: Performed by: SURGERY

## 2020-08-25 PROCEDURE — 3700000000 HC ANESTHESIA ATTENDED CARE: Performed by: SURGERY

## 2020-08-25 PROCEDURE — 86900 BLOOD TYPING SEROLOGIC ABO: CPT

## 2020-08-25 PROCEDURE — 2720000010 HC SURG SUPPLY STERILE: Performed by: SURGERY

## 2020-08-25 RX ORDER — ONDANSETRON 2 MG/ML
INJECTION INTRAMUSCULAR; INTRAVENOUS PRN
Status: DISCONTINUED | OUTPATIENT
Start: 2020-08-25 | End: 2020-08-25 | Stop reason: SDUPTHER

## 2020-08-25 RX ORDER — DIPHENHYDRAMINE HYDROCHLORIDE 50 MG/ML
12.5 INJECTION INTRAMUSCULAR; INTRAVENOUS
Status: DISCONTINUED | OUTPATIENT
Start: 2020-08-25 | End: 2020-08-25

## 2020-08-25 RX ORDER — ONDANSETRON 2 MG/ML
4 INJECTION INTRAMUSCULAR; INTRAVENOUS
Status: COMPLETED | OUTPATIENT
Start: 2020-08-25 | End: 2020-08-25

## 2020-08-25 RX ORDER — DEXTROSE, SODIUM CHLORIDE, AND POTASSIUM CHLORIDE 5; .45; .15 G/100ML; G/100ML; G/100ML
INJECTION INTRAVENOUS CONTINUOUS
Status: DISCONTINUED | OUTPATIENT
Start: 2020-08-25 | End: 2020-08-29

## 2020-08-25 RX ORDER — ALVIMOPAN 12 MG/1
12 CAPSULE ORAL ONCE
Status: COMPLETED | OUTPATIENT
Start: 2020-08-25 | End: 2020-08-25

## 2020-08-25 RX ORDER — SODIUM CHLORIDE 0.9 % (FLUSH) 0.9 %
10 SYRINGE (ML) INJECTION EVERY 12 HOURS SCHEDULED
Status: DISCONTINUED | OUTPATIENT
Start: 2020-08-25 | End: 2020-08-25

## 2020-08-25 RX ORDER — MAGNESIUM HYDROXIDE 1200 MG/15ML
LIQUID ORAL CONTINUOUS PRN
Status: COMPLETED | OUTPATIENT
Start: 2020-08-25 | End: 2020-08-25

## 2020-08-25 RX ORDER — SODIUM CHLORIDE 0.9 % (FLUSH) 0.9 %
10 SYRINGE (ML) INJECTION PRN
Status: DISCONTINUED | OUTPATIENT
Start: 2020-08-25 | End: 2020-08-25

## 2020-08-25 RX ORDER — NALOXONE HYDROCHLORIDE 0.4 MG/ML
0.4 INJECTION, SOLUTION INTRAMUSCULAR; INTRAVENOUS; SUBCUTANEOUS PRN
Status: DISCONTINUED | OUTPATIENT
Start: 2020-08-25 | End: 2020-08-26

## 2020-08-25 RX ORDER — ALVIMOPAN 12 MG/1
12 CAPSULE ORAL 2 TIMES DAILY
Status: DISCONTINUED | OUTPATIENT
Start: 2020-08-25 | End: 2020-08-25 | Stop reason: HOSPADM

## 2020-08-25 RX ORDER — CIPROFLOXACIN 2 MG/ML
INJECTION, SOLUTION INTRAVENOUS PRN
Status: DISCONTINUED | OUTPATIENT
Start: 2020-08-25 | End: 2020-08-25 | Stop reason: SDUPTHER

## 2020-08-25 RX ORDER — MEPERIDINE HYDROCHLORIDE 25 MG/ML
12.5 INJECTION INTRAMUSCULAR; INTRAVENOUS; SUBCUTANEOUS EVERY 5 MIN PRN
Status: DISCONTINUED | OUTPATIENT
Start: 2020-08-25 | End: 2020-08-25

## 2020-08-25 RX ORDER — BUPIVACAINE HYDROCHLORIDE 2.5 MG/ML
INJECTION, SOLUTION EPIDURAL; INFILTRATION; INTRACAUDAL PRN
Status: DISCONTINUED | OUTPATIENT
Start: 2020-08-25 | End: 2020-08-25 | Stop reason: SDUPTHER

## 2020-08-25 RX ORDER — SODIUM CHLORIDE, SODIUM LACTATE, POTASSIUM CHLORIDE, CALCIUM CHLORIDE 600; 310; 30; 20 MG/100ML; MG/100ML; MG/100ML; MG/100ML
INJECTION, SOLUTION INTRAVENOUS CONTINUOUS
Status: DISCONTINUED | OUTPATIENT
Start: 2020-08-25 | End: 2020-08-25

## 2020-08-25 RX ORDER — MIDAZOLAM HYDROCHLORIDE 1 MG/ML
INJECTION INTRAMUSCULAR; INTRAVENOUS PRN
Status: DISCONTINUED | OUTPATIENT
Start: 2020-08-25 | End: 2020-08-25 | Stop reason: SDUPTHER

## 2020-08-25 RX ORDER — DEXAMETHASONE SODIUM PHOSPHATE 10 MG/ML
INJECTION INTRAMUSCULAR; INTRAVENOUS PRN
Status: DISCONTINUED | OUTPATIENT
Start: 2020-08-25 | End: 2020-08-25 | Stop reason: SDUPTHER

## 2020-08-25 RX ORDER — MIDAZOLAM HYDROCHLORIDE 1 MG/ML
INJECTION INTRAMUSCULAR; INTRAVENOUS
Status: COMPLETED
Start: 2020-08-25 | End: 2020-08-25

## 2020-08-25 RX ORDER — CLINDAMYCIN PHOSPHATE 600 MG/50ML
600 INJECTION INTRAVENOUS
Status: COMPLETED | OUTPATIENT
Start: 2020-08-25 | End: 2020-08-25

## 2020-08-25 RX ORDER — SODIUM CHLORIDE, SODIUM LACTATE, POTASSIUM CHLORIDE, CALCIUM CHLORIDE 600; 310; 30; 20 MG/100ML; MG/100ML; MG/100ML; MG/100ML
INJECTION, SOLUTION INTRAVENOUS CONTINUOUS PRN
Status: DISCONTINUED | OUTPATIENT
Start: 2020-08-25 | End: 2020-08-25 | Stop reason: SDUPTHER

## 2020-08-25 RX ORDER — ROCURONIUM BROMIDE 10 MG/ML
INJECTION, SOLUTION INTRAVENOUS PRN
Status: DISCONTINUED | OUTPATIENT
Start: 2020-08-25 | End: 2020-08-25 | Stop reason: SDUPTHER

## 2020-08-25 RX ORDER — KETOROLAC TROMETHAMINE 30 MG/ML
30 INJECTION, SOLUTION INTRAMUSCULAR; INTRAVENOUS
Status: COMPLETED | OUTPATIENT
Start: 2020-08-25 | End: 2020-08-25

## 2020-08-25 RX ORDER — ONDANSETRON 2 MG/ML
4 INJECTION INTRAMUSCULAR; INTRAVENOUS EVERY 6 HOURS PRN
Status: DISCONTINUED | OUTPATIENT
Start: 2020-08-25 | End: 2020-08-26

## 2020-08-25 RX ORDER — LIDOCAINE HYDROCHLORIDE 10 MG/ML
1 INJECTION, SOLUTION EPIDURAL; INFILTRATION; INTRACAUDAL; PERINEURAL
Status: DISCONTINUED | OUTPATIENT
Start: 2020-08-25 | End: 2020-08-25

## 2020-08-25 RX ORDER — CIPROFLOXACIN 2 MG/ML
400 INJECTION, SOLUTION INTRAVENOUS ONCE
Status: DISCONTINUED | OUTPATIENT
Start: 2020-08-25 | End: 2020-08-25

## 2020-08-25 RX ORDER — SODIUM CHLORIDE 0.9 % (FLUSH) 0.9 %
10 SYRINGE (ML) INJECTION PRN
Status: DISCONTINUED | OUTPATIENT
Start: 2020-08-25 | End: 2020-09-03 | Stop reason: HOSPADM

## 2020-08-25 RX ORDER — METOCLOPRAMIDE HYDROCHLORIDE 5 MG/ML
10 INJECTION INTRAMUSCULAR; INTRAVENOUS
Status: DISCONTINUED | OUTPATIENT
Start: 2020-08-25 | End: 2020-08-25

## 2020-08-25 RX ORDER — SODIUM CHLORIDE 0.9 % (FLUSH) 0.9 %
10 SYRINGE (ML) INJECTION EVERY 12 HOURS SCHEDULED
Status: DISCONTINUED | OUTPATIENT
Start: 2020-08-25 | End: 2020-09-03 | Stop reason: HOSPADM

## 2020-08-25 RX ORDER — LIDOCAINE HYDROCHLORIDE 10 MG/ML
INJECTION, SOLUTION EPIDURAL; INFILTRATION; INTRACAUDAL; PERINEURAL PRN
Status: DISCONTINUED | OUTPATIENT
Start: 2020-08-25 | End: 2020-08-25 | Stop reason: SDUPTHER

## 2020-08-25 RX ORDER — SODIUM CHLORIDE, SODIUM LACTATE, POTASSIUM CHLORIDE, CALCIUM CHLORIDE 600; 310; 30; 20 MG/100ML; MG/100ML; MG/100ML; MG/100ML
INJECTION, SOLUTION INTRAVENOUS
Status: COMPLETED
Start: 2020-08-25 | End: 2020-08-25

## 2020-08-25 RX ORDER — FENTANYL CITRATE 50 UG/ML
25 INJECTION, SOLUTION INTRAMUSCULAR; INTRAVENOUS EVERY 10 MIN PRN
Status: DISCONTINUED | OUTPATIENT
Start: 2020-08-25 | End: 2020-08-25

## 2020-08-25 RX ORDER — PROPOFOL 10 MG/ML
INJECTION, EMULSION INTRAVENOUS PRN
Status: DISCONTINUED | OUTPATIENT
Start: 2020-08-25 | End: 2020-08-25 | Stop reason: SDUPTHER

## 2020-08-25 RX ADMIN — SODIUM CHLORIDE, POTASSIUM CHLORIDE, SODIUM LACTATE AND CALCIUM CHLORIDE: 600; 310; 30; 20 INJECTION, SOLUTION INTRAVENOUS at 11:30

## 2020-08-25 RX ADMIN — PHENYLEPHRINE HYDROCHLORIDE 100 MCG: 10 INJECTION INTRAVENOUS at 09:54

## 2020-08-25 RX ADMIN — SODIUM CHLORIDE, POTASSIUM CHLORIDE, SODIUM LACTATE AND CALCIUM CHLORIDE: 600; 310; 30; 20 INJECTION, SOLUTION INTRAVENOUS at 10:11

## 2020-08-25 RX ADMIN — METRONIDAZOLE 500 MG: 500 INJECTION, SOLUTION INTRAVENOUS at 20:46

## 2020-08-25 RX ADMIN — GABAPENTIN 600 MG: 300 CAPSULE ORAL at 20:47

## 2020-08-25 RX ADMIN — PHENYLEPHRINE HYDROCHLORIDE 100 MCG: 10 INJECTION INTRAVENOUS at 09:38

## 2020-08-25 RX ADMIN — PHENYLEPHRINE HYDROCHLORIDE 100 MCG: 10 INJECTION INTRAVENOUS at 09:51

## 2020-08-25 RX ADMIN — ROPIVACAINE HYDROCHLORIDE: 5 INJECTION, SOLUTION EPIDURAL; INFILTRATION; PERINEURAL at 11:50

## 2020-08-25 RX ADMIN — TORSEMIDE 40 MG: 20 TABLET ORAL at 20:47

## 2020-08-25 RX ADMIN — ROCURONIUM BROMIDE 10 MG: 10 INJECTION INTRAVENOUS at 10:43

## 2020-08-25 RX ADMIN — ROCURONIUM BROMIDE 10 MG: 10 INJECTION INTRAVENOUS at 10:17

## 2020-08-25 RX ADMIN — ROCURONIUM BROMIDE 40 MG: 10 INJECTION INTRAVENOUS at 09:17

## 2020-08-25 RX ADMIN — ROPIVACAINE HYDROCHLORIDE: 5 INJECTION, SOLUTION EPIDURAL; INFILTRATION; PERINEURAL at 23:38

## 2020-08-25 RX ADMIN — POTASSIUM CHLORIDE, DEXTROSE MONOHYDRATE AND SODIUM CHLORIDE: 150; 5; 450 INJECTION, SOLUTION INTRAVENOUS at 14:07

## 2020-08-25 RX ADMIN — POTASSIUM CHLORIDE, DEXTROSE MONOHYDRATE AND SODIUM CHLORIDE: 150; 5; 450 INJECTION, SOLUTION INTRAVENOUS at 23:38

## 2020-08-25 RX ADMIN — CIPROFLOXACIN 400 MG: 2 INJECTION, SOLUTION INTRAVENOUS at 09:32

## 2020-08-25 RX ADMIN — PROPOFOL 200 MG: 10 INJECTION, EMULSION INTRAVENOUS at 09:17

## 2020-08-25 RX ADMIN — BUPIVACAINE HYDROCHLORIDE 3 ML: 2.5 INJECTION, SOLUTION EPIDURAL; INFILTRATION; INTRACAUDAL; PERINEURAL at 11:36

## 2020-08-25 RX ADMIN — MIDAZOLAM HYDROCHLORIDE 2 MG: 2 INJECTION, SOLUTION INTRAMUSCULAR; INTRAVENOUS at 08:48

## 2020-08-25 RX ADMIN — SODIUM CHLORIDE, POTASSIUM CHLORIDE, SODIUM LACTATE AND CALCIUM CHLORIDE: 600; 310; 30; 20 INJECTION, SOLUTION INTRAVENOUS at 09:10

## 2020-08-25 RX ADMIN — PHENYLEPHRINE HYDROCHLORIDE 100 MCG: 10 INJECTION INTRAVENOUS at 09:32

## 2020-08-25 RX ADMIN — BUPIVACAINE HYDROCHLORIDE 4 ML: 2.5 INJECTION, SOLUTION EPIDURAL; INFILTRATION; INTRACAUDAL; PERINEURAL at 10:27

## 2020-08-25 RX ADMIN — ACETAMINOPHEN 650 MG: 325 TABLET, FILM COATED ORAL at 20:46

## 2020-08-25 RX ADMIN — ALVIMOPAN 12 MG: 12 CAPSULE ORAL at 08:35

## 2020-08-25 RX ADMIN — ONDANSETRON 4 MG: 2 INJECTION INTRAMUSCULAR; INTRAVENOUS at 11:58

## 2020-08-25 RX ADMIN — METRONIDAZOLE 500 MG: 500 INJECTION, SOLUTION INTRAVENOUS at 09:35

## 2020-08-25 RX ADMIN — PHENYLEPHRINE HYDROCHLORIDE 100 MCG: 10 INJECTION INTRAVENOUS at 09:45

## 2020-08-25 RX ADMIN — DEXAMETHASONE SODIUM PHOSPHATE 10 MG: 10 INJECTION INTRAMUSCULAR; INTRAVENOUS at 10:00

## 2020-08-25 RX ADMIN — PHENYLEPHRINE HYDROCHLORIDE 100 MCG: 10 INJECTION INTRAVENOUS at 11:06

## 2020-08-25 RX ADMIN — Medication 10 ML: at 20:47

## 2020-08-25 RX ADMIN — LIDOCAINE HYDROCHLORIDE 5 ML: 10 INJECTION, SOLUTION EPIDURAL; INFILTRATION; INTRACAUDAL; PERINEURAL at 09:33

## 2020-08-25 RX ADMIN — CLINDAMYCIN PHOSPHATE 600 MG: 600 INJECTION, SOLUTION INTRAVENOUS at 09:20

## 2020-08-25 RX ADMIN — PHENYLEPHRINE HYDROCHLORIDE 100 MCG: 10 INJECTION INTRAVENOUS at 09:34

## 2020-08-25 RX ADMIN — SUGAMMADEX 200 MG: 100 INJECTION, SOLUTION INTRAVENOUS at 11:30

## 2020-08-25 RX ADMIN — METRONIDAZOLE 500 MG: 500 INJECTION, SOLUTION INTRAVENOUS at 14:07

## 2020-08-25 RX ADMIN — KETOROLAC TROMETHAMINE 30 MG: 30 INJECTION, SOLUTION INTRAMUSCULAR at 08:35

## 2020-08-25 RX ADMIN — SODIUM CHLORIDE, POTASSIUM CHLORIDE, SODIUM LACTATE AND CALCIUM CHLORIDE: 600; 310; 30; 20 INJECTION, SOLUTION INTRAVENOUS at 10:50

## 2020-08-25 RX ADMIN — MONTELUKAST 10 MG: 10 TABLET, FILM COATED ORAL at 20:47

## 2020-08-25 RX ADMIN — ONDANSETRON 4 MG: 2 INJECTION INTRAMUSCULAR; INTRAVENOUS at 11:00

## 2020-08-25 ASSESSMENT — PULMONARY FUNCTION TESTS
PIF_VALUE: 19
PIF_VALUE: 19
PIF_VALUE: 18
PIF_VALUE: 18
PIF_VALUE: 20
PIF_VALUE: 21
PIF_VALUE: 20
PIF_VALUE: 19
PIF_VALUE: 17
PIF_VALUE: 19
PIF_VALUE: 22
PIF_VALUE: 18
PIF_VALUE: 20
PIF_VALUE: 19
PIF_VALUE: 22
PIF_VALUE: 21
PIF_VALUE: 22
PIF_VALUE: 18
PIF_VALUE: 20
PIF_VALUE: 19
PIF_VALUE: 1
PIF_VALUE: 22
PIF_VALUE: 20
PIF_VALUE: 16
PIF_VALUE: 19
PIF_VALUE: 19
PIF_VALUE: 15
PIF_VALUE: 20
PIF_VALUE: 15
PIF_VALUE: 15
PIF_VALUE: 19
PIF_VALUE: 15
PIF_VALUE: 21
PIF_VALUE: 21
PIF_VALUE: 18
PIF_VALUE: 18
PIF_VALUE: 21
PIF_VALUE: 18
PIF_VALUE: 10
PIF_VALUE: 22
PIF_VALUE: 15
PIF_VALUE: 19
PIF_VALUE: 20
PIF_VALUE: 22
PIF_VALUE: 20
PIF_VALUE: 21
PIF_VALUE: 20
PIF_VALUE: 19
PIF_VALUE: 23
PIF_VALUE: 17
PIF_VALUE: 30
PIF_VALUE: 22
PIF_VALUE: 21
PIF_VALUE: 19
PIF_VALUE: 19
PIF_VALUE: 22
PIF_VALUE: 22
PIF_VALUE: 19
PIF_VALUE: 20
PIF_VALUE: 22
PIF_VALUE: 18
PIF_VALUE: 22
PIF_VALUE: 20
PIF_VALUE: 21
PIF_VALUE: 22
PIF_VALUE: 21
PIF_VALUE: 22
PIF_VALUE: 10
PIF_VALUE: 1
PIF_VALUE: 20
PIF_VALUE: 31
PIF_VALUE: 19
PIF_VALUE: 18
PIF_VALUE: 22
PIF_VALUE: 17
PIF_VALUE: 18
PIF_VALUE: 19
PIF_VALUE: 20
PIF_VALUE: 19
PIF_VALUE: 22
PIF_VALUE: 22
PIF_VALUE: 19
PIF_VALUE: 18
PIF_VALUE: 20
PIF_VALUE: 9
PIF_VALUE: 19
PIF_VALUE: 22
PIF_VALUE: 21
PIF_VALUE: 19
PIF_VALUE: 33
PIF_VALUE: 2
PIF_VALUE: 19
PIF_VALUE: 20
PIF_VALUE: 17
PIF_VALUE: 22
PIF_VALUE: 15
PIF_VALUE: 22
PIF_VALUE: 2
PIF_VALUE: 21
PIF_VALUE: 22
PIF_VALUE: 20
PIF_VALUE: 21
PIF_VALUE: 21
PIF_VALUE: 19
PIF_VALUE: 18
PIF_VALUE: 22
PIF_VALUE: 19
PIF_VALUE: 22
PIF_VALUE: 10
PIF_VALUE: 19
PIF_VALUE: 22
PIF_VALUE: 20
PIF_VALUE: 18
PIF_VALUE: 20
PIF_VALUE: 20
PIF_VALUE: 18
PIF_VALUE: 19
PIF_VALUE: 31
PIF_VALUE: 21
PIF_VALUE: 15
PIF_VALUE: 19
PIF_VALUE: 18
PIF_VALUE: 22
PIF_VALUE: 20
PIF_VALUE: 22
PIF_VALUE: 32
PIF_VALUE: 20
PIF_VALUE: 21
PIF_VALUE: 22
PIF_VALUE: 20
PIF_VALUE: 22
PIF_VALUE: 19
PIF_VALUE: 23
PIF_VALUE: 20
PIF_VALUE: 21
PIF_VALUE: 22
PIF_VALUE: 22
PIF_VALUE: 20
PIF_VALUE: 18
PIF_VALUE: 10
PIF_VALUE: 21
PIF_VALUE: 20
PIF_VALUE: 21
PIF_VALUE: 17
PIF_VALUE: 10
PIF_VALUE: 22
PIF_VALUE: 19
PIF_VALUE: 20
PIF_VALUE: 21
PIF_VALUE: 21
PIF_VALUE: 18
PIF_VALUE: 22

## 2020-08-25 ASSESSMENT — PAIN SCALES - GENERAL
PAINLEVEL_OUTOF10: 5
PAINLEVEL_OUTOF10: 2
PAINLEVEL_OUTOF10: 4
PAINLEVEL_OUTOF10: 0

## 2020-08-25 NOTE — ANESTHESIA PRE PROCEDURE
Department of Anesthesiology  Preprocedure Note       Name:  Elian Silveirasin   Age:  77 y.o.  :  1955                                          MRN:  [id number]         Date:  2020      Surgeon: Eladia Burger):  Lashanda Briggs MD    Procedure: Procedure(s):  RIGHT COLECTOMY POSSIBLE COLOSTOMY    Medications prior to admission:   Prior to Admission medications    Medication Sig Start Date End Date Taking? Authorizing Provider   erythromycin (SHAE-CAP) 250 MG CPEP extended release capsule 1 tab po at 1 PM, 4 PM and 10 PM the day before surgery 20  Yes Lashanda Briggs MD   potassium chloride (KLOR-CON M) 20 MEQ extended release tablet TAKE 1 PO TID THE DAY BEFORE SURGERY 20  Yes Lashanda Briggs MD   CARTIA  MG extended release capsule TK ONE C PO D 20  Yes Historical Provider, MD   gabapentin (NEURONTIN) 300 MG capsule Take 600 mg by mouth nightly. 19  Yes Historical Provider, MD   irbesartan (AVAPRO) 300 MG tablet Take 300 mg by mouth daily  19  Yes Historical Provider, MD   torsemide (DEMADEX) 20 MG tablet Take 20 mg by mouth as needed    Yes Historical Provider, MD   Multiple Vitamins-Minerals (MULTIVITAMIN ADULT PO) Take by mouth daily    Yes Historical Provider, MD   esomeprazole (NEXIUM) 40 MG capsule TK 1 C PO QD 16  Yes Historical Provider, MD   montelukast (SINGULAIR) 10 MG tablet Take 10 mg by mouth nightly  16  Yes Historical Provider, MD   traMADol (ULTRAM) 50 MG tablet Take 50 mg by mouth every 6 hours as needed for Pain.     Historical Provider, MD   folic acid (FOLVITE) 1 MG tablet Take 1 mg by mouth daily  16   Historical Provider, MD       Current medications:    Current Facility-Administered Medications   Medication Dose Route Frequency Provider Last Rate Last Dose    lactated ringers infusion   Intravenous Continuous ROSE Dove CNP        lidocaine PF 1 % injection 1 mL  1 mL Intradermal Once PRN ROSE Dove CNP  sodium chloride flush 0.9 % injection 10 mL  10 mL Intravenous 2 times per day Benoit Cord, APRN - CNP        sodium chloride flush 0.9 % injection 10 mL  10 mL Intravenous PRN Benoit Cord, APRN - CNP        ciprofloxacin (CIPRO) IVPB 400 mg  400 mg Intravenous Once Pool Mtz MD        midazolam (VERSED) 2 MG/2ML injection             sodium chloride 0.9 % irrigation    Continuous PRN Pool Mtz MD   3,000 mL at 08/25/20 0941    fentaNYL (SUBLIMAZE) injection 25 mcg  25 mcg Intravenous Q10 Min PRN Cj Santacruz MD        diphenhydrAMINE (BENADRYL) injection 12.5 mg  12.5 mg Intravenous Once PRN Cj Santacruz MD        ondansetron (ZOFRAN) injection 4 mg  4 mg Intravenous Once PRN Cj Santacruz MD        metoclopramide (REGLAN) injection 10 mg  10 mg Intravenous Once PRN Cj Santacruz MD        meperidine (DEMEROL) injection 12.5 mg  12.5 mg Intravenous Q5 Min PRN Cj Santcaruz MD        naloxone [facility]) injection 0.4 mg  0.4 mg Intravenous PRN Cj Santacruz MD        ondansetron (ZOFRAN) injection 4 mg  4 mg Intravenous Q6H PRN Cj Santacruz MD        fentaNYL (SUBLIMAZE) 2 mcg/mL, ropivacaine (NAROPIN) 0.2 % in sodium chloride 0.9 % 100 mL epidural   Epidural Continuous Sameer Tadeo MD        sodium chloride flush 0.9 % injection 10 mL  10 mL Intravenous 2 times per day Elizabeth El MD   10 mL at 08/24/20 2109    sodium chloride flush 0.9 % injection 10 mL  10 mL Intravenous PRN Elizabeth El MD        ciprofloxacin (CIPRO) IVPB 400 mg  400 mg Intravenous 60 Min Pre-Op Pool Mtz MD        metronidazole (FLAGYL) 500 mg in NaCl 100 mL IVPB premix  500 mg Intravenous 60 Min Pre-Op Pool Mtz MD        torsemide [facility]) tablet 40 mg  40 mg Oral BID Elizabeth El MD   40 mg at 08/24/20 1325    cyanocobalamin injection 1,000 mcg  1,000 mcg Intramuscular Once Elizabeth El MD        0.9 % sodium chloride infusion   Intravenous Continuous Jean-Paul Daigle MD 75 mL/hr at 08/24/20 1815      morphine (PF) injection 4 mg  4 mg Intravenous Q15 Min PRN Be Otto MD   4 mg at 08/20/20 1242    albuterol (PROVENTIL) nebulizer solution 2.5 mg  2.5 mg Nebulization Q6H PRN Jean-Paul Daigle MD        dilTIAZem (CARDIZEM CD) extended release capsule 240 mg  240 mg Oral Daily Jean-Paul Daigle MD   240 mg at 08/24/20 0957    erythromycin ethylsuccinate (EES) tablet 400 mg  400 mg Oral Daily Jean-Paul Daigle MD        pantoprazole (PROTONIX) tablet 40 mg  40 mg Oral QAM AC Jean-Paul Daigle MD   40 mg at 34/15/28 8254    folic acid (FOLVITE) tablet 1 mg  1 mg Oral Daily Jean-Paul Daigle MD        gabapentin (NEURONTIN) capsule 600 mg  600 mg Oral Nightly Jean-Paul Daigle MD   600 mg at 08/24/20 2105    losartan (COZAAR) tablet 100 mg  100 mg Oral Daily Jean-Paul Daigle MD   100 mg at 08/24/20 0957    montelukast (SINGULAIR) tablet 10 mg  10 mg Oral Nightly Jean-Paul Daigle MD   10 mg at 08/24/20 2106    therapeutic multivitamin-minerals 1 tablet  1 tablet Oral Daily Jean-Paul Diagle MD        potassium chloride (KLOR-CON M) extended release tablet 20 mEq  20 mEq Oral Daily with breakfast Jean-Paul Daigle MD        torsemide [facility]) tablet 20 mg  20 mg Oral Daily Jean-Paul Daigle MD   Stopped at 08/23/20 0830    traMADol (ULTRAM) tablet 50 mg  50 mg Oral Q6H PRN Jean-Paul Daigle MD   50 mg at 08/24/20 2105    morphine injection 4 mg  4 mg Intravenous Q4H PRN Jean-Paul Daigle MD        ondansetron [facility]) injection 4 mg  4 mg Intravenous Q6H PRN Jean-Paul Daigle MD   4 mg at 08/24/20 2353    sodium chloride flush 0.9 % injection 10 mL  10 mL Intravenous 2 times per day Jean-Paul Daigle MD   10 mL at 08/20/20 2125    sodium chloride flush 0.9 % injection 10 mL  10 mL Intravenous PRN Jean-Paul Daigle MD        acetaminophen (TYLENOL) tablet 650 mg  650 mg Oral Q4H PRN Nedra Seen Noe Rowe MD   650 mg at 08/21/20 9459     Facility-Administered Medications Ordered in Other Encounters   Medication Dose Route Frequency Provider Last Rate Last Dose    phenylephrine (BOB-SYNEPHRINE) injection    PRN Anthony Cardoza MD   100 mcg at 08/25/20 0954    ciprofloxacin (CIPRO) IVPB   Intravenous PRN Anthony Cardoza MD   400 mg at 08/25/20 0932    metronidazole (FLAGYL) 500 mg in NaCl 100 mL IVPB premix   Intravenous PRN Anthony Cardoza MD   500 mg at 08/25/20 0935    lidocaine PF 1 % injection    PRN Anthony Cardoza MD   5 mL at 08/25/20 0933    lactated ringers infusion    Continuous PRN Anthony Cardoza MD        propofol injection    PRN Anthony Cardoza MD   200 mg at 08/25/20 0917    rocuronium (ZEMURON) injection    PRN Anthony Cardoza MD   10 mg at 08/25/20 1017    dexamethasone (DECADRON) injection    PRN Anthony Cardoza MD   10 mg at 08/25/20 1000    bupivacaine (PF) (MARCAINE) 0.25 % injection    PRN Anthony Cardoza MD   4 mL at 08/25/20 1027       Allergies:     Allergies   Allergen Reactions    Darvon [Propoxyphene] Hives    Lipitor [Atorvastatin]      Muscle pain      Mobic [Meloxicam] Hives    Penicillins Hives    Percodan [Oxycodone-Aspirin] Hives    Pineapple Hives    Sulfa Antibiotics Hives    Dilaudid [Hydromorphone Hcl] Hives and Nausea And Vomiting       Problem List:    Patient Active Problem List   Diagnosis Code    Estrogen receptor positive Z17.0    Malignant neoplasm of central portion of right female breast ([facility]) C50.111    Malignant neoplasm of upper-outer quadrant of left female breast ([facility]) C50.412    Lobular carcinoma in situ of left breast D05.02    Iron deficiency anemia secondary to blood loss (chronic) D50.0    Patent foramen ovale Q21.1    Class 3 severe obesity due to excess calories without serious comorbidity with body mass index (BMI) of 40.0 to 44.9 in adult ([facility]) E66.01, Z68.41    Osteoarthritis of spine with radiculopathy, lumbosacral region M47.27    Foraminal stenosis of lumbar region M48.061    Lumbar spondylosis M47.816    Former smoker, stopped smoking in distant past Z87.891    Spinal stenosis at L4-L5 level M48.061    Iron deficiency anemia, unspecified D50.9    Anemia of chronic renal failure N18.9, D63.1    Lower GI bleed K92.2    HTN (hypertension) I10    Osteoarthritis of hip M16.9    History of carcinoma in situ of breast Z86.000    History of cardioembolic stroke C22.93    Anticoagulated Z79.01    Adenomatous polyp of ascending colon D12.2    Gastrointestinal hemorrhage K92.2    Anemia D64.9    Adenoma of ascending colon D12.2       Past Medical History:        Diagnosis Date    Arthritis     Asthma     Cancer ([facility] [address] 2002 & 2015    Breast RIGHT (T2/N0/M0) ER/NH (+) HER2/estela 3+ / chemo / left breast with mastectomy    GERD (gastroesophageal reflux disease)     Hiatal hernia     History of blood transfusion 2013    post op TKR    Hypercholesteremia     Hyperlipidemia     past trx / off meds > 5 yrs    Hypertension     meds > 20 yrs     Patent foramen ovale        Past Surgical History:        Procedure Laterality Date    BREAST SURGERY Bilateral     Mastectomy    CARDIAC CATHETERIZATION  2013    no blockage    COLONOSCOPY  08/31/2016    w/polypectomy     COLONOSCOPY N/A 7/24/2020    COLONOSCOPY DIAGNOSTIC performed by Claudell Demark, MD at [address] [facility], COLON, DIAGNOSTIC     [address]    umbilical   [address] [facility] REPLACEMENT Bilateral     Knee    JOINT REPLACEMENT Bilateral 11/04/2013    LAMINECTOMY Right 12/30/2019    RIGHT L4-5 MICRODECOMPRESSION performed by Marylen Jules, MD at [address] 0.6-1CM REMAINDR BODY N/A 4/6/2018    EXCISION CYST RT.  NECK AND EXCISION LESION NOSE performed by Patrick Jennings MD at [address] [facility] VAD W/SUBQ PORT/ CTR/PRPH INSJ N/A 4/6/2018    REMOVAL VENOUS PORT performed by Mary Ray MD at [facility] / has been removed    TONSILLECTOMY      at age 23   Salvador Munozs  2016    w/polypectomy,bx     UPPER GASTROINTESTINAL ENDOSCOPY N/A 2020    EGD DIAGNOSTIC ONLY performed by Luis Alfredo Collazo MD at [address] [place] [address]  2003    excision polyps       Social History:    Social History     Tobacco Use    Smoking status: Former Smoker     Packs/day: 0.25     Years: 5.00     Pack years: 1.25     Types: Cigarettes     Last attempt to quit: 3/30/1978     Years since quittin.4    Smokeless tobacco: Never Used   Substance Use Topics    Alcohol use: No                                Counseling given: Not Answered      Vital Signs (Current):   Vitals:    20 0812 20 0813 20 0430 20 0747   BP: (!) 121/56  (!) 143/71 139/66   Pulse: 73  98 105   Resp:   20 20   Temp: 97.7 °F (36.5 °C) 97.7 °F (36.5 °C) 98.4 °F (36.9 °C) 98.2 °F (36.8 °C)   TempSrc:   Oral Oral   SpO2: 100%  100% 100%   Weight:       Height:                                                  BP Readings from Last 3 Encounters:   20 139/66   20 (!) 90/55   20 116/60       NPO Status: Time of last liquid consumption:                         Time of last solid consumption:                         Date of last liquid consumption: 20                        Date of last solid food consumption: 20    BMI:   Wt Readings from Last 3 Encounters:   20 233 lb (105.7 kg)   20 235 lb 3.2 oz (106.7 kg)   20 233 lb (105.7 kg)     Body mass index is 44.02 kg/m².     CBC:   Lab Results   Component Value Date    WBC 8.2 2020    RBC 3.98 2020    RBC 4.99 2012    HGB 9.5 2020    HCT 30.7 2020    MCV 77.0 2020    RDW 28.6 2020     2020       CMP:   Lab Results   Component Value Date     2020    K 3.5 2020    K 3.7 2020     blood products.      Attending anesthesiologist reviewed and agrees with Pre Eval content              Jean-Pierre Gerard MD   8/25/2020

## 2020-08-25 NOTE — PROGRESS NOTES
Progress Note  8/24/2020 8:23 PM  Subjective:   Admit Date: 8/20/2020  PCP: Monico Ko MD  Interval History: Double lumen PICC and bowel prep scheduled for today. For right Hemicolectomy in am. IV Fe was also given and Hgb is now up to 10.3 gms % No CP and No SOB. DIET CLEAR LIQUID;  Diet NPO, After Midnight    Intake/Output Summary (Last 24 hours) at 8/24/2020 2023  Last data filed at 8/24/2020 1825  Gross per 24 hour   Intake 433 ml   Output 3100 ml   Net -2667 ml     Medications:      sodium chloride 75 mL/hr at 08/24/20 1815      sodium chloride flush  10 mL Intravenous 2 times per day    [START ON 8/25/2020] ciprofloxacin  400 mg Intravenous 60 Min Pre-Op    [START ON 8/25/2020] metroNIDAZOLE  500 mg Intravenous 60 Min Pre-Op    torsemide  40 mg Oral BID    cyanocobalamin  1,000 mcg Intramuscular Once    dilTIAZem  240 mg Oral Daily    erythromycin ethylsuccinate  400 mg Oral Daily    pantoprazole  40 mg Oral QAM AC    folic acid  1 mg Oral Daily    gabapentin  600 mg Oral Nightly    losartan  100 mg Oral Daily    montelukast  10 mg Oral Nightly    therapeutic multivitamin-minerals  1 tablet Oral Daily    potassium chloride  20 mEq Oral Daily with breakfast    torsemide  20 mg Oral Daily    sodium chloride flush  10 mL Intravenous 2 times per day     Recent Labs     08/23/20  1732 08/24/20  0015 08/24/20  1700   WBC 8.4 9.0 8.8   HGB 9.8* 10.2* 10.3*    201 213     Recent Labs     08/22/20  0643 08/23/20  0634 08/24/20  1700    137 141   K 4.3 4.3 3.7    105 102   CO2 24 23 28   BUN 13 14 14   CREATININE 0.39* 0.43* 0.51   GLUCOSE 85 81 106*     No results for input(s): AST, ALT, ALB, BILITOT, ALKPHOS in the last 72 hours. Troponin T: No results for input(s): TROPONINI in the last 72 hours. Pro-BNP: No results for input(s): BNP in the last 72 hours.   INR:   Recent Labs     08/24/20  1700   INR 1.0       Objective:     Vitals:    08/23/20 0758 08/23/20 1938 08/24/20 0812 08/24/20 0813   BP: 136/61 (!) 105/90 (!) 121/56    Pulse: 77 86 73    Resp: 16 16     Temp: 97.7 °F (36.5 °C) 98.7 °F (37.1 °C) 97.7 °F (36.5 °C) 97.7 °F (36.5 °C)   TempSrc: Oral Oral     SpO2: 99%  100%    Weight:       Height:         General appearance: alert and cooperative with exam  Lungs: clear to auscultation bilaterally  Heart: regular rate and rhythm, S1, S2 normal, no murmur, click, rub or gallop  Abdomen: soft, non-tender; bowel sounds normal; no masses,  no organomegaly  Extremities: extremities normal, atraumatic, no cyanosis or edema  Neurologic: No obvious focal neurologic deficits. Assessment and Plan:   1. Lower GI bleeding with a 4-5 cm tubular adenoma on the ascending colon with chronic Fe deficiency anemia   2. Bilateral breast Ca s/p Bilateral mastectomy s/p chemo and hormonal therapy  3. PFO with bouts of CVA with no residual and TIA on coumadin as no closure recommended. 4. HTN  5. Hypercholesterolemia. 6. Morbid Obesity  7. S/P Bilateral TKR  8. ROSEMARIE  9. Fe deficiency anemia  10. S/P L4 and L5 foraminotomy  11. Chemotherapy induced Peripheral Neuropathy  12. History of Asthma  13. With Normal LHC in 2013 after an abnormal stress lexiscan  14. DJD and eventual possible bilateral THR. Advance Directive: Full Code  DVT prophylaxis with enoxaparin 40 mg sub-Q daily. Discharge planning: home    Active Problems:    Gastrointestinal hemorrhage    Anemia  Resolved Problems:    * No resolved hospital problems. *  1. 4-5 cm tubular adenoma on the ascending colon  2. Continue with H and H q 6 hrs for trending  3. Correct coagulopathy  4. Blood work in am  5. PPI  6. Resume home meds  7. Will continue to hold coumadin   8. SCD's  9. Taper down Cymbalta  10 IV Venofer 200 mg IVPB x 1 tonite  11. IVF to  Hold for now    12. Increase activity  13. Torsemide 40 mg po BID x 4 doses  14. PICC line in place and bowel prep and IV Fe given  15.  For right Hemicolectomy in am possible colostomy    Gennaro Ingram MD

## 2020-08-25 NOTE — PROGRESS NOTES
PM assessment completed. Patient c/o 6/10 pain, medicated with ultram PRN per orders. Patient nauseated with no vomiting, medicated with Zofran PRN per orders. Denies any other needs at this time, will continue to monitor. Call light within reach.

## 2020-08-25 NOTE — OP NOTE
[address]                      [address], [address] [facility]                                OPERATIVE REPORT    PATIENT NAME: Paula Beavers                      :        1955  MED REC NO:   [id number]                            ROOM:       [id number]  ACCOUNT NO:   [de-identified]                           ADMIT DATE: 2020  PROVIDER:     Sadia Salcedo MD    DATE OF PROCEDURE:  2020    PREOPERATIVE DIAGNOSIS:  Large right colon polyp. POSTOPERATIVE DIAGNOSIS:  Large right colon polyp. PROCEDURE:  Right colectomy. SURGEON:  Sadia Salcedo MD    ANESTHESIA:  General with epidural.    COMPLICATIONS:  None. ESTIMATED BLOOD LOSS:  Minimal.    HISTORY:  The patient is a 56-year-old female, who has had issues with  rectal bleeding recently and had a colonoscopy done that showed a large  adenomatous polyp of the cecum. She is now here for elective resection. She has had further episodes of bleeding and she has had recently her  anticoagulation stopped with no bridging needed. She is now here in the  hospital because she was admitted several days preoperatively due to  some rectal bleeding, however, required no blood transfusions. The  procedure of the colectomy as well as potential risks and complications  including, but not exclusive to infection, blood loss, damage to  surrounding structures, anastomotic leakage and even the possibility of  needing further surgery in the future including a colostomy were all  discussed with her. She understood and was agreeable to the procedure. OPERATIVE PROCEDURE:  The patient brought in the operative suite, placed  in the supine position where anesthesia was induced and she was  intubated. She had had an epidural placed in the holding room. The  patient then had her Kat catheter and NG tube inserted and her abdomen  was prepped and draped in a sterile fashion with an Ioban drape over the  abdomen.   Time-out called. The patient and procedure were properly  identified. A right paramedian incision was then made in the upper  abdomen and carried down through the subcutaneous tissue to the rectus  sheath. The sheath was opened. The muscle was split and the abdominal  cavity was entered. There was no free fluid or any masses noted on  entering the abdomen. An Mahi Lapine was then placed into the  abdominal cavity to protect the wound and the abdominal wall. The  Bookwalter retractor was then put into place. Mobilization was then  done of the right colon. As we were mobilizing, the mass appeared to be  near the hepatic flexure and the ascending colon. The rest of the colon  and the region appeared normal.  I proceeded to mobilize up the right  colon along the right gutter and freed it up and identified the duodenum  as well as at the hepatic flexure of the gallbladder. After adequately  mobilizing up the right transverse colon, I was able to transect the  terminal ileum just proximal to the junction of the ileocecal valve. This was done with a CK stapler. I then transected the right  transverse colon in a similar fashion using a CK stapler. The  mesentery of the colon was then mobilized and freed up with care made  not to injure the duodenum and the mesentery was taken nearest route  using an Enseal device. After the bowel had been resected, the  pathologist came into the room and opened up the specimen and there was  a golf ball size mass in the mid right colon. Otherwise, unremarkable. The two ends of bowel were then brought back together side-to-side. There appeared to be excellent prep. A CK 75 stapler was then used to  reapproximate the bowel side-to-side and a TA 60 stapler was used to  close the fork holes. There was excellent hemostasis at the anastomosis  before the closure. I then reinforced the anastomosis with interrupted  4-0 silk and 2-0 silk was used to close the mesenteric defect.

## 2020-08-25 NOTE — BRIEF OP NOTE
Brief Postoperative Note      Patient: Janie Bhardwaj  YOB: 1955  MRN: [id number]    Date of Procedure: 8/25/2020    Pre-Op Diagnosis: RIGHT COLON MASS    Post-Op Diagnosis: Same       Procedure(s):  RIGHT COLECTOMY    Surgeon(s):  Adis Rain MD    Assistant:  First Assistant: Isaias Morales    Anesthesia: General    Estimated Blood Loss (mL): less than 50     Complications: None    Specimens:   ID Type Source Tests Collected by Time Destination   A : right colectomy Tissue Colon SURGICAL PATHOLOGY Adis Rain MD 8/25/2020 7020        Implants:  * No implants in log *      Drains:   Urethral Catheter Non-latex 16 fr (Active)   $ Urethral catheter insertion Inserted for procedure 08/25/20 0933   Catheter Indications Perioperative use in selected surgeries including but not limited to urologic, pelvic or need for intraoperative monitoring of urinary output due to prolonged surgery, large volume infusion or need for diuretic therapy in surgery 08/25/20 0933   Site Assessment Pink;Moist 08/25/20 0933       Findings: 3 cm ascending colon mass    Electronically signed by Adis Rain MD on 8/25/2020 at 11:12 AM

## 2020-08-25 NOTE — PROGRESS NOTES
Xray done to verify ngt placement. ngt was at 45cm when she arrived to floor. Per xray- ngt needed to be advanced 20 cm. Ngt advanced 20 cm. Pt tolerated well. 100 of clear yellow output from ngt so far.

## 2020-08-25 NOTE — PLAN OF CARE
Problem: Bleeding:  Goal: Will show no signs and symptoms of excessive bleeding  Description: Will show no signs and symptoms of excessive bleeding  Outcome: Ongoing     Problem: Pain:  Goal: Pain level will decrease  Description: Pain level will decrease  Outcome: Ongoing  Goal: Control of acute pain  Description: Control of acute pain  Outcome: Ongoing  Goal: Control of chronic pain  Description: Control of chronic pain  Outcome: Ongoing     Problem: Falls - Risk of:  Goal: Will remain free from falls  Description: Will remain free from falls  Outcome: Ongoing  Goal: Absence of physical injury  Description: Absence of physical injury  Outcome: Ongoing

## 2020-08-25 NOTE — PROGRESS NOTES
Pt c/o nausea, medicated with zofran 4mg iv, pt spitting up frothy sputum, NG found coiled into mouth, called Dr Kiersten Mendez, ok to pull and place. Pulled ng out and reinserted, positive placement with stethoscope and gastric juices, placed to lis. Pt tolerated well and states feels better, pt not feeling nauseated.  Called Dr Kiersten Mendez to let him know it was reinsterted, no orders for xray at this time

## 2020-08-25 NOTE — PROGRESS NOTES
Spoke with dr Lalita Ma, pharmacy called and stated pt was on Erythromycin at home and sedation and toxicity effects could be high with giving Fentanyl, Dr Lalita Ma aware and no further orders

## 2020-08-25 NOTE — ANESTHESIA PROCEDURE NOTES
Epidural Block    Patient location during procedure: pre-op  Start time: 8/25/2020 8:50 AM  End time: 8/25/2020 9:00 AM  Reason for block: post-op pain management  Staffing  Anesthesiologist: Nyasia Giron MD  Performed: anesthesiologist   Preanesthetic Checklist  Completed: patient identified, site marked, surgical consent, pre-op evaluation, timeout performed, IV checked, risks and benefits discussed, monitors and equipment checked, anesthesia consent given, oxygen available and patient being monitored  Epidural  Patient position: sitting  Prep: Betadine  Patient monitoring: cardiac monitor, continuous pulse ox and frequent blood pressure checks  Approach: midline  Location: thoracic (1-12) (T9-10)  Injection technique: KEVIN saline  Provider prep: mask and sterile gloves  Needle  Needle type: Tuohy   Needle gauge: 17 G  Needle length: 3.5 in  Needle insertion depth: 5 cm  Catheter type: end hole  Catheter size: 18 G  Catheter at skin depth: 10 cm  Test dose: negative  Assessment  Sensory level: T6  Hemodynamics: stable  Attempts: 1

## 2020-08-25 NOTE — PROGRESS NOTES
Spoke with dr Linda Marquez re: NG being replaced d/t coiling in pt's mouth and was ordered by Dr Mary Genao to reinsert, per dr Linda Marquez, order stat portable cxr for placement confirmation, called [facility] and spoke with yeni to let her know and called radiology

## 2020-08-26 LAB
ALBUMIN SERPL-MCNC: 3.2 G/DL (ref 3.5–4.6)
ALP BLD-CCNC: 101 U/L (ref 40–130)
ALT SERPL-CCNC: 15 U/L (ref 0–33)
ANION GAP SERPL CALCULATED.3IONS-SCNC: 8 MEQ/L (ref 9–15)
AST SERPL-CCNC: 16 U/L (ref 0–35)
BILIRUB SERPL-MCNC: 0.3 MG/DL (ref 0.2–0.7)
BUN BLDV-MCNC: 7 MG/DL (ref 8–23)
CALCIUM SERPL-MCNC: 9.8 MG/DL (ref 8.5–9.9)
CHLORIDE BLD-SCNC: 110 MEQ/L (ref 95–107)
CO2: 25 MEQ/L (ref 20–31)
CREAT SERPL-MCNC: 0.46 MG/DL (ref 0.5–0.9)
GFR AFRICAN AMERICAN: >60
GFR NON-AFRICAN AMERICAN: >60
GLOBULIN: 2.5 G/DL (ref 2.3–3.5)
GLUCOSE BLD-MCNC: 106 MG/DL (ref 70–99)
HCT VFR BLD CALC: 26.9 % (ref 37–47)
HEMOGLOBIN: 8.3 G/DL (ref 12–16)
MAGNESIUM: 1.7 MG/DL (ref 1.7–2.4)
MCH RBC QN AUTO: 24 PG (ref 27–31.3)
MCHC RBC AUTO-ENTMCNC: 30.8 % (ref 33–37)
MCV RBC AUTO: 78 FL (ref 82–100)
PDW BLD-RTO: 27.6 % (ref 11.5–14.5)
PHOSPHORUS: 2 MG/DL (ref 2.3–4.8)
PLATELET # BLD: 186 K/UL (ref 130–400)
POTASSIUM SERPL-SCNC: 4.1 MEQ/L (ref 3.4–4.9)
RBC # BLD: 3.44 M/UL (ref 4.2–5.4)
SODIUM BLD-SCNC: 143 MEQ/L (ref 135–144)
TOTAL PROTEIN: 5.7 G/DL (ref 6.3–8)
WBC # BLD: 20.1 K/UL (ref 4.8–10.8)

## 2020-08-26 PROCEDURE — 84100 ASSAY OF PHOSPHORUS: CPT

## 2020-08-26 PROCEDURE — 99024 POSTOP FOLLOW-UP VISIT: CPT | Performed by: SURGERY

## 2020-08-26 PROCEDURE — 94761 N-INVAS EAR/PLS OXIMETRY MLT: CPT

## 2020-08-26 PROCEDURE — 80053 COMPREHEN METABOLIC PANEL: CPT

## 2020-08-26 PROCEDURE — 94770 HC ETCO2 MONITOR DAILY: CPT

## 2020-08-26 PROCEDURE — 6370000000 HC RX 637 (ALT 250 FOR IP): Performed by: SURGERY

## 2020-08-26 PROCEDURE — 85027 COMPLETE CBC AUTOMATED: CPT

## 2020-08-26 PROCEDURE — 2580000003 HC RX 258: Performed by: SURGERY

## 2020-08-26 PROCEDURE — 6360000002 HC RX W HCPCS: Performed by: SURGERY

## 2020-08-26 PROCEDURE — 2500000003 HC RX 250 WO HCPCS: Performed by: ANESTHESIOLOGY

## 2020-08-26 PROCEDURE — 36415 COLL VENOUS BLD VENIPUNCTURE: CPT

## 2020-08-26 PROCEDURE — 2500000003 HC RX 250 WO HCPCS: Performed by: NURSE ANESTHETIST, CERTIFIED REGISTERED

## 2020-08-26 PROCEDURE — 83735 ASSAY OF MAGNESIUM: CPT

## 2020-08-26 PROCEDURE — 2500000003 HC RX 250 WO HCPCS: Performed by: SURGERY

## 2020-08-26 PROCEDURE — 6360000002 HC RX W HCPCS: Performed by: NURSE ANESTHETIST, CERTIFIED REGISTERED

## 2020-08-26 PROCEDURE — 2700000000 HC OXYGEN THERAPY PER DAY

## 2020-08-26 PROCEDURE — 1210000000 HC MED SURG R&B

## 2020-08-26 RX ORDER — LIDOCAINE HYDROCHLORIDE 10 MG/ML
INJECTION, SOLUTION INFILTRATION; PERINEURAL PRN
Status: DISCONTINUED | OUTPATIENT
Start: 2020-08-26 | End: 2020-08-26 | Stop reason: SDUPTHER

## 2020-08-26 RX ORDER — METOCLOPRAMIDE HYDROCHLORIDE 5 MG/ML
5 INJECTION INTRAMUSCULAR; INTRAVENOUS EVERY 8 HOURS
Status: DISCONTINUED | OUTPATIENT
Start: 2020-08-26 | End: 2020-08-30

## 2020-08-26 RX ORDER — NALOXONE HYDROCHLORIDE 0.4 MG/ML
0.4 INJECTION, SOLUTION INTRAMUSCULAR; INTRAVENOUS; SUBCUTANEOUS PRN
Status: DISCONTINUED | OUTPATIENT
Start: 2020-08-26 | End: 2020-09-01

## 2020-08-26 RX ORDER — FENTANYL CITRATE 50 UG/ML
INJECTION, SOLUTION INTRAMUSCULAR; INTRAVENOUS PRN
Status: DISCONTINUED | OUTPATIENT
Start: 2020-08-26 | End: 2020-08-26 | Stop reason: SDUPTHER

## 2020-08-26 RX ORDER — BISACODYL 10 MG
10 SUPPOSITORY, RECTAL RECTAL DAILY
Status: COMPLETED | OUTPATIENT
Start: 2020-08-26 | End: 2020-08-28

## 2020-08-26 RX ORDER — MORPHINE SULFATE/0.9% NACL/PF 1 MG/ML
SYRINGE (ML) INJECTION CONTINUOUS
Status: DISCONTINUED | OUTPATIENT
Start: 2020-08-26 | End: 2020-09-01

## 2020-08-26 RX ADMIN — METOCLOPRAMIDE HYDROCHLORIDE 5 MG: 5 INJECTION INTRAMUSCULAR; INTRAVENOUS at 11:11

## 2020-08-26 RX ADMIN — Medication 10 ML: at 21:15

## 2020-08-26 RX ADMIN — PANTOPRAZOLE SODIUM 40 MG: 40 TABLET, DELAYED RELEASE ORAL at 05:46

## 2020-08-26 RX ADMIN — FENTANYL CITRATE 100 MCG: 50 INJECTION, SOLUTION INTRAMUSCULAR; INTRAVENOUS at 17:45

## 2020-08-26 RX ADMIN — METOCLOPRAMIDE HYDROCHLORIDE 5 MG: 5 INJECTION INTRAMUSCULAR; INTRAVENOUS at 21:15

## 2020-08-26 RX ADMIN — ROPIVACAINE HYDROCHLORIDE: 5 INJECTION, SOLUTION EPIDURAL; INFILTRATION; PERINEURAL at 11:08

## 2020-08-26 RX ADMIN — ENOXAPARIN SODIUM 40 MG: 40 INJECTION SUBCUTANEOUS at 09:40

## 2020-08-26 RX ADMIN — MONTELUKAST 10 MG: 10 TABLET, FILM COATED ORAL at 22:17

## 2020-08-26 RX ADMIN — LIDOCAINE HYDROCHLORIDE 8 ML: 10 INJECTION, SOLUTION INFILTRATION; PERINEURAL at 17:45

## 2020-08-26 RX ADMIN — BUPIVACAINE HYDROCHLORIDE 5 ML: 2.5 INJECTION, SOLUTION EPIDURAL; INFILTRATION; INTRACAUDAL; PERINEURAL at 15:30

## 2020-08-26 RX ADMIN — POTASSIUM CHLORIDE, DEXTROSE MONOHYDRATE AND SODIUM CHLORIDE: 150; 5; 450 INJECTION, SOLUTION INTRAVENOUS at 15:13

## 2020-08-26 RX ADMIN — GABAPENTIN 600 MG: 300 CAPSULE ORAL at 22:16

## 2020-08-26 RX ADMIN — Medication 10 ML: at 09:41

## 2020-08-26 RX ADMIN — POTASSIUM CHLORIDE, DEXTROSE MONOHYDRATE AND SODIUM CHLORIDE: 150; 5; 450 INJECTION, SOLUTION INTRAVENOUS at 10:00

## 2020-08-26 RX ADMIN — METRONIDAZOLE 500 MG: 500 INJECTION, SOLUTION INTRAVENOUS at 02:54

## 2020-08-26 RX ADMIN — DILTIAZEM HYDROCHLORIDE 240 MG: 240 CAPSULE, COATED, EXTENDED RELEASE ORAL at 15:13

## 2020-08-26 RX ADMIN — FENTANYL CITRATE 100 MCG: 50 INJECTION, SOLUTION INTRAMUSCULAR; INTRAVENOUS at 15:30

## 2020-08-26 RX ADMIN — MULTIPLE VITAMINS W/ MINERALS TAB 1 TABLET: TAB at 09:40

## 2020-08-26 RX ADMIN — BISACODYL 10 MG: 10 SUPPOSITORY RECTAL at 11:11

## 2020-08-26 RX ADMIN — MORPHINE SULFATE: 1 INJECTION INTRAVENOUS at 19:02

## 2020-08-26 ASSESSMENT — PAIN SCALES - GENERAL
PAINLEVEL_OUTOF10: 10
PAINLEVEL_OUTOF10: 4
PAINLEVEL_OUTOF10: 10

## 2020-08-26 ASSESSMENT — PAIN DESCRIPTION - LOCATION: LOCATION: ABDOMEN

## 2020-08-26 ASSESSMENT — PAIN DESCRIPTION - DESCRIPTORS: DESCRIPTORS: SHARP

## 2020-08-26 ASSESSMENT — PAIN DESCRIPTION - FREQUENCY: FREQUENCY: CONTINUOUS

## 2020-08-26 ASSESSMENT — PAIN DESCRIPTION - PAIN TYPE: TYPE: SURGICAL PAIN;ACUTE PAIN

## 2020-08-26 NOTE — PROGRESS NOTES
Pt Name: Elian Armando  Medical Record Number: [id number]  Date of Birth 1955   Admit date 8/20/2020 12:16 PM  Today's Date: 8/26/2020     ASSESSMENT  1. Post op day # 1  2. Elian Silveirasin had Procedure(s):  RIGHT COLECTOMY  3. Poor pain control with epidural  4. Acute and chronic blood loss anemia    PLAN  1. Remove epidural and start on PCA pump  2. Remove NG tube  3. Start Reglan IV and Dulcolax suppositories    SUBJECTIVE  Chief complaint: Abdominal pain  Afebrile, vital signs are stable. She denies any nausea or vomiting, has not passed flatus or had a bowel movement. She is tolerating a Diet NPO Effective Now Exceptions are: Ice Chips, Sips with Meds. Her pain is well controlled on current medications. She has been ambulating in the halls. has a past medical history of Arthritis, Asthma, Cancer ([address] GERD (gastroesophageal reflux disease), Hiatal hernia, History of blood transfusion, Hypercholesteremia, Hyperlipidemia, Hypertension, and Patent foramen ovale. CURRENT MEDS  Scheduled Meds:   metoclopramide  5 mg Intravenous Q8H    bisacodyl  10 mg Rectal Daily    sodium chloride flush  10 mL Intravenous 2 times per day    enoxaparin  40 mg Subcutaneous Daily    ciprofloxacin  400 mg Intravenous 60 Min Pre-Op    torsemide  40 mg Oral BID    dilTIAZem  240 mg Oral Daily    pantoprazole  40 mg Oral QAM AC    gabapentin  600 mg Oral Nightly    losartan  100 mg Oral Daily    montelukast  10 mg Oral Nightly    therapeutic multivitamin-minerals  1 tablet Oral Daily    torsemide  20 mg Oral Daily     Continuous Infusions:   morphine      dextrose 5% and 0.45% NaCl with KCl 20 mEq 125 mL/hr at 08/26/20 1513     PRN Meds:.naloxone, sodium chloride flush, albuterol, acetaminophen    OBJECTIVE  CURRENT VITALS:  height is 5' 1\" (1.549 m) and weight is 233 lb (105.7 kg). Her oral temperature is 97.9 °F (36.6 °C). Her blood pressure is 113/44 (abnormal) and her pulse is 111.  Her respiration is 18 and oxygen saturation is 99%. Temperature Range (24h):Temp: 97.9 °F (36.6 °C) Temp  Av.3 °F (37.4 °C)  Min: 97.9 °F (36.6 °C)  Max: 101.1 °F (38.4 °C)  BP Range (80X): Systolic (89MDJ), FPZ:829 , Min:102 , JIW:123     Diastolic (51WWH), NVV:47, Min:37, Max:55    Pulse Range (24h): Pulse  Av.3  Min: 99  Max: 115  Respiration Range (24h): Resp  Av  Min: 18  Max: 18    GENERAL: alert, no distress  LUNGS: clear to ausculation, without wheezes, rales or rhonci  HEART: normal rate and regular rhythm  ABDOMEN: non-distended, soft, incisional tenderness, bowel sounds present in all 4 quadrants and no guarding or peritoneal signs  INCISION: healing well, no significant drainage, no significant erythema  EXTERMITY: no cyanosis, clubbing or edema    In: 3438 [P.O.:120; I.V.:3318]  Out: 2350 [Urine:1400]  Date 20 0000 - 20   Shift 3418-0260 2739-7493 3931-8549 24 Hour Total   INTAKE   P.O.(mL/kg/hr) 120(0.1)   120   I. V.(mL/kg) 4190(66.5)  0087(80.4) 9872(52.8)   Shift Total(mL/kg) 6788(72.2)  5790(65.9) 9375(57.0)   OUTPUT   Urine(mL/kg/hr) 850(1)  550 1400   Emesis/NG output(mL/kg) 950(9)   950(9)   Other(mL/kg) 0(0)   0(0)   Stool(mL/kg) 0(0)   0(0)   Blood(mL/kg) 0(0)   0(0)   Shift Total(mL/kg) 1800(17)  550(5.2) 1671(30.2)   Weight (kg) 105.7 105.7 105.7 105.7       LABS  Recent Labs     20  1700 20  0600 20  0600   WBC 8.8 8.2 20.1*   HGB 10.3* 9.5* 8.3*   HCT 32.8* 30.7* 26.9*    163 186    138 143   K 3.7 3.5 4.1    103 110*   CO2 28 29 25   BUN 14 10 7*   CREATININE 0.51 0.56 0.46*   MG 2.1 1.8  --    PHOS 3.9 3.2  --    CALCIUM 10.6* 10.0* 9.8      Recent Labs     20  1700   INR 1.0     Recent Labs     20  0600   AST 16   ALT 15   BILITOT 0.3       RADIOLOGY  Ir Picc Wo Sq Port/pump > 5 Years    Result Date: 2020  PERIPHERALLY INSERTED CENTRAL CATHETER (PICC) PLACEMENT WITH ULTRASOUND GUIDANCE CLINICAL HISTORY:  REQUIRES VASCULAR ACCESS After discussing the procedure and possible complications with the patient, informed consent was obtained. The patient was placed on the Special Procedures table. The right upper extremity was sterilely prepared using a maximal sterile barrier technique which includes cap, mask, sterile gown, sterile gloves, sterile full-body drape, hand hygiene and 2% chlorhexidine for cutaneous antisepsis. A sterile ultrasound technique with sterile gel and sterile probe covers was also utilized. A pre-procedure time out was performed in order to assure the correct patient and procedure. Local anesthetic was administered. A peripheral vein was accessed with sonographic guidance. A sonographic spot image was obtained for documentation. A guidewire was advanced into the vein with fluoroscopic guidance and a sheath was placed over the guidewire. A 5-Gh[place]ian dual-lumen PICC was advanced through the sheath, up the arm and into the central vasculature. It was positioned appropriately. The sheath was removed. The catheter was shown to aspirate and infuse properly. The flange of the catheter was affixed to the arm using a PICC securement device. A spot image of the chest showed the tip of the PICC line to lie in the superior vena cava. The patient tolerated the procedure well and without complications. Number of films: 3 Fluoroscopy time: 106 seconds CONCLUSION: SUCCESSFUL RIGHT PICC PLACEMENT WITHOUT IMMEDIATE COMPLICATIONS. Xr Chest Abdomen Ng Placement    Result Date: 8/25/2020  EXAMINATION: XR CHEST ABDOMEN NG PLACEMENT CLINICAL HISTORY: NASOGASTRIC TUBE PLACEMENT COMPARISONS: DECEMBER 19, 2018 FINDINGS: Tip of nasogastric tube identified in caudal esophagus proximal to the gastroesophageal junction. Multiple surgical clips right and left axilla. Terminus PICC line within superior vena cava cava. Cardiopericardial silhouette normal. Lungs clear. TERMINUS NASOGASTRIC TUBE, CAUDAL ESOPHAGUS. RECOMMEND ADVANCING NASOGASTRIC TUBE, 20 CM.     Electronically signed by Nette Fernández MD on 8/26/2020 at 6:25 PM

## 2020-08-26 NOTE — ADDENDUM NOTE
Addendum  created 08/26/20 1617 by ROSE Ramos CRNA    Intraprocedure Event deleted, Intraprocedure Event edited

## 2020-08-26 NOTE — PROGRESS NOTES
Pt. AxOx4. Assessment complete. VSS. Pt. Afebrile at 2014 with a  Temp of 101.1 degrees orally. Tyelonol given. HR tachy in low 100's-110's as well. Pt. Weaned off O2 and currently sating at 100% on RA. Aquacell dressing to midline is CDI. Abdominal binder in place. Pt. Denies abd. Pain. Denies nausea. NG in L nares secured and held in place with catheter securement device as previously found from surgery. HOB elevated at 30 degrees. Total of 950cc pale green output emptied. Kat emptied a total of 850cc of lane clear urine. Hypoactive BS noted throughout. No BM this shift. Pt. States no flatus passed as of yet. Falls precautions in place. Call light within reach. Pt. Assisted to dangle once this shift and stand at bedside for repositioning. Pt. Tolerated well. Denies needs at this time.

## 2020-08-26 NOTE — PROGRESS NOTES
Progress Note  8/26/2020 12:14 PM  Subjective:   Admit Date: 8/20/2020  PCP: Jeffrey Beach MD  Interval History: Seen up in a chair and doing well , No CP and No SOB, Hgb is down to 8.3 and on epidural  For pain mgt. No Lower GI bleeding noted, and on ice chips and ok for po meds. BP was in low normal and BP meds were held for a few hours. Coumadin when ok with surgery and with Lovenox bridging at this time. Diet NPO Effective Now Exceptions are: Ice Chips, Sips with Meds    Intake/Output Summary (Last 24 hours) at 8/26/2020 1214  Last data filed at 8/26/2020 0533  Gross per 24 hour   Intake 2751 ml   Output 2550 ml   Net 201 ml     Medications:      fentanyl epidural builder 8 mL/hr at 08/26/20 1108    dextrose 5% and 0.45% NaCl with KCl 20 mEq 125 mL/hr at 08/26/20 1000      metoclopramide  5 mg Intravenous Q8H    bisacodyl  10 mg Rectal Daily    sodium chloride flush  10 mL Intravenous 2 times per day    enoxaparin  40 mg Subcutaneous Daily    ciprofloxacin  400 mg Intravenous 60 Min Pre-Op    torsemide  40 mg Oral BID    dilTIAZem  240 mg Oral Daily    pantoprazole  40 mg Oral QAM AC    gabapentin  600 mg Oral Nightly    losartan  100 mg Oral Daily    montelukast  10 mg Oral Nightly    therapeutic multivitamin-minerals  1 tablet Oral Daily    torsemide  20 mg Oral Daily     Recent Labs     08/24/20  1700 08/25/20  0600 08/26/20  0600   WBC 8.8 8.2 20.1*   HGB 10.3* 9.5* 8.3*    163 186     Recent Labs     08/24/20  1700 08/25/20  0600 08/26/20  0600    138 143   K 3.7 3.5 4.1    103 110*   CO2 28 29 25   BUN 14 10 7*   CREATININE 0.51 0.56 0.46*   GLUCOSE 106* 95 106*     Recent Labs     08/26/20  0600   AST 16   ALT 15   BILITOT 0.3   ALKPHOS 101     Troponin T: No results for input(s): TROPONINI in the last 72 hours. Pro-BNP: No results for input(s): BNP in the last 72 hours.   INR:   Recent Labs     08/24/20  1700   INR 1.0       Objective:     Vitals:    08/25/20 2327 08/26/20 0406 08/26/20 0738 08/26/20 0939   BP: (!) 116/50 (!) 117/55 (!) 106/43 (!) 102/37   Pulse: 101 111 105 99   Resp: 18  18    Temp: 99.9 °F (37.7 °C) 98.2 °F (36.8 °C) 97.9 °F (36.6 °C)    TempSrc: Oral  Oral    SpO2: 99% 100% 99%    Weight:       Height:         General appearance: alert and cooperative with exam  Lungs: clear to auscultation bilaterally  Heart: regular rate and rhythm, S1, S2 normal, no murmur, click, rub or gallop  Abdomen: soft, non-tender; bowel sounds normal; no masses,  no organomegaly  Extremities: extremities normal, atraumatic, no cyanosis or edema  Neurologic: No obvious focal neurologic deficits. Assessment and Plan:   1. Lower GI bleeding with a 4-5 cm tubular adenoma on the ascending colon with chronic Fe deficiency anemia , S/P Right Hemicolectomy  2. Bilateral breast Ca s/p Bilateral mastectomy s/p chemo and hormonal therapy  3. PFO with bouts of CVA with no residual and TIA on coumadin as no closure recommended. 4. HTN  5. Hypercholesterolemia. 6. Morbid Obesity  7. S/P Bilateral TKR  8. ROSEMARIE  9. Fe deficiency anemia  10. S/P L4 and L5 foraminotomy  11. Chemotherapy induced Peripheral Neuropathy  12. History of Asthma  13. With Normal LHC in 2013 after an abnormal stress lexiscan  14. DJD and eventual possible bilateral THR. Advance Directive: Full Code  DVT prophylaxis with enoxaparin 40 mg sub-Q daily. Discharge planning: Home    Principal Problem:    Adenoma of ascending colon  Active Problems:    Gastrointestinal hemorrhage    Anemia  Resolved Problems:    * No resolved hospital problems. *  Plan:  1. 4-5 cm tubular adenoma on the ascending colon  2. DC q 6 hrs H and H  3. Correct coagulopathy  4. Blood work in am  5. PPI  6. Resume home meds  7. Will continue to hold coumadin , Will resume if [id number] with Surgery with lovenox bridging  8. SCD's  9. Taper down Cymbalta  10 IV Venofer 200 mg IVPB x 1 tonite  11. IVF to  Hold for now    12. Increase activity  13.

## 2020-08-26 NOTE — ADDENDUM NOTE
Addendum  created 08/26/20 1831 by Amelia Wagoner, APRN - CRNA    Intraprocedure Event deleted, Intraprocedure Event edited, Intraprocedure Meds edited, Orders acknowledged in Narrator

## 2020-08-26 NOTE — PROGRESS NOTES
Patient still having a lot of pain. Anesthesia gave another bolus and increased the drip on epidural. Still waiting to see if she has relief.

## 2020-08-26 NOTE — PROGRESS NOTES
Epic order: \"no anti-coagulants while on epidural\". Warfarin order \"when ok w/ Dr Gonzalo Arambula". First spoke w/ anesthesia :   Per TDARLENE Mejia warfarin until after epidural removed.      Pradip Dorman [facility]  08/26/20  3:13 PM

## 2020-08-26 NOTE — PROGRESS NOTES
Progress Note  8/25/2020 10:57 PM  Subjective:   Admit Date: 8/20/2020  PCP: Ruben Collier MD  Interval History: S/P Right Hemicolectomy tolerated well on ice chips and sips with meds. On IV Flagyl and IV Cipro, on epidural for pain control. Diet NPO Effective Now Exceptions are: Ice Chips, Sips with Meds    Intake/Output Summary (Last 24 hours) at 8/25/2020 2257  Last data filed at 8/25/2020 1904  Gross per 24 hour   Intake 4651 ml   Output 1350 ml   Net 3301 ml     Medications:      fentanyl epidural builder 8 mL/hr at 08/25/20 1312    dextrose 5% and 0.45% NaCl with KCl 20 mEq 125 mL/hr at 08/25/20 1407      metroNIDAZOLE  500 mg Intravenous Q6H    sodium chloride flush  10 mL Intravenous 2 times per day    enoxaparin  40 mg Subcutaneous Daily    ciprofloxacin  400 mg Intravenous 60 Min Pre-Op    torsemide  40 mg Oral BID    dilTIAZem  240 mg Oral Daily    pantoprazole  40 mg Oral QAM AC    gabapentin  600 mg Oral Nightly    losartan  100 mg Oral Daily    montelukast  10 mg Oral Nightly    therapeutic multivitamin-minerals  1 tablet Oral Daily    torsemide  20 mg Oral Daily     Recent Labs     08/24/20  0015 08/24/20  1700 08/25/20  0600   WBC 9.0 8.8 8.2   HGB 10.2* 10.3* 9.5*    213 163     Recent Labs     08/23/20  0634 08/24/20  1700 08/25/20  0600    141 138   K 4.3 3.7 3.5    102 103   CO2 23 28 29   BUN 14 14 10   CREATININE 0.43* 0.51 0.56   GLUCOSE 81 106* 95     No results for input(s): AST, ALT, ALB, BILITOT, ALKPHOS in the last 72 hours. Troponin T: No results for input(s): TROPONINI in the last 72 hours. Pro-BNP: No results for input(s): BNP in the last 72 hours.   INR:   Recent Labs     08/24/20  1700   INR 1.0       Objective:     Vitals:    08/25/20 1330 08/25/20 1354 08/25/20 1629 08/25/20 2014   BP: 129/65 (!) 142/59 (!) 142/57 (!) 114/52   Pulse: 97 102 115 115   Resp: 27 18 16    Temp:  97.2 °F (36.2 °C) 99.5 °F (37.5 °C) 101.1 °F (38.4 °C)   TempSrc: SpO2: 100% 100% 100% 100%   Weight:       Height:         General appearance: alert and cooperative with exam  Lungs: clear to auscultation bilaterally  Heart: regular rate and rhythm, S1, S2 normal, no murmur, click, rub or gallop  Abdomen: soft, non-tender; bowel sounds normal; no masses,  no organomegaly  Extremities: extremities normal, atraumatic, no cyanosis or edema  Neurologic: No obvious focal neurologic deficits. Assessment and Plan:   1. Lower GI bleeding with a 4-5 cm tubular adenoma on the ascending colon with chronic Fe deficiency anemia , S/P Right Hemicolectomy  2. Bilateral breast Ca s/p Bilateral mastectomy s/p chemo and hormonal therapy  3. PFO with bouts of CVA with no residual and TIA on coumadin as no closure recommended. 4. HTN  5. Hypercholesterolemia. 6. Morbid Obesity  7. S/P Bilateral TKR  8. ROSEMARIE  9. Fe deficiency anemia  10. S/P L4 and L5 foraminotomy  11. Chemotherapy induced Peripheral Neuropathy  12. History of Asthma  13. With Normal LHC in 2013 after an abnormal stress lexiscan  14. DJD and eventual possible bilateral THR. Advance Directive: Full Code  DVT prophylaxis with enoxaparin 40 mg sub-Q daily. Discharge planning: Home    Principal Problem:    Adenoma of ascending colon  Active Problems:    Gastrointestinal hemorrhage    Anemia  Resolved Problems:    * No resolved hospital problems. *  1. 4-5 cm tubular adenoma on the ascending colon  2. Continue with H and H q 6 hrs for trending  3. Correct coagulopathy  4. Blood work in am  5. PPI  6. Resume home meds  7. Will continue to hold coumadin   8. SCD's  9. Taper down Cymbalta  10 IV Venofer 200 mg IVPB x 1 tonite  11. IVF to  Hold for now    12. Increase activity  13. Torsemide 40 mg po BID x 4 doses  14. PICC line in place and bowel prep and IV Fe given  15.  S/P right Madan Marcus MD

## 2020-08-27 LAB
ANION GAP SERPL CALCULATED.3IONS-SCNC: 7 MEQ/L (ref 9–15)
ANISOCYTOSIS: ABNORMAL
BASOPHILS ABSOLUTE: 0 K/UL (ref 0–0.2)
BASOPHILS RELATIVE PERCENT: 0.2 %
BUN BLDV-MCNC: 6 MG/DL (ref 8–23)
CALCIUM SERPL-MCNC: 9.6 MG/DL (ref 8.5–9.9)
CHLORIDE BLD-SCNC: 108 MEQ/L (ref 95–107)
CO2: 24 MEQ/L (ref 20–31)
CREAT SERPL-MCNC: 0.42 MG/DL (ref 0.5–0.9)
EOSINOPHILS ABSOLUTE: 0 K/UL (ref 0–0.7)
EOSINOPHILS RELATIVE PERCENT: 0.1 %
GFR AFRICAN AMERICAN: >60
GFR NON-AFRICAN AMERICAN: >60
GLUCOSE BLD-MCNC: 101 MG/DL (ref 70–99)
HCT VFR BLD CALC: 25.4 % (ref 37–47)
HEMOGLOBIN: 8.1 G/DL (ref 12–16)
HYPOCHROMIA: ABNORMAL
INR BLD: 1.2
LYMPHOCYTES ABSOLUTE: 0.8 K/UL (ref 1–4.8)
LYMPHOCYTES RELATIVE PERCENT: 5 %
MAGNESIUM: 1.8 MG/DL (ref 1.7–2.4)
MCH RBC QN AUTO: 24.7 PG (ref 27–31.3)
MCHC RBC AUTO-ENTMCNC: 31.8 % (ref 33–37)
MCV RBC AUTO: 77.7 FL (ref 82–100)
MONOCYTES ABSOLUTE: 0.9 K/UL (ref 0.2–0.8)
MONOCYTES RELATIVE PERCENT: 5.7 %
NEUTROPHILS ABSOLUTE: 13.7 K/UL (ref 1.4–6.5)
NEUTROPHILS RELATIVE PERCENT: 90 %
PDW BLD-RTO: 27.6 % (ref 11.5–14.5)
PHOSPHORUS: 2.1 MG/DL (ref 2.3–4.8)
PLATELET # BLD: 157 K/UL (ref 130–400)
PLATELET SLIDE REVIEW: NORMAL
POIKILOCYTES: ABNORMAL
POLYCHROMASIA: ABNORMAL
POTASSIUM SERPL-SCNC: 4.1 MEQ/L (ref 3.4–4.9)
PROTHROMBIN TIME: 15 SEC (ref 12.3–14.9)
RBC # BLD: 3.27 M/UL (ref 4.2–5.4)
SMUDGE CELLS: 1
SODIUM BLD-SCNC: 139 MEQ/L (ref 135–144)
TEAR DROP CELLS: ABNORMAL
WBC # BLD: 15.2 K/UL (ref 4.8–10.8)

## 2020-08-27 PROCEDURE — 6360000002 HC RX W HCPCS: Performed by: SURGERY

## 2020-08-27 PROCEDURE — 6370000000 HC RX 637 (ALT 250 FOR IP): Performed by: SURGERY

## 2020-08-27 PROCEDURE — 85025 COMPLETE CBC W/AUTO DIFF WBC: CPT

## 2020-08-27 PROCEDURE — 83735 ASSAY OF MAGNESIUM: CPT

## 2020-08-27 PROCEDURE — 97162 PT EVAL MOD COMPLEX 30 MIN: CPT

## 2020-08-27 PROCEDURE — 1210000000 HC MED SURG R&B

## 2020-08-27 PROCEDURE — 2580000003 HC RX 258: Performed by: SURGERY

## 2020-08-27 PROCEDURE — 99024 POSTOP FOLLOW-UP VISIT: CPT | Performed by: SURGERY

## 2020-08-27 PROCEDURE — 80048 BASIC METABOLIC PNL TOTAL CA: CPT

## 2020-08-27 PROCEDURE — 84100 ASSAY OF PHOSPHORUS: CPT

## 2020-08-27 PROCEDURE — 36592 COLLECT BLOOD FROM PICC: CPT

## 2020-08-27 PROCEDURE — 94770 HC ETCO2 MONITOR DAILY: CPT

## 2020-08-27 PROCEDURE — 85610 PROTHROMBIN TIME: CPT

## 2020-08-27 PROCEDURE — 2500000003 HC RX 250 WO HCPCS: Performed by: INTERNAL MEDICINE

## 2020-08-27 PROCEDURE — 2700000000 HC OXYGEN THERAPY PER DAY

## 2020-08-27 PROCEDURE — 2580000003 HC RX 258: Performed by: INTERNAL MEDICINE

## 2020-08-27 PROCEDURE — 2500000003 HC RX 250 WO HCPCS: Performed by: SURGERY

## 2020-08-27 PROCEDURE — 94761 N-INVAS EAR/PLS OXIMETRY MLT: CPT

## 2020-08-27 RX ORDER — MAGNESIUM SULFATE IN WATER 40 MG/ML
2 INJECTION, SOLUTION INTRAVENOUS ONCE
Status: COMPLETED | OUTPATIENT
Start: 2020-08-27 | End: 2020-08-28

## 2020-08-27 RX ORDER — KETOROLAC TROMETHAMINE 30 MG/ML
15 INJECTION, SOLUTION INTRAMUSCULAR; INTRAVENOUS EVERY 6 HOURS PRN
Status: DISCONTINUED | OUTPATIENT
Start: 2020-08-27 | End: 2020-08-28

## 2020-08-27 RX ADMIN — MORPHINE SULFATE: 1 INJECTION INTRAVENOUS at 11:45

## 2020-08-27 RX ADMIN — MULTIPLE VITAMINS W/ MINERALS TAB 1 TABLET: TAB at 10:25

## 2020-08-27 RX ADMIN — METOCLOPRAMIDE HYDROCHLORIDE 5 MG: 5 INJECTION INTRAMUSCULAR; INTRAVENOUS at 10:44

## 2020-08-27 RX ADMIN — DILTIAZEM HYDROCHLORIDE 240 MG: 240 CAPSULE, COATED, EXTENDED RELEASE ORAL at 10:24

## 2020-08-27 RX ADMIN — KETOROLAC TROMETHAMINE 15 MG: 30 INJECTION, SOLUTION INTRAMUSCULAR at 00:31

## 2020-08-27 RX ADMIN — GABAPENTIN 600 MG: 300 CAPSULE ORAL at 20:20

## 2020-08-27 RX ADMIN — KETOROLAC TROMETHAMINE 15 MG: 30 INJECTION, SOLUTION INTRAMUSCULAR at 06:46

## 2020-08-27 RX ADMIN — METOCLOPRAMIDE HYDROCHLORIDE 5 MG: 5 INJECTION INTRAMUSCULAR; INTRAVENOUS at 05:14

## 2020-08-27 RX ADMIN — POTASSIUM CHLORIDE, DEXTROSE MONOHYDRATE AND SODIUM CHLORIDE: 150; 5; 450 INJECTION, SOLUTION INTRAVENOUS at 13:35

## 2020-08-27 RX ADMIN — TORSEMIDE 40 MG: 20 TABLET ORAL at 10:31

## 2020-08-27 RX ADMIN — POTASSIUM PHOSPHATE, MONOBASIC AND POTASSIUM PHOSPHATE, DIBASIC 30 MMOL: 224; 236 INJECTION, SOLUTION, CONCENTRATE INTRAVENOUS at 23:56

## 2020-08-27 RX ADMIN — TORSEMIDE 40 MG: 20 TABLET ORAL at 20:20

## 2020-08-27 RX ADMIN — LOSARTAN POTASSIUM 100 MG: 50 TABLET, FILM COATED ORAL at 10:24

## 2020-08-27 RX ADMIN — METOCLOPRAMIDE HYDROCHLORIDE 5 MG: 5 INJECTION INTRAMUSCULAR; INTRAVENOUS at 20:20

## 2020-08-27 RX ADMIN — BISACODYL 10 MG: 10 SUPPOSITORY RECTAL at 10:23

## 2020-08-27 RX ADMIN — ENOXAPARIN SODIUM 40 MG: 40 INJECTION SUBCUTANEOUS at 10:24

## 2020-08-27 RX ADMIN — Medication 10 ML: at 10:44

## 2020-08-27 RX ADMIN — Medication 10 ML: at 20:21

## 2020-08-27 RX ADMIN — POTASSIUM CHLORIDE, DEXTROSE MONOHYDRATE AND SODIUM CHLORIDE: 150; 5; 450 INJECTION, SOLUTION INTRAVENOUS at 05:14

## 2020-08-27 RX ADMIN — MONTELUKAST 10 MG: 10 TABLET, FILM COATED ORAL at 20:20

## 2020-08-27 RX ADMIN — PANTOPRAZOLE SODIUM 40 MG: 40 TABLET, DELAYED RELEASE ORAL at 05:14

## 2020-08-27 ASSESSMENT — PAIN DESCRIPTION - DESCRIPTORS
DESCRIPTORS: SHARP
DESCRIPTORS: ACHING
DESCRIPTORS: ACHING

## 2020-08-27 ASSESSMENT — PAIN DESCRIPTION - LOCATION
LOCATION: HIP
LOCATION: ABDOMEN
LOCATION: HIP;ABDOMEN

## 2020-08-27 ASSESSMENT — PAIN SCALES - GENERAL
PAINLEVEL_OUTOF10: 3
PAINLEVEL_OUTOF10: 0
PAINLEVEL_OUTOF10: 6
PAINLEVEL_OUTOF10: 3
PAINLEVEL_OUTOF10: 6
PAINLEVEL_OUTOF10: 3
PAINLEVEL_OUTOF10: 0
PAINLEVEL_OUTOF10: 3
PAINLEVEL_OUTOF10: 7
PAINLEVEL_OUTOF10: 10

## 2020-08-27 ASSESSMENT — PAIN DESCRIPTION - FREQUENCY
FREQUENCY: CONTINUOUS
FREQUENCY: CONTINUOUS

## 2020-08-27 ASSESSMENT — PAIN DESCRIPTION - PAIN TYPE
TYPE: SURGICAL PAIN

## 2020-08-27 ASSESSMENT — PAIN DESCRIPTION - ORIENTATION
ORIENTATION: RIGHT

## 2020-08-27 NOTE — PROGRESS NOTES
Assessment completed this shift. Alert and oriented x4. Reports abdominal pain 3/10. Up to chair and washed up. Tolerated well. Dressing CDI. Abdominal binder in place.   Electronically signed by Margo Tineo RN on 8/27/2020 at 7:39 PM

## 2020-08-27 NOTE — PROGRESS NOTES
treatment;Patient declined any intervention  Comments / Details: pain pump present    Post Treatment Pain Screening:   Pain Screening  Patient Currently in Pain: Yes  Pain Assessment  Pain Assessment: 0-10  Pain Level: 3  Pain Location: Hip; Abdomen  Pain Orientation: Right  Pain Descriptors: Aching    Prior Level of Function:  Social/Functional History  Lives With: Daughter  Type of Home: House  Home Layout: One level  Home Access: Stairs to enter with rails  Entrance Stairs - Number of Steps: 3  Entrance Stairs - Rails: Both  Bathroom Shower/Tub: Tub/Shower unit  Bathroom Equipment: Grab bars in shower  Home Equipment: Cane, Rolling walker  Receives Help From: Family  ADL Assistance: Independent  Homemaking Assistance: Independent  Homemaking Responsibilities: Yes(shared)  Ambulation Assistance: Independent(cane at all times)  Transfer Assistance: Independent  Active : Yes  Occupation: Retired  Type of occupation: RN    OBJECTIVE:   Vision: Impaired  Vision Exceptions: Wears glasses at all times  Hearing: Within functional limits    Cognition:  Follows Commands: Within Functional Limits    Observation/Palpation  Observation: PCA pump, abdominal binder    ROM:  RLE PROM: WFL  LLE PROM: WFL    Strength:  Strength RLE  Strength RLE: WFL  Strength LLE  Strength LLE: WFL  Strength Other  Other: impaired core strength noted with mobility assessment    Neuro:  Balance  Posture: Fair  Sitting - Static: Good  Sitting - Dynamic: Good;-  Standing - Static: Fair;+  Standing - Dynamic: Fair;+     Motor Control  Gross Motor?: WFL  Sensation  Overall Sensation Status: WFL(pt denies parasthesias)    Bed mobility  Comment: unable to assess d/t pt up to bedside chair and electing to remain in chair for comfort. Pt ed in log roll technique, abdominal bracing with pillow/blanket for cough/sneeze, lifting restriction, abdominal binder. 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     Transfers  Sit to Stand: Stand by assistance  Stand to sit: Stand by assistance    Ambulation  Ambulation?: Yes  Ambulation 1  Surface: level tile  Device: Single point cane  Other Apparatus: Left  Assistance: Stand by assistance  Quality of Gait: antalgic, increased lateral sway noted  Gait Deviations: Slow Renetta; Increased ERIN; Decreased step length;Decreased step height  Distance: 30ft  Comments: with serveral turns in room, distance limited by lines and room environement. Pt demos good safety awareness    Stairs/Curb  Stairs?: No     Activity Tolerance  Activity Tolerance: Patient Tolerated treatment well      PT Education  PT Education: Goals;PT Role;Plan of Care;General Safety  Patient Education: abdominal binder, general abdominal precautions    ASSESSMENT:   Body structures, Functions, Activity limitations: Decreased functional mobility ; Decreased strength; Increased pain  Decision Making: Medium Complexity  History: med  Exam: med  Clinical Presentation: med    Prognosis: Good  Patient Education: abdominal binder, general abdominal precautions  Barriers to Learning: none    DISCHARGE RECOMMENDATIONS:  Discharge Recommendations: Continue to assess pending progress    Assessment: Pt presents s/p R colectomy with impaired functional mobility. Pt would benefit from physical therapy to address above deficits and allow for safe return home at highest level of function, decrease risk for falls, and improve QOL. REQUIRES PT FOLLOW UP: Yes      PLAN OF CARE:  Plan  Times per week: 3-6  Current Treatment Recommendations: Transfer Training, Neuromuscular Re-education, Strengthening, Patient/Caregiver Education & Training, Equipment Evaluation, Education, & procurement, Balance Training, Home Exercise Program, Functional Mobility Training, Stair training, Safety Education & Training, Gait Training, Pain Management  Safety Devices  Type of devices:  All fall risk precautions in place, Call light within reach, Left in chair  Restraints  Initially in

## 2020-08-27 NOTE — PLAN OF CARE
Nutrition Problem #1: Inadequate oral intake  Intervention: Food and/or Nutrient Delivery: Continue NPO(Advance to Low Fiber diet, when okay with surgery)  Nutritional Goals: progression to po diet

## 2020-08-27 NOTE — PROGRESS NOTES
2015 - Anesthesia here to remove pt's epidural. Pt. Sat forward with assistance. Anesthesia removed epidural from back with no complaints from pt. Open to air. No drainage noted. Pt. HOB elevated to 35 degrees for pt. Comfort. 2050 - Message sent to Dr. Xavier Cano concerning pt's continued pain. Pt. Rating pain a 10/10.    2053 - Call received from Dr. Xavier Cano at this nurse's extention. Dr. Adriane Lay he already doubled the pt's medication dose on the PCA pump and therefore she is already receiving a high dose of narcotics hourly. He also stated that it is often normal for it take a few hours to get the pt's pain under control following the epidural being d/c'd. No new orders at this time. 0010 - Perfect Serve sent to Dr. Xavire Cano following pt. Stating \"I can't do this all night\" referring to her continued pain. Pt states her pain rating has remained unchanged at a 10/10.    0011 - Call received from Dr. Xavier Cano at this nurse's extention. Again relayed pt's complaints. Dr. Youlanda Felty pt's chart and labs and decided to place a breakthrough pain order for Toradol Q6 PRN. See MAR.    6607 - Call received from Dr. Geeta Quiles he had contacted the Anestheologist and he agreed to re-insert an epidural in the pt. In the morning. Update given to pt. Pt. Medicated with IV Toradol per order. 0130 - Pain reassessed. Pt. Stating her pain is a 6/10 in her abd. And is more tolerable per pt. Pt. Denied wanting to reposition at this time. Currently supine with HOB elevated at 30 degrees. Denies nausea at this time. 0230 - pt. Asleep with respirations at 16 per minute. Will continue to monitor.

## 2020-08-27 NOTE — PROGRESS NOTES
Comprehensive Nutrition Assessment    Type and Reason for Visit:  Initial, RD Nutrition Re-Screen/LOS    Nutrition Recommendations/Plan:   Monitor for progression to po diet  Recommend Low Fiber, diet as tolerated, when okay with surgery    Nutrition Assessment:  Pt admitted for recurrence of GIB, POD #2 R colectomy. Appears to have eaten well prior to surgery, pt not avaialbe to interview at time of visit. NG was removed and aniticpate progression to po diet soon    Malnutrition Assessment:  Malnutrition Status: At risk for malnutrition (Comment)    Context:  Acute Illness     Findings of the 6 clinical characteristics of malnutrition:  Energy Intake:  Mild decrease in energy intake (Comment)  Weight Loss:  No significant weight loss     Body Fat Loss:  Unable to assess     Muscle Mass Loss:  Unable to assess    Fluid Accumulation:  No significant fluid accumulation     Strength:  Not Performed    Estimated Daily Nutrient Needs:  Energy (kcal):  8061-8888 kcals ( 12-14 kcal/kg); Weight Used for Energy Requirements:  Admission(106 kg)     Protein (g):  95 g protein ( 2 g/kgIBW); Weight Used for Protein Requirements:  Ideal(47 kg)        Fluid (ml/day):  ~1400 ml ( 30 ml/kg);  Weight Used for Fluid Requirements:  Ideal(47 kg)      Nutrition Related Findings:  labs noted, meds reriewed, 1+ BLE edema per nursing, last BM 8/23 (dulocolax ordered) Hx : CVA, Breast Ca, GERD, COPD, HTN      Wounds:  Surgical Wound       Current Nutrition Therapies:    Diet NPO Effective Now Exceptions are: Ice Chips, Sips with Meds    Anthropometric Measures:  · Height: 5' 1\" (154.9 cm)  · Current Body Weight: (n/a)   · Admission Body Weight: 233 lb (105.7 kg)(stated)    · Usual Body Weight: 230 lb (104.3 kg)((12/19), 228# ( 7/20), 235# ( 8/19/20))     · Ideal Body Weight: 105 lbs; % Ideal Body Weight   > 100%  · BMI:  44  · BMI Categories: Obese Class 3 (BMI 40.0 or greater)       Nutrition Diagnosis:   · Inadequate oral intake related to altered GI function as evidenced by NPO or clear liquid status due to medical condition      Nutrition Interventions:   Food and/or Nutrient Delivery:  Continue NPO(Advance to Low Fiber diet, when okay with surgery)  Nutrition Education/Counseling:  No recommendation at this time   Coordination of Nutrition Care:  No recommendation at this time    Goals:  progression to po diet       Nutrition Monitoring and Evaluation:   Food/Nutrient Intake Outcomes:  Diet Advancement/Tolerance, Food and Nutrient Intake  Physical Signs/Symptoms Outcomes:  GI Status, Constipation, Weight     Discharge Planning:     Too soon to determine     Electronically signed by Carlos Cristobal RD, LD on 8/27/20 at 1:04 PM EDT

## 2020-08-27 NOTE — ADDENDUM NOTE
Addendum  created 08/26/20 2023 by ROSE Sepulveda CRNA    Intraprocedure LDAs edited, LDA properties accepted

## 2020-08-28 LAB
ANION GAP SERPL CALCULATED.3IONS-SCNC: 12 MEQ/L (ref 9–15)
BASOPHILS ABSOLUTE: 0 K/UL (ref 0–0.2)
BASOPHILS RELATIVE PERCENT: 0.3 %
BUN BLDV-MCNC: 6 MG/DL (ref 8–23)
CALCIUM SERPL-MCNC: 9.6 MG/DL (ref 8.5–9.9)
CHLORIDE BLD-SCNC: 101 MEQ/L (ref 95–107)
CO2: 25 MEQ/L (ref 20–31)
CREAT SERPL-MCNC: 0.49 MG/DL (ref 0.5–0.9)
EOSINOPHILS ABSOLUTE: 0.6 K/UL (ref 0–0.7)
EOSINOPHILS RELATIVE PERCENT: 4.3 %
GFR AFRICAN AMERICAN: >60
GFR NON-AFRICAN AMERICAN: >60
GLUCOSE BLD-MCNC: 85 MG/DL (ref 70–99)
HCT VFR BLD CALC: 29.1 % (ref 37–47)
HEMOGLOBIN: 8.7 G/DL (ref 12–16)
LYMPHOCYTES ABSOLUTE: 1.3 K/UL (ref 1–4.8)
LYMPHOCYTES RELATIVE PERCENT: 9.6 %
MAGNESIUM: 2.4 MG/DL (ref 1.7–2.4)
MCH RBC QN AUTO: 24.1 PG (ref 27–31.3)
MCHC RBC AUTO-ENTMCNC: 29.9 % (ref 33–37)
MCV RBC AUTO: 80.7 FL (ref 82–100)
MONOCYTES ABSOLUTE: 1.3 K/UL (ref 0.2–0.8)
MONOCYTES RELATIVE PERCENT: 10.2 %
NEUTROPHILS ABSOLUTE: 9.9 K/UL (ref 1.4–6.5)
NEUTROPHILS RELATIVE PERCENT: 75.6 %
PDW BLD-RTO: 27 % (ref 11.5–14.5)
PHOSPHORUS: 3.8 MG/DL (ref 2.3–4.8)
PLATELET # BLD: 141 K/UL (ref 130–400)
PLATELET SLIDE REVIEW: ADEQUATE
POTASSIUM SERPL-SCNC: 4.1 MEQ/L (ref 3.4–4.9)
RBC # BLD: 3.61 M/UL (ref 4.2–5.4)
SODIUM BLD-SCNC: 138 MEQ/L (ref 135–144)
WBC # BLD: 13.1 K/UL (ref 4.8–10.8)

## 2020-08-28 PROCEDURE — 2580000003 HC RX 258: Performed by: SURGERY

## 2020-08-28 PROCEDURE — 6370000000 HC RX 637 (ALT 250 FOR IP): Performed by: SURGERY

## 2020-08-28 PROCEDURE — 97166 OT EVAL MOD COMPLEX 45 MIN: CPT

## 2020-08-28 PROCEDURE — 6360000002 HC RX W HCPCS: Performed by: INTERNAL MEDICINE

## 2020-08-28 PROCEDURE — 2700000000 HC OXYGEN THERAPY PER DAY

## 2020-08-28 PROCEDURE — 85025 COMPLETE CBC W/AUTO DIFF WBC: CPT

## 2020-08-28 PROCEDURE — 94770 HC ETCO2 MONITOR DAILY: CPT

## 2020-08-28 PROCEDURE — 97535 SELF CARE MNGMENT TRAINING: CPT

## 2020-08-28 PROCEDURE — 6360000002 HC RX W HCPCS: Performed by: SURGERY

## 2020-08-28 PROCEDURE — 80048 BASIC METABOLIC PNL TOTAL CA: CPT

## 2020-08-28 PROCEDURE — 94761 N-INVAS EAR/PLS OXIMETRY MLT: CPT

## 2020-08-28 PROCEDURE — 84100 ASSAY OF PHOSPHORUS: CPT

## 2020-08-28 PROCEDURE — 6370000000 HC RX 637 (ALT 250 FOR IP): Performed by: INTERNAL MEDICINE

## 2020-08-28 PROCEDURE — 99024 POSTOP FOLLOW-UP VISIT: CPT | Performed by: SURGERY

## 2020-08-28 PROCEDURE — 97116 GAIT TRAINING THERAPY: CPT

## 2020-08-28 PROCEDURE — 83735 ASSAY OF MAGNESIUM: CPT

## 2020-08-28 PROCEDURE — 1210000000 HC MED SURG R&B

## 2020-08-28 PROCEDURE — 2500000003 HC RX 250 WO HCPCS: Performed by: SURGERY

## 2020-08-28 RX ORDER — BISACODYL 10 MG
10 SUPPOSITORY, RECTAL RECTAL DAILY
Status: DISPENSED | OUTPATIENT
Start: 2020-08-29 | End: 2020-09-01

## 2020-08-28 RX ADMIN — BISACODYL 10 MG: 10 SUPPOSITORY RECTAL at 08:28

## 2020-08-28 RX ADMIN — MORPHINE SULFATE: 1 INJECTION INTRAVENOUS at 04:03

## 2020-08-28 RX ADMIN — METOCLOPRAMIDE HYDROCHLORIDE 5 MG: 5 INJECTION INTRAMUSCULAR; INTRAVENOUS at 04:10

## 2020-08-28 RX ADMIN — MAGNESIUM SULFATE 2 G: 2 INJECTION INTRAVENOUS at 04:16

## 2020-08-28 RX ADMIN — METOCLOPRAMIDE HYDROCHLORIDE 5 MG: 5 INJECTION INTRAMUSCULAR; INTRAVENOUS at 14:06

## 2020-08-28 RX ADMIN — DILTIAZEM HYDROCHLORIDE 240 MG: 240 CAPSULE, COATED, EXTENDED RELEASE ORAL at 08:28

## 2020-08-28 RX ADMIN — MULTIPLE VITAMINS W/ MINERALS TAB 1 TABLET: TAB at 08:29

## 2020-08-28 RX ADMIN — GABAPENTIN 600 MG: 300 CAPSULE ORAL at 21:09

## 2020-08-28 RX ADMIN — LOSARTAN POTASSIUM 100 MG: 50 TABLET, FILM COATED ORAL at 08:29

## 2020-08-28 RX ADMIN — MONTELUKAST 10 MG: 10 TABLET, FILM COATED ORAL at 21:09

## 2020-08-28 RX ADMIN — MORPHINE SULFATE 30 MG: 1 INJECTION INTRAVENOUS at 16:26

## 2020-08-28 RX ADMIN — Medication 10 ML: at 21:10

## 2020-08-28 RX ADMIN — ALTEPLASE 1 MG: 2.2 INJECTION, POWDER, LYOPHILIZED, FOR SOLUTION INTRAVENOUS at 21:12

## 2020-08-28 RX ADMIN — PANTOPRAZOLE SODIUM 40 MG: 40 TABLET, DELAYED RELEASE ORAL at 06:36

## 2020-08-28 RX ADMIN — POTASSIUM CHLORIDE, DEXTROSE MONOHYDRATE AND SODIUM CHLORIDE: 150; 5; 450 INJECTION, SOLUTION INTRAVENOUS at 21:19

## 2020-08-28 RX ADMIN — WARFARIN SODIUM 7.5 MG: 2.5 TABLET ORAL at 22:20

## 2020-08-28 RX ADMIN — METOCLOPRAMIDE HYDROCHLORIDE 5 MG: 5 INJECTION INTRAMUSCULAR; INTRAVENOUS at 21:09

## 2020-08-28 RX ADMIN — TORSEMIDE 40 MG: 20 TABLET ORAL at 08:28

## 2020-08-28 RX ADMIN — ENOXAPARIN SODIUM 40 MG: 40 INJECTION SUBCUTANEOUS at 09:07

## 2020-08-28 ASSESSMENT — PAIN SCALES - GENERAL
PAINLEVEL_OUTOF10: 2
PAINLEVEL_OUTOF10: 3
PAINLEVEL_OUTOF10: 1
PAINLEVEL_OUTOF10: 0

## 2020-08-28 ASSESSMENT — PAIN DESCRIPTION - LOCATION
LOCATION: ABDOMEN

## 2020-08-28 ASSESSMENT — PAIN DESCRIPTION - PAIN TYPE
TYPE: SURGICAL PAIN
TYPE: SURGICAL PAIN

## 2020-08-28 NOTE — PROGRESS NOTES
Continuous Infusions:   morphine      dextrose 5% and 0.45% NaCl with KCl 20 mEq 125 mL/hr at 20 1335     PRN Meds:.ketorolac, naloxone, sodium chloride flush, albuterol, acetaminophen    OBJECTIVE  CURRENT VITALS:  height is 5' 1\" (1.549 m) and weight is 233 lb (105.7 kg). Her oral temperature is 98.4 °F (36.9 °C). Her blood pressure is 119/53 (abnormal) and her pulse is 91. Her respiration is 18 and oxygen saturation is 100%. Temperature Range (24h):Temp: 98.4 °F (36.9 °C) Temp  Av.5 °F (36.9 °C)  Min: 98.4 °F (36.9 °C)  Max: 98.6 °F (37 °C)  BP Range (45J): Systolic (47FGA), HHS:198 , Min:119 , JKE:942     Diastolic (39YYA), GAP:54, Min:53, Max:56    Pulse Range (24h): Pulse  Av  Min: 91  Max: 91  Respiration Range (24h): Resp  Av.4  Min: 17  Max: 20    GENERAL: alert, no distress, sitting in a chair  LUNGS: clear to ausculation, without wheezes, rales or rhonci  HEART: normal rate and regular rhythm  ABDOMEN: non-distended, soft, incisional tenderness, bowel sounds present in all 4 quadrants and no guarding or peritoneal signs  INCISION: healing well, no significant drainage, no significant erythema  EXTERMITY: no cyanosis, clubbing or edema    In: 3108.1 [P.O.:60; I.V.:3048.1]  Out: 2800 [Urine:2800]  Date 20 0000 - 20 2359   Shift 3201-2680 2285-7018 8779-7595 24 Hour Total   INTAKE   P.O.(mL/kg/hr) 60(0.1)   60   I. V.(mL/kg) 1589. 1(15)  6307(71.2) 3048. 1(28.8)   Shift Total(mL/kg) 1649. 1(15.6)  9226(90.7) 3108.1(29.4)   OUTPUT   Urine(mL/kg/hr) 400(0.5) 450(0.5) 1950 2800   Emesis/NG output(mL/kg) 0(0)   0(0)   Other(mL/kg) 0(0)   0(0)   Stool(mL/kg) 0(0)   0(0)   Blood(mL/kg) 0(0)   0(0)   Shift Total(mL/kg) 400(3.8) 450(4.3) 1950(18.5) 2800(26.5)   Weight (kg) 105.7 105.7 105.7 105.7       LABS  Recent Labs     20  0600 20  0600 20  0538   WBC 8.2 20.1* 15.2*   HGB 9.5* 8.3* 8.1*   HCT 30.7* 26.9* 25.4*    186 157    143 139   K 3.5 4.1 4.1    110* 108*   CO2 29 25 24   BUN 10 7* 6*   CREATININE 0.56 0.46* 0.42*   MG 1.8 1.7 1.8   PHOS 3.2 2.0* 2.1*   CALCIUM 10.0* 9.8 9.6      Recent Labs     08/27/20  0537   INR 1.2     Recent Labs     08/26/20  0600   AST 16   ALT 15   BILITOT 0.3       RADIOLOGY  Ir Picc Wo Sq Port/pump > 5 Years    Result Date: 8/24/2020  PERIPHERALLY INSERTED CENTRAL CATHETER (PICC) PLACEMENT WITH ULTRASOUND GUIDANCE CLINICAL HISTORY:  REQUIRES VASCULAR ACCESS After discussing the procedure and possible complications with the patient, informed consent was obtained. The patient was placed on the Special Procedures table. The right upper extremity was sterilely prepared using a maximal sterile barrier technique which includes cap, mask, sterile gown, sterile gloves, sterile full-body drape, hand hygiene and 2% chlorhexidine for cutaneous antisepsis. A sterile ultrasound technique with sterile gel and sterile probe covers was also utilized. A pre-procedure time out was performed in order to assure the correct patient and procedure. Local anesthetic was administered. A peripheral vein was accessed with sonographic guidance. A sonographic spot image was obtained for documentation. A guidewire was advanced into the vein with fluoroscopic guidance and a sheath was placed over the guidewire. A 5-Bhutanese dual-lumen PICC was advanced through the sheath, up the arm and into the central vasculature. It was positioned appropriately. The sheath was removed. The catheter was shown to aspirate and infuse properly. The flange of the catheter was affixed to the arm using a PICC securement device. A spot image of the chest showed the tip of the PICC line to lie in the superior vena cava. The patient tolerated the procedure well and without complications. Number of films: 3 Fluoroscopy time: 106 seconds CONCLUSION: SUCCESSFUL RIGHT PICC PLACEMENT WITHOUT IMMEDIATE COMPLICATIONS.      Xr Chest Abdomen Ng Placement    Result Date: 8/25/2020  EXAMINATION: XR CHEST ABDOMEN NG PLACEMENT CLINICAL HISTORY: NASOGASTRIC TUBE PLACEMENT COMPARISONS: DECEMBER 19, 2018 FINDINGS: Tip of nasogastric tube identified in caudal esophagus proximal to the gastroesophageal junction. Multiple surgical clips right and left axilla. Terminus PICC line within superior vena cava cava. Cardiopericardial silhouette normal. Lungs clear. TERMINUS NASOGASTRIC TUBE, CAUDAL ESOPHAGUS. RECOMMEND ADVANCING NASOGASTRIC TUBE, 20 CM.     Electronically signed by Tommy Vinson MD on 8/27/2020 at 8:54 PM

## 2020-08-28 NOTE — PROGRESS NOTES
[facility] OCCUPATIONAL THERAPY EVALUATION - ACUTE     NAME: Curt Copeland  : 1955 (77 y.o.)  MRN: [id number]  CODE STATUS: Full Code  Room: J820/D075-98    Date of Service: 2020    Patient Diagnosis(es): Gastrointestinal hemorrhage [K92.2]  Anemia [D64.9]   Chief Complaint   Patient presents with    Rectal Bleeding     Patient Active Problem List    Diagnosis Date Noted    Adenoma of ascending colon 2020    Anemia 2020    Gastrointestinal hemorrhage 2020    Adenomatous polyp of ascending colon 2020    Lower GI bleed 2020    HTN (hypertension) 2020    Osteoarthritis of hip 2020    History of carcinoma in situ of breast 2020    History of cardioembolic stroke     Anticoagulated 2020    Iron deficiency anemia, unspecified 2020    Anemia of chronic renal failure 2020    Spinal stenosis at L4-L5 level 2019    Lumbar spondylosis 2019    Former smoker, stopped smoking in distant past 2019    Foraminal stenosis of lumbar region 2019    Class 3 severe obesity due to excess calories without serious comorbidity with body mass index (BMI) of 40.0 to 44.9 in adult [facility]) 2019    Osteoarthritis of spine with radiculopathy, lumbosacral region 2019    Patent foramen ovale 2019    Estrogen receptor positive 10/01/2018    Malignant neoplasm of central portion of right female breast ([address] 10/01/2018    Malignant neoplasm of upper-outer quadrant of left female breast ([address] 10/01/2018    Lobular carcinoma in situ of left breast 10/01/2018    Iron deficiency anemia secondary to blood loss (chronic) 10/01/2018        Past Medical History:   Diagnosis Date    Arthritis     Asthma     Cancer ([address]  &     Breast RIGHT (T2/N0/M0) ER/SC (+) HER2/estela 3+ / chemo / left breast with mastectomy    GERD (gastroesophageal reflux disease)     Hiatal hernia     History of blood require a device   Stand by assistance = Pt. does not perform task at an independent level but does not need physical assistance, requires verbal cues  Minimal, Moderate, Maximal Assistance = Pt. requires physical assistance (25%, 50%, 75% assist from helper) for task but is able to actively participate in task   Dependent = Pt. requires total assistance with task and is not able to actively participate with task completion     Functional Mobility:  Functional Mobility  Functional - Mobility Device: Cane  Assist Level: Moderate assistance  Transfers  Sit to stand: Minimal assistance  Stand to sit: Minimal assistance    Bed Mobility  Bed mobility  Comment: NT    Seated and Standing Balance:  Balance  Sitting Balance: Supervision  Standing Balance: Minimal assistance    Functional Endurance:  Activity Tolerance  Activity Tolerance: Patient Tolerated treatment well    D/C Recommendations:  OT D/C RECOMMENDATIONS  REQUIRES OT FOLLOW UP: Yes    Equipment Recommendations:  OT Equipment Recommendations  Equipment Needed: No    OT Education:        OT Follow Up:  OT D/C RECOMMENDATIONS  REQUIRES OT FOLLOW UP: Yes       Assessment/Discharge Disposition:  Assessment: Pt is 78 y/o female presented to [facility] with above deficits. Pt would benefit from OT services to maximize safety and idependence with ADL/IADL tasks, improve overall strength/endurance/balance to return to [facility].   Performance deficits / Impairments: Decreased functional mobility , Decreased endurance, Decreased ADL status, Decreased posture, Decreased balance, Decreased high-level IADLs  Prognosis: Good  Discharge Recommendations: Continue to assess pending progress  Decision Making: Medium Complexity  History: 4  Exam: 6  Assistance / Modification: min-mod A    Six Click Score   How much help for putting on and taking off regular lower body clothing?: A Lot  How much help for Bathing?: A Lot  How much help for Toileting?: A Little  How much help for putting on and taking off regular upper body clothing?: None  How much help for taking care of personal grooming?: None  How much help for eating meals?: None  AM-PAC Inpatient Daily Activity Raw Score: 19  AM-PAC Inpatient ADL T-Scale Score : 40.22  ADL Inpatient CMS 0-100% Score: 42.8    Plan:  Plan  Times per week: 1-4x week  Current Treatment Recommendations: Patient/Caregiver Education & Training, Balance Training, Neuromuscular Re-education, Functional Mobility Training, Endurance Training, Safety Education & Training, Self-Care / ADL    Goals:   Patient will:    - Be supervision in LB ADLs  - Be supervision in ADL transfers without LOB  - Be supervision in toileting tasks  - Access appropriate D/C site with as few architectural barriers as possible.     Patient Goal: Patient goals : I would like to go home with [facility] services     Discussed and agreed upon: Yes Comments:     Therapy Time:   OT Individual Minutes  Time In: 1955  Time Out: C/ Jose 66  Minutes: 23         Electronically signed by:    GUERO Larios  8/28/2020, 3:55 PM

## 2020-08-28 NOTE — PLAN OF CARE
Problem: Bleeding:  Goal: Will show no signs and symptoms of excessive bleeding  Description: Will show no signs and symptoms of excessive bleeding  Outcome: Ongoing     Problem: Pain:  Goal: Pain level will decrease  Description: Pain level will decrease  Outcome: Ongoing  Goal: Control of acute pain  Description: Control of acute pain  Outcome: Ongoing  Goal: Control of chronic pain  Description: Control of chronic pain  Outcome: Ongoing     Problem: Falls - Risk of:  Goal: Will remain free from falls  Description: Will remain free from falls  Outcome: Ongoing  Goal: Absence of physical injury  Description: Absence of physical injury  Outcome: Ongoing     Problem: Nutrition  Goal: Optimal nutrition therapy  Outcome: Ongoing

## 2020-08-28 NOTE — PROGRESS NOTES
Pt Name: Merrie Fothergill  Medical Record Number: [id number]  Date of Birth 1955   Admit date 2020 12:16 PM  Today's Date: 2020     ASSESSMENT  1. Post op day # 3  2. Merrie Fothergill had Procedure(s):  RIGHT COLECTOMY  3.  Pain control better on IV narcotics with PCA  4. Postoperative ileus probably secondary to narcotics    PLAN  1. Sips of clears  2. Remove Kat catheter tomorrow    SUBJECTIVE  Chief complaint: None  Afebrile, vital signs are stable. She denies any nausea or vomiting, has not passed flatus or had a bowel movement. She is tolerating a Diet NPO Effective Now Exceptions are: Ice Chips, Sips with Meds. Her pain is well controlled on current medications. She has been ambulating in the halls. Hgb 8.7      has a past medical history of Arthritis, Asthma, Cancer ([address] GERD (gastroesophageal reflux disease), Hiatal hernia, History of blood transfusion, Hypercholesteremia, Hyperlipidemia, Hypertension, and Patent foramen ovale. CURRENT MEDS  Scheduled Meds:   warfarin  7.5 mg Oral Daily    metoclopramide  5 mg Intravenous Q8H    sodium chloride flush  10 mL Intravenous 2 times per day    enoxaparin  40 mg Subcutaneous Daily    dilTIAZem  240 mg Oral Daily    pantoprazole  40 mg Oral QAM AC    gabapentin  600 mg Oral Nightly    losartan  100 mg Oral Daily    montelukast  10 mg Oral Nightly    therapeutic multivitamin-minerals  1 tablet Oral Daily    torsemide  20 mg Oral Daily     Continuous Infusions:   morphine      dextrose 5% and 0.45% NaCl with KCl 20 mEq 125 mL/hr at 20 1335     PRN Meds:.naloxone, sodium chloride flush, albuterol, acetaminophen    OBJECTIVE  CURRENT VITALS:  height is 5' 1\" (1.549 m) and weight is 233 lb (105.7 kg). Her oral temperature is 99 °F (37.2 °C). Her blood pressure is 111/46 (abnormal) and her pulse is 91. Her respiration is 16 and oxygen saturation is 98%.    Temperature Range (24h):Temp: 99 °F (37.2 °C) Temp  Av.7 °F (37.1 °C)  Min: 98.4 °F (36.9 °C)  Max: 99 °F (37.2 °C)  BP Range (31T): Systolic (21AUS), HXC:410 , Min:111 , EGH:356     Diastolic (13VXD), TLX:80, Min:46, Max:49    Pulse Range (24h): Pulse  Av  Min: 91  Max: 91  Respiration Range (24h): Resp  Av.4  Min: 16  Max: 20    GENERAL: alert, no distress, sitting in bed  LUNGS: clear to ausculation, without wheezes, rales or rhonci  HEART: normal rate and regular rhythm  ABDOMEN: non-distended, soft, incisional tenderness, bowel sounds present in all 4 quadrants and no guarding or peritoneal signs  INCISION: healing well, no significant drainage, no significant erythema  EXTERMITY: no cyanosis, clubbing or edema    In: 1519 [P.O.:50; I.V.:1469]  Out: 5750 [Urine:5750]  Date 20 0000 - 20 2359   Shift 5714-8184 5140-4060 2969-1736 24 Hour Total   INTAKE   Shift Total(mL/kg)       OUTPUT   Urine(mL/kg/hr) 1500(1.8) 1250  2750   Shift Total(mL/kg) 1500(14.2) 1250(11.8)  2750(26)   Weight (kg) 105.7 105.7 105.7 105.7       LABS  Recent Labs     20  0600 20  0538 20  0654   WBC 20.1* 15.2* 13.1*   HGB 8.3* 8.1* 8.7*   HCT 26.9* 25.4* 29.1*    157 141    139 138   K 4.1 4.1 4.1   * 108* 101   CO2 25 24 25   BUN 7* 6* 6*   CREATININE 0.46* 0.42* 0.49*   MG 1.7 1.8 2.4   PHOS 2.0* 2.1* 3.8   CALCIUM 9.8 9.6 9.6      Recent Labs     20  0537   INR 1.2     Recent Labs     20  0600   AST 16   ALT 15   BILITOT 0.3       RADIOLOGY  Ir Picc Wo Sq Port/pump > 5 Years    Result Date: 2020  PERIPHERALLY INSERTED CENTRAL CATHETER (PICC) PLACEMENT WITH ULTRASOUND GUIDANCE CLINICAL HISTORY:  REQUIRES VASCULAR ACCESS After discussing the procedure and possible complications with the patient, informed consent was obtained. The patient was placed on the Special Procedures table.   The right upper extremity was sterilely prepared using a maximal sterile barrier technique which includes cap, mask, sterile gown, sterile gloves, sterile

## 2020-08-28 NOTE — PROGRESS NOTES
Physical Therapy Med Surg Daily Treatment Note  Facility/Department: [facility]  Room: [facility]       NAME: Judi Lawrence  : 1955 (77 y.o.)  MRN: [id number]  CODE STATUS: Full Code    Date of Service: 2020    Patient Diagnosis(es): Gastrointestinal hemorrhage [K92.2]  Anemia [D64.9]   Chief Complaint   Patient presents with    Rectal Bleeding     Patient Active Problem List    Diagnosis Date Noted    Adenoma of ascending colon 2020    Anemia 2020    Gastrointestinal hemorrhage 2020    Adenomatous polyp of ascending colon 2020    Lower GI bleed 2020    HTN (hypertension) 2020    Osteoarthritis of hip 2020    History of carcinoma in situ of breast 2020    History of cardioembolic stroke     Anticoagulated 2020    Iron deficiency anemia, unspecified 2020    Anemia of chronic renal failure 2020    Spinal stenosis at L4-L5 level 2019    Lumbar spondylosis 2019    Former smoker, stopped smoking in distant past 2019    Foraminal stenosis of lumbar region 2019    Class 3 severe obesity due to excess calories without serious comorbidity with body mass index (BMI) of 40.0 to 44.9 in adult [facility]) 2019    Osteoarthritis of spine with radiculopathy, lumbosacral region 2019    Patent foramen ovale 2019    Estrogen receptor positive 10/01/2018    Malignant neoplasm of central portion of right female breast ([address] 10/01/2018    Malignant neoplasm of upper-outer quadrant of left female breast ([address] 10/01/2018    Lobular carcinoma in situ of left breast 10/01/2018    Iron deficiency anemia secondary to blood loss (chronic) 10/01/2018        Past Medical History:   Diagnosis Date    Arthritis     Asthma     Cancer ([address]  &     Breast RIGHT (T2/N0/M0) ER/CO (+) HER2/estela 3+ / chemo / left breast with mastectomy    GERD (gastroesophageal reflux disease)     Hiatal hernia     History of blood transfusion 2013    post op TKR    Hypercholesteremia     Hyperlipidemia     past trx / off meds > 5 yrs    Hypertension     meds > 20 yrs     Patent foramen ovale      Past Surgical History:   Procedure Laterality Date    BREAST SURGERY Bilateral     Mastectomy    CARDIAC CATHETERIZATION  2013    no blockage    COLONOSCOPY  08/31/2016    w/polypectomy     COLONOSCOPY N/A 7/24/2020    COLONOSCOPY DIAGNOSTIC performed by Anand Ya MD at [facility], DIAGNOSTIC     [address]    umbilical    HYSTERECTOMY  1997    JOINT REPLACEMENT Bilateral     Knee    JOINT REPLACEMENT Bilateral 11/04/2013    LAMINECTOMY Right 12/30/2019    RIGHT L4-5 MICRODECOMPRESSION performed by Maria Isabel Davies MD at [address] 0.6-1CM REMAINDR BODY N/A 4/6/2018    EXCISION CYST RT. NECK AND EXCISION LESION NOSE performed by Dennis Jesus MD at [address] W/SUBQ PORT/ CTR/PRPH INSJ N/A 4/6/2018    REMOVAL VENOUS PORT performed by Dennis Jesus MD at [facility] / has been removed    TONSILLECTOMY      at age 23   [address]  08/31/2016    w/polypectomy,bx     UPPER GASTROINTESTINAL ENDOSCOPY N/A 7/24/2020    EGD DIAGNOSTIC ONLY performed by Anand Ya MD at [address] [place] [address]    excision polyps       Restrictions  Restrictions/Precautions: Fall Risk  Position Activity Restriction  Other position/activity restrictions: general abdominal precautions    SUBJECTIVE   General  Chart Reviewed: Yes  Family / Caregiver Present: No  Subjective  Subjective: \"I told the  I would be up and walking today. \"    Pre-Session Pain Report  Pre Treatment Pain Screening  Pain at present: 2  Scale Used: Numeric Score  Intervention List: Patient able to continue with treatment  Pain Screening  Patient Currently in Pain: Yes       Post-Session Pain Report  Pain Assessment  Pain Assessment: 0-10  Pain Level: 2  Pain Type: Surgical pain  Pain Location: Abdomen         OBJECTIVE        Bed mobility  Supine to Sit: Minimal assistance(assistance needed to get RLE to EOB.)  Comment: pt educated on log roll technique. Transfers  Sit to Stand: Stand by assistance  Stand to sit: Stand by assistance  Comment: increased effort d/t pain. Ambulation  Ambulation?: Yes  Ambulation 1  Surface: level tile  Device: Rolling Walker  Assistance: Stand by assistance  Gait Deviations: Slow Renetta; Increased ERIN; Decreased step length;Decreased step height  Distance: 100'  Comments: pt safer with WW at this time, increased abdominal pain while using SPC                                         Activity Tolerance  Activity Tolerance: Patient Tolerated treatment well          ASSESSMENT   Assessment: increased time needed for mobility d/t pain, pt able to increase ambulation distance but used [employer] this date d/t pain when using SPC. pt with good motivation. Discharge Recommendations:  Continue to assess pending progress    Goals  Long term goals  Long term goal 1: Bed mobility with indep, demonstrating log roll  Long term goal 2: Functional transfers with indep  Long term goal 3: Amb >150ft with LRAD (cane) and indep  Long term goal 4: 4 steps with handrail and SBA to navigate  home environement  Patient Goals   Patient goals : \"heal at home so I can get my hip replacement done\"    PLAN    Times per week: 3-6  Safety Devices  Type of devices:  All fall risk precautions in place, Call light within reach, Chair alarm in place, Left in chair     [facility] (6 CLICK) Scar 95 Raw Score : 18      Therapy Time   Individual   Time In 1032   Time Out 1057   Minutes 25      Gait: 15  BM/Trsf: [address] [place], PTA, 08/28/20 at 11:06 AM         Definitions for assistance levels  Independent = pt does not require any physical supervision or assistance from another person for activity completion. Device may be needed.   Stand by assistance = pt requires verbal cues or instructions from another person, close to but not touching, to perform the activity  Minimal assistance= pt performs 75% or more of the activity; assistance is required to complete the activity  Moderate assistance= pt performs 50% of the activity; assistance is required to complete the activity  Maximal assistance = pt performs 25% of the activity; assistance is required to complete the activity  Dependent = pt requires total physical assistance to accomplish the task

## 2020-08-28 NOTE — PROGRESS NOTES
Shift assessment completed. Pt is alert and oriented x4, calm, cooperative. Heart and Lung sounds WDL. Bowel sounds present x4 quadrants. Aquacel to surgical incision C/D/I. Pt has no complaints of pain at this time. She is resting comfortably in bed, will continue to monitor.

## 2020-08-28 NOTE — PROGRESS NOTES
General appearance: alert and cooperative with exam  Lungs: clear to auscultation bilaterally  Heart: regular rate and rhythm, S1, S2 normal, no murmur, click, rub or gallop  Abdomen: soft, non-tender; bowel sounds normal; no masses,  no organomegaly s/p Right Hemicolectomy  Extremities: extremities normal, atraumatic, no cyanosis or edema  Neurologic: No obvious focal neurologic deficits. Assessment and Plan:   1. Lower GI bleeding with a 4-5 cm tubular adenoma on the ascending colon with chronic Fe deficiency anemia , S/P Right Hemicolectomy  2. Bilateral breast Ca s/p Bilateral mastectomy s/p chemo and hormonal therapy  3. PFO with bouts of CVA with no residual and TIA on coumadin as no closure recommended. 4. HTN  5. Hypercholesterolemia. 6. Morbid Obesity  7. S/P Bilateral TKR  8. ROSEMARIE  9. Fe deficiency anemia  10. S/P L4 and L5 foraminotomy  11. Chemotherapy induced Peripheral Neuropathy  12. History of Asthma  13. With Normal LHC in 2013 after an abnormal stress lexiscan  14. DJD and eventual possible bilateral THR. Advance Directive: Full Code  DVT prophylaxis with enoxaparin 40 mg sub-Q daily. Discharge planning: Home    Principal Problem:    Adenoma of ascending colon  Active Problems:    Gastrointestinal hemorrhage    Anemia  Resolved Problems:    * No resolved hospital problems. *  1. 4-5 cm tubular adenoma on the ascending colon  2. DC q 6 hrs H and H  3. Correct coagulopathy// Re start coumadin when cleared by surgery  4. Blood work in am  5. PPI  6. Resume home meds  7. Will continue to hold coumadin , Will resume if [id number] with Surgery with lovenox bridging  8. SCD's  9. Taper down Cymbalta  10 IV Venofer 200 mg IVPB x 1 tonite  11. IVF to  Hold for now    12. Increase activity  13.  Torsemide 40 mg po BID x 4 doses then q daily    Beth Heaton MD

## 2020-08-29 ENCOUNTER — APPOINTMENT (OUTPATIENT)
Dept: GENERAL RADIOLOGY | Age: 65
DRG: 330 | End: 2020-08-29
Payer: MEDICARE

## 2020-08-29 LAB
ANION GAP SERPL CALCULATED.3IONS-SCNC: 7 MEQ/L (ref 9–15)
BASOPHILS ABSOLUTE: 0 K/UL (ref 0–0.2)
BASOPHILS RELATIVE PERCENT: 0.2 %
BUN BLDV-MCNC: 9 MG/DL (ref 8–23)
CALCIUM SERPL-MCNC: 8.8 MG/DL (ref 8.5–9.9)
CHLORIDE BLD-SCNC: 99 MEQ/L (ref 95–107)
CO2: 26 MEQ/L (ref 20–31)
CREAT SERPL-MCNC: 0.7 MG/DL (ref 0.5–0.9)
EOSINOPHILS ABSOLUTE: 0.6 K/UL (ref 0–0.7)
EOSINOPHILS RELATIVE PERCENT: 4.6 %
GFR AFRICAN AMERICAN: >60
GFR NON-AFRICAN AMERICAN: >60
GLUCOSE BLD-MCNC: 398 MG/DL (ref 70–99)
HCT VFR BLD CALC: 25.3 % (ref 37–47)
HEMOGLOBIN: 7.8 G/DL (ref 12–16)
INR BLD: 1.2
IRON SATURATION: 7 % (ref 11–46)
IRON: 11 UG/DL (ref 37–145)
LYMPHOCYTES ABSOLUTE: 1 K/UL (ref 1–4.8)
LYMPHOCYTES RELATIVE PERCENT: 8.3 %
MAGNESIUM: 1.8 MG/DL (ref 1.7–2.4)
MCH RBC QN AUTO: 24.5 PG (ref 27–31.3)
MCHC RBC AUTO-ENTMCNC: 30.9 % (ref 33–37)
MCV RBC AUTO: 79.2 FL (ref 82–100)
MONOCYTES ABSOLUTE: 1.5 K/UL (ref 0.2–0.8)
MONOCYTES RELATIVE PERCENT: 12 %
NEUTROPHILS ABSOLUTE: 9.4 K/UL (ref 1.4–6.5)
NEUTROPHILS RELATIVE PERCENT: 74.9 %
PDW BLD-RTO: 25.6 % (ref 11.5–14.5)
PHOSPHORUS: 3 MG/DL (ref 2.3–4.8)
PLATELET # BLD: 172 K/UL (ref 130–400)
POTASSIUM SERPL-SCNC: 5.3 MEQ/L (ref 3.4–4.9)
PROTHROMBIN TIME: 15.4 SEC (ref 12.3–14.9)
RBC # BLD: 3.2 M/UL (ref 4.2–5.4)
SODIUM BLD-SCNC: 132 MEQ/L (ref 135–144)
TOTAL IRON BINDING CAPACITY: 163 UG/DL (ref 178–450)
WBC # BLD: 12.6 K/UL (ref 4.8–10.8)

## 2020-08-29 PROCEDURE — 83735 ASSAY OF MAGNESIUM: CPT

## 2020-08-29 PROCEDURE — 85610 PROTHROMBIN TIME: CPT

## 2020-08-29 PROCEDURE — 84100 ASSAY OF PHOSPHORUS: CPT

## 2020-08-29 PROCEDURE — 71045 X-RAY EXAM CHEST 1 VIEW: CPT

## 2020-08-29 PROCEDURE — 2700000000 HC OXYGEN THERAPY PER DAY

## 2020-08-29 PROCEDURE — 2580000003 HC RX 258: Performed by: SURGERY

## 2020-08-29 PROCEDURE — 36592 COLLECT BLOOD FROM PICC: CPT

## 2020-08-29 PROCEDURE — 6370000000 HC RX 637 (ALT 250 FOR IP): Performed by: SURGERY

## 2020-08-29 PROCEDURE — 2580000003 HC RX 258: Performed by: INTERNAL MEDICINE

## 2020-08-29 PROCEDURE — 99024 POSTOP FOLLOW-UP VISIT: CPT | Performed by: SURGERY

## 2020-08-29 PROCEDURE — 83540 ASSAY OF IRON: CPT

## 2020-08-29 PROCEDURE — 1210000000 HC MED SURG R&B

## 2020-08-29 PROCEDURE — 2500000003 HC RX 250 WO HCPCS: Performed by: SURGERY

## 2020-08-29 PROCEDURE — 6360000002 HC RX W HCPCS: Performed by: SURGERY

## 2020-08-29 PROCEDURE — 83550 IRON BINDING TEST: CPT

## 2020-08-29 PROCEDURE — 85025 COMPLETE CBC W/AUTO DIFF WBC: CPT

## 2020-08-29 PROCEDURE — 80048 BASIC METABOLIC PNL TOTAL CA: CPT

## 2020-08-29 PROCEDURE — 6370000000 HC RX 637 (ALT 250 FOR IP): Performed by: INTERNAL MEDICINE

## 2020-08-29 RX ORDER — DEXTROSE AND SODIUM CHLORIDE 5; .9 G/100ML; G/100ML
INJECTION, SOLUTION INTRAVENOUS CONTINUOUS
Status: DISCONTINUED | OUTPATIENT
Start: 2020-08-29 | End: 2020-09-03 | Stop reason: HOSPADM

## 2020-08-29 RX ORDER — ONDANSETRON 2 MG/ML
4 INJECTION INTRAMUSCULAR; INTRAVENOUS EVERY 6 HOURS PRN
Status: DISCONTINUED | OUTPATIENT
Start: 2020-08-29 | End: 2020-09-03 | Stop reason: HOSPADM

## 2020-08-29 RX ADMIN — Medication 10 ML: at 08:16

## 2020-08-29 RX ADMIN — BENZOCAINE AND MENTHOL, UNSPECIFIED FORM 1 LOZENGE: 15; 3.6 LOZENGE ORAL at 18:45

## 2020-08-29 RX ADMIN — TORSEMIDE 20 MG: 20 TABLET ORAL at 08:16

## 2020-08-29 RX ADMIN — ONDANSETRON 4 MG: 2 INJECTION INTRAMUSCULAR; INTRAVENOUS at 18:45

## 2020-08-29 RX ADMIN — MULTIPLE VITAMINS W/ MINERALS TAB 1 TABLET: TAB at 08:16

## 2020-08-29 RX ADMIN — BENZOCAINE AND MENTHOL, UNSPECIFIED FORM 1 LOZENGE: 15; 3.6 LOZENGE ORAL at 22:55

## 2020-08-29 RX ADMIN — LOSARTAN POTASSIUM 100 MG: 50 TABLET, FILM COATED ORAL at 08:16

## 2020-08-29 RX ADMIN — METOCLOPRAMIDE HYDROCHLORIDE 5 MG: 5 INJECTION INTRAMUSCULAR; INTRAVENOUS at 10:17

## 2020-08-29 RX ADMIN — DEXTROSE AND SODIUM CHLORIDE: 5; 900 INJECTION, SOLUTION INTRAVENOUS at 12:54

## 2020-08-29 RX ADMIN — MONTELUKAST 10 MG: 10 TABLET, FILM COATED ORAL at 20:18

## 2020-08-29 RX ADMIN — METOCLOPRAMIDE HYDROCHLORIDE 5 MG: 5 INJECTION INTRAMUSCULAR; INTRAVENOUS at 02:19

## 2020-08-29 RX ADMIN — BISACODYL 10 MG: 10 SUPPOSITORY RECTAL at 08:17

## 2020-08-29 RX ADMIN — POTASSIUM CHLORIDE, DEXTROSE MONOHYDRATE AND SODIUM CHLORIDE: 150; 5; 450 INJECTION, SOLUTION INTRAVENOUS at 09:30

## 2020-08-29 RX ADMIN — PANTOPRAZOLE SODIUM 40 MG: 40 TABLET, DELAYED RELEASE ORAL at 05:14

## 2020-08-29 RX ADMIN — MORPHINE SULFATE: 1 INJECTION INTRAVENOUS at 08:02

## 2020-08-29 RX ADMIN — BISACODYL 10 MG: 5 TABLET, COATED ORAL at 02:18

## 2020-08-29 RX ADMIN — DILTIAZEM HYDROCHLORIDE 240 MG: 240 CAPSULE, COATED, EXTENDED RELEASE ORAL at 08:16

## 2020-08-29 RX ADMIN — DEXTROSE AND SODIUM CHLORIDE: 5; 900 INJECTION, SOLUTION INTRAVENOUS at 22:55

## 2020-08-29 RX ADMIN — ENOXAPARIN SODIUM 40 MG: 40 INJECTION SUBCUTANEOUS at 08:16

## 2020-08-29 RX ADMIN — METOCLOPRAMIDE HYDROCHLORIDE 5 MG: 5 INJECTION INTRAMUSCULAR; INTRAVENOUS at 20:20

## 2020-08-29 RX ADMIN — ONDANSETRON 4 MG: 2 INJECTION INTRAMUSCULAR; INTRAVENOUS at 10:17

## 2020-08-29 RX ADMIN — Medication 10 ML: at 20:18

## 2020-08-29 RX ADMIN — GABAPENTIN 600 MG: 300 CAPSULE ORAL at 20:18

## 2020-08-29 ASSESSMENT — PAIN SCALES - GENERAL
PAINLEVEL_OUTOF10: 3
PAINLEVEL_OUTOF10: 2
PAINLEVEL_OUTOF10: 4
PAINLEVEL_OUTOF10: 3

## 2020-08-29 NOTE — PROGRESS NOTES
Shift assessment completed. Pt had active/ hypoactive bowel sounds x4. Pt states having good pain control with PCA pump. - Cathflow used at 2112 on the red line and checked an hour later and brisk blood return noted. - 2326 checked purple line blood return noted.

## 2020-08-29 NOTE — PROGRESS NOTES
Progress Note  8/28/2020 9:28 PM  Subjective:   Admit Date: 8/20/2020  PCP: Janis Bhardwaj MD  Interval History: No BM No Flatus yet , up in a chair remains on clear liquids and on PCA pump. Worked with PT/OT and doing well , No CP and no SOB. Diet NPO Effective Now Exceptions are: Ice Chips, Sips with Meds    Intake/Output Summary (Last 24 hours) at 8/28/2020 2128  Last data filed at 8/28/2020 1723  Gross per 24 hour   Intake 2004 ml   Output 3300 ml   Net -1296 ml     Medications:      morphine      dextrose 5% and 0.45% NaCl with KCl 20 mEq 100 mL/hr at 08/28/20 2119      warfarin  7.5 mg Oral Daily    [START ON 8/29/2020] bisacodyl  10 mg Rectal Daily    metoclopramide  5 mg Intravenous Q8H    sodium chloride flush  10 mL Intravenous 2 times per day    enoxaparin  40 mg Subcutaneous Daily    dilTIAZem  240 mg Oral Daily    pantoprazole  40 mg Oral QAM AC    gabapentin  600 mg Oral Nightly    losartan  100 mg Oral Daily    montelukast  10 mg Oral Nightly    therapeutic multivitamin-minerals  1 tablet Oral Daily    torsemide  20 mg Oral Daily     Recent Labs     08/26/20  0600 08/27/20  0538 08/28/20  0654   WBC 20.1* 15.2* 13.1*   HGB 8.3* 8.1* 8.7*    157 141     Recent Labs     08/26/20  0600 08/27/20  0538 08/28/20  0654    139 138   K 4.1 4.1 4.1   * 108* 101   CO2 25 24 25   BUN 7* 6* 6*   CREATININE 0.46* 0.42* 0.49*   GLUCOSE 106* 101* 85     Recent Labs     08/26/20  0600   AST 16   ALT 15   BILITOT 0.3   ALKPHOS 101     Troponin T: No results for input(s): TROPONINI in the last 72 hours. Pro-BNP: No results for input(s): BNP in the last 72 hours.   INR:   Recent Labs     08/27/20  0537   INR 1.2       Objective:     Vitals:    08/27/20 2205 08/28/20 0052 08/28/20 0433 08/28/20 0742   BP: (!) 113/49   (!) 111/46   Pulse: 91   91   Resp: 18 20 16 16   Temp: 98.4 °F (36.9 °C)   99 °F (37.2 °C)   TempSrc: Oral   Oral   SpO2: 100% 99% 98% 98%   Weight:       Height: General appearance: alert and cooperative with exam  Lungs: clear to auscultation bilaterally  Heart: regular rate and rhythm, S1, S2 normal, no murmur, click, rub or gallop  Abdomen: soft, non-tender; bowel sounds normal; no masses,  no organomegaly  Extremities: extremities normal, atraumatic, no cyanosis or edema  Neurologic: No obvious focal neurologic deficits. Assessment and Plan:   1. Lower GI bleeding with a 4-5 cm tubular adenoma on the ascending colon with chronic Fe deficiency anemia , S/P Right Hemicolectomy//Tubulo-Villous adenoma on Biopsy. 2. Bilateral breast Ca s/p Bilateral mastectomy s/p chemo and hormonal therapy  3. PFO with bouts of CVA with no residual and TIA on coumadin as no closure recommended. 4. HTN  5. Hypercholesterolemia. 6. Morbid Obesity  7. S/P Bilateral TKR  8. ROSEMARIE  9. Fe deficiency anemia  10. S/P L4 and L5 foraminotomy  11. Chemotherapy induced Peripheral Neuropathy  12. History of Asthma  13. With Normal LHC in 2013 after an abnormal stress lexiscan  14. DJD and eventual possible bilateral THR. Advance Directive: Full Code  DVT prophylaxis with enoxaparin 40 mg sub-Q daily. Discharge planning: Home    Principal Problem:    Adenoma of ascending colon  Active Problems:    Gastrointestinal hemorrhage    Anemia  Resolved Problems:    * No resolved hospital problems. *  1. 4-5 cm tubular adenoma on the ascending colon s/p right Hemicolectomy  2. DC q 6 hrs H and H  3. Correct coagulopathy// Re start coumadin when cleared by surgery  4. Blood work in am  5. PPI  6. Resume home meds  7. Will continue to hold coumadin , Will resume if [id number] with Surgery with lovenox bridging//coumadin re-started  8. SCD's  9. Taper down Cymbalta  10 IV Venofer 200 mg IVPB x 1 tonite  11. IVF to  Hold for now    12. Increase activity  13.  Torsemide 40 mg po BID x 4 doses then q daily    Hayder Thorne MD

## 2020-08-29 NOTE — PROGRESS NOTES
Pt Name: Rosario Easley  Medical Record Number: [id number]  Date of Birth 1955   Admit date 2020 12:16 PM  Today's Date: 2020     ASSESSMENT  1. Post op day # 4  2. Rosario Easley had Procedure(s):  RIGHT COLECTOMY  3.  Nausea vomiting after laxative last night    PLAN  1. No laxatives or Coumadin  2. NG tube  3. Abdominal x-ray    SUBJECTIVE  Chief complaint: Severe nausea and vomiting after being given a laxative by her primary care  Afebrile, vital signs are stable. has passed flatus and had a bowel movement. She is tolerating a Diet NPO Effective Now Exceptions are: Ice Chips, Sips with Meds. Her pain is well controlled on current medications. She has been ambulating in the halls. has a past medical history of Arthritis, Asthma, Cancer ([address] GERD (gastroesophageal reflux disease), Hiatal hernia, History of blood transfusion, Hypercholesteremia, Hyperlipidemia, Hypertension, and Patent foramen ovale. CURRENT MEDS  Scheduled Meds:   bisacodyl  10 mg Rectal Daily    metoclopramide  5 mg Intravenous Q8H    sodium chloride flush  10 mL Intravenous 2 times per day    enoxaparin  40 mg Subcutaneous Daily    dilTIAZem  240 mg Oral Daily    pantoprazole  40 mg Oral QAM AC    gabapentin  600 mg Oral Nightly    losartan  100 mg Oral Daily    montelukast  10 mg Oral Nightly    therapeutic multivitamin-minerals  1 tablet Oral Daily    torsemide  20 mg Oral Daily     Continuous Infusions:   dextrose 5 % and 0.9 % NaCl 100 mL/hr at 20 1254    morphine       PRN Meds:.bisacodyl, ondansetron, naloxone, sodium chloride flush, albuterol, acetaminophen    OBJECTIVE  CURRENT VITALS:  height is 5' 1\" (1.549 m) and weight is 233 lb (105.7 kg). Her oral temperature is 98.3 °F (36.8 °C). Her blood pressure is 101/56 (abnormal) and her pulse is 83. Her respiration is 17 and oxygen saturation is 94%.    Temperature Range (24h):Temp: 98.3 °F (36.8 °C) Temp  Av.2 °F (36.8 °C)  Min: 97.7 °F (36.5 °C)  Max: 99.3 °F (37.4 °C)  BP Range (62M): Systolic (48CFA), WAJ:865 , Min:97 , LGZ:621     Diastolic (69OUY), BSK:91, Min:42, Max:58    Pulse Range (24h): Pulse  Av.7  Min: 80  Max: 102  Respiration Range (24h): Resp  Av  Min: 15  Max: 18    GENERAL: alert, no distress  LUNGS: clear to ausculation, without wheezes, rales or rhonci  HEART: normal rate and regular rhythm  ABDOMEN: distended, soft, incisional tenderness, bowel sounds present in all 4 quadrants and no guarding or peritoneal signs  INCISION: healing well, no significant drainage, no significant erythema  EXTERMITY: no cyanosis, clubbing or edema    In: 3103.3 [I.V.:3103.3]  Out: 650 [Urine:650]  Date 20 0000 - 20 2359   Shift 7683-7070 0252-5098 2463-7534 24 Hour Total   INTAKE   I.V.(mL/kg) 1159.3(11)   1159.3(11)   Shift Total(mL/kg) 1159.3(11)   1159.3(11)   OUTPUT   Urine(mL/kg/hr) 450(0.5) 200  650   Shift Total(mL/kg) 450(4.3) 200(1.9)  650(6.2)   Weight (kg) 105.7 105.7 105.7 105.7       LABS  Recent Labs     20  0538 20  0654 20  0534 20  0537   WBC 15.2* 13.1*  --  12.6*   HGB 8.1* 8.7*  --  7.8*   HCT 25.4* 29.1*  --  25.3*    141  --  172    138 132*  --    K 4.1 4.1 5.3*  --    * 101 99  --    CO2 24 25 26  --    BUN 6* 6* 9  --    CREATININE 0.42* 0.49* 0.70  --    MG 1.8 2.4 1.8  --    PHOS 2.1* 3.8 3.0  --    CALCIUM 9.6 9.6 8.8  --       Recent Labs     20  0537 20  0535   INR 1.2 1.2     No results for input(s): AST, ALT, BILITOT, BILIDIR, AMYLASE, LIPASE, LDH, LACTA in the last 72 hours. RADIOLOGY  Ir Picc Wo Sq Port/pump > 5 Years    Result Date: 2020  PERIPHERALLY INSERTED CENTRAL CATHETER (PICC) PLACEMENT WITH ULTRASOUND GUIDANCE CLINICAL HISTORY:  REQUIRES VASCULAR ACCESS After discussing the procedure and possible complications with the patient, informed consent was obtained. The patient was placed on the Special Procedures table.   The right upper extremity was sterilely prepared using a maximal sterile barrier technique which includes cap, mask, sterile gown, sterile gloves, sterile full-body drape, hand hygiene and 2% chlorhexidine for cutaneous antisepsis. A sterile ultrasound technique with sterile gel and sterile probe covers was also utilized. A pre-procedure time out was performed in order to assure the correct patient and procedure. Local anesthetic was administered. A peripheral vein was accessed with sonographic guidance. A sonographic spot image was obtained for documentation. A guidewire was advanced into the vein with fluoroscopic guidance and a sheath was placed over the guidewire. A 5-Belizean dual-lumen PICC was advanced through the sheath, up the arm and into the central vasculature. It was positioned appropriately. The sheath was removed. The catheter was shown to aspirate and infuse properly. The flange of the catheter was affixed to the arm using a PICC securement device. A spot image of the chest showed the tip of the PICC line to lie in the superior vena cava. The patient tolerated the procedure well and without complications. Number of films: 3 Fluoroscopy time: 106 seconds CONCLUSION: SUCCESSFUL RIGHT PICC PLACEMENT WITHOUT IMMEDIATE COMPLICATIONS. Xr Chest Abdomen Ng Placement    Result Date: 8/25/2020  EXAMINATION: XR CHEST ABDOMEN NG PLACEMENT CLINICAL HISTORY: NASOGASTRIC TUBE PLACEMENT COMPARISONS: DECEMBER 19, 2018 FINDINGS: Tip of nasogastric tube identified in caudal esophagus proximal to the gastroesophageal junction. Multiple surgical clips right and left axilla. Terminus PICC line within superior vena cava cava. Cardiopericardial silhouette normal. Lungs clear. TERMINUS NASOGASTRIC TUBE, CAUDAL ESOPHAGUS. RECOMMEND ADVANCING NASOGASTRIC TUBE, 20 CM.     Electronically signed by Adair Marquez MD on 8/29/2020 at 1:43 PM

## 2020-08-29 NOTE — PROGRESS NOTES
Pt. Vital signs obtained  /56 on lower left arm. Other VS WNL. Up in chair resting comfortably. Denies any needs at this time. Cooperativewith  assessment.

## 2020-08-29 NOTE — PROGRESS NOTES
Patient is alert and oriented times four. She was assessed and charted on by this RN. Patient did have nausea and vomiting this afternoon and did not feel right since receiving oral laxatives. After putting an ng tube in she felt no nausea. Patient did have to small bowel movements that were brownish green in color and loose. Her midline dressing is clean and dry and she has hypoactive bowel sounds. I did remove her brink and she had 300 of clear yellow urine but still awaiting her to urinate. Her pain is tolerated with iv morphine PCA pump. Reglan is being scheduled. She is sleeping at this time. No new complaints.

## 2020-08-30 PROCEDURE — 1210000000 HC MED SURG R&B

## 2020-08-30 PROCEDURE — 2580000003 HC RX 258: Performed by: INTERNAL MEDICINE

## 2020-08-30 PROCEDURE — 6370000000 HC RX 637 (ALT 250 FOR IP): Performed by: INTERNAL MEDICINE

## 2020-08-30 PROCEDURE — 6360000002 HC RX W HCPCS: Performed by: SURGERY

## 2020-08-30 PROCEDURE — 6370000000 HC RX 637 (ALT 250 FOR IP): Performed by: SURGERY

## 2020-08-30 PROCEDURE — 99024 POSTOP FOLLOW-UP VISIT: CPT | Performed by: SURGERY

## 2020-08-30 PROCEDURE — C9113 INJ PANTOPRAZOLE SODIUM, VIA: HCPCS | Performed by: INTERNAL MEDICINE

## 2020-08-30 PROCEDURE — 6360000002 HC RX W HCPCS: Performed by: INTERNAL MEDICINE

## 2020-08-30 PROCEDURE — 2580000003 HC RX 258: Performed by: SURGERY

## 2020-08-30 PROCEDURE — 2700000000 HC OXYGEN THERAPY PER DAY

## 2020-08-30 RX ORDER — METOCLOPRAMIDE HYDROCHLORIDE 5 MG/ML
10 INJECTION INTRAMUSCULAR; INTRAVENOUS EVERY 8 HOURS
Status: DISCONTINUED | OUTPATIENT
Start: 2020-08-30 | End: 2020-09-02

## 2020-08-30 RX ORDER — PANTOPRAZOLE SODIUM 40 MG/10ML
40 INJECTION, POWDER, LYOPHILIZED, FOR SOLUTION INTRAVENOUS DAILY
Status: DISCONTINUED | OUTPATIENT
Start: 2020-08-30 | End: 2020-09-02

## 2020-08-30 RX ORDER — SODIUM CHLORIDE 9 MG/ML
10 INJECTION INTRAVENOUS DAILY
Status: DISCONTINUED | OUTPATIENT
Start: 2020-08-30 | End: 2020-09-03 | Stop reason: HOSPADM

## 2020-08-30 RX ORDER — MAGNESIUM SULFATE IN WATER 40 MG/ML
2 INJECTION, SOLUTION INTRAVENOUS ONCE
Status: DISCONTINUED | OUTPATIENT
Start: 2020-08-30 | End: 2020-09-02 | Stop reason: SDUPTHER

## 2020-08-30 RX ADMIN — LOSARTAN POTASSIUM 100 MG: 50 TABLET, FILM COATED ORAL at 09:10

## 2020-08-30 RX ADMIN — GABAPENTIN 600 MG: 300 CAPSULE ORAL at 21:34

## 2020-08-30 RX ADMIN — MULTIPLE VITAMINS W/ MINERALS TAB 1 TABLET: TAB at 09:10

## 2020-08-30 RX ADMIN — MONTELUKAST 10 MG: 10 TABLET, FILM COATED ORAL at 21:34

## 2020-08-30 RX ADMIN — DEXTROSE AND SODIUM CHLORIDE: 5; 900 INJECTION, SOLUTION INTRAVENOUS at 21:34

## 2020-08-30 RX ADMIN — DEXTROSE AND SODIUM CHLORIDE: 5; 900 INJECTION, SOLUTION INTRAVENOUS at 09:29

## 2020-08-30 RX ADMIN — TORSEMIDE 20 MG: 20 TABLET ORAL at 09:10

## 2020-08-30 RX ADMIN — BENZOCAINE AND MENTHOL, UNSPECIFIED FORM 1 LOZENGE: 15; 3.6 LOZENGE ORAL at 01:28

## 2020-08-30 RX ADMIN — METOCLOPRAMIDE HYDROCHLORIDE 10 MG: 5 INJECTION INTRAMUSCULAR; INTRAVENOUS at 13:02

## 2020-08-30 RX ADMIN — MORPHINE SULFATE: 1 INJECTION INTRAVENOUS at 09:08

## 2020-08-30 RX ADMIN — BISACODYL 10 MG: 10 SUPPOSITORY RECTAL at 09:10

## 2020-08-30 RX ADMIN — Medication 10 ML: at 09:10

## 2020-08-30 RX ADMIN — PANTOPRAZOLE SODIUM 40 MG: 40 INJECTION, POWDER, FOR SOLUTION INTRAVENOUS at 09:09

## 2020-08-30 RX ADMIN — DILTIAZEM HYDROCHLORIDE 240 MG: 240 CAPSULE, COATED, EXTENDED RELEASE ORAL at 09:10

## 2020-08-30 RX ADMIN — ENOXAPARIN SODIUM 40 MG: 40 INJECTION SUBCUTANEOUS at 09:09

## 2020-08-30 RX ADMIN — SODIUM CHLORIDE 300 MG: 9 INJECTION, SOLUTION INTRAVENOUS at 01:29

## 2020-08-30 RX ADMIN — PHENOL 1 SPRAY: 1.5 LIQUID ORAL at 17:45

## 2020-08-30 RX ADMIN — Medication 10 ML: at 21:36

## 2020-08-30 RX ADMIN — METOCLOPRAMIDE HYDROCHLORIDE 10 MG: 5 INJECTION INTRAMUSCULAR; INTRAVENOUS at 21:36

## 2020-08-30 RX ADMIN — METOCLOPRAMIDE HYDROCHLORIDE 10 MG: 5 INJECTION INTRAMUSCULAR; INTRAVENOUS at 03:56

## 2020-08-30 ASSESSMENT — PAIN SCALES - GENERAL
PAINLEVEL_OUTOF10: 0
PAINLEVEL_OUTOF10: 1

## 2020-08-30 NOTE — PROGRESS NOTES
Assessment complete. Alert and oriented. Patient denies SOB and dizziness at this time. She has pain 2/10, which she states is well controlled by the PCA pump. She is also experiencing some throat discomfort from the NG tube, for which she has been receiving the Cepacol sore throat lozenge per order. She denies other needs at this time. Call light within reach. Safety maintained. Will continue to monitor.  Electronically signed by Xavi Calderon RN on 8/29/2020 at 11:50 PM

## 2020-08-30 NOTE — PROGRESS NOTES
Pt Name: Yonathan Beth  Medical Record Number: [id number]  Date of Birth 1955   Admit date 8/20/2020 12:16 PM  Today's Date: 8/30/2020     ASSESSMENT  1. Post op day # 5  2. Yonathan Beth had Procedure(s):  RIGHT COLECTOMY  3. Doing much better today    PLAN  1. Clamp NGT  2. Throat spray and lozenges    SUBJECTIVE  Chief complaint: sore throat  Afebrile, vital signs are stable. She denies any nausea or vomiting, has passed flatus and had several bowel movements. She is tolerating a Diet NPO Effective Now Exceptions are: Ice Chips, Sips with Meds. Her pain is well controlled on current medications. She has been ambulating in the halls. has a past medical history of Arthritis, Asthma, Cancer ([address] GERD (gastroesophageal reflux disease), Hiatal hernia, History of blood transfusion, Hypercholesteremia, Hyperlipidemia, Hypertension, and Patent foramen ovale. CURRENT MEDS  Scheduled Meds:   magnesium sulfate  2 g Intravenous Once    metoclopramide  10 mg Intravenous Q8H    pantoprazole  40 mg Intravenous Daily    And    sodium chloride (PF)  10 mL Intravenous Daily    alteplase  1 mg Intracatheter Once    bisacodyl  10 mg Rectal Daily    sodium chloride flush  10 mL Intravenous 2 times per day    enoxaparin  40 mg Subcutaneous Daily    dilTIAZem  240 mg Oral Daily    gabapentin  600 mg Oral Nightly    losartan  100 mg Oral Daily    montelukast  10 mg Oral Nightly    therapeutic multivitamin-minerals  1 tablet Oral Daily    torsemide  20 mg Oral Daily     Continuous Infusions:   dextrose 5 % and 0.9 % NaCl 100 mL/hr at 08/30/20 0929    morphine       PRN Meds:.phenol, bisacodyl, ondansetron, benzocaine-menthol, naloxone, sodium chloride flush, albuterol, acetaminophen    OBJECTIVE  CURRENT VITALS:  height is 5' 1\" (1.549 m) and weight is 233 lb (105.7 kg). Her oral temperature is 98.2 °F (36.8 °C). Her blood pressure is 123/56 (abnormal) and her pulse is 86.  Her respiration is 16 and oxygen saturation is 100%. Temperature Range (24h):Temp: 98.2 °F (36.8 °C) Temp  Av.1 °F (36.7 °C)  Min: 97.9 °F (36.6 °C)  Max: 98.2 °F (36.8 °C)  BP Range (26X): Systolic (92NUB), JKW:805 , Min:123 , BHAVIK:169     Diastolic (04TDY), XXZ:13, Min:53, Max:56    Pulse Range (24h): Pulse  Av.5  Min: 83  Max: 86  Respiration Range (24h): Resp  Av  Min: 16  Max: 18    GENERAL: alert, no distress, sitting in a chair with NG tube in place  LUNGS: clear to ausculation, without wheezes, rales or rhonci  HEART: normal rate and regular rhythm  ABDOMEN: distended, soft, incisional tenderness, bowel sounds present in all 4 quadrants and no guarding or peritoneal signs  INCISION: healing well, no significant drainage, no significant erythema  EXTERMITY: no cyanosis, clubbing or edema    In: 1760 [P.O.:30; I.V.:1730]  Out: 900 [Urine:300]  Date 20 0000 - 20 2359   Shift 5851-6325 6652-3227 2405-5923 24 Hour Total   INTAKE   P.O.(mL/kg/hr) 30(0)   30   I. V.(mL/kg) 8027(77.4)   6483(39.6)   Shift Total(mL/kg) 4302(12.8)   8671(14.3)   OUTPUT   Urine(mL/kg/hr) 300(0.4)   300   Emesis/NG output(mL/kg) 600(5.7)   600(5.7)   Other(mL/kg) 0(0)   0(0)   Stool(mL/kg) 0(0)   0(0)   Blood(mL/kg) 0(0)   0(0)   Shift Total(mL/kg) 900(8.5)   900(8.5)   Weight (kg) 105.7 105.7 105.7 105.7       LABS  Recent Labs     20  0654 20  0534 20  0537   WBC 13.1*  --  12.6*   HGB 8.7*  --  7.8*   HCT 29.1*  --  25.3*     --  172    132*  --    K 4.1 5.3*  --     99  --    CO2 25 26  --    BUN 6* 9  --    CREATININE 0.49* 0.70  --    MG 2.4 1.8  --    PHOS 3.8 3.0  --    CALCIUM 9.6 8.8  --       Recent Labs     20  0535   INR 1.2     No results for input(s): AST, ALT, BILITOT, BILIDIR, AMYLASE, LIPASE, LDH, LACTA in the last 72 hours.     RADIOLOGY  Ir Picc Wo Sq Port/pump > 5 Years    Result Date: 2020  PERIPHERALLY INSERTED CENTRAL CATHETER (PICC) PLACEMENT WITH ULTRASOUND GUIDANCE CLINICAL HISTORY:  REQUIRES VASCULAR ACCESS After discussing the procedure and possible complications with the patient, informed consent was obtained. The patient was placed on the Special Procedures table. The right upper extremity was sterilely prepared using a maximal sterile barrier technique which includes cap, mask, sterile gown, sterile gloves, sterile full-body drape, hand hygiene and 2% chlorhexidine for cutaneous antisepsis. A sterile ultrasound technique with sterile gel and sterile probe covers was also utilized. A pre-procedure time out was performed in order to assure the correct patient and procedure. Local anesthetic was administered. A peripheral vein was accessed with sonographic guidance. A sonographic spot image was obtained for documentation. A guidewire was advanced into the vein with fluoroscopic guidance and a sheath was placed over the guidewire. A 5-Arabic dual-lumen PICC was advanced through the sheath, up the arm and into the central vasculature. It was positioned appropriately. The sheath was removed. The catheter was shown to aspirate and infuse properly. The flange of the catheter was affixed to the arm using a PICC securement device. A spot image of the chest showed the tip of the PICC line to lie in the superior vena cava. The patient tolerated the procedure well and without complications. Number of films: 3 Fluoroscopy time: 106 seconds CONCLUSION: SUCCESSFUL RIGHT PICC PLACEMENT WITHOUT IMMEDIATE COMPLICATIONS. Xr Chest Abdomen Ng Placement    Result Date: 8/29/2020  XR CHEST ABDOMEN NG PLACEMENT : 8/29/2020 1:50 PM CLINICAL HISTORY:  NGT placement, post op N/V . COMPARISON: None available. TECHNIQUE: ROUTINE FINDINGS: A nasogastric tube is noted with its tip overlying the fundus of the stomach. Mild to moderate small and large bowel gas in the upper abdomen appears similar to the prior study.  Shallow inspiratory volumes are present, with probable bibasilar atelectasis and small pleural effusions. No other significant change from 8/25/2020 is identified. A right upper extremity PICC catheter remains in expected position. Surgical clips in both axillae are again noted. NASOGASTRIC TUBE IN EXPECTED POSITION. PROBABLE MILD BIBASILAR ATELECTASIS AND SMALL PLEURAL EFFUSIONS. NO OTHER SIGNIFICANT CHANGE FROM 8/25/2020 IDENTIFIED. Xr Chest Abdomen Ng Placement    Result Date: 8/25/2020  EXAMINATION: XR CHEST ABDOMEN NG PLACEMENT CLINICAL HISTORY: NASOGASTRIC TUBE PLACEMENT COMPARISONS: DECEMBER 19, 2018 FINDINGS: Tip of nasogastric tube identified in caudal esophagus proximal to the gastroesophageal junction. Multiple surgical clips right and left axilla. Terminus PICC line within superior vena cava cava. Cardiopericardial silhouette normal. Lungs clear. TERMINUS NASOGASTRIC TUBE, CAUDAL ESOPHAGUS. RECOMMEND ADVANCING NASOGASTRIC TUBE, 20 CM.     Electronically signed by Nichole Montaño MD on 8/30/2020 at 4:52 PM

## 2020-08-30 NOTE — PROGRESS NOTES
Progress Note  8/29/2020 11:24 PM  Subjective:   Admit Date: 8/20/2020  PCP: Loyda Traylor MD  Interval History: Had bouts of N and V and was relieved with NGT placement. Small liquidy stool as per RN and bowels remains hypoactive on PCA pump. Still remains on ice chips and sips with meds. . Worsening anemia, dilutional but noted with severe Fe deficiency and will be replenished. Diet NPO Effective Now Exceptions are: Ice Chips, Sips with Meds    Intake/Output Summary (Last 24 hours) at 8/29/2020 2324  Last data filed at 8/29/2020 1914  Gross per 24 hour   Intake 2278.34 ml   Output 1700 ml   Net 578.34 ml     Medications:      dextrose 5 % and 0.9 % NaCl 100 mL/hr at 08/29/20 2255    morphine        bisacodyl  10 mg Rectal Daily    metoclopramide  5 mg Intravenous Q8H    sodium chloride flush  10 mL Intravenous 2 times per day    enoxaparin  40 mg Subcutaneous Daily    dilTIAZem  240 mg Oral Daily    pantoprazole  40 mg Oral QAM AC    gabapentin  600 mg Oral Nightly    losartan  100 mg Oral Daily    montelukast  10 mg Oral Nightly    therapeutic multivitamin-minerals  1 tablet Oral Daily    torsemide  20 mg Oral Daily     Recent Labs     08/27/20  0538 08/28/20  0654 08/29/20  0537   WBC 15.2* 13.1* 12.6*   HGB 8.1* 8.7* 7.8*    141 172     Recent Labs     08/27/20  0538 08/28/20  0654 08/29/20  0534    138 132*   K 4.1 4.1 5.3*   * 101 99   CO2 24 25 26   BUN 6* 6* 9   CREATININE 0.42* 0.49* 0.70   GLUCOSE 101* 85 398*     No results for input(s): AST, ALT, ALB, BILITOT, ALKPHOS in the last 72 hours. Troponin T: No results for input(s): TROPONINI in the last 72 hours. Pro-BNP: No results for input(s): BNP in the last 72 hours.   INR:   Recent Labs     08/27/20  0537 08/29/20  0535   INR 1.2 1.2       Objective:     Vitals:    08/29/20 1155 08/29/20 1200 08/29/20 1648 08/29/20 1959   BP: (!) 123/56 (!) 101/56  (!) 127/53   Pulse: 97 83  83   Resp:  17  18   Temp: 98.2 °F (36.8 °C) 98.3 °F (36.8 °C)  97.9 °F (36.6 °C)   TempSrc:  Oral  Oral   SpO2:   94% 100%   Weight:       Height:         General appearance: alert and cooperative with exam  Lungs: clear to auscultation bilaterally  Heart: regular rate and rhythm, S1, S2 normal, no murmur, click, rub or gallop  Abdomen: soft, non-tender; bowel sounds normal; no masses,  no organomegaly Hypoactive bowel sounds  Extremities: extremities normal, atraumatic, no cyanosis or edema  Neurologic: No obvious focal neurologic deficits. Assessment and Plan:   1. Lower GI bleeding with a 4-5 cm tubular adenoma on the ascending colon with chronic Fe deficiency anemia , S/P Right Hemicolectomy//Tubulo-Villous adenoma on Biopsy. 2. Bilateral breast Ca s/p Bilateral mastectomy s/p chemo and hormonal therapy  3. PFO with bouts of CVA with no residual and TIA on coumadin as no closure recommended. 4. HTN  5. Hypercholesterolemia. 6. Morbid Obesity  7. S/P Bilateral TKR  8. ROSEMARIE  9. Fe deficiency anemia  10. S/P L4 and L5 foraminotomy  11. Chemotherapy induced Peripheral Neuropathy  12. History of Asthma  13. With Normal LHC in 2013 after an abnormal stress lexiscan  14. DJD and eventual possible bilateral THR. Advance Directive: Full Code  DVT prophylaxis with enoxaparin 40 mg sub-Q daily. Discharge planning: Home    Principal Problem:    Adenoma of ascending colon  Active Problems:    Gastrointestinal hemorrhage    Anemia  Resolved Problems:    * No resolved hospital problems. *  1. 4-5 cm tubular adenoma on the ascending colon s/p right Hemicolectomy  2. DC q 6 hrs H and H  3. Correct coagulopathy// Re start coumadin when cleared by surgery  4. Blood work in am  5. PPI  6. Resume home meds  7. Will continue to hold coumadin , Will resume if Samir Cull with Surgery with lovenox bridging//coumadin re-started will follow-up PT/INR  8. SCD's  9. Taper down Cymbalta  10 IV Venofer 200 mg IVPB x 1 tonite  11. IVF to  Hold for now    12.  Increase

## 2020-08-30 NOTE — PROGRESS NOTES
Shift assessment completed. Pt is A&O x4. Pt having minimal pain in abdomen, 2/10. Denies N/V. Abdomen in soft and tender. Aquacel is C/D/I. Bowel sounds hypoactive. Pt did have small loose BM this morning. Pt moving from bedside commode to chair with no assistance. Call light is within reach, will continue to monitor. Fall precautions in place. NG clamped at 2pm pt tolerated well.       E. Electronically signed by Daniella Madrid RN on 8/30/2020 at 2:04 PM

## 2020-08-31 LAB
ANION GAP SERPL CALCULATED.3IONS-SCNC: 7 MEQ/L (ref 9–15)
ANISOCYTOSIS: ABNORMAL
BASOPHILS ABSOLUTE: 0.1 K/UL (ref 0–0.2)
BASOPHILS RELATIVE PERCENT: 0.7 %
BUN BLDV-MCNC: 5 MG/DL (ref 8–23)
CALCIUM SERPL-MCNC: 9.6 MG/DL (ref 8.5–9.9)
CHLORIDE BLD-SCNC: 106 MEQ/L (ref 95–107)
CO2: 29 MEQ/L (ref 20–31)
CREAT SERPL-MCNC: 0.47 MG/DL (ref 0.5–0.9)
EOSINOPHILS ABSOLUTE: 0.8 K/UL (ref 0–0.7)
EOSINOPHILS RELATIVE PERCENT: 8.1 %
GFR AFRICAN AMERICAN: >60
GFR NON-AFRICAN AMERICAN: >60
GLUCOSE BLD-MCNC: 96 MG/DL (ref 70–99)
HCT VFR BLD CALC: 25.3 % (ref 37–47)
HEMOGLOBIN: 8 G/DL (ref 12–16)
HYPOCHROMIA: ABNORMAL
LYMPHOCYTES ABSOLUTE: 0.8 K/UL (ref 1–4.8)
LYMPHOCYTES RELATIVE PERCENT: 8.2 %
MAGNESIUM: 1.9 MG/DL (ref 1.7–2.4)
MCH RBC QN AUTO: 24.9 PG (ref 27–31.3)
MCHC RBC AUTO-ENTMCNC: 31.6 % (ref 33–37)
MCV RBC AUTO: 78.9 FL (ref 82–100)
MICROCYTES: ABNORMAL
MONOCYTES ABSOLUTE: 1.3 K/UL (ref 0.2–0.8)
MONOCYTES RELATIVE PERCENT: 13.4 %
NEUTROPHILS ABSOLUTE: 6.6 K/UL (ref 1.4–6.5)
NEUTROPHILS RELATIVE PERCENT: 69.6 %
OVALOCYTES: ABNORMAL
PDW BLD-RTO: 25.6 % (ref 11.5–14.5)
PHOSPHORUS: 2.1 MG/DL (ref 2.3–4.8)
PLATELET # BLD: 188 K/UL (ref 130–400)
PLATELET SLIDE REVIEW: NORMAL
POIKILOCYTES: ABNORMAL
POTASSIUM SERPL-SCNC: 3.3 MEQ/L (ref 3.4–4.9)
RBC # BLD: 3.21 M/UL (ref 4.2–5.4)
SODIUM BLD-SCNC: 142 MEQ/L (ref 135–144)
TARGET CELLS: ABNORMAL
TEAR DROP CELLS: ABNORMAL
WBC # BLD: 9.5 K/UL (ref 4.8–10.8)

## 2020-08-31 PROCEDURE — 6360000002 HC RX W HCPCS: Performed by: SURGERY

## 2020-08-31 PROCEDURE — 80048 BASIC METABOLIC PNL TOTAL CA: CPT

## 2020-08-31 PROCEDURE — 99024 POSTOP FOLLOW-UP VISIT: CPT | Performed by: SURGERY

## 2020-08-31 PROCEDURE — 84100 ASSAY OF PHOSPHORUS: CPT

## 2020-08-31 PROCEDURE — 2700000000 HC OXYGEN THERAPY PER DAY

## 2020-08-31 PROCEDURE — 97116 GAIT TRAINING THERAPY: CPT

## 2020-08-31 PROCEDURE — 2500000003 HC RX 250 WO HCPCS: Performed by: INTERNAL MEDICINE

## 2020-08-31 PROCEDURE — 2580000003 HC RX 258: Performed by: SURGERY

## 2020-08-31 PROCEDURE — 36592 COLLECT BLOOD FROM PICC: CPT

## 2020-08-31 PROCEDURE — C9113 INJ PANTOPRAZOLE SODIUM, VIA: HCPCS | Performed by: INTERNAL MEDICINE

## 2020-08-31 PROCEDURE — 85025 COMPLETE CBC W/AUTO DIFF WBC: CPT

## 2020-08-31 PROCEDURE — 6370000000 HC RX 637 (ALT 250 FOR IP): Performed by: SURGERY

## 2020-08-31 PROCEDURE — 1210000000 HC MED SURG R&B

## 2020-08-31 PROCEDURE — 83735 ASSAY OF MAGNESIUM: CPT

## 2020-08-31 PROCEDURE — 6370000000 HC RX 637 (ALT 250 FOR IP): Performed by: INTERNAL MEDICINE

## 2020-08-31 PROCEDURE — 97110 THERAPEUTIC EXERCISES: CPT

## 2020-08-31 PROCEDURE — 36415 COLL VENOUS BLD VENIPUNCTURE: CPT

## 2020-08-31 PROCEDURE — 2580000003 HC RX 258: Performed by: INTERNAL MEDICINE

## 2020-08-31 PROCEDURE — 6360000002 HC RX W HCPCS: Performed by: INTERNAL MEDICINE

## 2020-08-31 RX ADMIN — METOCLOPRAMIDE HYDROCHLORIDE 10 MG: 5 INJECTION INTRAMUSCULAR; INTRAVENOUS at 21:03

## 2020-08-31 RX ADMIN — Medication 10 ML: at 08:54

## 2020-08-31 RX ADMIN — PANTOPRAZOLE SODIUM 40 MG: 40 INJECTION, POWDER, FOR SOLUTION INTRAVENOUS at 08:53

## 2020-08-31 RX ADMIN — MORPHINE SULFATE: 1 INJECTION INTRAVENOUS at 08:48

## 2020-08-31 RX ADMIN — METOCLOPRAMIDE HYDROCHLORIDE 10 MG: 5 INJECTION INTRAMUSCULAR; INTRAVENOUS at 05:06

## 2020-08-31 RX ADMIN — DILTIAZEM HYDROCHLORIDE 240 MG: 240 CAPSULE, COATED, EXTENDED RELEASE ORAL at 08:53

## 2020-08-31 RX ADMIN — MONTELUKAST 10 MG: 10 TABLET, FILM COATED ORAL at 21:00

## 2020-08-31 RX ADMIN — GABAPENTIN 600 MG: 300 CAPSULE ORAL at 21:00

## 2020-08-31 RX ADMIN — Medication 10 ML: at 21:12

## 2020-08-31 RX ADMIN — ENOXAPARIN SODIUM 40 MG: 40 INJECTION SUBCUTANEOUS at 08:53

## 2020-08-31 RX ADMIN — TORSEMIDE 20 MG: 20 TABLET ORAL at 08:53

## 2020-08-31 RX ADMIN — DEXTROSE AND SODIUM CHLORIDE: 5; 900 INJECTION, SOLUTION INTRAVENOUS at 18:00

## 2020-08-31 RX ADMIN — Medication 10 ML: at 08:53

## 2020-08-31 RX ADMIN — DEXTROSE AND SODIUM CHLORIDE: 5; 900 INJECTION, SOLUTION INTRAVENOUS at 06:09

## 2020-08-31 RX ADMIN — MULTIPLE VITAMINS W/ MINERALS TAB 1 TABLET: TAB at 08:53

## 2020-08-31 RX ADMIN — POTASSIUM PHOSPHATE, MONOBASIC POTASSIUM PHOSPHATE, DIBASIC 30 MMOL: 224; 236 INJECTION, SOLUTION, CONCENTRATE INTRAVENOUS at 21:06

## 2020-08-31 RX ADMIN — LOSARTAN POTASSIUM 100 MG: 50 TABLET, FILM COATED ORAL at 08:53

## 2020-08-31 ASSESSMENT — PAIN DESCRIPTION - PROGRESSION: CLINICAL_PROGRESSION: GRADUALLY IMPROVING

## 2020-08-31 ASSESSMENT — PAIN SCALES - GENERAL
PAINLEVEL_OUTOF10: 0
PAINLEVEL_OUTOF10: 0
PAINLEVEL_OUTOF10: 1
PAINLEVEL_OUTOF10: 1

## 2020-08-31 ASSESSMENT — PAIN DESCRIPTION - DESCRIPTORS: DESCRIPTORS: ACHING

## 2020-08-31 ASSESSMENT — PAIN DESCRIPTION - ORIENTATION: ORIENTATION: MID

## 2020-08-31 ASSESSMENT — PAIN DESCRIPTION - ONSET: ONSET: ON-GOING

## 2020-08-31 ASSESSMENT — PAIN DESCRIPTION - FREQUENCY: FREQUENCY: CONTINUOUS

## 2020-08-31 ASSESSMENT — PAIN - FUNCTIONAL ASSESSMENT: PAIN_FUNCTIONAL_ASSESSMENT: PREVENTS OR INTERFERES WITH MANY ACTIVE NOT PASSIVE ACTIVITIES

## 2020-08-31 ASSESSMENT — PAIN DESCRIPTION - PAIN TYPE: TYPE: SURGICAL PAIN

## 2020-08-31 ASSESSMENT — PAIN DESCRIPTION - LOCATION: LOCATION: ABDOMEN

## 2020-08-31 NOTE — PROGRESS NOTES
Physical Therapy Rehab Treatment Note  Facility/Department: [facility] MED SURG UNIT  [facility]       NAME: Lali Menezes  : 1955 (89 y.o.)  MRN: [id number]  CODE STATUS: Full Code    Date of Service: 2020  Chart Reviewed: Yes  Family / Caregiver Present: No    Restrictions:  Restrictions/Precautions: Fall Risk  Position Activity Restriction  Other position/activity restrictions: general abdominal precautions       SUBJECTIVE: Subjective: \"I'm feeling better. \"  Pain Screening  Patient Currently in Pain: Denies  Pre Treatment Pain Screening  Pain at present: 0  Intervention List: Patient able to continue with treatment    Post Treatment Pain Screening:  Pain Assessment  Pain Level: 0    OBJECTIVE:     Neuromuscular Education  PNF: Static and dynamic balance with reaching OOBOS and no UE support    Transfers  Sit to Stand: Supervision  Stand to sit: Supervision  Comment: VC's for hand placement    Ambulation  Ambulation?: Yes  Ambulation 1  Surface: level tile  Device: Single point cane  Assistance: Contact guard assistance  Quality of Gait: wbos, lateral sway, decreased rodrigo heel strike and foot clearance  Gait Deviations: Slow Renetta; Increased ERIN; Decreased step length;Decreased step height  Distance: 125'  Comments: fatigued post ambulation, SPO2 reading 99%    Exercises  Gluteal Sets: x10  Hip Flexion: x10  Knee Long Arc Quad: x10  Ankle Pumps: x10     ASSESSMENT/COMMENTS:  Assessment: Pt displays an increase in strength and endurance secondary to increasing ambulatory distance. Pt fatigued post ambulation while using SPC. Educated pt on energy conservation and recommend using [employer] for now to be able to continue to improve endurance and ambulate longer distances. Seated ther ex utilized to help increase strength in BLE and improve overall functional mobility. PLAN OF CARE/Safety:   Plan Comment: Cont. POC  Safety Devices  Type of devices:  All fall risk precautions in place;Call light within reach;Chair alarm in place; Left in chair      Therapy Time:   Individual   Time In 1129   Time Out 1154   Minutes 25     Minutes:      Transfer/Bed mobility trainin      Gait trainin      Neuro re education: 5     Therapeutic ex: [facility], [facility], 20 at 12:06 PM

## 2020-08-31 NOTE — PROGRESS NOTES
Pt Name: Merrie Fothergill  Medical Record Number: [id number]  Date of Birth 1955   Admit date 8/20/2020 12:16 PM  Today's Date: 8/31/2020     ASSESSMENT  1. Post op day # 6  2. Merrie Fothergill had Procedure(s):  RIGHT COLECTOMY  3.  Acute and chronic blood loss anemia stable  4. Ileus resolving    PLAN  1. Remove NGT    2. Continue Dulcolax and Reglan    SUBJECTIVE  Chief complaint: Sore throat from NG tube  Afebrile, vital signs are stable. She denies any nausea or vomiting, has passed flatus and had a bowel movement. She is tolerating a Diet NPO Effective Now Exceptions are: Ice Chips, Sips with Meds. Her pain is well controlled on current medications. She has been ambulating in the halls. has a past medical history of Arthritis, Asthma, Cancer ([address] GERD (gastroesophageal reflux disease), Hiatal hernia, History of blood transfusion, Hypercholesteremia, Hyperlipidemia, Hypertension, and Patent foramen ovale. CURRENT MEDS  Scheduled Meds:   magnesium sulfate  2 g Intravenous Once    metoclopramide  10 mg Intravenous Q8H    pantoprazole  40 mg Intravenous Daily    And    sodium chloride (PF)  10 mL Intravenous Daily    alteplase  1 mg Intracatheter Once    bisacodyl  10 mg Rectal Daily    sodium chloride flush  10 mL Intravenous 2 times per day    enoxaparin  40 mg Subcutaneous Daily    dilTIAZem  240 mg Oral Daily    gabapentin  600 mg Oral Nightly    losartan  100 mg Oral Daily    montelukast  10 mg Oral Nightly    therapeutic multivitamin-minerals  1 tablet Oral Daily    torsemide  20 mg Oral Daily     Continuous Infusions:   dextrose 5 % and 0.9 % NaCl 100 mL/hr at 08/31/20 0609    morphine       PRN Meds:.phenol, bisacodyl, ondansetron, benzocaine-menthol, naloxone, sodium chloride flush, albuterol, acetaminophen    OBJECTIVE  CURRENT VITALS:  height is 5' 1\" (1.549 m) and weight is 242 lb 8.1 oz (110 kg). Her temperature is 97.9 °F (36.6 °C).  Her blood pressure is 114/48 (abnormal) and her pulse is 78. Her respiration is 18 and oxygen saturation is 100%. Temperature Range (24h):Temp: 97.9 °F (36.6 °C) Temp  Av.4 °F (36.9 °C)  Min: 97.9 °F (36.6 °C)  Max: 99.1 °F (37.3 °C)  BP Range (36B): Systolic (29HZY), BDA:098 , Min:114 , IAW:952     Diastolic (34EKB), DLW:03, Min:46, Max:56    Pulse Range (24h): Pulse  Av  Min: 78  Max: 91  Respiration Range (24h): Resp  Av  Min: 16  Max: 18    GENERAL: alert, no distress, sitting in bed with NG tube in place  LUNGS: clear to ausculation, without wheezes, rales or rhonci  HEART: normal rate and regular rhythm  ABDOMEN: distended, soft, incisional tenderness, bowel sounds present in all 4 quadrants and no guarding or peritoneal signs  INCISION: healing well, no significant drainage, no significant erythema  EXTERMITY: no cyanosis, clubbing or edema    In: 1124 [P.O.:100; I.V.:1024]  Out: 400 [Urine:400]  Date 20 0000 - 20   Shift 2859-4833 8231-9816 0464-0190 24 Hour Total   INTAKE   P.O.(mL/kg/hr) 100   100   I. V.(mL/kg) 1024(9.3)   1024(9.3)   Shift Total(mL/kg) 1124(10.2)   1124(10.2)   OUTPUT   Urine(mL/kg/hr) 400   400   Shift Total(mL/kg) 400(3.6)   400(3.6)   Weight (kg) 110 110 110 110       LABS  Recent Labs     20  0534 20  0537 20  0545 20  0556   WBC  --  12.6* 9.5  --    HGB  --  7.8* 8.0*  --    HCT  --  25.3* 25.3*  --    PLT  --  172 188  --    *  --   --  142   K 5.3*  --   --  3.3*   CL 99  --   --  106   CO2 26  --   --  29   BUN 9  --   --  5*   CREATININE 0.70  --   --  0.47*   MG 1.8  --   --  1.9   PHOS 3.0  --   --  2.1*   CALCIUM 8.8  --   --  9.6      Recent Labs     20  0535   INR 1.2     No results for input(s): AST, ALT, BILITOT, BILIDIR, AMYLASE, LIPASE, LDH, LACTA in the last 72 hours.     RADIOLOGY  Ir Picc Wo Sq Port/pump > 5 Years    Result Date: 2020  PERIPHERALLY INSERTED CENTRAL CATHETER (PICC) PLACEMENT WITH ULTRASOUND GUIDANCE CLINICAL HISTORY: REQUIRES VASCULAR ACCESS After discussing the procedure and possible complications with the patient, informed consent was obtained. The patient was placed on the Special Procedures table. The right upper extremity was sterilely prepared using a maximal sterile barrier technique which includes cap, mask, sterile gown, sterile gloves, sterile full-body drape, hand hygiene and 2% chlorhexidine for cutaneous antisepsis. A sterile ultrasound technique with sterile gel and sterile probe covers was also utilized. A pre-procedure time out was performed in order to assure the correct patient and procedure. Local anesthetic was administered. A peripheral vein was accessed with sonographic guidance. A sonographic spot image was obtained for documentation. A guidewire was advanced into the vein with fluoroscopic guidance and a sheath was placed over the guidewire. A 5-American dual-lumen PICC was advanced through the sheath, up the arm and into the central vasculature. It was positioned appropriately. The sheath was removed. The catheter was shown to aspirate and infuse properly. The flange of the catheter was affixed to the arm using a PICC securement device. A spot image of the chest showed the tip of the PICC line to lie in the superior vena cava. The patient tolerated the procedure well and without complications. Number of films: 3 Fluoroscopy time: 106 seconds CONCLUSION: SUCCESSFUL RIGHT PICC PLACEMENT WITHOUT IMMEDIATE COMPLICATIONS. Xr Chest Abdomen Ng Placement    Result Date: 8/29/2020  XR CHEST ABDOMEN NG PLACEMENT : 8/29/2020 1:50 PM CLINICAL HISTORY:  NGT placement, post op N/V . COMPARISON: None available. TECHNIQUE: ROUTINE FINDINGS: A nasogastric tube is noted with its tip overlying the fundus of the stomach. Mild to moderate small and large bowel gas in the upper abdomen appears similar to the prior study.  Shallow inspiratory volumes are present, with probable bibasilar atelectasis and small pleural effusions. No other significant change from 8/25/2020 is identified. A right upper extremity PICC catheter remains in expected position. Surgical clips in both axillae are again noted. NASOGASTRIC TUBE IN EXPECTED POSITION. PROBABLE MILD BIBASILAR ATELECTASIS AND SMALL PLEURAL EFFUSIONS. NO OTHER SIGNIFICANT CHANGE FROM 8/25/2020 IDENTIFIED. Xr Chest Abdomen Ng Placement    Result Date: 8/25/2020  EXAMINATION: XR CHEST ABDOMEN NG PLACEMENT CLINICAL HISTORY: NASOGASTRIC TUBE PLACEMENT COMPARISONS: DECEMBER 19, 2018 FINDINGS: Tip of nasogastric tube identified in caudal esophagus proximal to the gastroesophageal junction. Multiple surgical clips right and left axilla. Terminus PICC line within superior vena cava cava. Cardiopericardial silhouette normal. Lungs clear. TERMINUS NASOGASTRIC TUBE, CAUDAL ESOPHAGUS. RECOMMEND ADVANCING NASOGASTRIC TUBE, 20 CM.     Electronically signed by Jimmie Martínez MD on 8/31/2020 at 7:28 AM

## 2020-08-31 NOTE — PROGRESS NOTES
Progress Note  8/31/2020 7:37 PM  Subjective:   Admit Date: 8/20/2020  PCP: Loyda Traylor MD  Interval History: Remains on NPO with ice chips and sips for meds. Passes BM and flatus, diet advance as per surgery. K and Phos are low and will be replenish. Was up in a chair today and ambulates with PT. Diet NPO Effective Now Exceptions are: Ice Chips, Sips with Meds    Intake/Output Summary (Last 24 hours) at 8/31/2020 1937  Last data filed at 8/31/2020 1753  Gross per 24 hour   Intake 1124 ml   Output 1700 ml   Net -576 ml     Medications:      dextrose 5 % and 0.9 % NaCl 100 mL/hr at 08/31/20 1800    morphine        magnesium sulfate  2 g Intravenous Once    metoclopramide  10 mg Intravenous Q8H    pantoprazole  40 mg Intravenous Daily    And    sodium chloride (PF)  10 mL Intravenous Daily    alteplase  1 mg Intracatheter Once    bisacodyl  10 mg Rectal Daily    sodium chloride flush  10 mL Intravenous 2 times per day    enoxaparin  40 mg Subcutaneous Daily    dilTIAZem  240 mg Oral Daily    gabapentin  600 mg Oral Nightly    losartan  100 mg Oral Daily    montelukast  10 mg Oral Nightly    therapeutic multivitamin-minerals  1 tablet Oral Daily    torsemide  20 mg Oral Daily     Recent Labs     08/29/20  0537 08/31/20  0545   WBC 12.6* 9.5   HGB 7.8* 8.0*    188     Recent Labs     08/29/20  0534 08/31/20  0556   * 142   K 5.3* 3.3*   CL 99 106   CO2 26 29   BUN 9 5*   CREATININE 0.70 0.47*   GLUCOSE 398* 96     No results for input(s): AST, ALT, ALB, BILITOT, ALKPHOS in the last 72 hours. Troponin T: No results for input(s): TROPONINI in the last 72 hours. Pro-BNP: No results for input(s): BNP in the last 72 hours.   INR:   Recent Labs     08/29/20  0535   INR 1.2       Objective:     Vitals:    08/30/20 1918 08/30/20 2309 08/31/20 0508 08/31/20 0742   BP: (!) 132/46 (!) 114/48  (!) 137/52   Pulse: 91 78  89   Resp: 18   18   Temp: 99.1 °F (37.3 °C) 97.9 °F (36.6 °C)  98.6 °F doses then q daily  14. NGT clamped today   15. IV Reglan changed to 10 mg IVP q 8 hrs  16.  Diet advance as per Surgery    Opal Yung MD

## 2020-08-31 NOTE — PROGRESS NOTES
Comprehensive Nutrition Assessment    Type and Reason for Visit:  Initial, RD Nutrition Re-Screen/LOS    Nutrition Recommendations/Plan:   Recommend change IVF to PPN. If unable to tolerate diet progression in next 24-48 hours, consider obtaining central access for TPN    Nutrition Assessment:  Pt admitted for recurrence og GIB, POD #6. Good po intake prior to surgery. NGT was replaced due to severe N/V after administering laxatives but will be removed today. Pt is NPO/Clear Liquids x 7 days. Recommend change IVF to PPN and if unable to tolerate diet progression in next 24-48 hrs, consider central line for TPN    Malnutrition Assessment:  Malnutrition Status: At risk for malnutrition (Comment)    Context:  Acute Illness     Findings of the 6 clinical characteristics of malnutrition:  Energy Intake:  Mild decrease in energy intake (Comment)  Weight Loss:  No significant weight loss     Body Fat Loss:  Unable to assess     Muscle Mass Loss:  Unable to assess    Fluid Accumulation:  No significant fluid accumulation     Strength:  Not Performed    Estimated Daily Nutrient Needs:  Energy (kcal):  5314-7452 kcals ( 12-14 kcal/kg); Weight Used for Energy Requirements:  Admission(106 kg)     Protein (g):  95 g protein ( 2 g/kgIBW); Weight Used for Protein Requirements:  Ideal(47 kg)        Fluid (ml/day):  ~1400 ml ( 30 ml/kg);  Weight Used for Fluid Requirements:  Ideal(47 kg)      Nutrition Related Findings:  labs/meds reviewed, 1+ pitting BLE edema noted, abdomen noted to be rounded;soft, BM (8/30, I/O: 2879/2150 (1050 via NGT on 8/30), resolving ileus per GI      Wounds:  Surgical Wound       Current Nutrition Therapies:    Diet NPO Effective Now Exceptions are: Ice Chips, Sips with Meds    Anthropometric Measures:  · Height: 5' 1\" (154.9 cm)  · Current Body Weight: 242 lb (109.8 kg)(8/31-[facility])   · Admission Body Weight: 233 lb (105.7 kg)(stated)    · Usual Body Weight: 230 lb (104.3 kg)((12/19), 228# ( 7/20), 235# ( 8/19/20))     · Ideal Body Weight: 105 lbs  · BMI: 45.7  · BMI Categories: Obese Class 3 (BMI 40.0 or greater)       Nutrition Diagnosis:   · Inadequate oral intake related to altered GI function as evidenced by NPO or clear liquid status due to medical condition      Nutrition Interventions:   Food and/or Nutrient Delivery:  (Recommend change IVF to PPN.   If unable to tolerate diet progression in next 24-48 hours, consider obtaining central access for TPN)  Nutrition Education/Counseling:  No recommendation at this time   Coordination of Nutrition Care:  Continued Inpatient Monitoring    Goals:  progression to po diet       Nutrition Monitoring and Evaluation:   Food/Nutrient Intake Outcomes:  Diet Advancement/Tolerance, Food and Nutrient Intake  Physical Signs/Symptoms Outcomes:  GI Status, Constipation, Weight     Electronically signed by Lisa Goff RD, LD on 8/31/20 at 11:32 AM EDT

## 2020-08-31 NOTE — PROGRESS NOTES
Shift assessment performed. Vitals stable. Patient is up as a stand by assist, ambulating the halls with PT today, tolerating well. Patient had small BM last night. Removed NG first thing this morning as ordered. Spoke with Dr. Neo Bearden regarding discontinuing the PCA pump and advancing diet but he stated due to the set backs he is not ready for those things at this time. Abdominal binder on and in place. Pt doesn't have any needs at this time. Was up in chair all day. Will continue to monitor.     Electronically signed by Amanda Coyle RN on 8/31/2020 at 6:15 PM

## 2020-08-31 NOTE — PROGRESS NOTES
Progress Note  8/30/2020 11:11 PM  Subjective:   Admit Date: 8/20/2020  PCP: Dwayne Stephenson MD  Interval History: Awake and alert NGT has been clamped but still kept on NPO nad sips of ice with po meds. No labs noted today and patient has passed BM and Flatus today. Diet advance as per surgery and coumadin to start as per Dr Sara Cross. Reglan increased to 10 mg IVP q 8 hrs. Remains on IVF Hgb down to 7.8 and IV Fe given. No obvious blood loss noted. Diet NPO Effective Now Exceptions are: Ice Chips, Sips with Meds    Intake/Output Summary (Last 24 hours) at 8/30/2020 2311  Last data filed at 8/30/2020 0653  Gross per 24 hour   Intake 1760 ml   Output 900 ml   Net 860 ml     Medications:      dextrose 5 % and 0.9 % NaCl 100 mL/hr at 08/30/20 2134    morphine        magnesium sulfate  2 g Intravenous Once    metoclopramide  10 mg Intravenous Q8H    pantoprazole  40 mg Intravenous Daily    And    sodium chloride (PF)  10 mL Intravenous Daily    alteplase  1 mg Intracatheter Once    bisacodyl  10 mg Rectal Daily    sodium chloride flush  10 mL Intravenous 2 times per day    enoxaparin  40 mg Subcutaneous Daily    dilTIAZem  240 mg Oral Daily    gabapentin  600 mg Oral Nightly    losartan  100 mg Oral Daily    montelukast  10 mg Oral Nightly    therapeutic multivitamin-minerals  1 tablet Oral Daily    torsemide  20 mg Oral Daily     Recent Labs     08/28/20  0654 08/29/20  0537   WBC 13.1* 12.6*   HGB 8.7* 7.8*    172     Recent Labs     08/28/20  0654 08/29/20  0534    132*   K 4.1 5.3*    99   CO2 25 26   BUN 6* 9   CREATININE 0.49* 0.70   GLUCOSE 85 398*     No results for input(s): AST, ALT, ALB, BILITOT, ALKPHOS in the last 72 hours. Troponin T: No results for input(s): TROPONINI in the last 72 hours. Pro-BNP: No results for input(s): BNP in the last 72 hours.   INR:   Recent Labs     08/29/20  0535   INR 1.2       Objective:     Vitals:    08/29/20 1648 08/29/20 1959 08/30/20 0821 08/30/20 1918   BP:  (!) 127/53 (!) 123/56 (!) 132/46   Pulse:  83 86 91   Resp:  18 16 18   Temp:  97.9 °F (36.6 °C) 98.2 °F (36.8 °C) 99.1 °F (37.3 °C)   TempSrc:  Oral Oral Oral   SpO2: 94% 100% 100% 100%   Weight:       Height:         General appearance: alert and cooperative with exam  Lungs: clear to auscultation bilaterally  Heart: regular rate and rhythm, S1, S2 normal, no murmur, click, rub or gallop  Abdomen: soft, non-tender; bowel sounds normal; no masses,  no organomegaly  Extremities: extremities normal, atraumatic, no cyanosis or edema  Neurologic: No obvious focal neurologic deficits. Assessment and Plan:   1. Lower GI bleeding with a 4-5 cm tubular adenoma on the ascending colon with chronic Fe deficiency anemia , S/P Right Hemicolectomy//Tubulo-Villous adenoma on Biopsy. 2. Bilateral breast Ca s/p Bilateral mastectomy s/p chemo and hormonal therapy  3. PFO with bouts of CVA with no residual and TIA on coumadin as no closure recommended. 4. HTN  5. Hypercholesterolemia. 6. Morbid Obesity  7. S/P Bilateral TKR  8. ROSEMARIE  9. Fe deficiency anemia  10. S/P L4 and L5 foraminotomy  11. Chemotherapy induced Peripheral Neuropathy  12. History of Asthma  13. With Normal LHC in 2013 after an abnormal stress lexiscan  14. DJD and eventual possible bilateral THR. Advance Directive: Full Code  DVT prophylaxis with enoxaparin 40 mg sub-Q daily. Discharge planning: Home    Principal Problem:    Adenoma of ascending colon  Active Problems:    Gastrointestinal hemorrhage    Anemia  Resolved Problems:    * No resolved hospital problems. *  Plan:  1. 4-5 cm tubular adenoma on the ascending colon s/p right Hemicolectomy  2. DC q 6 hrs H and H  3. Correct coagulopathy// Re start coumadin when cleared by surgery  4. Blood work in am  5. PPI changed to IV  6. Resume home meds  7. Will continue to hold coumadin , Will resume if Hanna Contes with Surgery with lovenox bridging//coumadin held by surgery  8. SCD's  9. Taper down Cymbalta  10 IV Venofer 300 mg IVPB given  11. On D5NS at 100 cc/hr  12. Increase activity  13. Torsemide 40 mg po BID x 4 doses then q daily  14. NGT clamped today   15.  IV Reglan changed to 10 mg IVP q 8 hrs    Gina Khoury MD

## 2020-08-31 NOTE — PROGRESS NOTES
Pt up to bedside commode several times during shift. Pt denies pain and nausea. Clamping schedule as ordered for ng tube. OUtput 50ml and no nausea. This am, pt states it is very uncomfortable and requesting it be taken out. Perfect serve to dr Katie Ding to get ng discontinued.   Electronically signed by Martha Hill RN on 8/31/2020 at 6:58 AM

## 2020-08-31 NOTE — CARE COORDINATION
MET WITH PATIENT TODAY IN HER ROOM TO DISCUSS DC PLANNING. PATIENT AWAKE SITTING IN CHAIR AT BEDSIDE. STATES SHE IS STILL NPO, STILL WITH PCA, DEMAND ONLY. PATIENT EXPRESSES THAT SHE FEELS MUCH BETTER. HAD BM  X 2 OVER THE WEEKEND AND THAT SHE HAD A \"ROUGH\" WEEKEND BUT NOW IS READY FOR SOME LEMON ICE OR ADVANCE FROM NPO STATUS. DC PLAN REMAINS HOME,, SHE HAS WALKER AND RAISED TOILET SEAT AT HOME AND SISTER WILL HELP.

## 2020-09-01 LAB
ABO/RH: NORMAL
ANION GAP SERPL CALCULATED.3IONS-SCNC: 10 MEQ/L (ref 9–15)
ANTIBODY SCREEN: NORMAL
BASOPHILS ABSOLUTE: 0 K/UL (ref 0–0.2)
BASOPHILS RELATIVE PERCENT: 0.2 %
BLOOD BANK DISPENSE STATUS: NORMAL
BLOOD BANK PRODUCT CODE: NORMAL
BPU ID: NORMAL
BUN BLDV-MCNC: 8 MG/DL (ref 8–23)
CALCIUM SERPL-MCNC: 8.4 MG/DL (ref 8.5–9.9)
CHLORIDE BLD-SCNC: 110 MEQ/L (ref 95–107)
CO2: 24 MEQ/L (ref 20–31)
CREAT SERPL-MCNC: 0.35 MG/DL (ref 0.5–0.9)
DESCRIPTION BLOOD BANK: NORMAL
EOSINOPHILS ABSOLUTE: 0.8 K/UL (ref 0–0.7)
EOSINOPHILS RELATIVE PERCENT: 7.7 %
GFR AFRICAN AMERICAN: >60
GFR NON-AFRICAN AMERICAN: >60
GLUCOSE BLD-MCNC: 85 MG/DL (ref 70–99)
HCT VFR BLD CALC: 23.5 % (ref 37–47)
HEMOGLOBIN: 7.4 G/DL (ref 12–16)
LYMPHOCYTES ABSOLUTE: 1 K/UL (ref 1–4.8)
LYMPHOCYTES RELATIVE PERCENT: 9.4 %
MAGNESIUM: 1.4 MG/DL (ref 1.7–2.4)
MCH RBC QN AUTO: 24.8 PG (ref 27–31.3)
MCHC RBC AUTO-ENTMCNC: 31.5 % (ref 33–37)
MCV RBC AUTO: 78.8 FL (ref 82–100)
MONOCYTES ABSOLUTE: 1.2 K/UL (ref 0.2–0.8)
MONOCYTES RELATIVE PERCENT: 11.6 %
NEUTROPHILS ABSOLUTE: 7.5 K/UL (ref 1.4–6.5)
NEUTROPHILS RELATIVE PERCENT: 71.1 %
PDW BLD-RTO: 25 % (ref 11.5–14.5)
PHOSPHORUS: 2.5 MG/DL (ref 2.3–4.8)
PLATELET # BLD: 193 K/UL (ref 130–400)
POTASSIUM SERPL-SCNC: 2.8 MEQ/L (ref 3.4–4.9)
RBC # BLD: 2.99 M/UL (ref 4.2–5.4)
SODIUM BLD-SCNC: 144 MEQ/L (ref 135–144)
WBC # BLD: 10.6 K/UL (ref 4.8–10.8)

## 2020-09-01 PROCEDURE — 80048 BASIC METABOLIC PNL TOTAL CA: CPT

## 2020-09-01 PROCEDURE — 6370000000 HC RX 637 (ALT 250 FOR IP): Performed by: SURGERY

## 2020-09-01 PROCEDURE — C9113 INJ PANTOPRAZOLE SODIUM, VIA: HCPCS | Performed by: INTERNAL MEDICINE

## 2020-09-01 PROCEDURE — 2580000003 HC RX 258: Performed by: INTERNAL MEDICINE

## 2020-09-01 PROCEDURE — 36592 COLLECT BLOOD FROM PICC: CPT

## 2020-09-01 PROCEDURE — 85025 COMPLETE CBC W/AUTO DIFF WBC: CPT

## 2020-09-01 PROCEDURE — 86900 BLOOD TYPING SEROLOGIC ABO: CPT

## 2020-09-01 PROCEDURE — 97116 GAIT TRAINING THERAPY: CPT

## 2020-09-01 PROCEDURE — P9016 RBC LEUKOCYTES REDUCED: HCPCS

## 2020-09-01 PROCEDURE — 86923 COMPATIBILITY TEST ELECTRIC: CPT

## 2020-09-01 PROCEDURE — 6360000002 HC RX W HCPCS: Performed by: SURGERY

## 2020-09-01 PROCEDURE — 86901 BLOOD TYPING SEROLOGIC RH(D): CPT

## 2020-09-01 PROCEDURE — 6370000000 HC RX 637 (ALT 250 FOR IP): Performed by: INTERNAL MEDICINE

## 2020-09-01 PROCEDURE — 1210000000 HC MED SURG R&B

## 2020-09-01 PROCEDURE — 2700000000 HC OXYGEN THERAPY PER DAY

## 2020-09-01 PROCEDURE — 6360000002 HC RX W HCPCS: Performed by: INTERNAL MEDICINE

## 2020-09-01 PROCEDURE — 83735 ASSAY OF MAGNESIUM: CPT

## 2020-09-01 PROCEDURE — 86850 RBC ANTIBODY SCREEN: CPT

## 2020-09-01 PROCEDURE — 2580000003 HC RX 258: Performed by: SURGERY

## 2020-09-01 PROCEDURE — 99024 POSTOP FOLLOW-UP VISIT: CPT | Performed by: SURGERY

## 2020-09-01 PROCEDURE — 84100 ASSAY OF PHOSPHORUS: CPT

## 2020-09-01 RX ORDER — MAGNESIUM SULFATE IN WATER 40 MG/ML
2 INJECTION, SOLUTION INTRAVENOUS ONCE
Status: COMPLETED | OUTPATIENT
Start: 2020-09-01 | End: 2020-09-01

## 2020-09-01 RX ORDER — 0.9 % SODIUM CHLORIDE 0.9 %
250 INTRAVENOUS SOLUTION INTRAVENOUS ONCE
Status: COMPLETED | OUTPATIENT
Start: 2020-09-01 | End: 2020-09-02

## 2020-09-01 RX ORDER — BISACODYL 10 MG
10 SUPPOSITORY, RECTAL RECTAL DAILY
Status: DISCONTINUED | OUTPATIENT
Start: 2020-09-01 | End: 2020-09-03 | Stop reason: HOSPADM

## 2020-09-01 RX ORDER — POTASSIUM CHLORIDE 7.45 MG/ML
10 INJECTION INTRAVENOUS
Status: COMPLETED | OUTPATIENT
Start: 2020-09-01 | End: 2020-09-01

## 2020-09-01 RX ORDER — POTASSIUM CHLORIDE 7.45 MG/ML
10 INJECTION INTRAVENOUS
Status: ACTIVE | OUTPATIENT
Start: 2020-09-01 | End: 2020-09-01

## 2020-09-01 RX ORDER — TRAMADOL HYDROCHLORIDE 50 MG/1
50 TABLET ORAL EVERY 6 HOURS PRN
Status: DISCONTINUED | OUTPATIENT
Start: 2020-09-01 | End: 2020-09-03 | Stop reason: HOSPADM

## 2020-09-01 RX ORDER — POTASSIUM CHLORIDE 7.45 MG/ML
10 INJECTION INTRAVENOUS
Status: DISCONTINUED | OUTPATIENT
Start: 2020-09-01 | End: 2020-09-01

## 2020-09-01 RX ADMIN — Medication 10 ML: at 09:01

## 2020-09-01 RX ADMIN — TRAMADOL HYDROCHLORIDE 50 MG: 50 TABLET, FILM COATED ORAL at 17:12

## 2020-09-01 RX ADMIN — POTASSIUM CHLORIDE 10 MEQ: 7.46 INJECTION, SOLUTION INTRAVENOUS at 11:15

## 2020-09-01 RX ADMIN — PANTOPRAZOLE SODIUM 40 MG: 40 INJECTION, POWDER, FOR SOLUTION INTRAVENOUS at 09:01

## 2020-09-01 RX ADMIN — MAGNESIUM SULFATE 2 G: 2 INJECTION INTRAVENOUS at 17:06

## 2020-09-01 RX ADMIN — TORSEMIDE 20 MG: 20 TABLET ORAL at 09:08

## 2020-09-01 RX ADMIN — METOCLOPRAMIDE HYDROCHLORIDE 10 MG: 5 INJECTION INTRAMUSCULAR; INTRAVENOUS at 20:49

## 2020-09-01 RX ADMIN — POTASSIUM CHLORIDE 10 MEQ: 7.46 INJECTION, SOLUTION INTRAVENOUS at 09:10

## 2020-09-01 RX ADMIN — POTASSIUM CHLORIDE 10 MEQ: 7.46 INJECTION, SOLUTION INTRAVENOUS at 12:38

## 2020-09-01 RX ADMIN — Medication 10 MEQ: at 19:58

## 2020-09-01 RX ADMIN — POTASSIUM CHLORIDE 10 MEQ: 7.46 INJECTION, SOLUTION INTRAVENOUS at 10:22

## 2020-09-01 RX ADMIN — TRAMADOL HYDROCHLORIDE 50 MG: 50 TABLET, FILM COATED ORAL at 10:45

## 2020-09-01 RX ADMIN — MONTELUKAST 10 MG: 10 TABLET, FILM COATED ORAL at 20:48

## 2020-09-01 RX ADMIN — LOSARTAN POTASSIUM 100 MG: 50 TABLET, FILM COATED ORAL at 09:00

## 2020-09-01 RX ADMIN — DILTIAZEM HYDROCHLORIDE 240 MG: 240 CAPSULE, COATED, EXTENDED RELEASE ORAL at 09:00

## 2020-09-01 RX ADMIN — Medication 10 MEQ: at 16:49

## 2020-09-01 RX ADMIN — ENOXAPARIN SODIUM 40 MG: 40 INJECTION SUBCUTANEOUS at 09:00

## 2020-09-01 RX ADMIN — METOCLOPRAMIDE HYDROCHLORIDE 10 MG: 5 INJECTION INTRAMUSCULAR; INTRAVENOUS at 12:09

## 2020-09-01 RX ADMIN — MULTIPLE VITAMINS W/ MINERALS TAB 1 TABLET: TAB at 09:09

## 2020-09-01 RX ADMIN — BISACODYL 10 MG: 10 SUPPOSITORY RECTAL at 10:25

## 2020-09-01 RX ADMIN — Medication 10 MEQ: at 18:10

## 2020-09-01 RX ADMIN — DEXTROSE AND SODIUM CHLORIDE: 5; 900 INJECTION, SOLUTION INTRAVENOUS at 04:07

## 2020-09-01 RX ADMIN — Medication 10 ML: at 20:51

## 2020-09-01 RX ADMIN — ACETAMINOPHEN 650 MG: 325 TABLET, FILM COATED ORAL at 14:30

## 2020-09-01 RX ADMIN — METOCLOPRAMIDE HYDROCHLORIDE 10 MG: 5 INJECTION INTRAMUSCULAR; INTRAVENOUS at 03:58

## 2020-09-01 RX ADMIN — GABAPENTIN 600 MG: 300 CAPSULE ORAL at 20:49

## 2020-09-01 ASSESSMENT — PAIN SCALES - GENERAL
PAINLEVEL_OUTOF10: 1
PAINLEVEL_OUTOF10: 1
PAINLEVEL_OUTOF10: 4
PAINLEVEL_OUTOF10: 8
PAINLEVEL_OUTOF10: 1

## 2020-09-01 ASSESSMENT — PAIN DESCRIPTION - PAIN TYPE
TYPE: SURGICAL PAIN;ACUTE PAIN
TYPE: SURGICAL PAIN;ACUTE PAIN
TYPE: SURGICAL PAIN

## 2020-09-01 ASSESSMENT — PAIN DESCRIPTION - LOCATION
LOCATION: ABDOMEN

## 2020-09-01 ASSESSMENT — PAIN DESCRIPTION - DESCRIPTORS
DESCRIPTORS: ACHING
DESCRIPTORS: ACHING

## 2020-09-01 NOTE — PROGRESS NOTES
Hiatal hernia     History of blood transfusion 2013    post op TKR    Hypercholesteremia     Hyperlipidemia     past trx / off meds > 5 yrs    Hypertension     meds > 20 yrs     Patent foramen ovale      Past Surgical History:   Procedure Laterality Date    BREAST SURGERY Bilateral     Mastectomy    CARDIAC CATHETERIZATION  2013    no blockage    COLONOSCOPY  08/31/2016    w/polypectomy     COLONOSCOPY N/A 7/24/2020    COLONOSCOPY DIAGNOSTIC performed by Henrry Millard MD at [facility], DIAGNOSTIC      HEMICOLECTOMY Right 8/25/2020    RIGHT COLECTOMY performed by Adair Marquez MD at [facility]    umbilical   [address] Bilateral     Knee    JOINT REPLACEMENT Bilateral 11/04/2013    LAMINECTOMY Right 12/30/2019    RIGHT L4-5 MICRODECOMPRESSION performed by Shelbi Todd MD at [address] 0.6-1CM REMAINDR BODY N/A 4/6/2018    EXCISION CYST RT. NECK AND EXCISION LESION NOSE performed by Amadeo Wilburn MD at [address] [facility] VAD W/SUBQ PORT/ CTR/PRPH INSJ N/A 4/6/2018    REMOVAL VENOUS PORT performed by Amadeo Wilburn MD at [facility] / has been removed    TONSILLECTOMY      at age 23   [address]  08/31/2016    w/polypectomy,bx     UPPER GASTROINTESTINAL ENDOSCOPY N/A 7/24/2020    EGD DIAGNOSTIC ONLY performed by Henrry Millard MD at [address] [place] [address]  2003    excision polyps       Restrictions       SUBJECTIVE   General  Chart Reviewed: Yes  Response To Previous Treatment: Patient with no complaints from previous session.   Family / Caregiver Present: No    Pre-Session Pain Report     Pain Screening  Patient Currently in Pain: Yes  Pain Assessment  Pain Assessment: 0-10  Pain Level: 1  Pain Type: Surgical pain;Acute pain  Pain Location: Abdomen  Pain Descriptors: Aching     Post-Session Pain Report  Pain Assessment  Pain

## 2020-09-01 NOTE — PROGRESS NOTES
Pt stated that she was feeling very cold and shaky. States that legs hurt rating 8/10. Medicated with tyelnol as ordered. Page put out to Dr. Melly Meyer. Spoke with him regarding patients lab work. Ordered 2g mag bolus over 2hrs, 1 unit of packed RBCS and will repeat labs in the morning.

## 2020-09-01 NOTE — PROGRESS NOTES
[facility] Occupational Therapy      Date: 2020  Patient Name: Artemio Michelle        MRN: [id number]  Account: [de-identified]   : 1955  (77 y.o.)  Room: [place] [address]    Chart reviewed, attempted OT at [phone] for treatment. Patient not seen 2° to: Other: Pt currently visibly shaking all over. Nursing informed and assessing patient. Spoke to Denzel Echols RN aware. Will attempt again when able.     Electronically signed by GUERO Khan on 9/3/7907 at 2:21 PM

## 2020-09-01 NOTE — PROGRESS NOTES
0800- Page to Dr. Kell Rogers- critical potassium 2.8. telephone order to replace with 40mEq IV and another 40 4 hours after. Pt assessment complete. Pt alert and oriented. Pt up to the chair independently. PCA pump discontinued. Pt rate pain in abdomen 1/10. States most of pain coming from R hip. Dr. Ryder Jackson in to see patient. Ordered q6 tramadol and tylenol. Discussed with patient. Started on clear liquid diet. Dressing on abdomen clean, dry, intact. Bowel sounds active. VS stable. Will continue to monitor.

## 2020-09-01 NOTE — PROGRESS NOTES
Assessment complete. Alert and oriented. Patient denies SOB and dizziness at this time. Patient states PCA is taking care of any abdominal pain, rating it 1/10 \"if that. \" She denies other needs at this time. Safety maintained. Call light within reach. Will continue to monitor. Electronically signed by Raffy Hicks RN on 8/31/2020 at 11:36 PM     0200: Dressing change on PICC complete. Patient tolerated well.

## 2020-09-01 NOTE — PROGRESS NOTES
Pt Name: Antwan Delatorre  Medical Record Number: [id number]  Date of Birth 1955   Admit date 8/20/2020 12:16 PM  Today's Date: 9/1/2020     ASSESSMENT  1. Post op day # 7   2. Antwan Delatorre had Procedure(s):  RIGHT COLECTOMY   3. Improving    PLAN  1. D/c  PCA  2. Clear liquids  3. Oral pain meds    SUBJECTIVE  Chief complaint: hip pain  Afebrile, vital signs are stable. She denies any nausea or vomiting, has passed flatus and had a bowel movement. She is tolerating a DIET CLEAR LIQUID;. Her pain is well controlled on current medications. She has been ambulating in the halls. has a past medical history of Arthritis, Asthma, Cancer ([address] GERD (gastroesophageal reflux disease), Hiatal hernia, History of blood transfusion, Hypercholesteremia, Hyperlipidemia, Hypertension, and Patent foramen ovale. CURRENT MEDS  Scheduled Meds:   potassium chloride  10 mEq Intravenous Q1H    potassium chloride  10 mEq Intravenous Q1H    bisacodyl  10 mg Rectal Daily    magnesium sulfate  2 g Intravenous Once    metoclopramide  10 mg Intravenous Q8H    pantoprazole  40 mg Intravenous Daily    And    sodium chloride (PF)  10 mL Intravenous Daily    alteplase  1 mg Intracatheter Once    sodium chloride flush  10 mL Intravenous 2 times per day    enoxaparin  40 mg Subcutaneous Daily    dilTIAZem  240 mg Oral Daily    gabapentin  600 mg Oral Nightly    losartan  100 mg Oral Daily    montelukast  10 mg Oral Nightly    therapeutic multivitamin-minerals  1 tablet Oral Daily    torsemide  20 mg Oral Daily     Continuous Infusions:   dextrose 5 % and 0.9 % NaCl 100 mL/hr at 09/01/20 0407     PRN Meds:.traMADol, phenol, bisacodyl, ondansetron, benzocaine-menthol, sodium chloride flush, albuterol, acetaminophen    OBJECTIVE  CURRENT VITALS:  height is 5' 1\" (1.549 m) and weight is 242 lb 8.1 oz (110 kg). Her oral temperature is 97.3 °F (36.3 °C). Her blood pressure is 114/48 (abnormal) and her pulse is 77.  Her respiration is 16 and oxygen saturation is 100%. Temperature Range (24h):Temp: 97.3 °F (36.3 °C) Temp  Av.9 °F (36.6 °C)  Min: 97.3 °F (36.3 °C)  Max: 98.4 °F (36.9 °C)  BP Range (40B): Systolic (53TLW), XRB:685 , Min:110 , GBC:595     Diastolic (68QDM), PSB:87, Min:42, Max:48    Pulse Range (24h): Pulse  Av.5  Min: 77  Max: 80  Respiration Range (24h): Resp  Av  Min: 16  Max: 18    GENERAL: alert, no distress, sitting in a chair  LUNGS: clear to ausculation, without wheezes, rales or rhonci  HEART: normal rate and regular rhythm  ABDOMEN: distended, soft, incisional tenderness, bowel sounds present in all 4 quadrants and no guarding or peritoneal signs, Aquacel dressing intact  INCISION: healing well, no significant drainage, no significant erythema  EXTERMITY: no cyanosis, clubbing or edema    In: 2476 [I.V.:2476]  Out: 400 [Urine:400]  Date 20 0000 - 20 235   Shift 5496-1476 0274-0726 9225-8273 24 Hour Total   INTAKE   I.V.(mL/kg) 1830(13.0)   8221(83.9)   Shift Total(mL/kg) 3005(97.2)   5951(45.4)   OUTPUT   Urine(mL/kg/hr) 0(0)   0   Emesis/NG output(mL/kg) 0(0)   0(0)   Other(mL/kg) 0(0)   0(0)   Stool(mL/kg) 0(0)   0(0)   Blood(mL/kg) 0(0)   0(0)   Shift Total(mL/kg) 0(0)   0(0)   Weight (kg) 110 110 110 110       LABS  Recent Labs     20  0545 20  0556 20  0600   WBC 9.5  --  10.6   HGB 8.0*  --  7.4*   HCT 25.3*  --  23.5*     --  193   NA  --  142 144   K  --  3.3* 2.8*   CL  --  106 110*   CO2  --  29 24   BUN  --  5* 8   CREATININE  --  0.47* 0.35*   MG  --  1.9 1.4*   PHOS  --  2.1* 2.5   CALCIUM  --  9.6 8.4*      No results for input(s): PTT, INR in the last 72 hours. Invalid input(s): PT  No results for input(s): AST, ALT, BILITOT, BILIDIR, AMYLASE, LIPASE, LDH, LACTA in the last 72 hours.     RADIOLOGY  Ir Picc Wo Sq Port/pump > 5 Years    Result Date: 2020  PERIPHERALLY INSERTED CENTRAL CATHETER (PICC) PLACEMENT WITH ULTRASOUND GUIDANCE CLINICAL HISTORY:  REQUIRES VASCULAR ACCESS After discussing the procedure and possible complications with the patient, informed consent was obtained. The patient was placed on the Special Procedures table. The right upper extremity was sterilely prepared using a maximal sterile barrier technique which includes cap, mask, sterile gown, sterile gloves, sterile full-body drape, hand hygiene and 2% chlorhexidine for cutaneous antisepsis. A sterile ultrasound technique with sterile gel and sterile probe covers was also utilized. A pre-procedure time out was performed in order to assure the correct patient and procedure. Local anesthetic was administered. A peripheral vein was accessed with sonographic guidance. A sonographic spot image was obtained for documentation. A guidewire was advanced into the vein with fluoroscopic guidance and a sheath was placed over the guidewire. A 5-Upper sorbian dual-lumen PICC was advanced through the sheath, up the arm and into the central vasculature. It was positioned appropriately. The sheath was removed. The catheter was shown to aspirate and infuse properly. The flange of the catheter was affixed to the arm using a PICC securement device. A spot image of the chest showed the tip of the PICC line to lie in the superior vena cava. The patient tolerated the procedure well and without complications. Number of films: 3 Fluoroscopy time: 106 seconds CONCLUSION: SUCCESSFUL RIGHT PICC PLACEMENT WITHOUT IMMEDIATE COMPLICATIONS. Xr Chest Abdomen Ng Placement    Result Date: 8/29/2020  XR CHEST ABDOMEN NG PLACEMENT : 8/29/2020 1:50 PM CLINICAL HISTORY:  NGT placement, post op N/V . COMPARISON: None available. TECHNIQUE: ROUTINE FINDINGS: A nasogastric tube is noted with its tip overlying the fundus of the stomach. Mild to moderate small and large bowel gas in the upper abdomen appears similar to the prior study.  Shallow inspiratory volumes are present, with probable bibasilar

## 2020-09-02 ENCOUNTER — APPOINTMENT (OUTPATIENT)
Dept: CT IMAGING | Age: 65
DRG: 330 | End: 2020-09-02
Payer: MEDICARE

## 2020-09-02 LAB
ANION GAP SERPL CALCULATED.3IONS-SCNC: 9 MEQ/L (ref 9–15)
ANISOCYTOSIS: ABNORMAL
BASOPHILS ABSOLUTE: 0.2 K/UL (ref 0–0.2)
BASOPHILS RELATIVE PERCENT: 1 %
BUN BLDV-MCNC: 4 MG/DL (ref 8–23)
CALCIUM SERPL-MCNC: 8.1 MG/DL (ref 8.5–9.9)
CHLORIDE BLD-SCNC: 107 MEQ/L (ref 95–107)
CO2: 24 MEQ/L (ref 20–31)
CREAT SERPL-MCNC: 0.38 MG/DL (ref 0.5–0.9)
EOSINOPHILS ABSOLUTE: 1.5 K/UL (ref 0–0.7)
EOSINOPHILS RELATIVE PERCENT: 9 %
GFR AFRICAN AMERICAN: >60
GFR NON-AFRICAN AMERICAN: >60
GLUCOSE BLD-MCNC: 79 MG/DL (ref 70–99)
HCT VFR BLD CALC: 29.3 % (ref 37–47)
HEMOGLOBIN: 9.3 G/DL (ref 12–16)
HYPOCHROMIA: ABNORMAL
LYMPHOCYTES ABSOLUTE: 1 K/UL (ref 1–4.8)
LYMPHOCYTES RELATIVE PERCENT: 6 %
MAGNESIUM: 1.7 MG/DL (ref 1.7–2.4)
MCH RBC QN AUTO: 25 PG (ref 27–31.3)
MCHC RBC AUTO-ENTMCNC: 31.6 % (ref 33–37)
MCV RBC AUTO: 79 FL (ref 82–100)
METAMYELOCYTES RELATIVE PERCENT: 1 %
MONOCYTES ABSOLUTE: 1.3 K/UL (ref 0.2–0.8)
MONOCYTES RELATIVE PERCENT: 7.6 %
NEUTROPHILS ABSOLUTE: 12.6 K/UL (ref 1.4–6.5)
NEUTROPHILS RELATIVE PERCENT: 76 %
PDW BLD-RTO: 24.2 % (ref 11.5–14.5)
PHOSPHORUS: 2.1 MG/DL (ref 2.3–4.8)
PLATELET # BLD: 230 K/UL (ref 130–400)
PLATELET SLIDE REVIEW: NORMAL
POIKILOCYTES: ABNORMAL
POTASSIUM SERPL-SCNC: 3.2 MEQ/L (ref 3.4–4.9)
RBC # BLD: 3.71 M/UL (ref 4.2–5.4)
REASON FOR REJECTION: NORMAL
REJECTED TEST: NORMAL
SODIUM BLD-SCNC: 140 MEQ/L (ref 135–144)
WBC # BLD: 16.4 K/UL (ref 4.8–10.8)

## 2020-09-02 PROCEDURE — 74176 CT ABD & PELVIS W/O CONTRAST: CPT

## 2020-09-02 PROCEDURE — 1210000000 HC MED SURG R&B

## 2020-09-02 PROCEDURE — 85025 COMPLETE CBC W/AUTO DIFF WBC: CPT

## 2020-09-02 PROCEDURE — 6370000000 HC RX 637 (ALT 250 FOR IP): Performed by: INTERNAL MEDICINE

## 2020-09-02 PROCEDURE — 6360000002 HC RX W HCPCS: Performed by: SURGERY

## 2020-09-02 PROCEDURE — 2580000003 HC RX 258: Performed by: SURGERY

## 2020-09-02 PROCEDURE — 84100 ASSAY OF PHOSPHORUS: CPT

## 2020-09-02 PROCEDURE — 6370000000 HC RX 637 (ALT 250 FOR IP): Performed by: SURGERY

## 2020-09-02 PROCEDURE — 6360000002 HC RX W HCPCS: Performed by: INTERNAL MEDICINE

## 2020-09-02 PROCEDURE — 2500000003 HC RX 250 WO HCPCS: Performed by: INTERNAL MEDICINE

## 2020-09-02 PROCEDURE — 2580000003 HC RX 258: Performed by: INTERNAL MEDICINE

## 2020-09-02 PROCEDURE — 36430 TRANSFUSION BLD/BLD COMPNT: CPT

## 2020-09-02 PROCEDURE — 83735 ASSAY OF MAGNESIUM: CPT

## 2020-09-02 PROCEDURE — 97110 THERAPEUTIC EXERCISES: CPT

## 2020-09-02 PROCEDURE — C9113 INJ PANTOPRAZOLE SODIUM, VIA: HCPCS | Performed by: INTERNAL MEDICINE

## 2020-09-02 PROCEDURE — P9016 RBC LEUKOCYTES REDUCED: HCPCS

## 2020-09-02 PROCEDURE — 99024 POSTOP FOLLOW-UP VISIT: CPT | Performed by: SURGERY

## 2020-09-02 PROCEDURE — 97116 GAIT TRAINING THERAPY: CPT

## 2020-09-02 PROCEDURE — 80048 BASIC METABOLIC PNL TOTAL CA: CPT

## 2020-09-02 RX ORDER — POTASSIUM CHLORIDE 20 MEQ/1
40 TABLET, EXTENDED RELEASE ORAL 2 TIMES DAILY WITH MEALS
Status: DISCONTINUED | OUTPATIENT
Start: 2020-09-02 | End: 2020-09-03 | Stop reason: HOSPADM

## 2020-09-02 RX ORDER — MAGNESIUM SULFATE IN WATER 40 MG/ML
4 INJECTION, SOLUTION INTRAVENOUS ONCE
Status: COMPLETED | OUTPATIENT
Start: 2020-09-02 | End: 2020-09-02

## 2020-09-02 RX ORDER — PANTOPRAZOLE SODIUM 40 MG/1
40 TABLET, DELAYED RELEASE ORAL
Status: DISCONTINUED | OUTPATIENT
Start: 2020-09-03 | End: 2020-09-03 | Stop reason: HOSPADM

## 2020-09-02 RX ORDER — METOCLOPRAMIDE 10 MG/1
5 TABLET ORAL
Status: DISCONTINUED | OUTPATIENT
Start: 2020-09-02 | End: 2020-09-03 | Stop reason: HOSPADM

## 2020-09-02 RX ADMIN — METOCLOPRAMIDE 5 MG: 10 TABLET ORAL at 13:15

## 2020-09-02 RX ADMIN — MULTIPLE VITAMINS W/ MINERALS TAB 1 TABLET: TAB at 08:17

## 2020-09-02 RX ADMIN — POTASSIUM CHLORIDE 40 MEQ: 20 TABLET, EXTENDED RELEASE ORAL at 13:16

## 2020-09-02 RX ADMIN — POTASSIUM CHLORIDE 40 MEQ: 20 TABLET, EXTENDED RELEASE ORAL at 16:59

## 2020-09-02 RX ADMIN — DEXTROSE AND SODIUM CHLORIDE: 5; 900 INJECTION, SOLUTION INTRAVENOUS at 06:20

## 2020-09-02 RX ADMIN — TRAMADOL HYDROCHLORIDE 50 MG: 50 TABLET, FILM COATED ORAL at 20:58

## 2020-09-02 RX ADMIN — TRAMADOL HYDROCHLORIDE 50 MG: 50 TABLET, FILM COATED ORAL at 05:52

## 2020-09-02 RX ADMIN — DILTIAZEM HYDROCHLORIDE 240 MG: 240 CAPSULE, COATED, EXTENDED RELEASE ORAL at 08:17

## 2020-09-02 RX ADMIN — TRAMADOL HYDROCHLORIDE 50 MG: 50 TABLET, FILM COATED ORAL at 13:26

## 2020-09-02 RX ADMIN — METOCLOPRAMIDE 5 MG: 10 TABLET ORAL at 16:59

## 2020-09-02 RX ADMIN — Medication 10 ML: at 08:19

## 2020-09-02 RX ADMIN — MAGNESIUM SULFATE HEPTAHYDRATE 4 G: 40 INJECTION, SOLUTION INTRAVENOUS at 13:45

## 2020-09-02 RX ADMIN — BISACODYL 10 MG: 10 SUPPOSITORY RECTAL at 08:17

## 2020-09-02 RX ADMIN — GABAPENTIN 600 MG: 300 CAPSULE ORAL at 20:57

## 2020-09-02 RX ADMIN — MONTELUKAST 10 MG: 10 TABLET, FILM COATED ORAL at 20:58

## 2020-09-02 RX ADMIN — PANTOPRAZOLE SODIUM 40 MG: 40 INJECTION, POWDER, FOR SOLUTION INTRAVENOUS at 08:16

## 2020-09-02 RX ADMIN — ENOXAPARIN SODIUM 40 MG: 40 INJECTION SUBCUTANEOUS at 08:17

## 2020-09-02 RX ADMIN — POTASSIUM PHOSPHATE, MONOBASIC POTASSIUM PHOSPHATE, DIBASIC 30 MMOL: 224; 236 INJECTION, SOLUTION, CONCENTRATE INTRAVENOUS at 13:17

## 2020-09-02 RX ADMIN — TORSEMIDE 20 MG: 20 TABLET ORAL at 08:17

## 2020-09-02 RX ADMIN — SODIUM CHLORIDE 250 ML: 9 INJECTION, SOLUTION INTRAVENOUS at 00:11

## 2020-09-02 RX ADMIN — LOSARTAN POTASSIUM 100 MG: 50 TABLET, FILM COATED ORAL at 08:17

## 2020-09-02 RX ADMIN — METOCLOPRAMIDE HYDROCHLORIDE 10 MG: 5 INJECTION INTRAMUSCULAR; INTRAVENOUS at 06:16

## 2020-09-02 ASSESSMENT — PAIN DESCRIPTION - DESCRIPTORS: DESCRIPTORS: ACHING

## 2020-09-02 ASSESSMENT — PAIN DESCRIPTION - LOCATION
LOCATION: HIP
LOCATION: ABDOMEN

## 2020-09-02 ASSESSMENT — PAIN SCALES - GENERAL
PAINLEVEL_OUTOF10: 6
PAINLEVEL_OUTOF10: 5
PAINLEVEL_OUTOF10: 0
PAINLEVEL_OUTOF10: 2
PAINLEVEL_OUTOF10: 6

## 2020-09-02 ASSESSMENT — PAIN DESCRIPTION - PAIN TYPE
TYPE: CHRONIC PAIN
TYPE: ACUTE PAIN;SURGICAL PAIN

## 2020-09-02 ASSESSMENT — PAIN DESCRIPTION - FREQUENCY: FREQUENCY: CONTINUOUS

## 2020-09-02 ASSESSMENT — PAIN DESCRIPTION - ORIENTATION: ORIENTATION: RIGHT

## 2020-09-02 NOTE — PROGRESS NOTES
Infusions:   dextrose 5 % and 0.9 % NaCl 50 mL/hr at 20 0620     PRN Meds:.traMADol, phenol, bisacodyl, ondansetron, benzocaine-menthol, sodium chloride flush, albuterol, acetaminophen    OBJECTIVE  CURRENT VITALS:  height is 5' 1\" (1.549 m) and weight is 242 lb 8.1 oz (110 kg). Her oral temperature is 97.3 °F (36.3 °C). Her blood pressure is 109/55 (abnormal) and her pulse is 87. Her respiration is 18 and oxygen saturation is 100%. Temperature Range (24h):Temp: 97.3 °F (36.3 °C) Temp  Av.9 °F (36.6 °C)  Min: 97.3 °F (36.3 °C)  Max: 98.2 °F (36.8 °C)  BP Range (38F): Systolic (41EZF), CZH:008 , Min:104 , UOE:390     Diastolic (28SAJ), GNH:45, Min:50, Max:61    Pulse Range (24h): Pulse  Av.5  Min: 87  Max: 98  Respiration Range (24h): Resp  Av.5  Min: 16  Max: 18    GENERAL: alert, no distress, sitting in a chair  LUNGS: clear to ausculation, without wheezes, rales or rhonci  HEART: normal rate and regular rhythm  ABDOMEN: distended, soft, incisional tenderness, bowel sounds present in all 4 quadrants and no guarding or peritoneal signs, Aquacel dressing intact  INCISION: healing well, no significant drainage, no significant erythema  EXTERMITY: no cyanosis, clubbing or edema    In: 1671 [P.O.:1200; I.V.:471]  Out: 400 [Urine:400]  Date 20 0000 - 20 235   Shift 0132-6516 6896-3705 6726-8545 24 Hour Total   INTAKE   P.O.(mL/kg/hr) 240(0.3) 960(1.1)  1200   I. V.(mL/kg) 471(4.3)   471(4.3)   Shift Total(mL/kg) 711(6.5) 960(8.7)  7590(09.3)   OUTPUT   Urine(mL/kg/hr) 400(0.5)   400   Emesis/NG output(mL/kg) 0(0)   0(0)   Other(mL/kg) 0(0)   0(0)   Stool(mL/kg) 0(0)   0(0)   Blood(mL/kg) 0(0)   0(0)   Shift Total(mL/kg) 400(3.6)   400(3.6)   Weight (kg) 110 110 110 110       LABS  Recent Labs     20  0545 20  0556 20  0600 20  0600 20  0851   WBC 9.5  --  10.6  --  16.4*   HGB 8.0*  --  7.4*  --  9.3*   HCT 25.3*  --  23.5*  --  29.3*     --  193 --  230   NA  --  142 144 140  --    K  --  3.3* 2.8* 3.2*  --    CL  --  106 110* 107  --    CO2  --  29 24 24  --    BUN  --  5* 8 4*  --    CREATININE  --  0.47* 0.35* 0.38*  --    MG  --  1.9 1.4* 1.7  --    PHOS  --  2.1* 2.5 2.1*  --    CALCIUM  --  9.6 8.4* 8.1*  --       No results for input(s): PTT, INR in the last 72 hours. Invalid input(s): PT  No results for input(s): AST, ALT, BILITOT, BILIDIR, AMYLASE, LIPASE, LDH, LACTA in the last 72 hours. RADIOLOGY  Ct Abdomen Pelvis Wo Contrast Additional Contrast? None    Result Date: 9/2/2020  CT of the Abdomen and Pelvis without intravenous contrast medium History:  Right hemicolectomy, abdominal pain. Progressive anemia. Rule out bleeding. Technical Factors: CT imaging of the abdomen and pelvis were obtained and formatted as 5 mm contiguous axial images from the domes of the diaphragm to the symphysis pubis. Sagittal and coronal reconstructions were also obtained. Oral contrast medium:  None. Intravenous contrast medium:  Not. Comparison: CT abdomen pelvis, July 22, 2020 2017 7, 2018. Findings: Study limited secondary to lack of intravenous contrast medium. Lungs:  Consolidation with air bronchogram, right lower lobe. Liver:  Normal in size, shape, and attenuation. Bile Ducts:  Normal in caliber. Gallbladder:  No stones or wall thickening. Pancreas:  Normal without masses, cysts, ductal dilatation or calcification. Spleen:  Normal in size without masses or calcifications. No splenules. Kidneys:  Normal in size. No hydronephrosis, masses, or stones. Adrenals:  Normal. Small bowel:  Circumferential small bowel thickening, just proximal to the surgical anastomosis with associated mild pericolonic fat stranding. Appendix:  Surgically absent. Colon:  Surgical clips, midline lower abdomen again identified and unchanged. Interval ileocolectomy with anastomosis at transverse colon. Pericolonic fat stranding identified. No bowel dilatation identified. Peritoneum:  No free air. Probable small amount of fluid dependent pelvis. Vessels: Aorta normal in course and caliber. Lymph nodes:  Retroperitoneal:  No enlarged retroperitoneal lymph nodes. Mesenteric:  No enlarged mesenteric lymph nodes. Pelvic: No enlarged pelvic lymph nodes. Ureters: Normal in course and caliber. No calcifications. Bladder: Decompressed. Reproductive organs: Uterus surgically absent. Abdominal Wall:  No hernia identified. No diastasis of rectus musculature. No edema or masses. Bones:  No bone lesions. This space narrowing L3-4 and L4-5. 3 mm anterolisthesis L5 on S1, unchanged. No post operative changes. Study limited secondary to lack of intravenous contrast medium. Interval ileocolectomy with anastomosis at level of transverse colon. No bowel dilatation. Thickening of small bowel proximal to anastomosis, as well as fat stranding adjacent small bowel and colon, most likely postsurgical in etiology. Small amount of fluid dependent pelvis, most likely postsurgical in etiology. Right lower lobe subsegmental atelectasis/pneumonia. Degenerative change lumbar spine with grade 1 L5 spondylolisthesis, stable. If clinical concern warrants, follow-up CT imaging utilizing intravenous contrast medium may be helpful for subsequent evaluation. All CT scans at this facility use dose modulation, iterative reconstruction, and/or weight based dosing when appropriate to reduce radiation dose to as low as reasonably achievable. Ir Picc Wo Sq Port/pump > 5 Years    Result Date: 8/24/2020  PERIPHERALLY INSERTED CENTRAL CATHETER (PICC) PLACEMENT WITH ULTRASOUND GUIDANCE CLINICAL HISTORY:  REQUIRES VASCULAR ACCESS After discussing the procedure and possible complications with the patient, informed consent was obtained. The patient was placed on the Special Procedures table.   The right upper extremity was sterilely prepared using a maximal sterile barrier technique which includes cap, mask, sterile gown, sterile gloves, sterile full-body drape, hand hygiene and 2% chlorhexidine for cutaneous antisepsis. A sterile ultrasound technique with sterile gel and sterile probe covers was also utilized. A pre-procedure time out was performed in order to assure the correct patient and procedure. Local anesthetic was administered. A peripheral vein was accessed with sonographic guidance. A sonographic spot image was obtained for documentation. A guidewire was advanced into the vein with fluoroscopic guidance and a sheath was placed over the guidewire. A 5-Lith[place]ian dual-lumen PICC was advanced through the sheath, up the arm and into the central vasculature. It was positioned appropriately. The sheath was removed. The catheter was shown to aspirate and infuse properly. The flange of the catheter was affixed to the arm using a PICC securement device. A spot image of the chest showed the tip of the PICC line to lie in the superior vena cava. The patient tolerated the procedure well and without complications. Number of films: 3 Fluoroscopy time: 106 seconds CONCLUSION: SUCCESSFUL RIGHT PICC PLACEMENT WITHOUT IMMEDIATE COMPLICATIONS. Xr Chest Abdomen Ng Placement    Result Date: 8/29/2020  XR CHEST ABDOMEN NG PLACEMENT : 8/29/2020 1:50 PM CLINICAL HISTORY:  NGT placement, post op N/V . COMPARISON: None available. TECHNIQUE: ROUTINE FINDINGS: A nasogastric tube is noted with its tip overlying the fundus of the stomach. Mild to moderate small and large bowel gas in the upper abdomen appears similar to the prior study. Shallow inspiratory volumes are present, with probable bibasilar atelectasis and small pleural effusions. No other significant change from 8/25/2020 is identified. A right upper extremity PICC catheter remains in expected position. Surgical clips in both axillae are again noted. NASOGASTRIC TUBE IN EXPECTED POSITION. PROBABLE MILD BIBASILAR ATELECTASIS AND SMALL PLEURAL EFFUSIONS.  NO OTHER SIGNIFICANT CHANGE FROM 8/25/2020 IDENTIFIED. Xr Chest Abdomen Ng Placement    Result Date: 8/25/2020  EXAMINATION: XR CHEST ABDOMEN NG PLACEMENT CLINICAL HISTORY: NASOGASTRIC TUBE PLACEMENT COMPARISONS: DECEMBER 19, 2018 FINDINGS: Tip of nasogastric tube identified in caudal esophagus proximal to the gastroesophageal junction. Multiple surgical clips right and left axilla. Terminus PICC line within superior vena cava cava. Cardiopericardial silhouette normal. Lungs clear. TERMINUS NASOGASTRIC TUBE, CAUDAL ESOPHAGUS. RECOMMEND ADVANCING NASOGASTRIC TUBE, 20 CM.     Electronically signed by Jimmie Martínez MD on 9/2/2020 at 4:20 PM

## 2020-09-02 NOTE — PROGRESS NOTES
Assessment compete. Alert and oriented. Denies SOB, dizziness, and pain. She denies other needs at this time. Call light within reach. Safety maintained. Will continue to monitor. 0050: Patient is currently recieving 1 unit packed RBCs. VSS. Will continue to monitor.

## 2020-09-02 NOTE — PROGRESS NOTES
Progress Note  9/1/2020 8:26 PM  Subjective:   Admit Date: 8/20/2020  PCP: Angela Castillo MD  Interval History: Feels chilly and cold with abdominal pain and decreasing H and H now down to 7.4 gms %. Remain on IVF and started on clear liquids. . K noted low at 2.8 and Magnesium at 1.4  And were both replenished. Off PCA pump and was started on tramadol, IVF was decreased to 50 ml/ hr. Check CT scan of abdomen without contrast in am to r/o intra-abdominal bleed. DIET CLEAR LIQUID; Intake/Output Summary (Last 24 hours) at 9/1/2020 2026  Last data filed at 9/1/2020 1859  Gross per 24 hour   Intake 4326 ml   Output 1450 ml   Net 2876 ml     Medications:      dextrose 5 % and 0.9 % NaCl 50 mL/hr at 09/01/20 0925      bisacodyl  10 mg Rectal Daily    0.9 % sodium chloride  250 mL Intravenous Once    magnesium sulfate  2 g Intravenous Once    metoclopramide  10 mg Intravenous Q8H    pantoprazole  40 mg Intravenous Daily    And    sodium chloride (PF)  10 mL Intravenous Daily    alteplase  1 mg Intracatheter Once    sodium chloride flush  10 mL Intravenous 2 times per day    enoxaparin  40 mg Subcutaneous Daily    dilTIAZem  240 mg Oral Daily    gabapentin  600 mg Oral Nightly    losartan  100 mg Oral Daily    montelukast  10 mg Oral Nightly    therapeutic multivitamin-minerals  1 tablet Oral Daily    torsemide  20 mg Oral Daily     Recent Labs     08/31/20  0545 09/01/20  0600   WBC 9.5 10.6   HGB 8.0* 7.4*    193     Recent Labs     08/31/20  0556 09/01/20  0600    144   K 3.3* 2.8*    110*   CO2 29 24   BUN 5* 8   CREATININE 0.47* 0.35*   GLUCOSE 96 85     No results for input(s): AST, ALT, ALB, BILITOT, ALKPHOS in the last 72 hours. Troponin T: No results for input(s): TROPONINI in the last 72 hours. Pro-BNP: No results for input(s): BNP in the last 72 hours. INR: No results for input(s): INR in the last 72 hours.     Objective:     Vitals:    08/31/20 7446 08/31/20 8742 08/31/20 1957 09/01/20 0750   BP:  (!) 137/52 (!) 110/42 (!) 114/48   Pulse:  89 80 77   Resp:  18 18 16   Temp:  98.6 °F (37 °C) 98.4 °F (36.9 °C) 97.3 °F (36.3 °C)   TempSrc:  Oral Oral Oral   SpO2:  100% 100% 100%   Weight: 242 lb 8.1 oz (110 kg)      Height:         General appearance: alert and cooperative with exam  Lungs: clear to auscultation bilaterally  Heart: regular rate and rhythm, S1, S2 normal, no murmur, click, rub or gallop  Abdomen: soft, non-tender; bowel sounds normal; no masses,  no organomegaly  Extremities: extremities normal, atraumatic, no cyanosis or edema  Neurologic: No obvious focal neurologic deficits. Assessment and Plan:   1. Lower GI bleeding with a 4-5 cm tubular adenoma on the ascending colon with chronic Fe deficiency anemia , S/P Right Hemicolectomy//Tubulo-Villous adenoma on Biopsy. 2. Bilateral breast Ca s/p Bilateral mastectomy s/p chemo and hormonal therapy  3. PFO with bouts of CVA with no residual and TIA on coumadin as no closure recommended. 4. HTN  5. Hypercholesterolemia. 6. Morbid Obesity  7. S/P Bilateral TKR  8. ROSEMARIE  9. Fe deficiency anemia  10. S/P L4 and L5 foraminotomy  11. Chemotherapy induced Peripheral Neuropathy  12. History of Asthma  13. With Normal LHC in 2013 after an abnormal stress lexiscan  14. Progressing anemia with no obvious external bleeding    Advance Directive: Full Code  DVT prophylaxis with enoxaparin 40 mg sub-Q daily. Discharge planning: Home    Principal Problem:    Adenoma of ascending colon  Active Problems:    Gastrointestinal hemorrhage    Anemia  Resolved Problems:    * No resolved hospital problems. *  Plan:  1. 4-5 cm tubular adenoma on the ascending colon s/p right Hemicolectomy  2. DC q 6 hrs H and H  3. Correct coagulopathy// Re start coumadin when cleared by surgery  4. CT scan of abdomen and pelvis in am without IV and oral contrast.r/o bleed  5. PPI changed to IV  6. Resume home meds  7.  Will continue to hold coumadin ,

## 2020-09-02 NOTE — PROGRESS NOTES
Physical Therapy Med Surg Daily Treatment Note  Fac[facility]/Department[facility]  [facility]       NAME: Qasim Pina  : 1955 (77 y.o.)  MRN: [id number]  CODE STATUS: Full Code    Date of Service: 2020    Patient Diagnosis(es): Gastrointestinal hemorrhage [K92.2]  Anemia [D64.9]   Chief Complaint   Patient presents with    Rectal Bleeding     Patient Active Problem List    Diagnosis Date Noted    Adenoma of ascending colon 2020    Anemia 2020    Gastrointestinal hemorrhage 2020    Adenomatous polyp of ascending colon 2020    Lower GI bleed 2020    HTN (hypertension) 2020    Osteoarthritis of hip 2020    History of carcinoma in situ of breast 2020    History of cardioembolic stroke     Anticoagulated 2020    Iron deficiency anemia, unspecified 2020    Anemia of chronic renal failure 2020    Spinal stenosis at L4-L5 level 2019    Lumbar spondylosis 2019    Former smoker, stopped smoking in distant past 2019    Foraminal stenosis of lumbar region 2019    Class 3 severe obesity due to excess calories without serious comorbidity with body mass index (BMI) of 40.0 to 44.9 in adult [facility]) 2019    Osteoarthritis of spine with radiculopathy, lumbosacral region 2019    Patent foramen ovale 2019    Estrogen receptor positive 10/01/2018    Malignant neoplasm of central portion of right female breast ([address] 10/01/2018    Malignant neoplasm of upper-outer quadrant of left female breast ([address] 10/01/2018    Lobular carcinoma in situ of left breast 10/01/2018    Iron deficiency anemia secondary to blood loss (chronic) 10/01/2018        Past Medical History:   Diagnosis Date    Arthritis     Asthma     Cancer ([address]  &     Breast RIGHT (T2/N0/M0) ER/FL (+) HER2/estela 3+ / chemo / left breast with mastectomy    GERD (gastroesophageal reflux disease)     pain  Pain Location: Hip  Pain Orientation: Right  Pain Descriptors: Aching  Pain Frequency: Continuous    Post-Session Pain Report  Pain Assessment  Pain Assessment: 0-10  Pain Level: 6  Pain Type: Chronic pain  Pain Location: Hip  Pain Orientation: Right  Pain Descriptors: Aching  Pain Frequency: Continuous    OBJECTIVE   Transfers  Sit to Stand: Modified independent  Stand to sit: Modified independent  Comment: demonstrates good safety    Ambulation  Ambulation?: Yes  Ambulation 1  Surface: level tile  Device: Rolling Walker  Assistance: Supervision;Modified Independent  Quality of Gait: flexed posture, WBOS, steady renetta, no LOB , mild antalgia  Gait Deviations: Slow Renetta; Increased ERIN; Decreased step length;Decreased step height  Distance: 250 feet    Stairs/Curb  Stairs?: Yes  Stairs  # Steps : 4  Stairs Height: 6\"  Rails: Bilateral  Assistance: Supervision  Comment: nonreciprocal, demonstrates good safety     Exercises: TOMEKA SERRATO, march x20 ea   ASSESSMENT   Assessment: completed stair training this AM- patient ascends/descends at supervision with good safety awareness. Discharge Recommendations:  Continue to assess pending progress    Goals  Long term goals  Long term goal 1: Bed mobility with indep, demonstrating log roll  Long term goal 2: Functional transfers with indep  Long term goal 3: Amb >150ft with LRAD (cane) and indep  Long term goal 4: 4 steps with handrail and SBA to navigate  home environement  Patient Goals   Patient goals : \"heal at home so I can get my hip replacement done\"    PLAN    Times per week: 3-6  Safety Devices  Type of devices:  All fall risk precautions in place, Left in chair, Call light within reach     [facility] (6 CLICK) [address] Mobility Raw Score : 22     Therapy Time   Individual   Time In 1005   Time Out 1035   Minutes 30     Timed Code Treatment Minutes: 30 Minutes   gt 20  MIGUEL ANGEL [address], PTA, 09/02/20 at 10:54 AM Definitions for assistance levels  Independent = pt does not require any physical supervision or assistance from another person for activity completion. Device may be needed.   Stand by assistance = pt requires verbal cues or instructions from another person, close to but not touching, to perform the activity  Minimal assistance= pt performs 75% or more of the activity; assistance is required to complete the activity  Moderate assistance= pt performs 50% of the activity; assistance is required to complete the activity  Maximal assistance = pt performs 25% of the activity; assistance is required to complete the activity  Dependent = pt requires total physical assistance to accomplish the task

## 2020-09-02 NOTE — PROGRESS NOTES
Progress Note  9/2/2020 11:05 AM  Subjective:   Admit Date: 8/20/2020  PCP: Mary Mendoza MD  Interval History: Up in a chair and ambulated with PT, Hgb is up to 9.4 gms post 1 unit of PRBC transfused. CT scan of abdomen and pelvis with no obvious bleeding small post -op fluid collection and right lower lobe consolidation or atelectasis. Patient is asymptomatic and better with diet advanced to General diet. K is still low at 3.2 and Mg is at 1.7 phos is also low at 2.2 will replenish. IVF off. DIET GENERAL; Intake/Output Summary (Last 24 hours) at 9/2/2020 1105  Last data filed at 9/2/2020 0851  Gross per 24 hour   Intake 3041 ml   Output 1550 ml   Net 1491 ml     Medications:      dextrose 5 % and 0.9 % NaCl 50 mL/hr at 09/02/20 0620      bisacodyl  10 mg Rectal Daily    magnesium sulfate  2 g Intravenous Once    metoclopramide  10 mg Intravenous Q8H    pantoprazole  40 mg Intravenous Daily    And    sodium chloride (PF)  10 mL Intravenous Daily    alteplase  1 mg Intracatheter Once    sodium chloride flush  10 mL Intravenous 2 times per day    enoxaparin  40 mg Subcutaneous Daily    dilTIAZem  240 mg Oral Daily    gabapentin  600 mg Oral Nightly    losartan  100 mg Oral Daily    montelukast  10 mg Oral Nightly    therapeutic multivitamin-minerals  1 tablet Oral Daily    torsemide  20 mg Oral Daily     Recent Labs     08/31/20  0545 09/01/20  0600 09/02/20  0851   WBC 9.5 10.6 16.4*   HGB 8.0* 7.4* 9.3*    193 230     Recent Labs     08/31/20  0556 09/01/20  0600 09/02/20  0600    144 140   K 3.3* 2.8* 3.2*    110* 107   CO2 29 24 24   BUN 5* 8 4*   CREATININE 0.47* 0.35* 0.38*   GLUCOSE 96 85 79     No results for input(s): AST, ALT, ALB, BILITOT, ALKPHOS in the last 72 hours. Troponin T: No results for input(s): TROPONINI in the last 72 hours. Pro-BNP: No results for input(s): BNP in the last 72 hours.   INR: No results for input(s): INR in the last 72 hours.    Objective:     Vitals:    09/02/20 0025 09/02/20 0055 09/02/20 0501 09/02/20 0820   BP:  105/61 (!) 109/55    Pulse:  87 87    Resp:  18 18    Temp: 98.2 °F (36.8 °C) 98.2 °F (36.8 °C) 97.3 °F (36.3 °C)    TempSrc:   Oral Oral   SpO2:   100%    Weight:       Height:         General appearance: alert and cooperative with exam  Lungs: clear to auscultation bilaterally  Heart: regular rate and rhythm, S1, S2 normal, no murmur, click, rub or gallop  Abdomen: soft, non-tender; bowel sounds normal; no masses,  no organomegaly  Extremities: extremities normal, atraumatic, no cyanosis or edema  Neurologic: No obvious focal neurologic deficits. Assessment and Plan:   1. Lower GI bleeding with a 4-5 cm tubular adenoma on the ascending colon with chronic Fe deficiency anemia , S/P Right Hemicolectomy//Tubulo-Villous adenoma on Biopsy. 2. Bilateral breast Ca s/p Bilateral mastectomy s/p chemo and hormonal therapy  3. PFO with bouts of CVA with no residual and TIA on coumadin as no closure recommended. 4. HTN  5. Hypercholesterolemia. 6. Morbid Obesity  7. S/P Bilateral TKR  8. ROSEMARIE  9. Fe deficiency anemia  10. S/P L4 and L5 foraminotomy  11. Chemotherapy induced Peripheral Neuropathy  12. History of Asthma  13. With Normal LHC in 2013 after an abnormal stress lexiscan  14. Progressing anemia with no obvious external bleeding , Note (-) CT scan of abdomen and pelvis for bleed. Advance Directive: Full Code  DVT prophylaxis with enoxaparin 40 mg sub-Q daily. Discharge planning: Home    Principal Problem:    Adenoma of ascending colon  Active Problems:    Gastrointestinal hemorrhage    Anemia  Resolved Problems:    * No resolved hospital problems. *  Plan:  1. 4-5 cm tubular adenoma on the ascending colon s/p right Hemicolectomy  2. DC q 6 hrs H and H  3. Correct coagulopathy// Re start coumadin when cleared by surgery  4. CT scan of abdomen and pelvis in am without IV and oral contrast.r/o bleed  5.  PPI changed to IV will change to po  6. Resume home meds  7. Will continue to hold coumadin , Will resume if [id number] [address] with Surgery with lovenox bridging//coumadin held by surgery  8. SCD's  9. Taper down Cymbalta  10 IV Venofer 300 mg IVPB given  11. On D5NS decreased to 50 cc/ hr.// dc'd today  12. Replenish lytes  13. Torsemide 40 mg po BID x 4 doses then q daily  14. NGT dc'd  15. IV Reglan changed to 10 mg IVP q 8 hrs  16.  Diet advanced to general       Preston Dash MD

## 2020-09-03 VITALS
SYSTOLIC BLOOD PRESSURE: 116 MMHG | HEART RATE: 79 BPM | OXYGEN SATURATION: 100 % | BODY MASS INDEX: 45.79 KG/M2 | TEMPERATURE: 97.7 F | HEIGHT: 61 IN | WEIGHT: 242.51 LBS | RESPIRATION RATE: 17 BRPM | DIASTOLIC BLOOD PRESSURE: 56 MMHG

## 2020-09-03 LAB
ANION GAP SERPL CALCULATED.3IONS-SCNC: 9 MEQ/L (ref 9–15)
BASOPHILS ABSOLUTE: 0.1 K/UL (ref 0–0.2)
BASOPHILS RELATIVE PERCENT: 0.5 %
BUN BLDV-MCNC: 5 MG/DL (ref 8–23)
CALCIUM SERPL-MCNC: 9.6 MG/DL (ref 8.5–9.9)
CHLORIDE BLD-SCNC: 103 MEQ/L (ref 95–107)
CO2: 26 MEQ/L (ref 20–31)
CREAT SERPL-MCNC: 0.47 MG/DL (ref 0.5–0.9)
EOSINOPHILS ABSOLUTE: 0.8 K/UL (ref 0–0.7)
EOSINOPHILS RELATIVE PERCENT: 6.8 %
FERRITIN: 470 NG/ML (ref 13–150)
GFR AFRICAN AMERICAN: >60
GFR NON-AFRICAN AMERICAN: >60
GLUCOSE BLD-MCNC: 94 MG/DL (ref 70–99)
HCT VFR BLD CALC: 29 % (ref 37–47)
HEMOGLOBIN: 9.3 G/DL (ref 12–16)
INR BLD: 1.1
LYMPHOCYTES ABSOLUTE: 1.1 K/UL (ref 1–4.8)
LYMPHOCYTES RELATIVE PERCENT: 9.3 %
MAGNESIUM: 2.3 MG/DL (ref 1.7–2.4)
MCH RBC QN AUTO: 25.2 PG (ref 27–31.3)
MCHC RBC AUTO-ENTMCNC: 32.1 % (ref 33–37)
MCV RBC AUTO: 78.7 FL (ref 82–100)
MONOCYTES ABSOLUTE: 1.3 K/UL (ref 0.2–0.8)
MONOCYTES RELATIVE PERCENT: 11.3 %
NEUTROPHILS ABSOLUTE: 8.6 K/UL (ref 1.4–6.5)
NEUTROPHILS RELATIVE PERCENT: 72.1 %
PDW BLD-RTO: 24.6 % (ref 11.5–14.5)
PHOSPHORUS: 2.6 MG/DL (ref 2.3–4.8)
PLATELET # BLD: 213 K/UL (ref 130–400)
POTASSIUM SERPL-SCNC: 4.6 MEQ/L (ref 3.4–4.9)
PROTHROMBIN TIME: 14.3 SEC (ref 12.3–14.9)
RBC # BLD: 3.69 M/UL (ref 4.2–5.4)
SODIUM BLD-SCNC: 138 MEQ/L (ref 135–144)
WBC # BLD: 11.9 K/UL (ref 4.8–10.8)

## 2020-09-03 PROCEDURE — 85025 COMPLETE CBC W/AUTO DIFF WBC: CPT

## 2020-09-03 PROCEDURE — 85610 PROTHROMBIN TIME: CPT

## 2020-09-03 PROCEDURE — 83735 ASSAY OF MAGNESIUM: CPT

## 2020-09-03 PROCEDURE — 99024 POSTOP FOLLOW-UP VISIT: CPT | Performed by: SURGERY

## 2020-09-03 PROCEDURE — 6370000000 HC RX 637 (ALT 250 FOR IP): Performed by: SURGERY

## 2020-09-03 PROCEDURE — 97110 THERAPEUTIC EXERCISES: CPT

## 2020-09-03 PROCEDURE — 84100 ASSAY OF PHOSPHORUS: CPT

## 2020-09-03 PROCEDURE — 97116 GAIT TRAINING THERAPY: CPT

## 2020-09-03 PROCEDURE — 6370000000 HC RX 637 (ALT 250 FOR IP): Performed by: INTERNAL MEDICINE

## 2020-09-03 PROCEDURE — 80048 BASIC METABOLIC PNL TOTAL CA: CPT

## 2020-09-03 PROCEDURE — 82728 ASSAY OF FERRITIN: CPT

## 2020-09-03 PROCEDURE — 97535 SELF CARE MNGMENT TRAINING: CPT

## 2020-09-03 PROCEDURE — 36592 COLLECT BLOOD FROM PICC: CPT

## 2020-09-03 PROCEDURE — 6360000002 HC RX W HCPCS: Performed by: SURGERY

## 2020-09-03 RX ORDER — ALBUTEROL SULFATE 90 UG/1
2 AEROSOL, METERED RESPIRATORY (INHALATION) EVERY 6 HOURS PRN
Qty: 1 INHALER | Refills: 3 | Status: SHIPPED | OUTPATIENT
Start: 2020-09-03

## 2020-09-03 RX ORDER — DILTIAZEM HYDROCHLORIDE 240 MG/1
240 CAPSULE, COATED, EXTENDED RELEASE ORAL DAILY
Qty: 30 CAPSULE | Refills: 3 | Status: SHIPPED | OUTPATIENT
Start: 2020-09-04

## 2020-09-03 RX ORDER — WARFARIN SODIUM 1 MG/1
TABLET ORAL
Qty: 30 TABLET | Refills: 3 | Status: SHIPPED | OUTPATIENT
Start: 2020-09-04

## 2020-09-03 RX ORDER — METOCLOPRAMIDE 5 MG/1
5 TABLET ORAL
Qty: 120 TABLET | Refills: 3 | Status: SHIPPED | OUTPATIENT
Start: 2020-09-04

## 2020-09-03 RX ORDER — LOSARTAN POTASSIUM 100 MG/1
100 TABLET ORAL DAILY
Qty: 30 TABLET | Refills: 3 | Status: SHIPPED | OUTPATIENT
Start: 2020-09-04

## 2020-09-03 RX ORDER — POTASSIUM CHLORIDE 20 MEQ/1
40 TABLET, EXTENDED RELEASE ORAL 2 TIMES DAILY WITH MEALS
Qty: 60 TABLET | Refills: 3 | Status: SHIPPED | OUTPATIENT
Start: 2020-09-04

## 2020-09-03 RX ADMIN — MULTIPLE VITAMINS W/ MINERALS TAB 1 TABLET: TAB at 08:45

## 2020-09-03 RX ADMIN — POTASSIUM CHLORIDE 40 MEQ: 20 TABLET, EXTENDED RELEASE ORAL at 08:44

## 2020-09-03 RX ADMIN — WARFARIN SODIUM 7.5 MG: 2.5 TABLET ORAL at 16:11

## 2020-09-03 RX ADMIN — METOCLOPRAMIDE 5 MG: 10 TABLET ORAL at 05:25

## 2020-09-03 RX ADMIN — POTASSIUM CHLORIDE 40 MEQ: 20 TABLET, EXTENDED RELEASE ORAL at 16:12

## 2020-09-03 RX ADMIN — PANTOPRAZOLE SODIUM 40 MG: 40 TABLET, DELAYED RELEASE ORAL at 05:25

## 2020-09-03 RX ADMIN — TRAMADOL HYDROCHLORIDE 50 MG: 50 TABLET, FILM COATED ORAL at 16:11

## 2020-09-03 RX ADMIN — ENOXAPARIN SODIUM 40 MG: 40 INJECTION SUBCUTANEOUS at 08:45

## 2020-09-03 RX ADMIN — METOCLOPRAMIDE 5 MG: 10 TABLET ORAL at 11:50

## 2020-09-03 RX ADMIN — METOCLOPRAMIDE 5 MG: 10 TABLET ORAL at 16:11

## 2020-09-03 RX ADMIN — DILTIAZEM HYDROCHLORIDE 240 MG: 240 CAPSULE, COATED, EXTENDED RELEASE ORAL at 08:45

## 2020-09-03 RX ADMIN — LOSARTAN POTASSIUM 100 MG: 50 TABLET, FILM COATED ORAL at 08:44

## 2020-09-03 RX ADMIN — TORSEMIDE 20 MG: 20 TABLET ORAL at 08:44

## 2020-09-03 RX ADMIN — TRAMADOL HYDROCHLORIDE 50 MG: 50 TABLET, FILM COATED ORAL at 03:06

## 2020-09-03 ASSESSMENT — PAIN SCALES - GENERAL
PAINLEVEL_OUTOF10: 4
PAINLEVEL_OUTOF10: 0
PAINLEVEL_OUTOF10: 5

## 2020-09-03 ASSESSMENT — PAIN DESCRIPTION - LOCATION: LOCATION: ABDOMEN

## 2020-09-03 ASSESSMENT — PAIN DESCRIPTION - PAIN TYPE: TYPE: ACUTE PAIN;SURGICAL PAIN

## 2020-09-03 NOTE — PROGRESS NOTES
Physical Therapy Med Surg Daily Treatment Note  [facility]  [facility]       NAME: Rosario Easley  : 1955 (77 y.o.)  MRN: [id number]  CODE STATUS: Full Code    Date of Service: 9/3/2020    Patient Diagnosis(es): Gastrointestinal hemorrhage [K92.2]  Anemia [D64.9]   Chief Complaint   Patient presents with    Rectal Bleeding     Patient Active Problem List    Diagnosis Date Noted    Adenoma of ascending colon 2020    Anemia 2020    Gastrointestinal hemorrhage 2020    Adenomatous polyp of ascending colon 2020    Lower GI bleed 2020    HTN (hypertension) 2020    Osteoarthritis of hip 2020    History of carcinoma in situ of breast 2020    History of cardioembolic stroke     Anticoagulated 2020    Iron deficiency anemia, unspecified 2020    Anemia of chronic renal failure 2020    Spinal stenosis at L4-L5 level 2019    Lumbar spondylosis 2019    Former smoker, stopped smoking in distant past 2019    Foraminal stenosis of lumbar region 2019    Class 3 severe obesity due to excess calories without serious comorbidity with body mass index (BMI) of 40.0 to 44.9 in adult [facility]) 2019    Osteoarthritis of spine with radiculopathy, lumbosacral region 2019    Patent foramen ovale 2019    Estrogen receptor positive 10/01/2018    Malignant neoplasm of central portion of right female breast ([address] 10/01/2018    Malignant neoplasm of upper-outer quadrant of left female breast ([address] 10/01/2018    Lobular carcinoma in situ of left breast 10/01/2018    Iron deficiency anemia secondary to blood loss (chronic) 10/01/2018        Past Medical History:   Diagnosis Date    Arthritis     Asthma     Cancer ([address]  &     Breast RIGHT (T2/N0/M0) ER/AR (+) HER2/estela 3+ / chemo / left breast with mastectomy    GERD (gastroesophageal reflux disease)     Hiatal hernia     History of blood transfusion 2013    post op TKR    Hypercholesteremia     Hyperlipidemia     past trx / off meds > 5 yrs    Hypertension     meds > 20 yrs     Patent foramen ovale      Past Surgical History:   Procedure Laterality Date    BREAST SURGERY Bilateral     Mastectomy    CARDIAC CATHETERIZATION  2013    no blockage    COLONOSCOPY  08/31/2016    w/polypectomy     COLONOSCOPY N/A 7/24/2020    COLONOSCOPY DIAGNOSTIC performed by Jan Harrison MD at [facility], DIAGNOSTIC      HEMICOLECTOMY Right 8/25/2020    RIGHT COLECTOMY performed by Apple Roberts MD at [address]    umbilical   [address] Bilateral     Knee    JOINT REPLACEMENT Bilateral 11/04/2013    LAMINECTOMY Right 12/30/2019    RIGHT L4-5 MICRODECOMPRESSION performed by Felix Corrales MD at [address] 0.6-1CM REMAINDR BODY N/A 4/6/2018    EXCISION CYST RT. NECK AND EXCISION LESION NOSE performed by Delon Jones MD at [address] [facility] VAD W/SUBQ PORT/ CTR/PRPH INSJ N/A 4/6/2018    REMOVAL VENOUS PORT performed by Delon Jones MD at [facility] / has been removed    TONSILLECTOMY      at age 23   Mey Amber  08/31/2016    w/polypectomy,bx     UPPER GASTROINTESTINAL ENDOSCOPY N/A 7/24/2020    EGD DIAGNOSTIC ONLY performed by Jan Harrison MD at [address] [place] [address]  2003    excision polyps       Restrictions  Position Activity Restriction  Other position/activity restrictions: general abdominal precautions    SUBJECTIVE   General  Chart Reviewed: Yes  Family / Caregiver Present: No  Subjective  Subjective: Pt reports that she will be d/c'd home today.     Pre-Session Pain Report  Pre Treatment Pain Screening  Pain at present: 0  Scale Used: Numeric Score  Intervention List: Patient able to continue with treatment  Pain Screening  Patient \"heal at home so I can get my hip replacement done\"    PLAN    Times per week: 3-6  Plan Comment: D/C'd from PT POC  Safety Devices  Type of devices: All fall risk precautions in place, Call light within reach, Left in chair  Restraints  Initially in place: No     AMPAC (6 CLICK) Susan Jacob 28 Inpatient Mobility Raw Score : 18     Therapy Time   Individual   Time In 1130   Time Out 1140   Minutes 10             Nikunj Camacho, [facility], 09/03/20 at 3:12 PM         Definitions for assistance levels  Independent = pt does not require any physical supervision or assistance from another person for activity completion. Device may be needed.   Stand by assistance = pt requires verbal cues or instructions from another person, close to but not touching, to perform the activity  Minimal assistance= pt performs 75% or more of the activity; assistance is required to complete the activity  Moderate assistance= pt performs 50% of the activity; assistance is required to complete the activity  Maximal assistance = pt performs 25% of the activity; assistance is required to complete the activity  Dependent = pt requires total physical assistance to accomplish the task

## 2020-09-03 NOTE — PROGRESS NOTES
[facility] SYSTEM with Dr Sara Cross that pt starts on her coumadin.  Electronically signed by Brie Hinton RN on 9/3/2020 at 3:03 PM

## 2020-09-03 NOTE — PROGRESS NOTES
continue to hold coumadin , Will resume if [facility] with Surgery with lovenox bridging//coumadin held by surgery  8. SCD's  9. Taper down Cymbalta  10 IV Venofer 300 mg IVPB given  11. On D5NS decreased to 50 cc/ hr.// dc'd today  12. Replenish lytes  13. Torsemide 40 mg po BID x 4 doses then q daily  14. NGT dc'd  15. IV Reglan changed to 10 mg IVP q 8 hrs  16.  Diet advanced to general  [facility] today        Mk Agustin MD

## 2020-09-03 NOTE — FLOWSHEET NOTE
Pt up to chair, rating abdominal pain a 2/10, Tramadol administered as ordered. Aquacel on abdomen clean and dry. Bottom of dressing starting to peel off, old shadowing on dressing. Pt is passing gas, bowel sounds active, 2 BM's during day shift. Pt stated she tried to eat a general diet and her stomach was upset today. Denies needs at this time.

## 2020-09-03 NOTE — PROGRESS NOTES
Pt Name: Raina Denver  Medical Record Number: [id number]  Date of Birth 1955   Admit date 8/20/2020 12:16 PM  Today's Date: 9/3/2020     ASSESSMENT  1. Post op day # 9  2. Raina Denver had Procedure(s):  RIGHT COLECTOMY   3.  Doing well    PLAN  1. She can be discharged from my standpoint  2. Follow-up in 1 week    SUBJECTIVE  Chief complaint: None  Afebrile, vital signs are stable. She denies any nausea or vomiting, has passed flatus and had a bowel movement. She is tolerating a DIET GENERAL;. Her pain is well controlled on current medications. She has been ambulating in the halls. Hemoglobin stable      has a past medical history of Arthritis, Asthma, Cancer ([address] GERD (gastroesophageal reflux disease), Hiatal hernia, History of blood transfusion, Hypercholesteremia, Hyperlipidemia, Hypertension, and Patent foramen ovale. CURRENT MEDS  Scheduled Meds:   potassium chloride  40 mEq Oral BID WC    warfarin  7.5 mg Oral Daily    pantoprazole  40 mg Oral QAM AC    metoclopramide  5 mg Oral TID AC    bisacodyl  10 mg Rectal Daily    sodium chloride (PF)  10 mL Intravenous Daily    alteplase  1 mg Intracatheter Once    sodium chloride flush  10 mL Intravenous 2 times per day    enoxaparin  40 mg Subcutaneous Daily    dilTIAZem  240 mg Oral Daily    gabapentin  600 mg Oral Nightly    losartan  100 mg Oral Daily    montelukast  10 mg Oral Nightly    therapeutic multivitamin-minerals  1 tablet Oral Daily    torsemide  20 mg Oral Daily     Continuous Infusions:   dextrose 5 % and 0.9 % NaCl 50 mL/hr at 09/02/20 0620     PRN Meds:.traMADol, phenol, bisacodyl, ondansetron, benzocaine-menthol, sodium chloride flush, albuterol, acetaminophen    OBJECTIVE  CURRENT VITALS:  height is 5' 1\" (1.549 m) and weight is 242 lb 8.1 oz (110 kg). Her oral temperature is 97.7 °F (36.5 °C). Her blood pressure is 116/56 (abnormal) and her pulse is 79. Her respiration is 17 and oxygen saturation is 100%. Temperature Range (24h):Temp: 97.7 °F (36.5 °C) Temp  Av.8 °F (36.6 °C)  Min: 97.7 °F (36.5 °C)  Max: 97.9 °F (36.6 °C)  BP Range (85T): Systolic (80YBK), YGF:578 , Min:116 , UKA:539     Diastolic (01ULX), CZB:10, Min:56, Max:56    Pulse Range (24h): Pulse  Av  Min: 79  Max: 87  Respiration Range (24h): Resp  Av  Min: 17  Max: 17    GENERAL: alert, no distress, sitting in a chair  LUNGS: clear to ausculation, without wheezes, rales or rhonci  HEART: normal rate and regular rhythm  ABDOMEN: distended, soft, incisional tenderness, bowel sounds present in all 4 quadrants and no guarding or peritoneal signs, Aquacel dressing intact  INCISION: healing well, no significant drainage, no significant erythema  EXTERMITY: no cyanosis, clubbing or edema    In: 177 [P.O.:580; I.V.:1191]  Out: -   Date 20 0000 - 20 2359   Shift 9005-2966 8807-8698 9371-8834 24 Hour Total   INTAKE   P.O.(mL/kg/hr) 340(0.4)   340   I. V.(mL/kg) 1191(10.8)   1191(10.8)   Shift Total(mL/kg) 5101(21.8)   2910(12.6)   OUTPUT   Shift Total(mL/kg)       Weight (kg) 110 110 110 110       LABS  Recent Labs     20  0600 20  0600 20  0851 20  0553 20  0554   WBC 10.6  --  16.4* 11.9*  --    HGB 7.4*  --  9.3* 9.3*  --    HCT 23.5*  --  29.3* 29.0*  --      --  230 213  --     140  --   --  138   K 2.8* 3.2*  --   --  4.6   * 107  --   --  103   CO2 24 24  --   --  26   BUN 8 4*  --   --  5*   CREATININE 0.35* 0.38*  --   --  0.47*   MG 1.4* 1.7  --   --  2.3   PHOS 2.5 2.1*  --   --  2.6   CALCIUM 8.4* 8.1*  --   --  9.6      Recent Labs     20  0554   INR 1.1     No results for input(s): AST, ALT, BILITOT, BILIDIR, AMYLASE, LIPASE, LDH, LACTA in the last 72 hours. RADIOLOGY  Ct Abdomen Pelvis Wo Contrast Additional Contrast? None    Result Date: 2020  CT of the Abdomen and Pelvis without intravenous contrast medium History:  Right hemicolectomy, abdominal pain. Progressive anemia. Rule out bleeding. Technical Factors: CT imaging of the abdomen and pelvis were obtained and formatted as 5 mm contiguous axial images from the domes of the diaphragm to the symphysis pubis. Sagittal and coronal reconstructions were also obtained. Oral contrast medium:  None. Intravenous contrast medium:  Not. Comparison: CT abdomen pelvis, July 22, 2020 2017 7, 2018. Findings: Study limited secondary to lack of intravenous contrast medium. Lungs:  Consolidation with air bronchogram, right lower lobe. Liver:  Normal in size, shape, and attenuation. Bile Ducts:  Normal in caliber. Gallbladder:  No stones or wall thickening. Pancreas:  Normal without masses, cysts, ductal dilatation or calcification. Spleen:  Normal in size without masses or calcifications. No splenules. Kidneys:  Normal in size. No hydronephrosis, masses, or stones. Adrenals:  Normal. Small bowel:  Circumferential small bowel thickening, just proximal to the surgical anastomosis with associated mild pericolonic fat stranding. Appendix:  Surgically absent. Colon:  Surgical clips, midline lower abdomen again identified and unchanged. Interval ileocolectomy with anastomosis at transverse colon. Pericolonic fat stranding identified. No bowel dilatation identified. Peritoneum:  No free air. Probable small amount of fluid dependent pelvis. Vessels: Aorta normal in course and caliber. Lymph nodes:  Retroperitoneal:  No enlarged retroperitoneal lymph nodes. Mesenteric:  No enlarged mesenteric lymph nodes. Pelvic: No enlarged pelvic lymph nodes. Ureters: Normal in course and caliber. No calcifications. Bladder: Decompressed. Reproductive organs: Uterus surgically absent. Abdominal Wall:  No hernia identified. No diastasis of rectus musculature. No edema or masses. Bones:  No bone lesions. This space narrowing L3-4 and L4-5. 3 mm anterolisthesis L5 on S1, unchanged. No post operative changes.      Study limited secondary to lack of intravenous contrast medium. Interval ileocolectomy with anastomosis at level of transverse colon. No bowel dilatation. Thickening of small bowel proximal to anastomosis, as well as fat stranding adjacent small bowel and colon, most likely postsurgical in etiology. Small amount of fluid dependent pelvis, most likely postsurgical in etiology. Right lower lobe subsegmental atelectasis/pneumonia. Degenerative change lumbar spine with grade 1 L5 spondylolisthesis, stable. If clinical concern warrants, follow-up CT imaging utilizing intravenous contrast medium may be helpful for subsequent evaluation. All CT scans at this facility use dose modulation, iterative reconstruction, and/or weight based dosing when appropriate to reduce radiation dose to as low as reasonably achievable. Ir Picc Wo Sq Port/pump > 5 Years    Result Date: 8/24/2020  PERIPHERALLY INSERTED CENTRAL CATHETER (PICC) PLACEMENT WITH ULTRASOUND GUIDANCE CLINICAL HISTORY:  REQUIRES VASCULAR ACCESS After discussing the procedure and possible complications with the patient, informed consent was obtained. The patient was placed on the Special Procedures table. The right upper extremity was sterilely prepared using a maximal sterile barrier technique which includes cap, mask, sterile gown, sterile gloves, sterile full-body drape, hand hygiene and 2% chlorhexidine for cutaneous antisepsis. A sterile ultrasound technique with sterile gel and sterile probe covers was also utilized. A pre-procedure time out was performed in order to assure the correct patient and procedure. Local anesthetic was administered. A peripheral vein was accessed with sonographic guidance. A sonographic spot image was obtained for documentation. A guidewire was advanced into the vein with fluoroscopic guidance and a sheath was placed over the guidewire. A 5-Spanish dual-lumen PICC was advanced through the sheath, up the arm and into the central vasculature.   It was positioned appropriately. The sheath was removed. The catheter was shown to aspirate and infuse properly. The flange of the catheter was affixed to the arm using a PICC securement device. A spot image of the chest showed the tip of the PICC line to lie in the superior vena cava. The patient tolerated the procedure well and without complications. Number of films: 3 Fluoroscopy time: 106 seconds CONCLUSION: SUCCESSFUL RIGHT PICC PLACEMENT WITHOUT IMMEDIATE COMPLICATIONS. Xr Chest Abdomen Ng Placement    Result Date: 8/29/2020  XR CHEST ABDOMEN NG PLACEMENT : 8/29/2020 1:50 PM CLINICAL HISTORY:  NGT placement, post op N/V . COMPARISON: None available. TECHNIQUE: ROUTINE FINDINGS: A nasogastric tube is noted with its tip overlying the fundus of the stomach. Mild to moderate small and large bowel gas in the upper abdomen appears similar to the prior study. Shallow inspiratory volumes are present, with probable bibasilar atelectasis and small pleural effusions. No other significant change from 8/25/2020 is identified. A right upper extremity PICC catheter remains in expected position. Surgical clips in both axillae are again noted. NASOGASTRIC TUBE IN EXPECTED POSITION. PROBABLE MILD BIBASILAR ATELECTASIS AND SMALL PLEURAL EFFUSIONS. NO OTHER SIGNIFICANT CHANGE FROM 8/25/2020 IDENTIFIED. Xr Chest Abdomen Ng Placement    Result Date: 8/25/2020  EXAMINATION: XR CHEST ABDOMEN NG PLACEMENT CLINICAL HISTORY: NASOGASTRIC TUBE PLACEMENT COMPARISONS: DECEMBER 19, 2018 FINDINGS: Tip of nasogastric tube identified in caudal esophagus proximal to the gastroesophageal junction. Multiple surgical clips right and left axilla. Terminus PICC line within superior vena cava cava. Cardiopericardial silhouette normal. Lungs clear. TERMINUS NASOGASTRIC TUBE, CAUDAL ESOPHAGUS. RECOMMEND ADVANCING NASOGASTRIC TUBE, 20 CM.     Electronically signed by Roxi Leonardo MD on 9/3/2020 at 10:04 AM

## 2020-09-03 NOTE — PROGRESS NOTES
PICC line removed prior to D/C pressure applied, tip intact. D/C instructions given to pt.  Electronically signed by Samia Razo RN on 9/3/2020 at 6:27 PM

## 2020-09-03 NOTE — PROGRESS NOTES
Comprehensive Nutrition Assessment    Type and Reason for Visit:  Reassess    Nutrition Recommendations/Plan:   Continue with general diet, as tolerated    Nutrition Assessment:  POD # 9, R colectomy. Pt has been progressed to General diet and is tolerating with ' fair' intake. D/c planning in process, no needs idnetified    Malnutrition Assessment:  Malnutrition Status: At risk for malnutrition (Comment)    Context:  Acute Illness     Findings of the 6 clinical characteristics of malnutrition:  Energy Intake:  Mild decrease in energy intake (Comment)  Weight Loss:  No significant weight loss     Body Fat Loss:  Unable to assess     Muscle Mass Loss:  Unable to assess    Fluid Accumulation:  No significant fluid accumulation     Strength:  Not Performed    Estimated Daily Nutrient Needs:  Energy (kcal):  8069-8260 kcals ( 12-14 kcal/kg); Weight Used for Energy Requirements:  Admission(106 kg)     Protein (g):  95 g protein ( 2 g/kgIBW); Weight Used for Protein Requirements:  Ideal(47 kg)        Fluid (ml/day):  ~1400 ml ( 30 ml/kg); Weight Used for Fluid Requirements:  Ideal(47 kg)      Nutrition Related Findings:  BM 9/2 & 9/3, c/o upset stomach after eating yesterday, better today, eating 51-75% per records, labs noted, meds reviewd      Wounds:  Surgical Wound       Current Nutrition Therapies:    DIET GENERAL;     Anthropometric Measures:  · Height: 5' 1\" (154.9 cm)  · Current Body Weight: (no new wt)   · Admission Body Weight: 233 lb (105.7 kg)(stated)    · Usual Body Weight: 230 lb (104.3 kg)((12/19), 228# ( 7/20), 235# ( 8/19/20))     · Ideal Body Weight: 105 lbs; % Ideal Body Weight   > 100%  · BMI: 45.7  · BMI Categories: Obese Class 3 (BMI 40.0 or greater)       Nutrition Diagnosis:   · Inadequate oral intake related to altered GI function as evidenced by intake 51-75%      Nutrition Interventions:   Food and/or Nutrient Delivery:  Continue Current Diet  Nutrition Education/Counseling:  Education not indicated   Coordination of Nutrition Care:  Continued Inpatient Monitoring    Goals:  po > 75% meals       Nutrition Monitoring and Evaluation:   Food/Nutrient Intake Outcomes:  Food and Nutrient Intake  Physical Signs/Symptoms Outcomes:  Weight, GI Status     Discharge Planning:    No discharge needs at this time     Electronically signed by Cordelia Coughlin RD, LD on 9/3/20 at 3:23 PM EDT

## 2020-09-04 NOTE — DISCHARGE SUMMARY
been  initially planned and made and now status post right hemicolectomy and  postop day #9 and doing much better now. After the patient had a _____  postop with persistent nausea and vomiting, persistent drop of  hemoglobin and hematocrit requiring 1 unit of packed red blood cells,  although no obvious bleeding noted in terms of GI, hematuria, or  epistaxis. The patient eventually took a while for bowel movement to  normalize as the patient has been on epidural as well as PCA pump postop  and eventually has been doing well and eventually has been cleared by  Surgery as bowels have been improving and tolerating meals and no nausea  or vomiting and hemoglobin and hematocrit have been stable and a CT scan  of the abdomen and pelvis actually done prior to discharge also showed  no evidence of bleeding except for possible air bronchogram and possible  pneumonia versus atelectasis, MRSA seemed to be more consistent with the  atelectasis as the patient does not have any symptoms of fever or  leukocytosis, may be a little bit high or there may be a reactive  leukocytosis at this time and seems to be improving and doing better off  IV antibiotics. Otherwise, the patient has been ambulating with  physical therapy and tolerating meals and was discharged to home on  09/03/2020 with medications as per medication reconciliation sheet,  activity as tolerated, and followup will be made in the office in about  three weeks.         Isaias Leone MD    D: 09/03/2020 17:16:12       T: 09/03/2020 20:00:10     [id number]  Job#: [id number]     Doc#: [id number]    CC:

## 2020-09-08 NOTE — PROGRESS NOTES
Physical Therapy  Facility/Department: [facility] [id number]/[id number]  Physical Therapy Discharge      NAME: Janie Bhardwaj    : 1955 (77 y.o.)  MRN: [id number]    Account: [de-identified]  Gender: female      Patient has been discharged from acute care hospital. DC patient from current PT program.      Electronically signed by Danya Palmer PT on 20 at 1:47 PM EDT

## 2020-09-10 ENCOUNTER — OFFICE VISIT (OUTPATIENT)
Dept: SURGERY | Age: 65
End: 2020-09-10

## 2020-09-10 VITALS
TEMPERATURE: 98 F | BODY MASS INDEX: 42.1 KG/M2 | HEIGHT: 61 IN | WEIGHT: 223 LBS | SYSTOLIC BLOOD PRESSURE: 126 MMHG | DIASTOLIC BLOOD PRESSURE: 72 MMHG

## 2020-09-10 DIAGNOSIS — K92.2 LOWER GI BLEED: ICD-10-CM

## 2020-09-10 DIAGNOSIS — D12.2 ADENOMATOUS POLYP OF ASCENDING COLON: ICD-10-CM

## 2020-09-10 LAB
HCT VFR BLD CALC: 34 % (ref 37–47)
HEMOGLOBIN: 10.7 G/DL (ref 12–16)
MCH RBC QN AUTO: 25.5 PG (ref 27–31.3)
MCHC RBC AUTO-ENTMCNC: 31.5 % (ref 33–37)
MCV RBC AUTO: 81.1 FL (ref 82–100)
PDW BLD-RTO: 24.9 % (ref 11.5–14.5)
PLATELET # BLD: 271 K/UL (ref 130–400)
RBC # BLD: 4.19 M/UL (ref 4.2–5.4)
WBC # BLD: 8.5 K/UL (ref 4.8–10.8)

## 2020-09-10 PROCEDURE — 99024 POSTOP FOLLOW-UP VISIT: CPT | Performed by: SURGERY

## 2020-09-10 RX ORDER — GABAPENTIN 600 MG/1
TABLET ORAL
COMMUNITY
Start: 2020-08-03

## 2020-09-10 RX ORDER — KETOCONAZOLE 20 MG/G
2 CREAM TOPICAL PRN
COMMUNITY
Start: 2020-08-10

## 2020-09-10 RX ORDER — TRAMADOL HYDROCHLORIDE 50 MG/1
50 TABLET ORAL EVERY 6 HOURS PRN
Qty: 25 TABLET | Refills: 0 | Status: SHIPPED | OUTPATIENT
Start: 2020-09-10 | End: 2020-09-17

## 2020-09-10 NOTE — PROGRESS NOTES
Subjective:      Patient ID: Patt Moody is a 77 y.o. female. HPI   Patt Moody returns in follow up of a right hemicolectomy done August 25, 2020 for colon polyp and rectal bleeding. The post operative course course eventful for prolonged postoperative ileus that has since resolved. The patient denies rectal bleeding. Appetite Good. Pain control is excellent. The patient is using Ultram. Bowel movements are 2 per day and are not formed. The pathology report showed TUBULOVILLOUS ADENOMA WITH FOCAL HIGH-GRADE DYSPLASIA (ADENOMA MEASURES 4.0 X 2.0 X 2.4 CM). .   Pt is currently at Home and convalescing very well. .   She is still having ambulatory difficulties due to needing a hip replacement that was postponed due to her colon mass and bleeding. Review of Systems    Objective:   Physical Exam  Constitutional:       General: She is not in acute distress. Appearance: Normal appearance. HENT:      Mouth/Throat:      Mouth: Mucous membranes are moist.      Pharynx: Oropharynx is clear. Eyes:      Pupils: Pupils are equal, round, and reactive to light. Neck:      Comments: Neck is supple with out masses, no thyromegaly, trachea midline  Abdominal:          Comments: Incision is well approximated, erythema is not present, swelling is minimal, no evidence of hernia, mild tenderness, no drainage present, skin glue/sutures are present. Aquacel dressing in place. Musculoskeletal:      Comments: Using a walker   Skin:     Findings: No bruising, lesion or rash. Neurological:      Mental Status: She is alert and oriented to person, place, and time.    Psychiatric:         Mood and Affect: Mood normal.         Judgment: Judgment normal.       /72   Temp 98 °F (36.7 °C) (Temporal)   Ht 5' 1\" (1.549 m)   Wt 223 lb (101.2 kg)   LMP 12/20/1997   BMI 42.14 kg/m²   Assessment:      Satisfactory course      Plan:      Cbc  Removed Aquacel  Return visit in 1 month        Robert Woods MD

## 2020-10-08 ENCOUNTER — OFFICE VISIT (OUTPATIENT)
Dept: SURGERY | Age: 65
End: 2020-10-08

## 2020-10-08 VITALS
WEIGHT: 224 LBS | BODY MASS INDEX: 42.29 KG/M2 | SYSTOLIC BLOOD PRESSURE: 122 MMHG | TEMPERATURE: 97.3 F | DIASTOLIC BLOOD PRESSURE: 84 MMHG | HEIGHT: 61 IN

## 2020-10-08 DIAGNOSIS — Z79.01 ANTICOAGULATED: ICD-10-CM

## 2020-10-08 DIAGNOSIS — Q21.12 PATENT FORAMEN OVALE: ICD-10-CM

## 2020-10-08 LAB
INR BLD: 2.9
PROTHROMBIN TIME: 30.2 SEC (ref 12.3–14.9)

## 2020-10-08 PROCEDURE — 99024 POSTOP FOLLOW-UP VISIT: CPT | Performed by: SURGERY

## 2020-10-08 NOTE — PROGRESS NOTES
Subjective:      Patient ID: Nirav Brunson is a 77 y.o. female. HPI   Nirav Brunson returns in follow up of a right hemicolectomy done August 25, 2020 for colon polyp and rectal bleeding. The post operative course course eventful for prolonged postoperative ileus that has since resolved. The patient denies rectal bleeding. Appetite Good. Pain control is excellent. The patient is using Ultram. Bowel movements are 4-6 per day and are not formed. The pathology report showed TUBULOVILLOUS ADENOMA WITH FOCAL HIGH-GRADE DYSPLASIA (ADENOMA MEASURES 4.0 X 2.0 X 2.4 CM). .   Pt is currently at Home and convalescing very well. .   She is still having ambulatory difficulties due to needing a hip replacement that was postponed due to her colon mass and bleeding. She is requesting a PT/INR to be done today    Review of Systems    Objective:   Physical Exam  Constitutional:       General: She is not in acute distress. Appearance: Normal appearance. HENT:      Mouth/Throat:      Mouth: Mucous membranes are moist.      Pharynx: Oropharynx is clear. Eyes:      Pupils: Pupils are equal, round, and reactive to light. Neck:      Comments: Neck is supple with out masses, no thyromegaly, trachea midline  Abdominal:          Comments: Incision is well approximated, erythema is not present, swelling is minimal, no evidence of hernia, mild tenderness, no drainage present     Musculoskeletal:      Comments: Using a walker   Skin:     Findings: No bruising, lesion or rash. Neurological:      Mental Status: She is alert and oriented to person, place, and time.    Psychiatric:         Mood and Affect: Mood normal.         Judgment: Judgment normal.       /84   Temp 97.3 °F (36.3 °C) (Temporal)   Ht 5' 1\" (1.549 m)   Wt 224 lb (101.6 kg)   LMP 12/20/1997   BMI 42.32 kg/m²   Assessment:      Satisfactory course  Post colectomy diarrhea      Plan:      Metamucil daily with meal  Return visit in 1 month        Bert Rolle Trevor Cline MD
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
## 2020-11-06 ENCOUNTER — OFFICE VISIT (OUTPATIENT)
Dept: SURGERY | Age: 65
End: 2020-11-06

## 2020-11-06 VITALS
SYSTOLIC BLOOD PRESSURE: 110 MMHG | DIASTOLIC BLOOD PRESSURE: 70 MMHG | TEMPERATURE: 99.1 F | BODY MASS INDEX: 41.54 KG/M2 | WEIGHT: 220 LBS | HEIGHT: 61 IN

## 2020-11-06 PROCEDURE — 99024 POSTOP FOLLOW-UP VISIT: CPT | Performed by: SURGERY

## 2020-11-06 NOTE — PROGRESS NOTES
Subjective:      Patient ID: Chemo Romo is a 77 y.o. female. Established patient    HPI   Chemo Romo returns in follow up of a right hemicolectomy done August 25, 2020 for colon polyp and rectal bleeding. The post operative course course eventful for prolonged postoperative ileus that has since resolved. The patient denies rectal bleeding. Appetite Good. Pain control is excellent. The patient is using Ultram. Bowel movements are 2 per day and are semiformed. The pathology report showed TUBULOVILLOUS ADENOMA WITH FOCAL HIGH-GRADE DYSPLASIA (ADENOMA MEASURES 4.0 X 2.0 X 2.4 CM). .   Pt is currently at Home and convalescing very well. .   She is still having ambulatory difficulties due to needing a hip replacement that was postponed due to her colon mass and bleeding. Review of Systems    Objective:   Physical Exam  Constitutional:       General: She is not in acute distress. Appearance: Normal appearance. HENT:      Mouth/Throat:      Mouth: Mucous membranes are moist.      Pharynx: Oropharynx is clear. Eyes:      Pupils: Pupils are equal, round, and reactive to light. Neck:      Comments: Neck is supple with out masses, no thyromegaly, trachea midline  Abdominal:          Comments: Incision is well approximated, erythema is not present, swelling is minimal, no evidence of hernia, mild tenderness, no drainage present     Musculoskeletal:      Comments: Using a walker   Skin:     Findings: No bruising, lesion or rash. Neurological:      Mental Status: She is alert and oriented to person, place, and time.    Psychiatric:         Mood and Affect: Mood normal.         Judgment: Judgment normal.       /70   Temp 99.1 °F (37.3 °C) (Temporal)   Ht 5' 1\" (1.549 m)   Wt 220 lb (99.8 kg)   LMP 12/20/1997   BMI 41.57 kg/m²   Assessment:      Satisfactory course      Plan:      Return to see me as needed  She is scheduled to see her orthopedic surgeon soon and from my standpoint she can have her hip replacement anytime.         Alexandro Bumpers, MD

## 2021-03-05 LAB
INR BLD: 2.2
PROTHROMBIN TIME: 24.7 SEC (ref 12.3–14.9)

## 2021-08-13 PROBLEM — Z87.891 STOPPED SMOKING: Status: ACTIVE | Noted: 2019-12-20

## 2021-08-13 PROBLEM — M25.579 ANKLE PAIN: Status: ACTIVE | Noted: 2021-08-13

## 2021-08-13 PROBLEM — M25.559 HIP PAIN: Status: ACTIVE | Noted: 2021-08-13

## 2021-08-13 PROBLEM — M54.50 LOW BACK PAIN: Status: ACTIVE | Noted: 2019-11-12

## 2021-08-13 PROBLEM — Z86.79 H/O: HYPERTENSION: Status: ACTIVE | Noted: 2021-08-13

## 2021-08-13 PROBLEM — M25.569 KNEE PAIN: Status: ACTIVE | Noted: 2021-08-13

## 2021-09-06 NOTE — PLAN OF CARE
Hospitalist Admission History and Physical     Active Problems:    COPD exacerbation (CMS/[facility])    GERD (gastroesophageal reflux disease)    Plan:  COPD exacerbation   Upper resp infection   - presented with cough, congestion and generalized malaise. Seen at urgent care and then came to ED  - afebrile and hemodynamically stable. Labs unremarkable and CXR no acute pathology  - probably viral URTI with copd exacerbation   - admit for observation   - use prn ipratropium based nebulized Q 6 hrs as needed, prednisone 20 mg ( unable to tolerate high dose)  - check resp pathogen panel   GERD- resume PPI     Code Status: Full code   ----------------------------------------------------------------------------------------------    Patient name: Teresa Stafford  YOB: 1958  MRN: [id number]    Date of admission:  9/6/2021    Primary care physician:  Kel Roe MD    Chief Complaint:  Teresa is a 63 year old female who presents with cough and congestion     HPI:    This is a 62 yo F with past medical problem chronic resp failure on 1.5L oxygen,COPD presented to the ED with cough and congestion for 2 days. She stated that she has been having cough with nasal congestion, headache and malaise for the past two days. She was seen at urgent care and was instructed to the ED. She stated that she has chills but no fever. She reported global headache and decreased appetite and feels dehydrated.  In the ED she was afebrile, but tachycardic.  Labs appear unremarkable. CXR with mild air space opacity. procalcitonin is not elevated.   Rapid covid test is negative.  She is admitted for observation.   Past Medical History  Past Medical History:   Diagnosis Date   • Anxiety    • Bronchitis    • Chronic low back pain    • Chronic pain     back   • COPD (chronic obstructive pulmonary disease) (CMS/[facility])    • Gastroesophageal reflux disease    • Nephrolithiasis    • Pneumonia    • RAD (reactive airway disease)          Home  Flexion: 3+/5  L Hip Extension: 4/5  L Hip ABduction: 3+/5  L Hip Internal Rotation: 4/5  L Hip External Rotation: 4/5  L Knee Flexion: 4+/5  L Knee Extension: 4+/5  L Ankle Dorsiflexion: 4+/5        Strength Other  Other: Decreased core strength based off functional mobility. [] yes  [x] no   Long term goal 3: LEFS >/= 40/80 to demonstrate functional improvements. LEFS: 30/80 [] yes  [x] no       Body structures, Functions, Activity limitations: Decreased functional mobility , Decreased ROM, Increased Pain, Decreased strength, Decreased endurance  Assessment: Patient reports having increased pain starting this year making it difficult to move. Upon PT evaluation, patient demonstrates LE strength with impaired core stabilization. Additionally, patient demonstrates impaired flexibility in hips R>L. Further skilled PT recommended to decrease pain and improve strength for overall functional tolerance. Specific instructions for Next Treatment: aquatics for 4 weeks than progress to land  Discharge Recommendations: Continue to assess pending progress      PT Education: Goals;PT Role;Plan of Care    PLAN: [x] Evaluate and Treat  Frequency/Duration:  Plan  Times per week: 2  Plan weeks: 6  Specific instructions for Next Treatment: aquatics for 4 weeks than progress to land  Current Treatment Recommendations: Strengthening, ROM, Functional Mobility Training, Endurance Training, Neuromuscular Re-education, Manual Therapy - Joint Manipulation, Manual Therapy - Soft Tissue Mobilization, Home Exercise Program, Safety Education & Training, Modalities, Patient/Caregiver Education & Training, Aquatics  Plan Comment: transfer care to Nisa Diaz PT     Precautions:                  Patient Status:[x] Continue/ Initiate plan of Care    [] Discharge PT. Recommend pt continue with HEP.      [] Additional visits requested, Please re-certify for additional visits:          Signature: Electronically signed by Mihir Murray PT on Medications  Prior to Admission medications    Medication Sig Start Date End Date Taking? Authorizing Provider   predniSONE (DELTASONE) 20 MG tablet Take 1 tablet by mouth daily. 9/4/21   Quinton Stafford,    fluticasone-umeclidin-vilanterol (TRELEGY ELLIPTA) 100-62.5-25 MCG/INH inhaler Inhale 1 puff into the lungs daily. 8/21/21   Nedra Sky MD   oxyCODONE 20 MG tablet Take 1 tablet by mouth 2 times daily. Do not start before August 22, 2021. 8/22/21   Nedra Sky MD   QUEtiapine (SEROquel) 50 MG tablet Take 1 tablet by mouth nightly. 7/6/21   Kel Roe MD   ipratropium-albuterol (DUONEB) 0.5-2.5 (3) MG/3ML nebulizer solution USE 3 ML VIA NEBULIZER EVERY 6 HOURS AS NEEDED FOR WHEEZING 6/8/21   Alma Perez PA-C   fluticasone (CUTIVATE) 0.05 % cream APPLY EXTERNALLY TO THE AFFECTED AREA TWICE DAILY FOR 7 DAYS 4/12/21   Kel Roe MD   acetaminophen (TYLENOL) 325 MG tablet Take 2 tablets by mouth every 4 hours as needed. 5/31/20   SPRING Peters   montelukast (SINGULAIR) 10 MG tablet Take 1 tablet by mouth every evening.  Patient taking differently: Take 10 mg by mouth every evening as needed.  5/31/20   SPRING Peters   albuterol 108 (90 Base) MCG/ACT inhaler Inhale 4 puffs into the lungs every 4 hours as needed for Shortness of Breath. 5/31/20   SPRING Peters   NARCAN 4 MG/0.1ML nasal spray Spray 4 mg in each nostril as needed for Opioid Reversal. 4/8/20   Outside Provider       Social History  Teresa  reports that she quit smoking about 7 years ago. Her smoking use included cigarettes. She has a 40.00 pack-year smoking history. She has never used smokeless tobacco. She reports that she does not drink alcohol and does not use drugs.    Family History  Teresa's family history includes Cancer in her mother; Diabetes in her maternal grandfather and maternal grandmother.    Allergies  ALLERGIES:   Allergen Reactions   • Amoxicillin-Pot Clavulanate PRURITUS   •  8/29/19 at 1:06 PM      If you have any questions or concerns, please don't hesitate to call. Thank you for your referral.    I have reviewed this plan of care and certify a need for medically necessary rehabilitation services.     Physician Signature:__________________________________________________________  Date:  Please sign and return Prednisone Other (See Comments)     TO HIGH OF DOSE CAUSES ITCHING & SCALP BURNING BY THE HAIR LINE  TO HIGH OF DOSE CAUSES ITCHING & SCALP BURNING BY THE HAIR LINE          Review of Systems  A complete review of systems is performed and negative other than the pertinent positives and negatives reported in HPI.  All other systems are reviewed and are negative.      Physical Exam  Visit Vitals  /81 (BP Location: RUE - Right upper extremity, Patient Position: Semi-Lucio's)   Pulse (!) 109   Temp 98.2 °F (36.8 °C) (Oral)   Resp 18   Wt 47.7 kg   LMP  (LMP Unknown)   SpO2 96%   BMI 18.05 kg/m²     General NAD. cachexia . Appears dry. Alert.   Eyes Non-icteric. Pink Conjunctiva.  PERRLA.  EOM intact.   ENT External ears/nose without abnormalities.  Throat clear. Moist oral mucosa.   Respiratory Increased  respiratory effort. Decrease breath sounds bilaterally.    Cardiac tachycardia. No murmurs/gallops/rubs.  Distal (DP and PT) pulses intact.  No edema.   GI Soft. Non-distended. Non-tender. No masses appreciated.  No HSM.    Extremities Not cyanotic. Full ROM on both upper and lower extremities. Strength 5/5 in both upper and lower extremities.    MSK Strength 5/5 in both upper and lower extremities.    Neuro Sensation to light touch is intact in both upper and lower extremities.CN II-XII WNL. Attention and concentration good.   Skin No rashes or lesions noted. Warm and dry to touch.   Psych Alert and oriented x3. Appropriate mood and affect.        Labs:  Labs reviewed and pertinent findings as follows: unremarkable     Imaging:   CXR: Mild patchy airspace opacities in the basilar left lung, may represent  atelectasis or infiltrate.       Is the patient expected to require at least a two midnight stay in the hospital? No  Goals of Care Note  Full code     Patient Seen and Examined on: 9/6/2021 3:44 AM      Chente Dinero MD  [facility] [place]  Pager: [phone]

## 2021-11-04 DIAGNOSIS — Z01.818 PRE-OP TESTING: Primary | ICD-10-CM

## 2021-11-24 RX ORDER — SODIUM, POTASSIUM,MAG SULFATES 17.5-3.13G
SOLUTION, RECONSTITUTED, ORAL ORAL
Qty: 354 ML | Refills: 0 | Status: SHIPPED | OUTPATIENT
Start: 2021-11-24

## 2021-12-02 ENCOUNTER — NURSE ONLY (OUTPATIENT)
Dept: GASTROENTEROLOGY | Age: 66
End: 2021-12-02

## 2021-12-02 DIAGNOSIS — Z01.818 PRE-OP TESTING: ICD-10-CM

## 2021-12-03 LAB — SARS-COV-2, PCR: NOT DETECTED

## 2021-12-03 RX ORDER — POLYETHYLENE GLYCOL 3350, SODIUM CHLORIDE, SODIUM BICARBONATE, POTASSIUM CHLORIDE 420; 11.2; 5.72; 1.48 G/4L; G/4L; G/4L; G/4L
4000 POWDER, FOR SOLUTION ORAL ONCE
Qty: 4000 ML | Refills: 0 | Status: SHIPPED | OUTPATIENT
Start: 2021-12-03 | End: 2021-12-03

## 2021-12-09 ENCOUNTER — ANESTHESIA EVENT (OUTPATIENT)
Dept: ENDOSCOPY | Age: 66
End: 2021-12-09
Payer: MEDICARE

## 2021-12-09 ENCOUNTER — ANESTHESIA (OUTPATIENT)
Dept: ENDOSCOPY | Age: 66
End: 2021-12-09
Payer: MEDICARE

## 2021-12-09 ENCOUNTER — HOSPITAL ENCOUNTER (OUTPATIENT)
Age: 66
Setting detail: OUTPATIENT SURGERY
Discharge: HOME | End: 2021-12-09
Attending: SPECIALIST | Admitting: SPECIALIST
Payer: MEDICARE

## 2021-12-09 ENCOUNTER — ANCILLARY PROCEDURE (OUTPATIENT)
Dept: ENDOSCOPY | Age: 66
End: 2021-12-09
Attending: SPECIALIST
Payer: MEDICARE

## 2021-12-09 VITALS
RESPIRATION RATE: 18 BRPM | DIASTOLIC BLOOD PRESSURE: 90 MMHG | TEMPERATURE: 98 F | HEIGHT: 61 IN | OXYGEN SATURATION: 98 % | SYSTOLIC BLOOD PRESSURE: 171 MMHG | BODY MASS INDEX: 45.31 KG/M2 | HEART RATE: 87 BPM | WEIGHT: 240 LBS

## 2021-12-09 VITALS
RESPIRATION RATE: 28 BRPM | SYSTOLIC BLOOD PRESSURE: 121 MMHG | OXYGEN SATURATION: 99 % | DIASTOLIC BLOOD PRESSURE: 70 MMHG

## 2021-12-09 PROBLEM — D12.5 ADENOMATOUS POLYP OF SIGMOID COLON: Status: ACTIVE | Noted: 2020-07-30

## 2021-12-09 PROCEDURE — 6360000002 HC RX W HCPCS: Performed by: NURSE ANESTHETIST, CERTIFIED REGISTERED

## 2021-12-09 PROCEDURE — 2709999900 HC NON-CHARGEABLE SUPPLY: Performed by: SPECIALIST

## 2021-12-09 PROCEDURE — 45385 COLONOSCOPY W/LESION REMOVAL: CPT | Performed by: SPECIALIST

## 2021-12-09 PROCEDURE — 2580000003 HC RX 258: Performed by: SPECIALIST

## 2021-12-09 PROCEDURE — 3700000001 HC ADD 15 MINUTES (ANESTHESIA): Performed by: SPECIALIST

## 2021-12-09 PROCEDURE — 3700000000 HC ANESTHESIA ATTENDED CARE: Performed by: SPECIALIST

## 2021-12-09 PROCEDURE — 7100000010 HC PHASE II RECOVERY - FIRST 15 MIN: Performed by: SPECIALIST

## 2021-12-09 PROCEDURE — 3609027000 HC COLONOSCOPY: Performed by: SPECIALIST

## 2021-12-09 PROCEDURE — 7100000011 HC PHASE II RECOVERY - ADDTL 15 MIN: Performed by: SPECIALIST

## 2021-12-09 PROCEDURE — 88305 TISSUE EXAM BY PATHOLOGIST: CPT

## 2021-12-09 PROCEDURE — 2500000003 HC RX 250 WO HCPCS: Performed by: NURSE ANESTHETIST, CERTIFIED REGISTERED

## 2021-12-09 PROCEDURE — 2580000003 HC RX 258

## 2021-12-09 RX ORDER — ACETAMINOPHEN 500 MG
1000 TABLET ORAL EVERY 6 HOURS PRN
COMMUNITY

## 2021-12-09 RX ORDER — SODIUM CHLORIDE 9 MG/ML
INJECTION, SOLUTION INTRAVENOUS CONTINUOUS
Status: DISCONTINUED | OUTPATIENT
Start: 2021-12-09 | End: 2021-12-09 | Stop reason: HOSPADM

## 2021-12-09 RX ORDER — ONDANSETRON 2 MG/ML
4 INJECTION INTRAMUSCULAR; INTRAVENOUS
Status: DISCONTINUED | OUTPATIENT
Start: 2021-12-09 | End: 2021-12-09 | Stop reason: HOSPADM

## 2021-12-09 RX ORDER — SODIUM CHLORIDE 9 MG/ML
INJECTION, SOLUTION INTRAVENOUS
Status: COMPLETED
Start: 2021-12-09 | End: 2021-12-09

## 2021-12-09 RX ORDER — PROPOFOL 10 MG/ML
INJECTION, EMULSION INTRAVENOUS CONTINUOUS PRN
Status: DISCONTINUED | OUTPATIENT
Start: 2021-12-09 | End: 2021-12-09 | Stop reason: SDUPTHER

## 2021-12-09 RX ORDER — LIDOCAINE HYDROCHLORIDE 20 MG/ML
INJECTION, SOLUTION INFILTRATION; PERINEURAL PRN
Status: DISCONTINUED | OUTPATIENT
Start: 2021-12-09 | End: 2021-12-09 | Stop reason: SDUPTHER

## 2021-12-09 RX ORDER — MAGNESIUM HYDROXIDE 1200 MG/15ML
LIQUID ORAL PRN
Status: DISCONTINUED | OUTPATIENT
Start: 2021-12-09 | End: 2021-12-09 | Stop reason: ALTCHOICE

## 2021-12-09 RX ADMIN — SODIUM CHLORIDE: 9 INJECTION, SOLUTION INTRAVENOUS at 08:01

## 2021-12-09 RX ADMIN — LIDOCAINE HYDROCHLORIDE 40 MG: 20 INJECTION, SOLUTION INFILTRATION; PERINEURAL at 08:12

## 2021-12-09 RX ADMIN — PROPOFOL 120 MCG/KG/MIN: 10 INJECTION, EMULSION INTRAVENOUS at 08:12

## 2021-12-09 ASSESSMENT — PAIN DESCRIPTION - DESCRIPTORS: DESCRIPTORS: CONSTANT;DISCOMFORT

## 2021-12-09 ASSESSMENT — PAIN - FUNCTIONAL ASSESSMENT
PAIN_FUNCTIONAL_ASSESSMENT: 0-10
PAIN_FUNCTIONAL_ASSESSMENT: PREVENTS OR INTERFERES SOME ACTIVE ACTIVITIES AND ADLS

## 2021-12-09 NOTE — ANESTHESIA POSTPROCEDURE EVALUATION
Department of Anesthesiology  Postprocedure Note    Patient: Carmen Tomlinson  MRN: [id number]  [place]: 1955  Date of evaluation: 12/9/2021  Time:  8:28 AM     Procedure Summary     Date: 12/09/21 Room / Location: [facility] OR 01 / [facility]    Anesthesia Start: 0935 Anesthesia Stop:     Procedure: COLORECTAL CANCER SCREENING, HIGH RISK WITH POLYPECTOMY (N/A ) Diagnosis: (History of colon polyps Z86.010 Bowel resection)    Surgeons: Fidelia Velasquez MD Responsible Provider: ROSE Monte CRNA    Anesthesia Type: MAC ASA Status: 2          Anesthesia Type: No value filed. Kevin Phase I: Kevin Score: 10    Kevin Phase II:      Last vitals: Reviewed and per EMR flowsheets.        Anesthesia Post Evaluation    Patient location during evaluation: PACU  Patient participation: complete - patient participated  Level of consciousness: awake and alert  Airway patency: patent  Nausea & Vomiting: no nausea and no vomiting  Complications: no  Cardiovascular status: blood pressure returned to baseline and hemodynamically stable  Respiratory status: acceptable  Hydration status: euvolemic

## 2021-12-09 NOTE — ANESTHESIA PRE PROCEDURE
Department of Anesthesiology  Preprocedure Note       Name:  Cade Fairbanks   Age:  74 y.o.  :  1955                                          MRN:  [id number]         Date:  2021      Surgeon: Julius De Santiago):  Marnie Chavez MD    Procedure: Procedure(s):  COLORECTAL CANCER SCREENING, HIGH RISK    Medications prior to admission:   Prior to Admission medications    Medication Sig Start Date End Date Taking? Authorizing Provider   Na Sulfate-K Sulfate-Mg Sulf 17.5-3.13-1.6 GM/177ML SOLN As directed 21   Marnie Chavez MD   gabapentin (NEURONTIN) 600 MG tablet TK 1 T PO QHS 8/3/20   Historical Provider, MD   ketoconazole (NIZORAL) 2 % cream Apply 2 % topically as needed  8/10/20   Historical Provider, MD   albuterol sulfate  (90 Base) MCG/ACT inhaler Inhale 2 puffs into the lungs every 6 hours as needed for Wheezing 9/3/20   Leatha Chowdhury MD   warfarin (COUMADIN) 1 MG tablet One po 7.5 mg daily at [facility] 20   Leatha Chowdhury MD   losartan (COZAAR) 100 MG tablet Take 1 tablet by mouth daily 20   Leatha Chowduhry MD   dilTIAZem (CARTIA XT) 240 MG extended release capsule Take 1 capsule by mouth daily 20   Leatha Chowdhury MD   potassium chloride (KLOR-CON M) 20 MEQ extended release tablet Take 2 tablets by mouth 2 times daily (with meals) 20   Leatha Chowdhury MD   metoclopramide (REGLAN) 5 MG tablet Take 1 tablet by mouth 3 times daily (before meals) 20   Leatha Chowdhury MD   traMADol (ULTRAM) 50 MG tablet Take 50 mg by mouth every 6 hours as needed for Pain.     Historical Provider, MD   torsemide (DEMADEX) 20 MG tablet Take 20 mg by mouth as needed     Historical Provider, MD   Multiple Vitamins-Minerals (MULTIVITAMIN ADULT PO) Take by mouth daily     Historical Provider, MD   folic acid (FOLVITE) 1 MG tablet Take 1 mg by mouth daily  16   Historical Provider, MD   esomeprazole (NEXIUM) 40 MG capsule TK 1 C PO QD 16   Historical Provider, MD   montelukast (SINGULAIR) 10 MG tablet Take 10 mg by mouth nightly  8/5/16   Historical Provider, MD       Current medications:    Current Facility-Administered Medications   Medication Dose Route Frequency Provider Last Rate Last Admin    sterile water for irrigation    PRN Anastasia Gatica MD   1,000 mL at 12/09/21 0711    Simethicone SUSP    PRN Anastasia Gatica MD   60 mg at 12/09/21 9998    sodium chloride 0.9 % infusion             0.9 % sodium chloride infusion   IntraVENous Continuous Anastasia Gatica MD           Allergies:     Allergies   Allergen Reactions    Darvon [Propoxyphene] Hives    Lipitor [Atorvastatin]      Muscle pain      Mobic [Meloxicam] Hives    Penicillins Hives    Percodan [Oxycodone-Aspirin] Hives    Pineapple Hives    Sulfa Antibiotics Hives    Dilaudid [Hydromorphone Hcl] Hives and Nausea And Vomiting       Problem List:    Patient Active Problem List   Diagnosis Code    Estrogen receptor positive Z17.0    Malignant neoplasm of central portion of right female breast ([facility]) C50.111    Malignant neoplasm of upper-outer quadrant of left female breast ([facility]) C50.412    Lobular carcinoma in situ of left breast D05.02    Iron deficiency anemia secondary to blood loss (chronic) D50.0    Patent foramen ovale Q21.1    Class 3 severe obesity due to excess calories without serious comorbidity with body mass index (BMI) of 40.0 to 44.9 in adult ([facility]) E66.01, Z68.41    Osteoarthritis of spine with radiculopathy, lumbosacral region M47.27    Foraminal stenosis of lumbar region M48.061    Lumbar spondylosis M47.816    Former smoker, stopped smoking in distant past Z87.891    Spinal stenosis at L4-L5 level M48.061    Iron deficiency anemia, unspecified D50.9    Anemia of chronic renal failure N18.9, D63.1    Lower GI bleed K92.2    HTN (hypertension) I10    Osteoarthritis of hip M16.9    History of carcinoma in situ of breast Z86.000    History of cardioembolic stroke C16.10    Anticoagulated Z79.01    Adenomatous polyp of ascending colon D12.2    Gastrointestinal hemorrhage K92.2    Anemia D64.9    Adenoma of ascending colon D12.2    H/O: hypertension Z86.79    Ankle pain M25.579    Hip pain M25.559    Knee pain M25.569    Low back pain M54.50    Stopped smoking Z87.891       Past Medical History:        Diagnosis Date    Arthritis     Asthma     Cancer ([address] 2002 & 2015    Breast RIGHT (T2/N0/M0) ER/OK (+) HER2/estela 3+ / chemo / left breast with mastectomy    GERD (gastroesophageal reflux disease)     Hiatal hernia     History of blood transfusion 2013    post op TKR    Hypercholesteremia     Hyperlipidemia     past trx / off meds > 5 yrs    Hypertension     meds > 20 yrs     Patent foramen ovale        Past Surgical History:        Procedure Laterality Date    BREAST SURGERY Bilateral     Mastectomy    CARDIAC CATHETERIZATION  2013    no blockage    COLONOSCOPY  08/31/2016    w/polypectomy     COLONOSCOPY N/A 7/24/2020    COLONOSCOPY DIAGNOSTIC performed by Fannie Benavides MD at [facility] Right 8/25/2020    RIGHT COLECTOMY performed by Laure Garg MD at [address] [facility]    umbilical   [address] Bilateral     Knee    JOINT REPLACEMENT Bilateral 11/04/2013    LAMINECTOMY Right 12/30/2019    RIGHT L4-5 MICRODECOMPRESSION performed by Kamilah Milan MD at [address] 0.6-1CM REMAINDR BODY N/A 4/6/2018    EXCISION CYST RT.  NECK AND EXCISION LESION NOSE performed by Vernette Curling, MD at [address] [facility] [address] W/SUBQ PORT/ CTR/PRPH INSJ N/A 4/6/2018    REMOVAL VENOUS PORT performed by Vernette Curling, MD at [facility] / has been removed    TONSILLECTOMY      at age 23   [address]  08/31/2016    w/polypectomy,bx     UPPER GASTROINTESTINAL ENDOSCOPY N/A 7/24/2020    EGD DIAGNOSTIC ONLY performed by Moustapha Sandy MD at [address]  2003    excision polyps       Social History:    Social History     Tobacco Use    Smoking status: Former Smoker     Packs/day: 0.25     Years: 5.00     Pack years: 1.25     Types: Cigarettes     Quit date: 3/30/1978     Years since quittin.7    Smokeless tobacco: Never Used   Substance Use Topics    Alcohol use: No                                Counseling given: Not Answered      Vital Signs (Current): There were no vitals filed for this visit. BP Readings from Last 3 Encounters:   20 110/70   10/08/20 122/84   09/10/20 126/72       NPO Status:                                                                                 BMI:   Wt Readings from Last 3 Encounters:   20 220 lb (99.8 kg)   10/08/20 224 lb (101.6 kg)   09/10/20 223 lb (101.2 kg)     There is no height or weight on file to calculate BMI.    CBC:   Lab Results   Component Value Date    WBC 8.5 09/10/2020    RBC 4.19 09/10/2020    RBC 4.99 2012    HGB 10.7 09/10/2020    HCT 34.0 09/10/2020    MCV 81.1 09/10/2020    RDW 24.9 09/10/2020     09/10/2020       CMP:   Lab Results   Component Value Date     2020    K 4.6 2020    K 3.7 2020     2020    CO2 26 2020    BUN 5 2020    CREATININE 0.47 2020    GFRAA >60.0 2020    LABGLOM >60.0 2020    GLUCOSE 94 2020    GLUCOSE 78 2012    PROT 5.7 2020    CALCIUM 9.6 2020    BILITOT 0.3 2020    ALKPHOS 101 2020    AST 16 2020    ALT 15 2020       POC Tests: No results for input(s): POCGLU, POCNA, POCK, POCCL, POCBUN, POCHEMO, POCHCT in the last 72 hours.     Coags:   Lab Results   Component Value Date    PROTIME 23.7 04/10/2021    PROTIME 26.6 2019    INR 2.1 04/10/2021    APTT 58.7 2020       HCG (If Applicable): No results found for: PREGTESTUR, PREGSERUM, HCG, HCGQUANT     ABGs: No results found for: PHART, PO2ART, ELZ3SEW, EPQ3VOR, BEART, R9ZEUWEQ     Type & Screen (If Applicable):  No results found for: LABABO, LABRH    Drug/Infectious Status (If Applicable):  No results found for: HIV, HEPCAB    COVID-19 Screening (If Applicable):   Lab Results   Component Value Date    COVID19 Not Detected 12/02/2021           Anesthesia Evaluation  Patient summary reviewed and Nursing notes reviewed  Airway: Mallampati: II  TM distance: >3 FB   Neck ROM: full  Mouth opening: > = 3 FB Dental: normal exam         Pulmonary:Negative Pulmonary ROS and normal exam    (+) asthma:                            Cardiovascular:    (+) hypertension:,                   Neuro/Psych:   (+) neuromuscular disease:,             GI/Hepatic/Renal:   (+) hiatal hernia, GERD:,           Endo/Other: Negative Endo/Other ROS                    Abdominal:             Vascular: Other Findings:             Anesthesia Plan      MAC     ASA 2             Anesthetic plan and risks discussed with patient. Plan discussed with CRNA.                   ROSE Fleming - MAGGIE   12/9/2021

## 2021-12-09 NOTE — H&P
Patient Name: Marce Hansen  : 1955  MRN: [id number]  DATE: 21      ENDOSCOPY  History and Physical    Procedure:    [x] Diagnostic Colonoscopy       [] Screening Colonoscopy  [] EGD      [] ERCP      [] EUS       [] Other    [x] Previous office notes/History and Physical reviewed from the patients chart. Please see EMR for further details of HPI. I have examined the patient's status immediately prior to the procedure and:      Indications/HPI:    []Abdominal Pain  []Cancer- GI/Lung  []Fhx of colon CA/polyps  []History of Polyps  []Avinas   []Melena  []Abnormal Imaging  []Dysphagia    []Persistent Pneumonia  []Anemia  []Food Impaction  [x]History of Polyps  []GI Bleed  []Pulmonary nodule/Mass  []Change in bowel habits []Heartburn/Reflux  []Rectal Bleed (BRBPR)  []Chest Pain - Non Cardiac []Heme (+) Stoo  l[]Ulcers  []Constipation  []Hemoptysis   []Varices  []Diarrhea  []Hypoxemia  []Nausea/Vomiting  []Screening   []Crohns/Colitis  []Other:     Anesthesia:   [x] MAC [] Moderate Sedation   [] General   [] None     ROS: 12 pt Review of Symptoms was negative unless mentioned above    Medications:   Prior to Admission medications    Medication Sig Start Date End Date Taking?  Authorizing Provider   enoxaparin (LOVENOX) 60 MG/0.6ML injection Inject 60 mg into the skin once   Yes Historical Provider, MD   acetaminophen (TYLENOL) 500 MG tablet Take 1,000 mg by mouth every 6 hours as needed for Pain   Yes Historical Provider, MD   Na Sulfate-K Sulfate-Mg Sulf 17.5-3.13-1.6 GM/177ML SOLN As directed 21  Yes Mica Franco MD   gabapentin (NEURONTIN) 600 MG tablet TK 1 T PO QHS 8/3/20  Yes Historical Provider, MD   warfarin (COUMADIN) 1 MG tablet One po 7.5 mg daily at [address] 20  Yes Eusebia Alcala MD   losartan (COZAAR) 100 MG tablet Take 1 tablet by mouth daily 20  Yes Eusebia Alcala MD   dilTIAZem (CARTIA XT) 240 MG extended release capsule Take 1 capsule by mouth daily 20  Yes Juna Adams Bianka Reyes MD   potassium chloride (KLOR-CON M) 20 MEQ extended release tablet Take 2 tablets by mouth 2 times daily (with meals) 9/4/20  Yes Ny Hobson MD   traMADol (ULTRAM) 50 MG tablet Take 50 mg by mouth every 6 hours as needed for Pain. Yes Historical Provider, MD   torsemide (DEMADEX) 20 MG tablet Take 20 mg by mouth as needed    Yes Historical Provider, MD   esomeprazole (NEXIUM) 40 MG capsule TK 1 C PO QD 6/24/16  Yes Historical Provider, MD   montelukast (SINGULAIR) 10 MG tablet Take 10 mg by mouth nightly  8/5/16  Yes Historical Provider, MD   ketoconazole (NIZORAL) 2 % cream Apply 2 % topically as needed  8/10/20   Historical Provider, MD   albuterol sulfate  (90 Base) MCG/ACT inhaler Inhale 2 puffs into the lungs every 6 hours as needed for Wheezing 9/3/20   Ny Hobson MD   metoclopramide (REGLAN) 5 MG tablet Take 1 tablet by mouth 3 times daily (before meals) 9/4/20   Ny Hobson MD   Multiple Vitamins-Minerals (MULTIVITAMIN ADULT PO) Take by mouth daily     Historical Provider, MD   folic acid (FOLVITE) 1 MG tablet Take 1 mg by mouth daily  8/13/16   Historical Provider, MD       Allergies:    Allergies   Allergen Reactions    Darvon [Propoxyphene] Hives    Lipitor [Atorvastatin]      Muscle pain      Mobic [Meloxicam] Hives    Penicillins Hives    Percodan [Oxycodone-Aspirin] Hives    Pineapple Hives    Sulfa Antibiotics Hives    Dilaudid [Hydromorphone Hcl] Hives and Nausea And Vomiting        History of allergic reaction to anesthesia:  No    Past Medical History:  Past Medical History:   Diagnosis Date    Arthritis     Asthma     Cancer ([facility] 2002 & 2015    Breast RIGHT (T2/N0/M0) ER/KS (+) HER2/estela 3+ / chemo / left breast with mastectomy    GERD (gastroesophageal reflux disease)     Hiatal hernia     History of blood transfusion 2013    post op TKR    Hypercholesteremia     Hyperlipidemia     past trx / off meds > 5 yrs    Hypertension     meds > 20 yrs     Patent foramen ovale        Past Surgical History:  Past Surgical History:   Procedure Laterality Date    BREAST SURGERY Bilateral     Mastectomy    CARDIAC CATHETERIZATION  2013    no blockage    COLONOSCOPY  2016    w/polypectomy     COLONOSCOPY N/A 2020    COLONOSCOPY DIAGNOSTIC performed by Marisel Perez MD at [address] [facility], DIAGNOSTIC      HEMICOLECTOMY Right 2020    RIGHT COLECTOMY performed by Jazmín Rosario MD at [address]    umbilical   [address] Bilateral     Knee    JOINT REPLACEMENT Bilateral 2013    LAMINECTOMY Right 2019    RIGHT L4-5 MICRODECOMPRESSION performed by Nano Valentino MD at [address] 0.6-1CM REMAINDR BODY N/A 2018    EXCISION CYST RT.  NECK AND EXCISION LESION NOSE performed by Chace Romeo MD at [address] [facility] [address] W/SUBQ PORT/ CTR/PRPH INSJ N/A 2018    REMOVAL VENOUS PORT performed by Chace Romeo MD at [facility] / has been removed    TONSILLECTOMY      at age 23   [address]  2016    w/polypectomy,bx     UPPER GASTROINTESTINAL ENDOSCOPY N/A 2020    EGD DIAGNOSTIC ONLY performed by Marisel Perez MD at [address] [place] [address]  2003    excision polyps       Social History:  Social History     Tobacco Use    Smoking status: Former Smoker     Packs/day: 0.25     Years: 5.00     Pack years: 1.25     Types: Cigarettes     Quit date: 3/30/1978     Years since quittin.7    Smokeless tobacco: Never Used   Vaping Use    Vaping Use: Never used   Substance Use Topics    Alcohol use: No    Drug use: No       Vital Signs:   Vitals:    21 0739   BP: (!) 176/84   Pulse: 98   Resp: 20   Temp: 98 °F (36.7 °C)   SpO2: 99%        Physical Exam:  Cardiac:  [x]WNL  []Comments:  Pulmonary:  [x]WNL   []Comments:   Neuro/Mental Status:  [x]WNL []Comments:  Abdominal:  [x]WNL    []Comments:  Other:   []WNL  []Comments:    Informed Consent:  The risks and benefits of the procedure have been discussed with either the patient or if they cannot consent, their representative. Assessment:  Patient examined and appropriate for planned sedation and procedure. Plan:  Proceed with planned sedation and procedure as above.     Nay Foley MD  8:09 AM

## 2022-10-25 ENCOUNTER — HOSPITAL ENCOUNTER (OUTPATIENT)
Dept: GENERAL RADIOLOGY | Age: 67
Discharge: HOME | End: 2022-10-27
Payer: MEDICARE

## 2022-10-25 DIAGNOSIS — R05.9 COUGH, UNSPECIFIED TYPE: ICD-10-CM

## 2022-10-25 PROCEDURE — 71046 X-RAY EXAM CHEST 2 VIEWS: CPT

## 2023-09-26 LAB
ANION GAP IN SER/PLAS: 11 MMOL/L (ref 10–20)
ANION GAP SERPL CALCULATED.3IONS-SCNC: 11 MMOL/L (ref 10–20)
BICARBONATE: 27 MMOL/L (ref 21–32)
CALCIUM (MG/DL) IN SER/PLAS: 11.2 MG/DL (ref 8.6–10.3)
CALCIUM SERPL-MCNC: 11.2 MG/DL (ref 8.6–10.3)
CARBON DIOXIDE, TOTAL (MMOL/L) IN SER/PLAS: 27 MMOL/L (ref 21–32)
CHLORIDE (MMOL/L) IN SER/PLAS: 104 MMOL/L (ref 98–107)
CHLORIDE BLD-SCNC: 104 MMOL/L (ref 98–107)
CREAT SERPL-MCNC: 0.63 MG/DL (ref 0.5–1.05)
CREATININE (MG/DL) IN SER/PLAS: 0.63 MG/DL (ref 0.5–1.05)
EGFR FEMALE: >90 ML/MIN/1.73M2
ERYTHROCYTE DISTRIBUTION WIDTH (RATIO) BY AUTOMATED COUNT: 16.4 % (ref 11.5–14.5)
ERYTHROCYTE MEAN CORPUSCULAR HEMOGLOBIN CONCENTRATION (G/DL) BY AUTOMATED: 30.6 G/DL (ref 32–36)
ERYTHROCYTE MEAN CORPUSCULAR VOLUME (FL) BY AUTOMATED COUNT: 85 FL (ref 80–100)
ERYTHROCYTE [DISTWIDTH] IN BLOOD BY AUTOMATED COUNT: 16.4 % (ref 11.5–14.5)
ERYTHROCYTES (10*6/UL) IN BLOOD BY AUTOMATED COUNT: 5.29 X10E12/L (ref 4–5.2)
GFR FEMALE: >90 ML/MIN/1.73M2
GLUCOSE (MG/DL) IN SER/PLAS: 72 MG/DL (ref 74–99)
GLUCOSE: 72 MG/DL (ref 74–99)
HCT VFR BLD CALC: 44.8 % (ref 36–46)
HEMATOCRIT (%) IN BLOOD BY AUTOMATED COUNT: 44.8 % (ref 36–46)
HEMOGLOBIN (G/DL) IN BLOOD: 13.7 G/DL (ref 12–16)
HEMOGLOBIN: 13.7 G/DL (ref 12–16)
INR BLD: 2.5 (ref 0.9–1.1)
INR IN PPP BY COAGULATION ASSAY: 2.5 (ref 0.9–1.1)
LEUKOCYTES (10*3/UL) IN BLOOD BY AUTOMATED COUNT: 10.1 X10E9/L (ref 4.4–11.3)
MCHC RBC AUTO-ENTMCNC: 30.6 G/DL (ref 32–36)
MCV RBC AUTO: 85 FL (ref 80–100)
PLATELET # BLD: 235 X10E9/L (ref 150–450)
PLATELETS (10*3/UL) IN BLOOD AUTOMATED COUNT: 235 X10E9/L (ref 150–450)
POTASSIUM (MMOL/L) IN SER/PLAS: 3.8 MMOL/L (ref 3.5–5.3)
POTASSIUM SERPL-SCNC: 3.8 MMOL/L (ref 3.5–5.3)
PROTHROMBIN TIME (PT) IN PPP BY COAGULATION ASSAY: 28.2 SEC (ref 9.8–12.8)
PROTHROMBIN TIME: 28.2 SEC (ref 9.8–12.8)
RBC # BLD: 5.29 X10E12/L (ref 4–5.2)
SODIUM (MMOL/L) IN SER/PLAS: 138 MMOL/L (ref 136–145)
SODIUM BLD-SCNC: 138 MMOL/L (ref 136–145)
UREA NITROGEN (MG/DL) IN SER/PLAS: 13 MG/DL (ref 6–23)
UREA NITROGEN: 13 MG/DL (ref 6–23)
WBC: 10.1 X10E9/L (ref 4.4–11.3)

## 2023-10-03 RX ORDER — HYDROGEN PEROXIDE 3 %
20 SOLUTION, NON-ORAL MISCELLANEOUS
COMMUNITY

## 2023-10-03 RX ORDER — METHOTREXATE 2.5 MG/1
20 TABLET ORAL
COMMUNITY

## 2023-10-03 RX ORDER — DILTIAZEM HYDROCHLORIDE 240 MG/1
240 CAPSULE, COATED, EXTENDED RELEASE ORAL DAILY
COMMUNITY
End: 2024-01-16 | Stop reason: HOSPADM

## 2023-10-03 RX ORDER — WARFARIN SODIUM 5 MG/1
7.5 TABLET ORAL DAILY
COMMUNITY

## 2023-10-03 RX ORDER — TRAMADOL HYDROCHLORIDE 50 MG/1
50 TABLET ORAL EVERY 4 HOURS PRN
COMMUNITY

## 2023-10-03 RX ORDER — LOSARTAN POTASSIUM 100 MG/1
100 TABLET ORAL DAILY
COMMUNITY
End: 2024-01-16 | Stop reason: HOSPADM

## 2023-10-03 RX ORDER — GABAPENTIN 600 MG/1
600 TABLET ORAL 2 TIMES DAILY
COMMUNITY

## 2023-10-03 RX ORDER — ALBUTEROL SULFATE 90 UG/1
2 AEROSOL, METERED RESPIRATORY (INHALATION) EVERY 4 HOURS PRN
COMMUNITY

## 2023-10-03 RX ORDER — KETOCONAZOLE 20 MG/G
CREAM TOPICAL DAILY PRN
Status: ON HOLD | COMMUNITY
End: 2024-01-15 | Stop reason: WASHOUT

## 2023-10-03 RX ORDER — FOLIC ACID 1 MG/1
1 TABLET ORAL DAILY
COMMUNITY

## 2023-10-03 RX ORDER — TORSEMIDE 20 MG/1
20 TABLET ORAL
COMMUNITY

## 2023-10-03 RX ORDER — MONTELUKAST SODIUM 10 MG/1
10 TABLET ORAL NIGHTLY
COMMUNITY

## 2023-10-04 ENCOUNTER — APPOINTMENT (OUTPATIENT)
Dept: CARDIOLOGY | Facility: HOSPITAL | Age: 68
End: 2023-10-04
Payer: MEDICARE

## 2023-10-04 ENCOUNTER — HOSPITAL ENCOUNTER (OUTPATIENT)
Facility: HOSPITAL | Age: 68
Setting detail: OUTPATIENT SURGERY
Discharge: HOME | End: 2023-10-04
Attending: INTERNAL MEDICINE | Admitting: INTERNAL MEDICINE
Payer: MEDICARE

## 2023-10-04 DIAGNOSIS — I06.0 RHEUMATIC AORTIC STENOSIS: ICD-10-CM

## 2023-10-04 DIAGNOSIS — I35.0 NONRHEUMATIC AORTIC (VALVE) STENOSIS: Primary | ICD-10-CM

## 2023-10-04 LAB
APTT PPP: 30 SECONDS (ref 27–38)
ATRIAL RATE: 88 BPM
INR PPP: 1.1 (ref 0.9–1.1)
P AXIS: 46 DEGREES
P OFFSET: 198 MS
P ONSET: 150 MS
PR INTERVAL: 142 MS
PROTHROMBIN TIME: 11.9 SECONDS (ref 9.8–12.8)
Q ONSET: 221 MS
QRS COUNT: 14 BEATS
QRS DURATION: 88 MS
QT INTERVAL: 342 MS
QTC CALCULATION(BAZETT): 413 MS
QTC FREDERICIA: 388 MS
R AXIS: -10 DEGREES
T AXIS: 5 DEGREES
T OFFSET: 392 MS
VENTRICULAR RATE: 88 BPM

## 2023-10-04 PROCEDURE — 93005 ELECTROCARDIOGRAM TRACING: CPT

## 2023-10-04 PROCEDURE — 36415 COLL VENOUS BLD VENIPUNCTURE: CPT | Performed by: NURSE PRACTITIONER

## 2023-10-04 PROCEDURE — 2720000007 HC OR 272 NO HCPCS: Performed by: INTERNAL MEDICINE

## 2023-10-04 PROCEDURE — 99152 MOD SED SAME PHYS/QHP 5/>YRS: CPT | Performed by: INTERNAL MEDICINE

## 2023-10-04 PROCEDURE — 2500000001 HC RX 250 WO HCPCS SELF ADMINISTERED DRUGS (ALT 637 FOR MEDICARE OP): Performed by: NURSE PRACTITIONER

## 2023-10-04 PROCEDURE — C1751 CATH, INF, PER/CENT/MIDLINE: HCPCS | Performed by: INTERNAL MEDICINE

## 2023-10-04 PROCEDURE — C1894 INTRO/SHEATH, NON-LASER: HCPCS | Performed by: INTERNAL MEDICINE

## 2023-10-04 PROCEDURE — 7100000010 HC PHASE TWO TIME - EACH INCREMENTAL 1 MINUTE: Performed by: INTERNAL MEDICINE

## 2023-10-04 PROCEDURE — 99153 MOD SED SAME PHYS/QHP EA: CPT | Performed by: INTERNAL MEDICINE

## 2023-10-04 PROCEDURE — 93460 R&L HRT ART/VENTRICLE ANGIO: CPT | Performed by: INTERNAL MEDICINE

## 2023-10-04 PROCEDURE — 85730 THROMBOPLASTIN TIME PARTIAL: CPT | Performed by: NURSE PRACTITIONER

## 2023-10-04 PROCEDURE — 93010 ELECTROCARDIOGRAM REPORT: CPT | Performed by: INTERNAL MEDICINE

## 2023-10-04 PROCEDURE — 2550000001 HC RX 255 CONTRASTS: Performed by: INTERNAL MEDICINE

## 2023-10-04 PROCEDURE — 2500000004 HC RX 250 GENERAL PHARMACY W/ HCPCS (ALT 636 FOR OP/ED): Performed by: NURSE PRACTITIONER

## 2023-10-04 PROCEDURE — 7100000009 HC PHASE TWO TIME - INITIAL BASE CHARGE: Performed by: INTERNAL MEDICINE

## 2023-10-04 PROCEDURE — 2500000005 HC RX 250 GENERAL PHARMACY W/O HCPCS: Performed by: INTERNAL MEDICINE

## 2023-10-04 PROCEDURE — 2500000004 HC RX 250 GENERAL PHARMACY W/ HCPCS (ALT 636 FOR OP/ED): Performed by: INTERNAL MEDICINE

## 2023-10-04 RX ORDER — ACETAMINOPHEN 325 MG/1
650 TABLET ORAL EVERY 6 HOURS PRN
Status: CANCELLED | OUTPATIENT
Start: 2023-10-04

## 2023-10-04 RX ORDER — ISOSORBIDE MONONITRATE 30 MG/1
30 TABLET, EXTENDED RELEASE ORAL ONCE
Status: DISCONTINUED | OUTPATIENT
Start: 2023-10-04 | End: 2023-10-04 | Stop reason: HOSPADM

## 2023-10-04 RX ORDER — ASPIRIN 325 MG
325 TABLET ORAL ONCE
Status: COMPLETED | OUTPATIENT
Start: 2023-10-04 | End: 2023-10-04

## 2023-10-04 RX ORDER — LIDOCAINE HYDROCHLORIDE 20 MG/ML
INJECTION, SOLUTION INFILTRATION; PERINEURAL AS NEEDED
Status: DISCONTINUED | OUTPATIENT
Start: 2023-10-04 | End: 2023-10-04 | Stop reason: HOSPADM

## 2023-10-04 RX ORDER — FENTANYL CITRATE 50 UG/ML
INJECTION, SOLUTION INTRAMUSCULAR; INTRAVENOUS AS NEEDED
Status: DISCONTINUED | OUTPATIENT
Start: 2023-10-04 | End: 2023-10-04 | Stop reason: HOSPADM

## 2023-10-04 RX ORDER — WARFARIN SODIUM 5 MG/1
10 TABLET ORAL
COMMUNITY

## 2023-10-04 RX ORDER — RANOLAZINE 500 MG/1
500 TABLET, EXTENDED RELEASE ORAL ONCE
Status: COMPLETED | OUTPATIENT
Start: 2023-10-04 | End: 2023-10-04

## 2023-10-04 RX ORDER — SODIUM CHLORIDE 9 MG/ML
100 INJECTION, SOLUTION INTRAVENOUS CONTINUOUS
Status: CANCELLED | OUTPATIENT
Start: 2023-10-04 | End: 2023-10-04

## 2023-10-04 RX ORDER — SODIUM CHLORIDE 9 MG/ML
100 INJECTION, SOLUTION INTRAVENOUS CONTINUOUS
Status: DISCONTINUED | OUTPATIENT
Start: 2023-10-04 | End: 2023-10-04

## 2023-10-04 RX ORDER — MIDAZOLAM HYDROCHLORIDE 1 MG/ML
INJECTION INTRAMUSCULAR; INTRAVENOUS AS NEEDED
Status: DISCONTINUED | OUTPATIENT
Start: 2023-10-04 | End: 2023-10-04 | Stop reason: HOSPADM

## 2023-10-04 RX ADMIN — ASPIRIN 325 MG: 325 TABLET ORAL at 08:12

## 2023-10-04 RX ADMIN — SODIUM CHLORIDE 100 ML/HR: 9 INJECTION, SOLUTION INTRAVENOUS at 08:12

## 2023-10-04 RX ADMIN — RANOLAZINE 500 MG: 500 TABLET, FILM COATED, EXTENDED RELEASE ORAL at 08:12

## 2023-10-04 ASSESSMENT — COLUMBIA-SUICIDE SEVERITY RATING SCALE - C-SSRS
2. HAVE YOU ACTUALLY HAD ANY THOUGHTS OF KILLING YOURSELF?: NO
6. HAVE YOU EVER DONE ANYTHING, STARTED TO DO ANYTHING, OR PREPARED TO DO ANYTHING TO END YOUR LIFE?: NO
1. IN THE PAST MONTH, HAVE YOU WISHED YOU WERE DEAD OR WISHED YOU COULD GO TO SLEEP AND NOT WAKE UP?: NO

## 2023-10-04 ASSESSMENT — PAIN - FUNCTIONAL ASSESSMENT: PAIN_FUNCTIONAL_ASSESSMENT: 0-10

## 2023-10-04 ASSESSMENT — PAIN SCALES - GENERAL: PAINLEVEL_OUTOF10: 0 - NO PAIN

## 2023-10-04 NOTE — PROGRESS NOTES
Discussed results of LHC/RHC with patient and her family member.  Pictures of LHC provided.  Please see procedural report for complete details.  Findings of the LHC revealed normal coronary arteries.  Patient will be referred to Dr. Meyer for further management of her severe aortic valve stenosis.  Referral order placed.  Multiple questions answered.  Both verbalized understanding.      Linsey Rushing, JOSE DAVID-CNP

## 2023-10-04 NOTE — Clinical Note
Patient Clipped and Prepped: right groin. Prepped with ChloraPrep, a minimum of 3 minute dry time, longer if needed, no pooling noted, patient draped in sterile fashion.

## 2023-10-04 NOTE — DISCHARGE INSTRUCTIONS
Dr. Mariee is referring you to interventional cardiologist, Dr. Meyer for further management of your severe aortic valve stenosis.  Dr. Meyer's office will be in contact with you soon to schedule an appointment.  Dr. Meyer's office is located on the first floor in the [facility] attached to [facility] in [place] [address].

## 2023-10-04 NOTE — POST-PROCEDURE NOTE
Physician Transition of Care Summary  Invasive Cardiovascular Lab    Procedure Date: 10/4/2023  Attending:    * Toshia Mariee - Primary  Resident/Fellow/Other Assistant: No surgical staff documented.    Pre Procedure Indications:   Valvular disease     Post Procedure Diagnosis:   Severe aortic stenosis    Procedure(s):   Left and Right Heart Catheterization    Procedure Findings:   Severe aortic stenosis    Description of the Procedure:   Right and left heart catheterization coronary angiography left ventriculogram    Complications:   None    Stents/Implants:       Anticoagulation/Antiplatelet Plan:   None    Estimated Blood Loss:   * No values recorded between 10/4/2023 10:25 AM and 10/4/2023 11:01 AM *    Anesthesia: Moderate Sedation Anesthesia Staff: No anesthesia staff entered.    Any Specimen(s) Removed:   Order Name Source Comment Collection Info Order Time   COAGULATION SCREEN Blood, Venous  Collected By: Oneida Holbrook Collection 10/4/2023  7:21 AM     Release result to [employer]   Immediate            Disposition:   Recommendation evaluation for TAVR.  Needs a CT angiography TAVR protocol and referral to Dr. Meyer for further evaluation      Electronically signed by: Toshia Mariee MD, 10/4/2023 11:01 AM

## 2023-10-04 NOTE — Clinical Note
The DP pulses are detected w/ doppler bilaterally. Emphysema of lung    Glaucoma    Hashimoto's disease    Hernia, inguinal    Multiple myeloma    Osteopenia    RA (rheumatoid arthritis)    Sarcoidosis

## 2023-10-06 ENCOUNTER — TELEPHONE (OUTPATIENT)
Dept: CARDIOLOGY | Facility: HOSPITAL | Age: 68
End: 2023-10-06
Payer: MEDICARE

## 2023-10-06 DIAGNOSIS — I35.0 AORTIC VALVE STENOSIS, ETIOLOGY OF CARDIAC VALVE DISEASE UNSPECIFIED: ICD-10-CM

## 2023-10-10 NOTE — H&P
History Of Present Illness  Dottie Fragoso is a 68 y.o. female presenting with severe aortic stenosis.     Past Medical History  Past Medical History:   Diagnosis Date    Cancer (CMS/HCC)     bilat breat cancer with L mastectomy 2016/ R mastectomy 2002    Heart murmur     pt states she need an aoritc valve replacement    Hyperlipidemia     Hypertension     Personal history of malignant neoplasm, unspecified     History of malignant neoplasm    Personal history of other diseases of the circulatory system     History of hypertension    PFO (patent foramen ovale) 2002    not repaired    Stroke (CMS/[facility]) 2002    stroke (without residual) from PFO, event happened after a port was placed. Info per pt    Unilateral primary osteoarthritis, right hip 11/17/2020    Primary osteoarthritis of right hip    Unilateral primary osteoarthritis, right hip 07/01/2020    Primary osteoarthritis of right hip       Surgical History  Past Surgical History:   Procedure Laterality Date    CARDIAC CATHETERIZATION N/A 10/4/2023    Procedure: Left And Right Heart Cath, With LV;  Surgeon: Toshia Mariee MD;  Location: [facility];  Service: Cardiovascular;  Laterality: N/A;    OTHER SURGICAL HISTORY  03/02/2020    Tonsillectomy    OTHER SURGICAL HISTORY  07/06/2021    Joint replacement procedure    OTHER SURGICAL HISTORY  07/06/2021    Back surgery    OTHER SURGICAL HISTORY  07/06/2021    Mastectomy    OTHER SURGICAL HISTORY  02/11/2022    Colonoscopy    OTHER SURGICAL HISTORY  02/11/2022    Esophagogastroduodenoscopy    OTHER SURGICAL HISTORY  02/11/2022    Hip replacement    OTHER SURGICAL HISTORY  02/11/2022    Hysterectomy    OTHER SURGICAL HISTORY  02/11/2022    Cyst excision    OTHER SURGICAL HISTORY  02/11/2022    Hernia repair    OTHER SURGICAL HISTORY  02/11/2022    Shave biopsy    OTHER SURGICAL HISTORY  02/11/2022    Decompression of spinal cord    OTHER SURGICAL HISTORY  02/11/2022    Venous access port removal    OTHER  "SURGICAL HISTORY  02/11/2022    Knee replacement        Social History  She reports that she quit smoking about 36 years ago. Her smoking use included cigarettes. She has never used smokeless tobacco. She reports that she does not drink alcohol and does not use drugs.    Family History  Family History   Problem Relation Name Age of Onset    Heart attack Mother      Heart attack Father          Allergies  Darvon [propoxyphene], Dilaudid [hydromorphone], Mobic [meloxicam], Penicillins, Sulfasalazine, and Percodan [oxycodone-aspirin]    Review of Systems     Physical Exam     Last Recorded Vitals  Blood pressure 161/84, pulse 83, temperature 37.2 °C (99 °F), temperature source Tympanic, resp. rate 18, height 1.549 m (5' 1\"), weight 103 kg (227 lb 8.2 oz), SpO2 100 %.    Relevant Results        Severe aortic stenosis     Assessment/Plan   Active Problems:  There are no active Hospital Problems.      Right and left heart catheterization       I spent 20 minutes in the professional and overall care of this patient.      Toshia Mariee MD    "

## 2023-10-12 VITALS
TEMPERATURE: 99 F | DIASTOLIC BLOOD PRESSURE: 84 MMHG | HEART RATE: 83 BPM | RESPIRATION RATE: 18 BRPM | OXYGEN SATURATION: 100 % | WEIGHT: 227.51 LBS | SYSTOLIC BLOOD PRESSURE: 161 MMHG | HEIGHT: 61 IN | BODY MASS INDEX: 42.96 KG/M2

## 2023-10-24 ENCOUNTER — LAB (OUTPATIENT)
Dept: LAB | Facility: LAB | Age: 68
End: 2023-10-24
Payer: MEDICARE

## 2023-10-24 DIAGNOSIS — M05.79 RHEUMATOID ARTHRITIS WITH RHEUMATOID FACTOR OF MULTIPLE SITES WITHOUT ORGAN OR SYSTEMS INVOLVEMENT (MULTI): Primary | ICD-10-CM

## 2023-10-24 LAB
ALBUMIN SERPL BCP-MCNC: 3.8 G/DL (ref 3.4–5)
ALP SERPL-CCNC: 132 U/L (ref 33–136)
ALT SERPL W P-5'-P-CCNC: 8 U/L (ref 7–45)
AST SERPL W P-5'-P-CCNC: 11 U/L (ref 9–39)
BILIRUB DIRECT SERPL-MCNC: 0 MG/DL (ref 0–0.3)
BILIRUB SERPL-MCNC: 0.3 MG/DL (ref 0–1.2)
CRP SERPL-MCNC: 1.19 MG/DL
ERYTHROCYTE [DISTWIDTH] IN BLOOD BY AUTOMATED COUNT: 18.4 % (ref 11.5–14.5)
ERYTHROCYTE [SEDIMENTATION RATE] IN BLOOD BY WESTERGREN METHOD: 35 MM/H (ref 0–30)
HCT VFR BLD AUTO: 41.6 % (ref 36–46)
HGB BLD-MCNC: 12.6 G/DL (ref 12–16)
MCH RBC QN AUTO: 26.3 PG (ref 26–34)
MCHC RBC AUTO-ENTMCNC: 30.3 G/DL (ref 32–36)
MCV RBC AUTO: 87 FL (ref 80–100)
NRBC BLD-RTO: 0 /100 WBCS (ref 0–0)
PLATELET # BLD AUTO: 212 X10*3/UL (ref 150–450)
PMV BLD AUTO: 12.6 FL (ref 7.5–11.5)
PROT SERPL-MCNC: 7 G/DL (ref 6.4–8.2)
RBC # BLD AUTO: 4.8 X10*6/UL (ref 4–5.2)
WBC # BLD AUTO: 9.8 X10*3/UL (ref 4.4–11.3)

## 2023-10-24 PROCEDURE — 86200 CCP ANTIBODY: CPT

## 2023-10-24 PROCEDURE — 36415 COLL VENOUS BLD VENIPUNCTURE: CPT

## 2023-10-24 PROCEDURE — 85652 RBC SED RATE AUTOMATED: CPT

## 2023-10-24 PROCEDURE — 86140 C-REACTIVE PROTEIN: CPT

## 2023-10-24 PROCEDURE — 80076 HEPATIC FUNCTION PANEL: CPT

## 2023-10-24 PROCEDURE — 85027 COMPLETE CBC AUTOMATED: CPT

## 2023-10-25 LAB — CCP IGG SERPL-ACNC: 85 U/ML

## 2023-11-17 ENCOUNTER — TELEPHONE (OUTPATIENT)
Dept: CARDIOLOGY | Facility: HOSPITAL | Age: 68
End: 2023-11-17
Payer: MEDICARE

## 2023-11-20 ENCOUNTER — ANCILLARY PROCEDURE (OUTPATIENT)
Dept: RADIOLOGY | Facility: CLINIC | Age: 68
End: 2023-11-20
Payer: MEDICARE

## 2023-11-20 DIAGNOSIS — I35.0 AORTIC VALVE STENOSIS, ETIOLOGY OF CARDIAC VALVE DISEASE UNSPECIFIED: ICD-10-CM

## 2023-11-20 PROCEDURE — 74174 CTA ABD&PLVS W/CONTRAST: CPT | Performed by: INTERNAL MEDICINE

## 2023-11-20 PROCEDURE — 71275 CT ANGIOGRAPHY CHEST: CPT

## 2023-11-20 PROCEDURE — 71275 CT ANGIOGRAPHY CHEST: CPT | Performed by: INTERNAL MEDICINE

## 2023-11-20 PROCEDURE — 2550000001 HC RX 255 CONTRASTS: Performed by: INTERNAL MEDICINE

## 2023-11-20 RX ADMIN — IOHEXOL 90 ML: 350 INJECTION, SOLUTION INTRAVENOUS at 14:44

## 2023-11-28 ENCOUNTER — APPOINTMENT (OUTPATIENT)
Dept: RADIOLOGY | Facility: CLINIC | Age: 68
End: 2023-11-28
Payer: MEDICARE

## 2023-12-12 NOTE — PROGRESS NOTES
PCP: Dr. Garcia  Cardio: Dr. Mariee    I was asked by Dr. Mariee to evaluate this patient in consultation for evaluation of severe aortic stenosis.   ?  The patient is a 68-year-old female former smoker with hyperlipidemia, obstructive sleep apnea and low back disease who is referred for evaluation of aortic stenosis.    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  She also has significant lumbar disc disease with compression at L2-L3 and lower extremity neuropathy.  At her functional baseline she walks with a cane.  She has chronic lower extremity edema secondary to lymphedema.    Over the past year the patient has noticed increasing fatigue with activity such as grocery shopping.  She has not had any chest discomfort, presyncope or syncope.    Transthoracic echocardiogram performed September 18, 2023 demonstrated normal LV systolic function with left ventricular ejection fraction of 60 to 65%, calcified aortic valve with reduced leaflet excursion, mean gradient 40 mmHg, peak aortic jet velocity 4.3 m/s, aortic valve area 0.69 cm².  Patient subsequently underwent cardiac catheterization on October 4, 2023 which demonstrated widely patent angiographically normal coronary arteries.  Patient's PA pressure was 36/13, mean pulmonary capillary wedge pressure 14 mmHg, cardiac output 6 L/min, cardiac index 3.1 L/min/m².    The work-up otherwise is as summarized below:  NYHA: Class II  Frailty score: 0/5 (Hb > 12, albumin > 3.5, mobility < 15 sec, mental OK)  EKG: NSR; Irene 142 ; QRS 88  Echo as above, study September 18, 2023   LHC (October 4, 2023): Widely patent coronaries   CT-TAVR: November 2023  Dental: Letter requested  STS Score:    ?  Given the patient's severe symptomatic aortic stenosis, the patient is referred to our structural heart clinic for consideration of aortic valve replacement.          ROS:  Constitutional:  Progressive fatigue, not feeling poorly, no fever and no chills.   Eyes: no eyesight problems, no blurred vision, no diplopia, no eye pain, no purulent discharge from the eyes, eyes not red, no dryness of the eyes and no itching of the eyes.   ENT: no nosebleeds, no hearing loss, no tinnitus, no earache, no sore throat, no hoarseness, no swollen glands in the neck and no nasal discharge.   Cardiovascular: dyspnea on exertion  Respiratory: no chronic cough, not coughing up sputum,  no wheezing that is consistent with asthma, no asthma, no orthopnea and no postural nocturnal dyspnea.   Gastrointestinal: no change in bowel habits, no blood in stools, no diarrhea, no constipation, no nausea, no vomiting, no abdominal pain, no signs and symptoms of ulcer disease, no alma colored stools and no intolerance to fatty foods.   Genitourinary: no hematuria,  no urinary frequency, no dysuria, no incontinence, no burning sensation during urination, no urinary hesitancy, no nocturia, no genital lesion, no testicular pain, urinary stream is not smaller and urinary stream does not start and stop.   Musculoskeletal: no arthralgias, no myalgias, no joint swelling, no joint stiffness, no muscle weakness, no back pain, no limb pain, chronic lower extremity edema and does report difficulty walking.  Chronic low back pain and lower extremity neuropathy  Skin: no skin rashes, no change in skin color and pigmentation, no skin lesions and no skin lumps.   Neurological: no seizures, no frequent falls, no headaches, reports lower extremity neuropathy, no fainting and no limb weakness.   Psychiatric: no depression, not suicidal, no confusion, no memory lapses or loss, no anxiety, no personality change and no emotional problems.   Endocrine: no goiter, no thyroid disorder, no diabetes mellitus, no excessive thirst, no dry skin, no cold intolerance, no heat intolerance, no erectile dysfunction, no increased urinary frequency, no proptosis and no  deepening of the voice.   Hematologic/Lymphatic: no bleeding issues.   All other systems have been reviewed and are negative for complaint.      Physical Exam:    Visit Vitals  Smoking Status Former        Constitutional: alert and in no acute distress.   Eyes: no erythema, swelling or discharge from the eye .   Ears, Nose, Mouth, and Throat: external inspection of ears and nose is normal , lips, teeth, and gums are normal with good dentition  and oropharynx normal with no erythema, edema, exudate or lesions .   Neck: neck is supple, symmetric, trachea midline, no masses  and no thyromegaly .   Pulmonary: no increased work of breathing or signs of respiratory distress , lungs clear to auscultation. , normal percussion of chest  and chest palpation normal .   Cardiovascular: RRR, 4/6 ZELDA RUSB, murmur radiates to the left neck.  S2 preserved.  No S3 or S4 gallops.  No rubs.  1+ leg edema  Abdomen: abdomen non-tender, no masses  and no hepatomegaly .           Skin:  no skin lesions          Neurologic: non-focal neurologic examination.      Psychiatric judgment and insight is normal , oriented to person, place and time , normal mood and affect.          Results for orders placed or performed in visit on 10/24/23   CBC   Result Value Ref Range    WBC 9.8 4.4 - 11.3 x10*3/uL    nRBC 0.0 0.0 - 0.0 /100 WBCs    RBC 4.80 4.00 - 5.20 x10*6/uL    Hemoglobin 12.6 12.0 - 16.0 g/dL    Hematocrit 41.6 36.0 - 46.0 %    MCV 87 80 - 100 fL    MCH 26.3 26.0 - 34.0 pg    MCHC 30.3 (L) 32.0 - 36.0 g/dL    RDW 18.4 (H) 11.5 - 14.5 %    Platelets 212 150 - 450 x10*3/uL    MPV 12.6 (H) 7.5 - 11.5 fL   C-Reactive Protein   Result Value Ref Range    C-Reactive Protein 1.19 (H) <1.00 mg/dL   Sedimentation Rate   Result Value Ref Range    Sedimentation Rate 35 (H) 0 - 30 mm/h   Hepatic Function Panel   Result Value Ref Range    Albumin 3.8 3.4 - 5.0 g/dL    Bilirubin, Total 0.3 0.0 - 1.2 mg/dL    Bilirubin, Direct 0.0 0.0 - 0.3 mg/dL     Alkaline Phosphatase 132 33 - 136 U/L    ALT 8 7 - 45 U/L    AST 11 9 - 39 U/L    Total Protein 7.0 6.4 - 8.2 g/dL   Citrulline Antibody, IgG   Result Value Ref Range    Citrulline Antibody, IgG 85 (H) <3 U/mL            Current Outpatient Medications   Medication Instructions    albuterol (Ventolin HFA) 90 mcg/actuation inhaler 2 puffs, inhalation, Every 4 hours PRN    dilTIAZem CD (CARDIZEM CD) 240 mg, oral, Daily    esomeprazole (NEXIUM) 20 mg, oral, Daily before breakfast, Do not open capsule.    folic acid (FOLVITE) 1 mg, oral, Daily    gabapentin (NEURONTIN) 600 mg, oral, 2 times daily    ketoconazole (NIZOral) 2 % cream Daily PRN    losartan (COZAAR) 100 mg, oral, Daily    methotrexate (TREXALL) 15 mg, oral, Weekly, Follow directions carefully, and ask to explain any part you do not understand. Take exactly as directed.    montelukast (SINGULAIR) 10 mg, oral, Nightly    torsemide (DEMADEX) 20 mg, oral, 3 times daily PRN    traMADol (ULTRAM) 50 mg, oral, Every 4 hours PRN    warfarin (COUMADIN) 7.5 mg, oral, Daily, EVERY TUES-THURS-SAT-SUN    warfarin (COUMADIN) 10 mg, oral, Daily, TAKES 10MG EVERY MON-WED-FRI        Reviewed images from transthoracic echocardiogram and left heart catheterization with interpretation as noted above and with inclusion of this interpretation and assessment and plan for this encounter      Assessment:  This is a 68-year-old female with multiple medical issues including significant lumbar disc disease, lower extremity neuropathy, gait limitation and severe symptomatic aortic stenosis.    The overall decision regarding the best treatment for her condition is complex.  We discussed options of both surgical aortic valve replacement and transcatheter aortic valve replacement along with with risks and benefits involved with both of them in detail.  Based on the patient's comorbidities, functional limitation, low back disease and neuropathy, would favor transcatheter approach.    All the  risks of the procedures including but not limited to groin complications, CVA, MI, pericardial tamponade, risk of PPM and death were discussed with the patient and family in detail .The patient verbalized understanding and decided to proceed with the procedure.    The patient will be discussed at Valve Team meeting for final decision making.     Thank you Dr. Mariee for allowing us to participate in this patient's care.          [facility]Q Questionnaire      1  Heart failure affects different people in different ways. Some feel shortness of breath while others feel fatigue. Please indicate how much you are limited by heart failure (shortness of breath or fatigue) in your ability to do the following activities over the past 2 weeks. 1 month Structural Heart NP follow-up     A.) Showering/bathing  5. Not at All  B.) Walking 1 block on level ground 1. Extremely  C.) Hurrying or Jogging   1. Extremely    2.  Over the past 2 weeks, how many times did you have swelling in your feet, ankles or legs when you woke up in the morning? 5. Never    3.  Over the past 2 weeks, on average, how many times has fatigue limited your ability to do what you wanted? 7. Never    4.  Over the past 2 weeks, on average, how many times has shortness of breath limited your ability to do what you wanted? 7. Never    5.  Over the past 2 weeks, on average, how many times have you been forced to sleep sitting up in a chair or with at least 3 pillows to prop you up because of shortness of breath? Never    6. Over the past 2 weeks, how much has your heart failure limited your enjoyment of life? It has not limited my enjoyment of life    7. If you had to spend the rest of your life with your heart failure the way it is right now, how would you feel about this? 2. Mostly dissatisfied    8. How much does your heart failure affect your lifestyle? Please indicate how your heart failure may have limited yourparticipation in the following activities over the past  2 weeks    A.)  Hobbies, recreational activities  5. Did not limit at all    B.) Working or doing household chores  5. Did not limit at all    C.) Visiting family or friends out of your home  5. Did not limit at all

## 2023-12-13 ENCOUNTER — OFFICE VISIT (OUTPATIENT)
Dept: CARDIOLOGY | Facility: CLINIC | Age: 68
End: 2023-12-13
Payer: MEDICARE

## 2023-12-13 ENCOUNTER — OFFICE VISIT (OUTPATIENT)
Dept: CARDIAC SURGERY | Facility: CLINIC | Age: 68
End: 2023-12-13
Payer: MEDICARE

## 2023-12-13 VITALS
WEIGHT: 230 LBS | HEIGHT: 61 IN | TEMPERATURE: 97.1 F | DIASTOLIC BLOOD PRESSURE: 72 MMHG | SYSTOLIC BLOOD PRESSURE: 131 MMHG | BODY MASS INDEX: 43.43 KG/M2 | HEART RATE: 93 BPM | OXYGEN SATURATION: 96 %

## 2023-12-13 DIAGNOSIS — I35.0 SEVERE AORTIC STENOSIS: Primary | ICD-10-CM

## 2023-12-13 DIAGNOSIS — I35.0 NONRHEUMATIC AORTIC VALVE STENOSIS: Primary | ICD-10-CM

## 2023-12-13 PROCEDURE — 1036F TOBACCO NON-USER: CPT | Performed by: INTERNAL MEDICINE

## 2023-12-13 PROCEDURE — 1036F TOBACCO NON-USER: CPT | Performed by: THORACIC SURGERY (CARDIOTHORACIC VASCULAR SURGERY)

## 2023-12-13 PROCEDURE — 99205 OFFICE O/P NEW HI 60 MIN: CPT | Performed by: INTERNAL MEDICINE

## 2023-12-13 PROCEDURE — 99205 OFFICE O/P NEW HI 60 MIN: CPT | Performed by: THORACIC SURGERY (CARDIOTHORACIC VASCULAR SURGERY)

## 2023-12-13 PROCEDURE — 1126F AMNT PAIN NOTED NONE PRSNT: CPT | Performed by: INTERNAL MEDICINE

## 2023-12-13 PROCEDURE — 1159F MED LIST DOCD IN RCRD: CPT | Performed by: THORACIC SURGERY (CARDIOTHORACIC VASCULAR SURGERY)

## 2023-12-13 PROCEDURE — 1159F MED LIST DOCD IN RCRD: CPT | Performed by: INTERNAL MEDICINE

## 2023-12-13 PROCEDURE — 1126F AMNT PAIN NOTED NONE PRSNT: CPT | Performed by: THORACIC SURGERY (CARDIOTHORACIC VASCULAR SURGERY)

## 2023-12-13 PROCEDURE — 99215 OFFICE O/P EST HI 40 MIN: CPT | Performed by: THORACIC SURGERY (CARDIOTHORACIC VASCULAR SURGERY)

## 2023-12-13 PROCEDURE — 99215 OFFICE O/P EST HI 40 MIN: CPT | Mod: 25 | Performed by: INTERNAL MEDICINE

## 2023-12-13 NOTE — LETTER
December 13, 2023     Toshia Mariee MD  [address]  [facility] [address]    Patient: Dottie Fragoso   YOB: 1955   Date of Visit: 12/13/2023       Dear Dr. Toshia Mariee MD:    Thank you for referring Dottie Fragoso to me for evaluation. Below are my notes for this consultation.  If you have questions, please do not hesitate to call me. I look forward to following your patient along with you.       Sincerely,     Gerhard Bhatti MD      CC: Jose Garcia MD  ______________________________________________________________________________________    Chief Complaint  Dyspnea    HPI:   Ms. Dottie Fragoso is an 68 y.o. female, who is a patient of Dr. Jose Garcia MD   I have been asked to see them by Toshia Zelaya to evaluate aortic stenosis.  She used her inhaler more often in the fall. She denies CP, PND or orthopnea.  She denies dizziness or syncope.  She has limitations due to neuropathy and slower mobility.  She has a history of stroke with Line associated DVT and PFO.       Past Medical History:   Diagnosis Date   • Cancer (CMS/HCC)     bilat breat cancer with L mastectomy 2016/ R mastectomy 2002   • Heart murmur     pt states she need an aoritc valve replacement   • Hyperlipidemia    • Hypertension    • Personal history of malignant neoplasm, unspecified     History of malignant neoplasm   • Personal history of other diseases of the circulatory system     History of hypertension   • PFO (patent foramen ovale) 2002    not repaired   • Stroke (CMS/HCC) 2002    stroke (without residual) from PFO, event happened after a port was placed. Info per pt   • Unilateral primary osteoarthritis, right hip 11/17/2020    Primary osteoarthritis of right hip   • Unilateral primary osteoarthritis, right hip 07/01/2020    Primary osteoarthritis of right hip       Past Surgical History:   Procedure Laterality Date   • CARDIAC CATHETERIZATION N/A 10/4/2023    Procedure:  Left And Right Heart Cath, With LV;  Surgeon: Toshia Mariee MD;  Location: [facility];  Service: Cardiovascular;  Laterality: N/A;   • OTHER SURGICAL HISTORY  2020    Tonsillectomy   • OTHER SURGICAL HISTORY  2021    Joint replacement procedure   • OTHER SURGICAL HISTORY  2021    Back surgery   • OTHER SURGICAL HISTORY  2021    Mastectomy   • OTHER SURGICAL HISTORY  2022    Colonoscopy   • OTHER SURGICAL HISTORY  2022    Esophagogastroduodenoscopy   • OTHER SURGICAL HISTORY  2022    Hip replacement   • OTHER SURGICAL HISTORY  2022    Hysterectomy   • OTHER SURGICAL HISTORY  2022    Cyst excision   • OTHER SURGICAL HISTORY  2022    Hernia repair   • OTHER SURGICAL HISTORY  2022    Shave biopsy   • OTHER SURGICAL HISTORY  2022    Decompression of spinal cord   • OTHER SURGICAL HISTORY  2022    Venous access port removal   • OTHER SURGICAL HISTORY  2022    Knee replacement       Family History   Problem Relation Name Age of Onset   • Heart attack Mother     • Heart attack Father         Social History     Socioeconomic History   • Marital status: Single     Spouse name: Not on file   • Number of children: Not on file   • Years of education: Not on file   • Highest education level: Not on file   Occupational History   • Not on file   Tobacco Use   • Smoking status: Former     Types: Cigarettes     Quit date:      Years since quittin.9   • Smokeless tobacco: Never   Vaping Use   • Vaping Use: Never used   Substance and Sexual Activity   • Alcohol use: Never   • Drug use: Never   • Sexual activity: Defer   Other Topics Concern   • Not on file   Social History Narrative   • Not on file     Social Determinants of Health     Financial Resource Strain: Not on file   Food Insecurity: Not on file   Transportation Needs: Not on file   Physical Activity: Not on file   Stress: Not on file   Social Connections: Not on file    Intimate Partner Violence: Not on file   Housing Stability: Not on file       Allergies   Allergen Reactions   • Darvon [Propoxyphene] Hives   • Dilaudid [Hydromorphone] Hives   • Mobic [Meloxicam] Hives   • Penicillins Hives   • Sulfasalazine Hives   • Percodan [Oxycodone-Aspirin] Palpitations       Outpatient Encounter Medications as of 12/13/2023   Medication Sig Dispense Refill   • albuterol (Ventolin HFA) 90 mcg/actuation inhaler Inhale 2 puffs every 4 hours if needed for wheezing.     • dilTIAZem CD (Cardizem CD) 240 mg 24 hr capsule Take 1 capsule (240 mg) by mouth once daily.     • esomeprazole (NexIUM) 20 mg DR capsule Take 1 capsule (20 mg) by mouth once daily in the morning. Take before meals. Do not open capsule.     • folic acid (Folvite) 1 mg tablet Take 1 tablet (1 mg) by mouth once daily.     • gabapentin (Neurontin) 300 mg capsule Take 2 capsules (600 mg) by mouth 2 times a day.     • ketoconazole (NIZOral) 2 % cream once daily as needed.     • losartan (Cozaar) 100 mg tablet Take 1 tablet (100 mg) by mouth once daily.     • methotrexate (Trexall) 2.5 mg tablet Take 6 tablets (15 mg total) by mouth 1 (one) time per week.  Follow directions carefully, and ask to explain any part you do not understand. Take exactly as directed.     • montelukast (Singulair) 10 mg tablet Take 1 tablet (10 mg) by mouth once daily at bedtime.     • torsemide (Demadex) 20 mg tablet Take 1 tablet (20 mg) by mouth 3 times a day as needed.     • traMADol (Ultram) 50 mg tablet Take 1 tablet (50 mg) by mouth every 4 hours if needed for severe pain (7 - 10).     • warfarin (Coumadin) 5 mg tablet Take 1.5 tablets (7.5 mg) by mouth once daily. EVERY TUES-THURS-SAT-SUN     • warfarin (Coumadin) 5 mg tablet Take 2 tablets (10 mg) by mouth once daily. TAKES 10MG EVERY MON-WED-FRI       No facility-administered encounter medications on file as of 12/13/2023.       Physical Exam  Constitutional:       Appearance: Normal appearance.  She is obese.   HENT:      Head: Normocephalic and atraumatic.   Eyes:      General: No scleral icterus.     Conjunctiva/sclera: Conjunctivae normal.      Pupils: Pupils are equal, round, and reactive to light.   Cardiovascular:      Rate and Rhythm: Normal rate and regular rhythm.      Heart sounds: Murmur heard.   Pulmonary:      Effort: Pulmonary effort is normal.      Breath sounds: Normal breath sounds.   Musculoskeletal:         General: Normal range of motion.      Right lower leg: Edema present.   Neurological:      General: No focal deficit present.      Mental Status: She is alert and oriented to person, place, and time.   Psychiatric:         Mood and Affect: Mood normal.         Thought Content: Thought content normal.         Encounter Date: 10/04/23   ECG 12 lead STAT   Result Value    Ventricular Rate 88    Atrial Rate 88    PA Interval 142    QRS Duration 88    QT Interval 342    QTC Calculation(Bazett) 413    P Axis 46    R Axis -10    T Axis 5    QRS Count 14    Q Onset 221    P Onset 150    P Offset 198    T Offset 392    QTC Fredericia 388    Narrative    Normal sinus rhythm  Moderate voltage criteria for LVH, may be normal variant  Borderline ECG  When compared with ECG of 29-SEP-2021 07:39,  No significant change was found  Confirmed by Yesenia Cerna [id number]) on 10/4/2023 8:23:34 PM       Lab Results   Component Value Date    WBC 9.8 10/24/2023    HGB 12.6 10/24/2023    HCT 41.6 10/24/2023    MCV 87 10/24/2023     10/24/2023     Lab Results   Component Value Date    GLUCOSE 72 (L) 09/26/2023    CALCIUM 11.2 (H) 09/26/2023     09/26/2023    K 3.8 09/26/2023    CO2 27 09/26/2023     09/26/2023    BUN 13 09/26/2023    CREATININE 0.63 09/26/2023     EKG dated October 4, 2023.  Normal sinus rhythm, ventricular rate 88 bpm.  PA intervals 142 ms QRS duration 88 ms, no evidence of heart block    LHC: Dated September 26, 2023.  The coronary circulation is right dominant, there is no  significant coronary disease in any branch.       Assessment and Plan:   Ms. Dottie Fragoso is an 68 y.o. female who presents with aortic stenosis.  This is associated with hypertension, hyperlipidemia, patent foramen ovale, prior stroke; and in the setting of bilateral hip osteoarthritis, bilateral knee osteoarthritis, and breast cancer..  Their symptoms include Fatigue.  She has NYHA II heart failure symptoms.  Their imaging is consistent with aortic stenosis.     We had a nice discussion regarding the indications for intervention on their valvular disease.  This included percutaneous as well as open surgical approaches. I do believe that a catheter based approach is in her best interest, given her age and co-morbidities.  She understands that the goal of this procedure will be to relieve symptoms if present, preserve left ventricular function, and prolong life.  I discussed the specific risks of vascular access bleeding, stroke and need for pacemaker placement.     With regards to their surgical fitness, she is a Normal risk candidate.  If there is compromise of vascular access or aortic root anatomy/geometry, I would offer them an open surgical approach.

## 2023-12-13 NOTE — PROGRESS NOTES
Chief Complaint  Dyspnea    HPI:   Ms. Dottie Fragoso is an 68 y.o. female, who is a patient of Dr. Jose Garcia MD   I have been asked to see them by Toshia Zelaya to evaluate aortic stenosis.  She used her inhaler more often in the fall. She denies CP, PND or orthopnea.  She denies dizziness or syncope.  She has limitations due to neuropathy and slower mobility.  She has a history of stroke with Line associated DVT and PFO.       Past Medical History:   Diagnosis Date    Cancer (CMS/[facility])     bilat breat cancer with L mastectomy 2016/ R mastectomy 2002    Heart murmur     pt states she need an aoritc valve replacement    Hyperlipidemia     Hypertension     Personal history of malignant neoplasm, unspecified     History of malignant neoplasm    Personal history of other diseases of the circulatory system     History of hypertension    PFO (patent foramen ovale) 2002    not repaired    Stroke (CMS/[facility]) 2002    stroke (without residual) from PFO, event happened after a port was placed. Info per pt    Unilateral primary osteoarthritis, right hip 11/17/2020    Primary osteoarthritis of right hip    Unilateral primary osteoarthritis, right hip 07/01/2020    Primary osteoarthritis of right hip       Past Surgical History:   Procedure Laterality Date    CARDIAC CATHETERIZATION N/A 10/4/2023    Procedure: Left And Right Heart Cath, With LV;  Surgeon: Toshia Mariee MD;  Location: [facility];  Service: Cardiovascular;  Laterality: N/A;    OTHER SURGICAL HISTORY  03/02/2020    Tonsillectomy    OTHER SURGICAL HISTORY  07/06/2021    Joint replacement procedure    OTHER SURGICAL HISTORY  07/06/2021    Back surgery    OTHER SURGICAL HISTORY  07/06/2021    Mastectomy    OTHER SURGICAL HISTORY  02/11/2022    Colonoscopy    OTHER SURGICAL HISTORY  02/11/2022    Esophagogastroduodenoscopy    OTHER SURGICAL HISTORY  02/11/2022    Hip replacement    OTHER SURGICAL HISTORY  02/11/2022    Hysterectomy    OTHER SURGICAL  HISTORY  2022    Cyst excision    OTHER SURGICAL HISTORY  2022    Hernia repair    OTHER SURGICAL HISTORY  2022    Shave biopsy    OTHER SURGICAL HISTORY  2022    Decompression of spinal cord    OTHER SURGICAL HISTORY  2022    Venous access port removal    OTHER SURGICAL HISTORY  2022    Knee replacement       Family History   Problem Relation Name Age of Onset    Heart attack Mother      Heart attack Father         Social History     Socioeconomic History    Marital status: Single     Spouse name: Not on file    Number of children: Not on file    Years of education: Not on file    Highest education level: Not on file   Occupational History    Not on file   Tobacco Use    Smoking status: Former     Types: Cigarettes     Quit date:      Years since quittin.9    Smokeless tobacco: Never   Vaping Use    Vaping Use: Never used   Substance and Sexual Activity    Alcohol use: Never    Drug use: Never    Sexual activity: Defer   Other Topics Concern    Not on file   Social History Narrative    Not on file     Social Determinants of Health     Financial Resource Strain: Not on file   Food Insecurity: Not on file   Transportation Needs: Not on file   Physical Activity: Not on file   Stress: Not on file   Social Connections: Not on file   Intimate Partner Violence: Not on file   Housing Stability: Not on file       Allergies   Allergen Reactions    Darvon [Propoxyphene] Hives    Dilaudid [Hydromorphone] Hives    Mobic [Meloxicam] Hives    Penicillins Hives    Sulfasalazine Hives    Percodan [Oxycodone-Aspirin] Palpitations       Outpatient Encounter Medications as of 2023   Medication Sig Dispense Refill    albuterol (Ventolin HFA) 90 mcg/actuation inhaler Inhale 2 puffs every 4 hours if needed for wheezing.      dilTIAZem CD (Cardizem CD) 240 mg 24 hr capsule Take 1 capsule (240 mg) by mouth once daily.      esomeprazole (NexIUM) 20 mg DR capsule Take 1 capsule (20 mg) by  mouth once daily in the morning. Take before meals. Do not open capsule.      folic acid (Folvite) 1 mg tablet Take 1 tablet (1 mg) by mouth once daily.      gabapentin (Neurontin) 300 mg capsule Take 2 capsules (600 mg) by mouth 2 times a day.      ketoconazole (NIZOral) 2 % cream once daily as needed.      losartan (Cozaar) 100 mg tablet Take 1 tablet (100 mg) by mouth once daily.      methotrexate (Trexall) 2.5 mg tablet Take 6 tablets (15 mg total) by mouth 1 (one) time per week.  Follow directions carefully, and ask to explain any part you do not understand. Take exactly as directed.      montelukast (Singulair) 10 mg tablet Take 1 tablet (10 mg) by mouth once daily at bedtime.      torsemide (Demadex) 20 mg tablet Take 1 tablet (20 mg) by mouth 3 times a day as needed.      traMADol (Ultram) 50 mg tablet Take 1 tablet (50 mg) by mouth every 4 hours if needed for severe pain (7 - 10).      warfarin (Coumadin) 5 mg tablet Take 1.5 tablets (7.5 mg) by mouth once daily. EVERY TUES-THURS-SAT-SUN      warfarin (Coumadin) 5 mg tablet Take 2 tablets (10 mg) by mouth once daily. TAKES 10MG EVERY MON-WED-FRI       No facility-administered encounter medications on file as of 12/13/2023.       Physical Exam  Constitutional:       Appearance: Normal appearance. She is obese.   HENT:      Head: Normocephalic and atraumatic.   Eyes:      General: No scleral icterus.     Conjunctiva/sclera: Conjunctivae normal.      Pupils: Pupils are equal, round, and reactive to light.   Cardiovascular:      Rate and Rhythm: Normal rate and regular rhythm.      Heart sounds: Murmur heard.   Pulmonary:      Effort: Pulmonary effort is normal.      Breath sounds: Normal breath sounds.   Musculoskeletal:         General: Normal range of motion.      Right lower leg: Edema present.   Neurological:      General: No focal deficit present.      Mental Status: She is alert and oriented to person, place, and time.   Psychiatric:         Mood and  Affect: Mood normal.         Thought Content: Thought content normal.         Encounter Date: 10/04/23   ECG 12 lead STAT   Result Value    Ventricular Rate 88    Atrial Rate 88    SD Interval 142    QRS Duration 88    QT Interval 342    QTC Calculation(Bazett) 413    P Axis 46    R Axis -10    T Axis 5    QRS Count 14    Q Onset 221    P Onset 150    P Offset 198    T Offset 392    QTC Fredericia 388    Narrative    Normal sinus rhythm  Moderate voltage criteria for LVH, may be normal variant  Borderline ECG  When compared with ECG of 29-SEP-2021 07:39,  No significant change was found  Confirmed by Yesenia Cerna [id number]) on 10/4/2023 8:23:34 PM       Lab Results   Component Value Date    WBC 9.8 10/24/2023    HGB 12.6 10/24/2023    HCT 41.6 10/24/2023    MCV 87 10/24/2023     10/24/2023     Lab Results   Component Value Date    GLUCOSE 72 (L) 09/26/2023    CALCIUM 11.2 (H) 09/26/2023     09/26/2023    K 3.8 09/26/2023    CO2 27 09/26/2023     09/26/2023    BUN 13 09/26/2023    CREATININE 0.63 09/26/2023     EKG dated October 4, 2023.  Normal sinus rhythm, ventricular rate 88 bpm.  SD intervals 142 ms QRS duration 88 ms, no evidence of heart block    LHC: Dated September 26, 2023.  The coronary circulation is right dominant, there is no significant coronary disease in any branch.       Assessment and Plan:   Ms. Dottie Fragoso is an 68 y.o. female who presents with aortic stenosis.  This is associated with hypertension, hyperlipidemia, patent foramen ovale, prior stroke; and in the setting of bilateral hip osteoarthritis, bilateral knee osteoarthritis, and breast cancer..  Their symptoms include Fatigue.  She has NYHA II heart failure symptoms.  Their imaging is consistent with aortic stenosis.     We had a nice discussion regarding the indications for intervention on their valvular disease.  This included percutaneous as well as open surgical approaches. I do believe that a catheter based approach is  in her best interest, given her age and co-morbidities.  She understands that the goal of this procedure will be to relieve symptoms if present, preserve left ventricular function, and prolong life.  I discussed the specific risks of vascular access bleeding, stroke and need for pacemaker placement.     With regards to their surgical fitness, she is a Normal risk candidate.  If there is compromise of vascular access or aortic root anatomy/geometry, I would offer them an open surgical approach.

## 2023-12-14 ENCOUNTER — LAB (OUTPATIENT)
Dept: LAB | Facility: LAB | Age: 68
End: 2023-12-14
Payer: MEDICARE

## 2023-12-14 DIAGNOSIS — M05.79 RHEUMATOID ARTHRITIS WITH RHEUMATOID FACTOR OF MULTIPLE SITES WITHOUT ORGAN OR SYSTEMS INVOLVEMENT (MULTI): Primary | ICD-10-CM

## 2023-12-14 DIAGNOSIS — I10 ESSENTIAL (PRIMARY) HYPERTENSION: ICD-10-CM

## 2023-12-14 LAB
ALBUMIN SERPL BCP-MCNC: 4 G/DL (ref 3.4–5)
ALP SERPL-CCNC: 142 U/L (ref 33–136)
ALT SERPL W P-5'-P-CCNC: 8 U/L (ref 7–45)
AST SERPL W P-5'-P-CCNC: 10 U/L (ref 9–39)
BILIRUB DIRECT SERPL-MCNC: 0 MG/DL (ref 0–0.3)
BILIRUB SERPL-MCNC: 0.3 MG/DL (ref 0–1.2)
ERYTHROCYTE [DISTWIDTH] IN BLOOD BY AUTOMATED COUNT: 17.7 % (ref 11.5–14.5)
HCT VFR BLD AUTO: 41.8 % (ref 36–46)
HGB BLD-MCNC: 13 G/DL (ref 12–16)
MCH RBC QN AUTO: 26.9 PG (ref 26–34)
MCHC RBC AUTO-ENTMCNC: 31.1 G/DL (ref 32–36)
MCV RBC AUTO: 86 FL (ref 80–100)
NRBC BLD-RTO: 0 /100 WBCS (ref 0–0)
PLATELET # BLD AUTO: 170 X10*3/UL (ref 150–450)
PROT SERPL-MCNC: 7 G/DL (ref 6.4–8.2)
RBC # BLD AUTO: 4.84 X10*6/UL (ref 4–5.2)
WBC # BLD AUTO: 8.8 X10*3/UL (ref 4.4–11.3)

## 2023-12-14 PROCEDURE — 80076 HEPATIC FUNCTION PANEL: CPT

## 2023-12-14 PROCEDURE — 85027 COMPLETE CBC AUTOMATED: CPT

## 2023-12-14 PROCEDURE — 36415 COLL VENOUS BLD VENIPUNCTURE: CPT

## 2023-12-19 ENCOUNTER — TELEPHONE (OUTPATIENT)
Dept: CARDIOLOGY | Facility: HOSPITAL | Age: 68
End: 2023-12-19
Payer: MEDICARE

## 2023-12-19 ENCOUNTER — CARDIOLOGY CONFERENCE (OUTPATIENT)
Dept: CARDIOLOGY | Facility: HOSPITAL | Age: 68
End: 2023-12-19
Payer: MEDICARE

## 2023-12-19 DIAGNOSIS — Z00.6 RESEARCH EXAM: Primary | ICD-10-CM

## 2023-12-19 DIAGNOSIS — I35.0 AORTIC VALVE STENOSIS, ETIOLOGY OF CARDIAC VALVE DISEASE UNSPECIFIED: ICD-10-CM

## 2023-12-19 NOTE — PROGRESS NOTES
Case reviewed in Valve and Structural Heart team meeting.    Patient is candidate for MRI study.  Henok will consent.  CT read: area 443 darrick 77 LCA 14 RCA 20 Sinus 34/33/34 stj 25/28 Domo 26 underfilled 1 ml RFP.

## 2023-12-20 DIAGNOSIS — Z00.6 RESEARCH EXAM: Primary | ICD-10-CM

## 2023-12-21 ENCOUNTER — TELEPHONE (OUTPATIENT)
Dept: CARDIOLOGY | Facility: HOSPITAL | Age: 68
End: 2023-12-21
Payer: MEDICARE

## 2023-12-21 ENCOUNTER — DOCUMENTATION (OUTPATIENT)
Dept: CARDIOLOGY | Facility: HOSPITAL | Age: 68
End: 2023-12-21
Payer: MEDICARE

## 2024-01-03 ENCOUNTER — HOSPITAL ENCOUNTER (OUTPATIENT)
Dept: CT IMAGING | Age: 69
Discharge: HOME | End: 2024-01-05
Payer: MEDICARE

## 2024-01-03 DIAGNOSIS — Z85.3 PERSONAL HISTORY OF MALIGNANT NEOPLASM OF BREAST: ICD-10-CM

## 2024-01-03 DIAGNOSIS — R91.1 LUNG NODULE: ICD-10-CM

## 2024-01-03 PROCEDURE — 78815 PET IMAGE W/CT SKULL-THIGH: CPT

## 2024-01-03 PROCEDURE — A9609 HC RX DIAGNOSTIC RADIOPHARMACEUTICAL: HCPCS | Performed by: INTERNAL MEDICINE

## 2024-01-03 PROCEDURE — 3430000000 HC RX DIAGNOSTIC RADIOPHARMACEUTICAL: Performed by: INTERNAL MEDICINE

## 2024-01-03 RX ORDER — FLUDEOXYGLUCOSE F 18 200 MCI/ML
16.2 INJECTION, SOLUTION INTRAVENOUS
Status: COMPLETED | OUTPATIENT
Start: 2024-01-03 | End: 2024-01-03

## 2024-01-03 RX ADMIN — FLUDEOXYGLUCOSE F 18 16.2 MILLICURIE: 200 INJECTION, SOLUTION INTRAVENOUS at 07:55

## 2024-01-04 ENCOUNTER — LAB (OUTPATIENT)
Dept: LAB | Facility: LAB | Age: 69
End: 2024-01-04
Payer: MEDICARE

## 2024-01-04 DIAGNOSIS — I35.0 AORTIC VALVE STENOSIS, ETIOLOGY OF CARDIAC VALVE DISEASE UNSPECIFIED: ICD-10-CM

## 2024-01-04 LAB
ANION GAP SERPL CALC-SCNC: 7 MMOL/L (ref 10–20)
BUN SERPL-MCNC: 12 MG/DL (ref 6–23)
CALCIUM SERPL-MCNC: 10.1 MG/DL (ref 8.6–10.3)
CHLORIDE SERPL-SCNC: 107 MMOL/L (ref 98–107)
CO2 SERPL-SCNC: 28 MMOL/L (ref 21–32)
CREAT SERPL-MCNC: 0.55 MG/DL (ref 0.5–1.05)
ERYTHROCYTE [DISTWIDTH] IN BLOOD BY AUTOMATED COUNT: 17.1 % (ref 11.5–14.5)
GFR SERPL CREATININE-BSD FRML MDRD: >90 ML/MIN/1.73M*2
GLUCOSE SERPL-MCNC: 98 MG/DL (ref 74–99)
HCT VFR BLD AUTO: 42.3 % (ref 36–46)
HGB BLD-MCNC: 13.6 G/DL (ref 12–16)
INR PPP: 2 (ref 0.9–1.1)
MCH RBC QN AUTO: 27.6 PG (ref 26–34)
MCHC RBC AUTO-ENTMCNC: 32.2 G/DL (ref 32–36)
MCV RBC AUTO: 86 FL (ref 80–100)
NRBC BLD-RTO: 0 /100 WBCS (ref 0–0)
PLATELET # BLD AUTO: 184 X10*3/UL (ref 150–450)
POTASSIUM SERPL-SCNC: 3.9 MMOL/L (ref 3.5–5.3)
PROTHROMBIN TIME: 23.2 SECONDS (ref 9.8–12.8)
RBC # BLD AUTO: 4.92 X10*6/UL (ref 4–5.2)
SODIUM SERPL-SCNC: 138 MMOL/L (ref 136–145)
WBC # BLD AUTO: 6.3 X10*3/UL (ref 4.4–11.3)

## 2024-01-04 PROCEDURE — 80048 BASIC METABOLIC PNL TOTAL CA: CPT

## 2024-01-04 PROCEDURE — 36415 COLL VENOUS BLD VENIPUNCTURE: CPT

## 2024-01-04 PROCEDURE — 85610 PROTHROMBIN TIME: CPT

## 2024-01-04 PROCEDURE — 85027 COMPLETE CBC AUTOMATED: CPT

## 2024-01-15 ENCOUNTER — APPOINTMENT (OUTPATIENT)
Dept: RADIOLOGY | Facility: HOSPITAL | Age: 69
DRG: 267 | End: 2024-01-15
Payer: MEDICARE

## 2024-01-15 ENCOUNTER — HOSPITAL ENCOUNTER (INPATIENT)
Facility: HOSPITAL | Age: 69
LOS: 1 days | Discharge: HOME | DRG: 267 | End: 2024-01-16
Attending: INTERNAL MEDICINE | Admitting: INTERNAL MEDICINE
Payer: MEDICARE

## 2024-01-15 ENCOUNTER — HOSPITAL ENCOUNTER (OUTPATIENT)
Dept: RADIOLOGY | Facility: HOSPITAL | Age: 69
Discharge: HOME | DRG: 267 | End: 2024-01-15
Payer: MEDICARE

## 2024-01-15 ENCOUNTER — APPOINTMENT (OUTPATIENT)
Dept: CARDIOLOGY | Facility: HOSPITAL | Age: 69
DRG: 267 | End: 2024-01-15
Payer: MEDICARE

## 2024-01-15 DIAGNOSIS — R52 ACUTE PAIN: ICD-10-CM

## 2024-01-15 DIAGNOSIS — I35.0 AORTIC VALVE STENOSIS, ETIOLOGY OF CARDIAC VALVE DISEASE UNSPECIFIED: ICD-10-CM

## 2024-01-15 DIAGNOSIS — Z00.6 RESEARCH EXAM: ICD-10-CM

## 2024-01-15 DIAGNOSIS — Z95.3 S/P TAVR (TRANSCATHETER AORTIC VALVE REPLACEMENT), BIOPROSTHETIC: Primary | ICD-10-CM

## 2024-01-15 DIAGNOSIS — I10 PRIMARY HYPERTENSION: ICD-10-CM

## 2024-01-15 LAB
ANION GAP SERPL CALC-SCNC: 10 MMOL/L (ref 10–20)
AORTIC VALVE MEAN GRADIENT: 37
AORTIC VALVE PEAK VELOCITY: 3.96
AV PEAK GRADIENT: 62.7
AVA (PEAK VEL): 0.99
AVA (VTI): 0.93
BASOPHILS # BLD AUTO: 0.04 X10*3/UL (ref 0–0.1)
BASOPHILS NFR BLD AUTO: 0.5 %
BUN SERPL-MCNC: 9 MG/DL (ref 6–23)
CALCIUM SERPL-MCNC: 9.8 MG/DL (ref 8.6–10.6)
CHLORIDE SERPL-SCNC: 106 MMOL/L (ref 98–107)
CO2 SERPL-SCNC: 25 MMOL/L (ref 21–32)
CREAT SERPL-MCNC: 0.52 MG/DL (ref 0.5–1.05)
EGFRCR SERPLBLD CKD-EPI 2021: >90 ML/MIN/1.73M*2
EOSINOPHIL # BLD AUTO: 0.07 X10*3/UL (ref 0–0.7)
EOSINOPHIL NFR BLD AUTO: 0.9 %
ERYTHROCYTE [DISTWIDTH] IN BLOOD BY AUTOMATED COUNT: 16.8 % (ref 11.5–14.5)
GLUCOSE BLD MANUAL STRIP-MCNC: 113 MG/DL (ref 74–99)
GLUCOSE SERPL-MCNC: 105 MG/DL (ref 74–99)
HCT VFR BLD AUTO: 40.4 % (ref 36–46)
HGB BLD-MCNC: 12.7 G/DL (ref 12–16)
IMM GRANULOCYTES # BLD AUTO: 0.09 X10*3/UL (ref 0–0.7)
IMM GRANULOCYTES NFR BLD AUTO: 1.2 % (ref 0–0.9)
INR PPP: 1.1 (ref 0.9–1.1)
LEFT VENTRICLE INTERNAL DIMENSION DIASTOLE: 4.4 (ref 3.5–6)
LEFT VENTRICULAR OUTFLOW TRACT DIAMETER: 2
LYMPHOCYTES # BLD AUTO: 0.79 X10*3/UL (ref 1.2–4.8)
LYMPHOCYTES NFR BLD AUTO: 10.2 %
MAGNESIUM SERPL-MCNC: 1.9 MG/DL (ref 1.6–2.4)
MCH RBC QN AUTO: 28 PG (ref 26–34)
MCHC RBC AUTO-ENTMCNC: 31.4 G/DL (ref 32–36)
MCV RBC AUTO: 89 FL (ref 80–100)
MONOCYTES # BLD AUTO: 0.56 X10*3/UL (ref 0.1–1)
MONOCYTES NFR BLD AUTO: 7.2 %
NEUTROPHILS # BLD AUTO: 6.2 X10*3/UL (ref 1.2–7.7)
NEUTROPHILS NFR BLD AUTO: 80 %
NRBC BLD-RTO: 0 /100 WBCS (ref 0–0)
PLATELET # BLD AUTO: 142 X10*3/UL (ref 150–450)
POTASSIUM SERPL-SCNC: 3.8 MMOL/L (ref 3.5–5.3)
PROTHROMBIN TIME: 12.6 SECONDS (ref 9.8–12.8)
RBC # BLD AUTO: 4.53 X10*6/UL (ref 4–5.2)
SODIUM SERPL-SCNC: 137 MMOL/L (ref 136–145)
WBC # BLD AUTO: 7.8 X10*3/UL (ref 4.4–11.3)

## 2024-01-15 PROCEDURE — 99152 MOD SED SAME PHYS/QHP 5/>YRS: CPT | Performed by: INTERNAL MEDICINE

## 2024-01-15 PROCEDURE — 82947 ASSAY GLUCOSE BLOOD QUANT: CPT

## 2024-01-15 PROCEDURE — 36415 COLL VENOUS BLD VENIPUNCTURE: CPT | Performed by: NURSE PRACTITIONER

## 2024-01-15 PROCEDURE — 93308 TTE F-UP OR LMTD: CPT | Performed by: STUDENT IN AN ORGANIZED HEALTH CARE EDUCATION/TRAINING PROGRAM

## 2024-01-15 PROCEDURE — 75557 CARDIAC MRI FOR MORPH: CPT

## 2024-01-15 PROCEDURE — 85347 COAGULATION TIME ACTIVATED: CPT | Performed by: INTERNAL MEDICINE

## 2024-01-15 PROCEDURE — C1769 GUIDE WIRE: HCPCS | Performed by: INTERNAL MEDICINE

## 2024-01-15 PROCEDURE — 33361 REPLACE AORTIC VALVE PERQ: CPT | Performed by: INTERNAL MEDICINE

## 2024-01-15 PROCEDURE — 85347 COAGULATION TIME ACTIVATED: CPT

## 2024-01-15 PROCEDURE — 93005 ELECTROCARDIOGRAM TRACING: CPT

## 2024-01-15 PROCEDURE — 93010 ELECTROCARDIOGRAM REPORT: CPT | Performed by: INTERNAL MEDICINE

## 2024-01-15 PROCEDURE — 2500000005 HC RX 250 GENERAL PHARMACY W/O HCPCS: Performed by: INTERNAL MEDICINE

## 2024-01-15 PROCEDURE — 2500000004 HC RX 250 GENERAL PHARMACY W/ HCPCS (ALT 636 FOR OP/ED): Performed by: NURSE PRACTITIONER

## 2024-01-15 PROCEDURE — 2500000001 HC RX 250 WO HCPCS SELF ADMINISTERED DRUGS (ALT 637 FOR MEDICARE OP): Performed by: STUDENT IN AN ORGANIZED HEALTH CARE EDUCATION/TRAINING PROGRAM

## 2024-01-15 PROCEDURE — 7100000009 HC PHASE TWO TIME - INITIAL BASE CHARGE: Performed by: INTERNAL MEDICINE

## 2024-01-15 PROCEDURE — 99153 MOD SED SAME PHYS/QHP EA: CPT | Performed by: INTERNAL MEDICINE

## 2024-01-15 PROCEDURE — C1894 INTRO/SHEATH, NON-LASER: HCPCS | Performed by: INTERNAL MEDICINE

## 2024-01-15 PROCEDURE — 93321 DOPPLER ECHO F-UP/LMTD STD: CPT | Performed by: STUDENT IN AN ORGANIZED HEALTH CARE EDUCATION/TRAINING PROGRAM

## 2024-01-15 PROCEDURE — C1889 IMPLANT/INSERT DEVICE, NOC: HCPCS | Performed by: INTERNAL MEDICINE

## 2024-01-15 PROCEDURE — 2780000003 HC OR 278 NO HCPCS: Performed by: INTERNAL MEDICINE

## 2024-01-15 PROCEDURE — G0269 OCCLUSIVE DEVICE IN VEIN ART: HCPCS | Mod: TC,59 | Performed by: INTERNAL MEDICINE

## 2024-01-15 PROCEDURE — 2720000007 HC OR 272 NO HCPCS: Performed by: INTERNAL MEDICINE

## 2024-01-15 PROCEDURE — 83735 ASSAY OF MAGNESIUM: CPT | Performed by: NURSE PRACTITIONER

## 2024-01-15 PROCEDURE — 7100000010 HC PHASE TWO TIME - EACH INCREMENTAL 1 MINUTE: Performed by: INTERNAL MEDICINE

## 2024-01-15 PROCEDURE — 2500000001 HC RX 250 WO HCPCS SELF ADMINISTERED DRUGS (ALT 637 FOR MEDICARE OP): Performed by: NURSE PRACTITIONER

## 2024-01-15 PROCEDURE — C1760 CLOSURE DEV, VASC: HCPCS | Performed by: INTERNAL MEDICINE

## 2024-01-15 PROCEDURE — 71045 X-RAY EXAM CHEST 1 VIEW: CPT

## 2024-01-15 PROCEDURE — 2500000004 HC RX 250 GENERAL PHARMACY W/ HCPCS (ALT 636 FOR OP/ED): Performed by: INTERNAL MEDICINE

## 2024-01-15 PROCEDURE — 82374 ASSAY BLOOD CARBON DIOXIDE: CPT | Performed by: NURSE PRACTITIONER

## 2024-01-15 PROCEDURE — 85025 COMPLETE CBC W/AUTO DIFF WBC: CPT | Performed by: NURSE PRACTITIONER

## 2024-01-15 PROCEDURE — 71045 X-RAY EXAM CHEST 1 VIEW: CPT | Performed by: RADIOLOGY

## 2024-01-15 PROCEDURE — 1200000002 HC GENERAL ROOM WITH TELEMETRY DAILY

## 2024-01-15 PROCEDURE — 93325 DOPPLER ECHO COLOR FLOW MAPG: CPT | Performed by: STUDENT IN AN ORGANIZED HEALTH CARE EDUCATION/TRAINING PROGRAM

## 2024-01-15 PROCEDURE — 75561 CARDIAC MRI FOR MORPH W/DYE: CPT | Performed by: STUDENT IN AN ORGANIZED HEALTH CARE EDUCATION/TRAINING PROGRAM

## 2024-01-15 PROCEDURE — C1725 CATH, TRANSLUMIN NON-LASER: HCPCS | Performed by: INTERNAL MEDICINE

## 2024-01-15 PROCEDURE — 93321 DOPPLER ECHO F-UP/LMTD STD: CPT

## 2024-01-15 PROCEDURE — 85610 PROTHROMBIN TIME: CPT | Performed by: NURSE PRACTITIONER

## 2024-01-15 PROCEDURE — 33361 REPLACE AORTIC VALVE PERQ: CPT | Performed by: THORACIC SURGERY (CARDIOTHORACIC VASCULAR SURGERY)

## 2024-01-15 PROCEDURE — 02RF38Z REPLACEMENT OF AORTIC VALVE WITH ZOOPLASTIC TISSUE, PERCUTANEOUS APPROACH: ICD-10-PCS | Performed by: INTERNAL MEDICINE

## 2024-01-15 DEVICE — LOADING SYS L-EVOLUTFX-2329
Type: IMPLANTABLE DEVICE | Status: NON-FUNCTIONAL
Brand: EVOLUT™ FX

## 2024-01-15 DEVICE — DELIV SYS D-EVOLUTFX-2329
Type: IMPLANTABLE DEVICE | Status: NON-FUNCTIONAL
Brand: EVOLUT™ FX

## 2024-01-15 DEVICE — VLV EVOLUTFX-29 COMM US
Type: IMPLANTABLE DEVICE | Status: FUNCTIONAL
Brand: EVOLUT™ FX

## 2024-01-15 RX ORDER — PANTOPRAZOLE SODIUM 40 MG/10ML
40 INJECTION, POWDER, LYOPHILIZED, FOR SOLUTION INTRAVENOUS
Status: DISCONTINUED | OUTPATIENT
Start: 2024-01-16 | End: 2024-01-16 | Stop reason: HOSPADM

## 2024-01-15 RX ORDER — HEPARIN SODIUM 1000 [USP'U]/ML
INJECTION, SOLUTION INTRAVENOUS; SUBCUTANEOUS AS NEEDED
Status: DISCONTINUED | OUTPATIENT
Start: 2024-01-15 | End: 2024-01-15 | Stop reason: HOSPADM

## 2024-01-15 RX ORDER — MONTELUKAST SODIUM 10 MG/1
10 TABLET ORAL NIGHTLY
Status: DISCONTINUED | OUTPATIENT
Start: 2024-01-15 | End: 2024-01-16 | Stop reason: HOSPADM

## 2024-01-15 RX ORDER — ACETAMINOPHEN 325 MG/1
650 TABLET ORAL EVERY 6 HOURS PRN
Status: DISCONTINUED | OUTPATIENT
Start: 2024-01-15 | End: 2024-01-16 | Stop reason: HOSPADM

## 2024-01-15 RX ORDER — PROCHLORPERAZINE EDISYLATE 5 MG/ML
10 INJECTION INTRAMUSCULAR; INTRAVENOUS EVERY 6 HOURS PRN
Status: DISCONTINUED | OUTPATIENT
Start: 2024-01-15 | End: 2024-01-16 | Stop reason: HOSPADM

## 2024-01-15 RX ORDER — ATROPINE SULFATE 0.1 MG/ML
INJECTION INTRAVENOUS AS NEEDED
Status: DISCONTINUED | OUTPATIENT
Start: 2024-01-15 | End: 2024-01-15 | Stop reason: HOSPADM

## 2024-01-15 RX ORDER — TRAMADOL HYDROCHLORIDE 50 MG/1
50 TABLET ORAL EVERY 8 HOURS PRN
Status: DISCONTINUED | OUTPATIENT
Start: 2024-01-15 | End: 2024-01-16 | Stop reason: HOSPADM

## 2024-01-15 RX ORDER — FENTANYL CITRATE 50 UG/ML
INJECTION, SOLUTION INTRAMUSCULAR; INTRAVENOUS AS NEEDED
Status: DISCONTINUED | OUTPATIENT
Start: 2024-01-15 | End: 2024-01-15 | Stop reason: HOSPADM

## 2024-01-15 RX ORDER — DOCUSATE SODIUM 100 MG/1
100 CAPSULE, LIQUID FILLED ORAL 2 TIMES DAILY
Status: DISCONTINUED | OUTPATIENT
Start: 2024-01-15 | End: 2024-01-16 | Stop reason: HOSPADM

## 2024-01-15 RX ORDER — ALBUTEROL SULFATE 90 UG/1
2 AEROSOL, METERED RESPIRATORY (INHALATION) EVERY 4 HOURS PRN
Status: DISCONTINUED | OUTPATIENT
Start: 2024-01-15 | End: 2024-01-16 | Stop reason: HOSPADM

## 2024-01-15 RX ORDER — SODIUM CHLORIDE 9 MG/ML
100 INJECTION, SOLUTION INTRAVENOUS CONTINUOUS
Status: DISCONTINUED | OUTPATIENT
Start: 2024-01-15 | End: 2024-01-16

## 2024-01-15 RX ORDER — VANCOMYCIN HYDROCHLORIDE 1 G/200ML
1000 INJECTION, SOLUTION INTRAVENOUS ONCE
Status: COMPLETED | OUTPATIENT
Start: 2024-01-15 | End: 2024-01-15

## 2024-01-15 RX ORDER — LIDOCAINE HYDROCHLORIDE 10 MG/ML
INJECTION, SOLUTION EPIDURAL; INFILTRATION; INTRACAUDAL; PERINEURAL AS NEEDED
Status: DISCONTINUED | OUTPATIENT
Start: 2024-01-15 | End: 2024-01-15 | Stop reason: HOSPADM

## 2024-01-15 RX ORDER — GABAPENTIN 300 MG/1
600 CAPSULE ORAL 2 TIMES DAILY
Status: DISCONTINUED | OUTPATIENT
Start: 2024-01-15 | End: 2024-01-16 | Stop reason: HOSPADM

## 2024-01-15 RX ORDER — SODIUM CHLORIDE, SODIUM LACTATE, POTASSIUM CHLORIDE, CALCIUM CHLORIDE 600; 310; 30; 20 MG/100ML; MG/100ML; MG/100ML; MG/100ML
75 INJECTION, SOLUTION INTRAVENOUS CONTINUOUS
Status: ACTIVE | OUTPATIENT
Start: 2024-01-15 | End: 2024-01-15

## 2024-01-15 RX ORDER — PROTAMINE SULFATE 10 MG/ML
INJECTION, SOLUTION INTRAVENOUS CONTINUOUS PRN
Status: COMPLETED | OUTPATIENT
Start: 2024-01-15 | End: 2024-01-15

## 2024-01-15 RX ORDER — PROCHLORPERAZINE 25 MG/1
25 SUPPOSITORY RECTAL EVERY 12 HOURS PRN
Status: DISCONTINUED | OUTPATIENT
Start: 2024-01-15 | End: 2024-01-16 | Stop reason: HOSPADM

## 2024-01-15 RX ORDER — ACETAMINOPHEN 325 MG/1
650 TABLET ORAL EVERY 6 HOURS PRN
Status: DISCONTINUED | OUTPATIENT
Start: 2024-01-15 | End: 2024-01-15 | Stop reason: SDUPTHER

## 2024-01-15 RX ORDER — ACETAMINOPHEN 650 MG/1
650 SUPPOSITORY RECTAL EVERY 6 HOURS PRN
Status: DISCONTINUED | OUTPATIENT
Start: 2024-01-15 | End: 2024-01-16 | Stop reason: HOSPADM

## 2024-01-15 RX ORDER — MIDAZOLAM HYDROCHLORIDE 1 MG/ML
INJECTION INTRAMUSCULAR; INTRAVENOUS AS NEEDED
Status: DISCONTINUED | OUTPATIENT
Start: 2024-01-15 | End: 2024-01-15 | Stop reason: HOSPADM

## 2024-01-15 RX ORDER — ACETAMINOPHEN 160 MG/5ML
650 SOLUTION ORAL EVERY 6 HOURS PRN
Status: DISCONTINUED | OUTPATIENT
Start: 2024-01-15 | End: 2024-01-16 | Stop reason: HOSPADM

## 2024-01-15 RX ORDER — PANTOPRAZOLE SODIUM 40 MG/1
40 TABLET, DELAYED RELEASE ORAL
Status: DISCONTINUED | OUTPATIENT
Start: 2024-01-16 | End: 2024-01-16 | Stop reason: HOSPADM

## 2024-01-15 RX ORDER — PROCHLORPERAZINE MALEATE 10 MG
10 TABLET ORAL EVERY 6 HOURS PRN
Status: DISCONTINUED | OUTPATIENT
Start: 2024-01-15 | End: 2024-01-16 | Stop reason: HOSPADM

## 2024-01-15 RX ADMIN — VANCOMYCIN HYDROCHLORIDE 1000 MG: 1 INJECTION, SOLUTION INTRAVENOUS at 14:30

## 2024-01-15 RX ADMIN — DOCUSATE SODIUM 100 MG: 100 CAPSULE, LIQUID FILLED ORAL at 21:00

## 2024-01-15 RX ADMIN — GABAPENTIN 600 MG: 300 CAPSULE ORAL at 21:00

## 2024-01-15 RX ADMIN — SODIUM CHLORIDE 100 ML/HR: 9 INJECTION, SOLUTION INTRAVENOUS at 21:10

## 2024-01-15 RX ADMIN — SODIUM CHLORIDE, POTASSIUM CHLORIDE, SODIUM LACTATE AND CALCIUM CHLORIDE 75 ML/HR: 600; 310; 30; 20 INJECTION, SOLUTION INTRAVENOUS at 14:30

## 2024-01-15 RX ADMIN — TRAMADOL HYDROCHLORIDE 50 MG: 50 TABLET, COATED ORAL at 22:32

## 2024-01-15 RX ADMIN — MONTELUKAST 10 MG: 10 TABLET, FILM COATED ORAL at 21:00

## 2024-01-15 SDOH — SOCIAL STABILITY: SOCIAL INSECURITY: ARE YOU OR HAVE YOU BEEN THREATENED OR ABUSED PHYSICALLY, EMOTIONALLY, OR SEXUALLY BY ANYONE?: NO

## 2024-01-15 SDOH — SOCIAL STABILITY: SOCIAL INSECURITY: WERE YOU ABLE TO COMPLETE ALL THE BEHAVIORAL HEALTH SCREENINGS?: YES

## 2024-01-15 SDOH — SOCIAL STABILITY: SOCIAL INSECURITY: DO YOU FEEL ANYONE HAS EXPLOITED OR TAKEN ADVANTAGE OF YOU FINANCIALLY OR OF YOUR PERSONAL PROPERTY?: NO

## 2024-01-15 SDOH — SOCIAL STABILITY: SOCIAL INSECURITY: HAS ANYONE EVER THREATENED TO HURT YOUR FAMILY OR YOUR PETS?: NO

## 2024-01-15 SDOH — SOCIAL STABILITY: SOCIAL INSECURITY: ARE THERE ANY APPARENT SIGNS OF INJURIES/BEHAVIORS THAT COULD BE RELATED TO ABUSE/NEGLECT?: NO

## 2024-01-15 SDOH — SOCIAL STABILITY: SOCIAL INSECURITY: DOES ANYONE TRY TO KEEP YOU FROM HAVING/CONTACTING OTHER FRIENDS OR DOING THINGS OUTSIDE YOUR HOME?: NO

## 2024-01-15 SDOH — SOCIAL STABILITY: SOCIAL INSECURITY: ABUSE: ADULT

## 2024-01-15 SDOH — SOCIAL STABILITY: SOCIAL INSECURITY: DO YOU FEEL UNSAFE GOING BACK TO THE PLACE WHERE YOU ARE LIVING?: NO

## 2024-01-15 SDOH — SOCIAL STABILITY: SOCIAL INSECURITY: HAVE YOU HAD THOUGHTS OF HARMING ANYONE ELSE?: NO

## 2024-01-15 ASSESSMENT — ACTIVITIES OF DAILY LIVING (ADL)
ADEQUATE_TO_COMPLETE_ADL: YES
HEARING - RIGHT EAR: FUNCTIONAL
WALKS IN HOME: INDEPENDENT
DRESSING YOURSELF: INDEPENDENT
FEEDING YOURSELF: INDEPENDENT
TOILETING: INDEPENDENT
HEARING - LEFT EAR: FUNCTIONAL
PATIENT'S MEMORY ADEQUATE TO SAFELY COMPLETE DAILY ACTIVITIES?: YES
BATHING: INDEPENDENT
JUDGMENT_ADEQUATE_SAFELY_COMPLETE_DAILY_ACTIVITIES: YES
LACK_OF_TRANSPORTATION: NO
GROOMING: INDEPENDENT

## 2024-01-15 ASSESSMENT — COLUMBIA-SUICIDE SEVERITY RATING SCALE - C-SSRS
6. HAVE YOU EVER DONE ANYTHING, STARTED TO DO ANYTHING, OR PREPARED TO DO ANYTHING TO END YOUR LIFE?: NO
1. IN THE PAST MONTH, HAVE YOU WISHED YOU WERE DEAD OR WISHED YOU COULD GO TO SLEEP AND NOT WAKE UP?: NO
2. HAVE YOU ACTUALLY HAD ANY THOUGHTS OF KILLING YOURSELF?: NO
6. HAVE YOU EVER DONE ANYTHING, STARTED TO DO ANYTHING, OR PREPARED TO DO ANYTHING TO END YOUR LIFE?: NO
2. HAVE YOU ACTUALLY HAD ANY THOUGHTS OF KILLING YOURSELF?: NO
1. IN THE PAST MONTH, HAVE YOU WISHED YOU WERE DEAD OR WISHED YOU COULD GO TO SLEEP AND NOT WAKE UP?: NO

## 2024-01-15 ASSESSMENT — COGNITIVE AND FUNCTIONAL STATUS - GENERAL
WALKING IN HOSPITAL ROOM: A LITTLE
MOBILITY SCORE: 17
TOILETING: A LITTLE
CLIMB 3 TO 5 STEPS WITH RAILING: A LOT
MOVING FROM LYING ON BACK TO SITTING ON SIDE OF FLAT BED WITH BEDRAILS: A LITTLE
TOILETING: A LITTLE
PATIENT BASELINE BEDBOUND: NO
STANDING UP FROM CHAIR USING ARMS: A LITTLE
TURNING FROM BACK TO SIDE WHILE IN FLAT BAD: A LITTLE
DRESSING REGULAR UPPER BODY CLOTHING: A LITTLE
MOVING TO AND FROM BED TO CHAIR: A LITTLE
MOVING TO AND FROM BED TO CHAIR: A LITTLE
DRESSING REGULAR LOWER BODY CLOTHING: A LITTLE
DRESSING REGULAR LOWER BODY CLOTHING: A LITTLE
MOVING FROM LYING ON BACK TO SITTING ON SIDE OF FLAT BED WITH BEDRAILS: A LITTLE
MOBILITY SCORE: 17
DAILY ACTIVITIY SCORE: 20
TURNING FROM BACK TO SIDE WHILE IN FLAT BAD: A LITTLE
HELP NEEDED FOR BATHING: A LITTLE
HELP NEEDED FOR BATHING: A LITTLE
STANDING UP FROM CHAIR USING ARMS: A LITTLE
DAILY ACTIVITIY SCORE: 20
WALKING IN HOSPITAL ROOM: A LITTLE
CLIMB 3 TO 5 STEPS WITH RAILING: A LOT
DRESSING REGULAR UPPER BODY CLOTHING: A LITTLE

## 2024-01-15 ASSESSMENT — PAIN SCALES - GENERAL
PAINLEVEL_OUTOF10: 4
PAINLEVEL_OUTOF10: 7

## 2024-01-15 ASSESSMENT — LIFESTYLE VARIABLES
HOW MANY STANDARD DRINKS CONTAINING ALCOHOL DO YOU HAVE ON A TYPICAL DAY: PATIENT DOES NOT DRINK
SKIP TO QUESTIONS 9-10: 1
AUDIT-C TOTAL SCORE: 0
HOW OFTEN DO YOU HAVE A DRINK CONTAINING ALCOHOL: NEVER
AUDIT-C TOTAL SCORE: 0
HOW OFTEN DO YOU HAVE 6 OR MORE DRINKS ON ONE OCCASION: NEVER

## 2024-01-15 ASSESSMENT — PAIN - FUNCTIONAL ASSESSMENT
PAIN_FUNCTIONAL_ASSESSMENT: 0-10
PAIN_FUNCTIONAL_ASSESSMENT: 0-10

## 2024-01-15 ASSESSMENT — PATIENT HEALTH QUESTIONNAIRE - PHQ9
2. FEELING DOWN, DEPRESSED OR HOPELESS: NOT AT ALL
SUM OF ALL RESPONSES TO PHQ9 QUESTIONS 1 & 2: 0
1. LITTLE INTEREST OR PLEASURE IN DOING THINGS: NOT AT ALL

## 2024-01-15 ASSESSMENT — PAIN DESCRIPTION - LOCATION: LOCATION: BACK

## 2024-01-15 NOTE — POST-PROCEDURE NOTE
Physician Transition of Care Summary  Invasive Cardiovascular Lab    Procedure Date: 1/15/2024  Attending: Panel 1:     * Oscar Meyer - Primary  Panel 2:     * Christiano Rasmussen - Primary  Resident/Fellow/Other Assistant: Surgeon(s) and Role:  Panel 1:     * Bandar Franz MD - Fellow     * Najma Salazar MD - Fellow    Indications:   Pre-op Diagnosis     * Aortic valve stenosis, etiology of cardiac valve disease unspecified [I35.0]    Post-procedure diagnosis:   Post-op Diagnosis     * Aortic valve stenosis, etiology of cardiac valve disease unspecified [I35.0]    Procedure(s):   TVP for TAVR    TAVR-OR    TAVR (Transcatheter AV Replacement)  35198 - WI REPLACE AORTIC VALVE PERQ FEMORAL ARTRY APPROACH    WI REPLACE AORTIC VALVE PERQ FEMORAL ARTRY APPROACH [31118]    Description of the Procedure:   S/p TAVR with Evolut fx 29, Predil 20 mm TruDil Balloon   Indication: Severe AS    Access  Primary: right CFA s/p 2 proglide  Secondary: left CFA 6 Barbadian s/p 1 proglide  TVP: Left FV, 7 Barbadian, removed in the cath lab.       LVEDP 18  Intra-op echo - Trace  PVL, No effusion, mean gradient 7 mm Hg    Conclusion  Monitor tele  Monitor access sites for bleeding  TTE tomorrow  Structural team will continue to follow.   Likely discharge tomorrow           Estimated Blood Loss:   20 mL    Anesthesia: Moderate Sedation Anesthesia Staff: No anesthesia staff entered.    Any Specimen(s) Removed:   Order Name Source Comment Collection Info Order Time   TYPE AND SCREEN Blood, Venous   1/15/2024  2:18 PM     Release result to MyChart   Immediate        ABORH Blood, Venous   1/15/2024  2:18 PM     Release result to MyChart   Immediate                Electronically signed by: Bandar Franz MD, 1/15/2024 5:34 PM

## 2024-01-15 NOTE — H&P
PCP: Dr. Garcia  Cardio: Dr. Mariee       The patient is a 68-year-old female former smoker with hyperlipidemia, obstructive sleep apnea and chronic back pain who is referred for evaluation of aortic stenosis.     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  She also has significant lumbar disc disease with compression at L2-L3 and lower extremity neuropathy.  At her functional baseline she walks with a cane.  She has chronic lower extremity edema secondary to lymphedema.     Over the past year the patient has noticed increasing fatigue with activity such as grocery shopping.  She has not had any chest discomfort, presyncope or syncope.     Transthoracic echocardiogram performed September 18, 2023 demonstrated normal LV systolic function with left ventricular ejection fraction of 60 to 65%, calcified aortic valve with reduced leaflet excursion, mean gradient 40 mmHg, peak aortic jet velocity 4.3 m/s, aortic valve area 0.69 cm².  Patient subsequently underwent cardiac catheterization on October 4, 2023 which demonstrated widely patent angiographically normal coronary arteries.  Patient's PA pressure was 36/13, mean pulmonary capillary wedge pressure 14 mmHg, cardiac output 6 L/min, cardiac index 3.1 L/min/m².     The work-up otherwise is as summarized below:  NYHA: Class II  Frailty score: 0/5 (Hb > 12, albumin > 3.5, mobility < 15 sec, mental OK)  EKG: NSR; Irene 142 ; QRS 88  Echo as above, study September 18, 2023   [facility] (October 4, 2023): Widely patent coronaries   CT-TAVR: November 2023       ?  Given the patient's severe symptomatic aortic stenosis, the patient is here for aortic valve replacement.            ROS:  Constitutional: Progressive fatigue, not feeling poorly, no fever and no chills.   Eyes: no eyesight problems, no blurred vision, no diplopia, no eye pain, no purulent discharge from the eyes, eyes not red,  no dryness of the eyes and no itching of the eyes.   ENT: no nosebleeds, no hearing loss, no tinnitus, no earache, no sore throat, no hoarseness, no swollen glands in the neck and no nasal discharge.   Cardiovascular: dyspnea on exertion  Respiratory: no chronic cough, not coughing up sputum,  no wheezing that is consistent with asthma, no asthma, no orthopnea and no postural nocturnal dyspnea.   Gastrointestinal: no change in bowel habits, no blood in stools, no diarrhea, no constipation, no nausea, no vomiting, no abdominal pain, no signs and symptoms of ulcer disease, no alma colored stools and no intolerance to fatty foods.   Genitourinary: no hematuria,  no urinary frequency, no dysuria, no incontinence, no burning sensation during urination, no urinary hesitancy, no nocturia, no genital lesion, no testicular pain, urinary stream is not smaller and urinary stream does not start and stop.   Musculoskeletal: no arthralgias, no myalgias, no joint swelling, no joint stiffness, no muscle weakness, no back pain, no limb pain, chronic lower extremity edema and does report difficulty walking.  Chronic low back pain and lower extremity neuropathy  Skin: no skin rashes, no change in skin color and pigmentation, no skin lesions and no skin lumps.   Neurological: no seizures, no frequent falls, no headaches, reports lower extremity neuropathy, no fainting and no limb weakness.   Psychiatric: no depression, not suicidal, no confusion, no memory lapses or loss, no anxiety, no personality change and no emotional problems.   Endocrine: no goiter, no thyroid disorder, no diabetes mellitus, no excessive thirst, no dry skin, no cold intolerance, no heat intolerance, no erectile dysfunction, no increased urinary frequency, no proptosis and no deepening of the voice.   Hematologic/Lymphatic: no bleeding issues.   All other systems have been reviewed and are negative for complaint.       Physical Exam:     Visit Vitals  Smoking  Status Former         Constitutional: alert and in no acute distress.   Eyes: no erythema, swelling or discharge from the eye .   Ears, Nose, Mouth, and Throat: external inspection of ears and nose is normal , lips, teeth, and gums are normal with good dentition  and oropharynx normal with no erythema, edema, exudate or lesions .   Neck: neck is supple, symmetric, trachea midline, no masses  and no thyromegaly .   Pulmonary: no increased work of breathing or signs of respiratory distress , lungs clear to auscultation. , normal percussion of chest  and chest palpation normal .   Cardiovascular: RRR, 4/6 ZELDA RUSB, murmur radiates to the left neck.  S2 preserved.  No S3 or S4 gallops.  No rubs.  1+ leg edema  Abdomen: abdomen non-tender, no masses  and no hepatomegaly .           Skin:  no skin lesions          Neurologic: non-focal neurologic examination.      Psychiatric judgment and insight is normal , oriented to person, place and time , normal mood and affect.                       Current Outpatient Medications   Medication Instructions    albuterol (Ventolin HFA) 90 mcg/actuation inhaler 2 puffs, inhalation, Every 4 hours PRN    dilTIAZem CD (CARDIZEM CD) 240 mg, oral, Daily    esomeprazole (NEXIUM) 20 mg, oral, Daily before breakfast, Do not open capsule.    folic acid (FOLVITE) 1 mg, oral, Daily    gabapentin (NEURONTIN) 600 mg, oral, 2 times daily    ketoconazole (NIZOral) 2 % cream Daily PRN    losartan (COZAAR) 100 mg, oral, Daily    methotrexate (TREXALL) 15 mg, oral, Weekly, Follow directions carefully, and ask to explain any part you do not understand. Take exactly as directed.    montelukast (SINGULAIR) 10 mg, oral, Nightly    torsemide (DEMADEX) 20 mg, oral, 3 times daily PRN    traMADol (ULTRAM) 50 mg, oral, Every 4 hours PRN    warfarin (COUMADIN) 7.5 mg, oral, Daily, EVERY TUES-THURS-SAT-SUN    warfarin (COUMADIN) 10 mg, oral, Daily, TAKES 10MG EVERY MON-WED-FRI         Reviewed images from  transthoracic echocardiogram and left heart catheterization with interpretation as noted above and with inclusion of this interpretation and assessment and plan for this encounter        Assessment:  This is a 68-year-old female with multiple medical issues including significant lumbar disc disease, lower extremity neuropathy, gait limitation and severe symptomatic aortic stenosis.     The overall decision regarding the best treatment for her condition was complex.  We discussed options of both surgical aortic valve replacement and transcatheter aortic valve replacement along with with risks and benefits involved with both of them in detail.  Based on the patient's comorbidities, functional limitation, low back disease and neuropathy, would favor transcatheter approach.    Proceed with TAVR as recommended by heart team     All the risks of the procedures including but not limited to groin complications, CVA, MI, pericardial tamponade, risk of PPM and death were discussed with the patient and family in detail .The patient verbalized understanding and decided to proceed with the procedure.

## 2024-01-15 NOTE — PROGRESS NOTES
Pharmacy Medication History Review    Dottie Fragoso is a 68 y.o. female admitted for Aortic valve stenosis. Pharmacy reviewed the patient's [id number] medications and allergies for accuracy.    The list below reflects the updated PTA list. Comments regarding how patient may be taking medications differently can be found in the Admit Orders Activity  Prior to Admission Medications   Prescriptions Last Dose Informant   albuterol (Ventolin HFA) 90 mcg/actuation inhaler Past Month Self   Sig: Inhale 2 puffs every 4 hours if needed for wheezing.   dilTIAZem CD (Cardizem CD) 240 mg 24 hr capsule 1/15/2024 Self   Sig: Take 1 capsule (240 mg) by mouth once daily.   esomeprazole (NexIUM) 20 mg DR capsule 1/15/2024 Self   Sig: Take 1 capsule (20 mg) by mouth once daily in the morning. Take before meals. Do not open capsule.   folic acid (Folvite) 1 mg tablet 1/14/2024 Self   Sig: Take 1 tablet (1 mg) by mouth once daily.   gabapentin (Neurontin) 600 mg tablet 1/15/2024 Self   Sig: Take 1 tablet (600 mg) by mouth 2 times a day.   losartan (Cozaar) 100 mg tablet 1/14/2024 Self   Sig: Take 1 tablet (100 mg) by mouth once daily.   methotrexate (Trexall) 2.5 mg tablet 1/13/2024 Self   Sig: Take 8 tablets (20 mg total) by mouth 1 (one) time per week.  Follow directions carefully, and ask to explain any part you do not understand. Take exactly as directed.   montelukast (Singulair) 10 mg tablet 1/14/2024 Self   Sig: Take 1 tablet (10 mg) by mouth once daily at bedtime.   torsemide (Demadex) 20 mg tablet Past Month Self   Sig: Take 1 tablet (20 mg) by mouth once a day on Monday, Wednesday, and Friday. As needed   traMADol (Ultram) 50 mg tablet 1/14/2024 Self   Sig: Take 1 tablet (50 mg) by mouth every 4 hours if needed for severe pain (7 - 10).   warfarin (Coumadin) 5 mg tablet 1/7/2024 Self   Sig: Take 1.5 tablets (7.5 mg) by mouth once daily. EVERY TUES-THURS-SAT-SUN   warfarin (Coumadin) 5 mg tablet 1/8/2024 Self   Sig:  Take 2 tablets (10 mg) by mouth once a day on Monday, Wednesday, and Friday. TAKES 10MG ONCE DAILY EVERY MON-WED-FRI      Facility-Administered Medications: None        The list below reflects the updated allergy list. Please review each documented allergy for additional clarification and justification.  Allergies  Reviewed by Sybil Berger RN on 1/15/2024        Severity Reactions Comments    Darvon [propoxyphene] Not Specified Hives     Dilaudid [hydromorphone] Not Specified Hives     Mobic [meloxicam] Not Specified Hives     Penicillins Not Specified Hives     Sulfasalazine Not Specified Hives     Percodan [oxycodone-aspirin] Low Palpitations             Patient was unable to be assessed for M2B at discharge. Pharmacy has been updated to [facility] [address] [facility], in [place], [place].    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

## 2024-01-15 NOTE — Clinical Note
Patient Clipped and Prepped: groin and right neck. Prepped with ChloraPrep, a minimum of 3 minute dry time, longer if needed, no pooling noted, patient draped in sterile fashion.

## 2024-01-15 NOTE — Clinical Note
Sheath was exchanged in the left femoral artery with SHEATH, PINNACLE, 10 CM,  6FR INTRODUCER, 6FR MICHAEL, 2.5 CM DIALATOR.

## 2024-01-15 NOTE — DISCHARGE INSTRUCTIONS
Dottie Fragoso is now s/p TAVR Evolut FX 29mm with Predil 20 mm TruDil Balloon via B/L femoral artery (Right primary) on 1/15/24 with Dr. Meyer and Dr. Rasmussen. Final procedure ECHO showed trace PVL, mean grad 7, no PC effusion.  No conduction disturbances, temp wire pulled.  Pt was sent to the nursing floor for monitoring overnight.  Pre EKG - SR 78 (, QRS 86).  Post-EKG - SR 77 (, QRS 82).    POD 1 - No issues overnight.  Vitals (HR 70-80s, -130s with Losartan and Diltiazem), labs, neuro assessment, and groins remained stable.  Pt denies CP, SOB, abd/groin pain, numbness or tingling of BLE.  Euvolemic on assessment with chronic lymphedema, clear lungs with slightly diminished, sats stable on RA.  No signs of acute heart failure. POD 1 EKG - SR 80 (, QRS 88).  No events observed on Tele. POD 1 ECHO - EF 75%, triv PVL/AI, gradients 20/10, no PC effusion.   Pt discharged home on POD 1 after ambulating around the nursing floor.     Plan:  - D/C home today 1/16/2024  - Cont Warfarin (start tonight), no need for ASA  - Resumed home Diltiazem at reduced dose (now 120mg daily)  - Holding Losartan for now, suspect will be able to resume as outpatient at a reduced dose  - Continue home medications otherwise, including PRN diuretic  - follow up with Structural Heart clinic in one week, 1 month, and 1 year.  Pt is apart of the MRI study.  - f/w Dr. Mariee (Primary Cards) as scheduled or in 6-10 weeks  - f/w Jose Garcia MD in 1-2 weeks  - pt given Lab recs (1 week) and ECHO rec (1 month)       D/w Dr. Betsy Jurado MSN, [facility]  Acute Care Nurse Practitioner  Structural Heart / TAVR Team  [phone] (ph)  [phone] (fax)          ####  POST VALVE PROCEDURE DISCHARGE INSTRUCTIONS   ####    - Please weigh yourself daily (first thing in the morning) and record reading  - Please take and record your blood pressure 2 times a day (at least 60-90 mins AFTER you take your  meds)  PLEASE HAVE THESE READINGS AVAILABLE DURING YOUR FOLLOW-UP APPOINTMENTS!!    - NO DRIVING OR LIFTING/PULLING/PUSHING GREATER THAN 10 POUNDS FOR 1 WEEK FROM YOUR PROCEDURE DATE.    - A lab requisition has been provided for you to draw a CBC, BMP, and PT/INR.  Please take this rec to your local lab to have your labs drawn between 4-7 days post discharge.     - You have been given the order requisition for the 1 month ECHO at the time of your discharge.  Please call and schedule this ECHO at your LOCAL center (no sooner than 23 days after your procedure date).    - You will have 3 appointments that are vital to your post procedure care, these will be scheduled for you IF YOU NEED TO CHANGE YOUR APPOINTMENT TIME/DATE, PLEASE CALL [phone]   - Please follow up with your PCP within 7-14 days of discharge  - You will follow up with your primary cardiologist in 6-10 weeks    **** No elective dental procedures or cleanings for 3 months post procedure - You will need dental prophylaxis (oral antibiotic) prior to dental work/cleanings for life *****    Please call the STRUCTURAL HEART TEAM LINE if you have any questions or concerns - [phone]     ****   CALL PROVIDER IF:   ****  - Breathing faster than normal.   - Breathing harder than normal or having retractions.   - Fever of 100.4 F (38 C) or higher.   - Chills.   - Drinking less than normal.   - Urinating less than normal, over 1 day.   - Acting very sleepy and difficult to awaken.   - Vomiting (throwing up) and not able to eat or drink for 12 hours.   - 3 or more loose, watery bowel movements in 24 hours (diarrhea).    -Any new concerning symptoms.    - If you develop difficulty breathing, rash, hives, severe nausea, vomiting, light-headedness or any signs of infection, immediately contact your doctor and go to the nearest emergency room.      #####   MISC. HOME-GOING INFO   #####  - DO NOT drink any alcoholic drinks or take any non-prescriptive  medications that contain alcohol for the first 24 hours.   - DO NOT make any important decisions for the first 24 hours.      ACTIVITY:  - You are advised to go directly home from the hospital.   - DO NOT lift anything heavier than 10 pounds for one week, this allows for proper healing of the groin.   - No excessive exercise or treadmill use for one week. You may walk and do stairs, slowly.   - No sexual activities for 24 hours after you arrive home.      WOUND CARE:  - If slight bleeding should occur at site, lie down and have someone apply firm pressure just above the puncture site for 5 minutes.  If it continues or is profuse, call 911. Always notify your doctor if bleeding occurs.   - Keep site clean and dry. Let air dry or you may use a simple bandaid.   - Gently cleanse the puncture site in your groin with soap and water only.   - You may experience some tenderness, bruising or minimal inflammation.  If you have any concerns, you may contact the Cath Lab or if any of these symptoms become excessive, contact your cardiologist or go to the emergency room.   - No tub baths, soaking, or swimming for one week.   - May shower the next day after your procedure.      DIET:  - You may resume your normal diet. However, be mindful of your sodium intake.  Ideally you should try to limit your daily intake of sodium to 2-3g a day      HEART FAILURE SPECIFIC INSTRUCTIONS:   - CALL 911 IF YOU HAVE ANY OF THE SIGNS AND SYMPTOMS OF HEART FAILURE:   1. Chest pain   2. Significant Shortness of breath   3. Fainting.   - Notify your physician immediately if you have shortness of breath; weight gain of 3 lbs. or more; fatigue and loss of energy; swelling of lower extremities or abdomen; dizziness or fainting; change of appetite; and frequent coughing.   - Daily weight on the same scale, same time after voiding and before eating.   - Maintain daily weight log.

## 2024-01-15 NOTE — Clinical Note
Sheath was exchanged in the right femoral artery with INTRODUCER, SHEATH, FAST-CATH, 7 FR X 23 CM. Dilator in and out

## 2024-01-16 ENCOUNTER — APPOINTMENT (OUTPATIENT)
Dept: CARDIOLOGY | Facility: HOSPITAL | Age: 69
DRG: 267 | End: 2024-01-16
Payer: MEDICARE

## 2024-01-16 VITALS
SYSTOLIC BLOOD PRESSURE: 136 MMHG | RESPIRATION RATE: 20 BRPM | WEIGHT: 228.4 LBS | DIASTOLIC BLOOD PRESSURE: 76 MMHG | TEMPERATURE: 97.4 F | HEIGHT: 61 IN | OXYGEN SATURATION: 97 % | BODY MASS INDEX: 43.12 KG/M2 | HEART RATE: 88 BPM

## 2024-01-16 LAB
ABO GROUP (TYPE) IN BLOOD: NORMAL
ACT BLD: 331 SEC (ref 89–169)
ANION GAP SERPL CALC-SCNC: 12 MMOL/L (ref 10–20)
AORTIC VALVE MEAN GRADIENT: 15
AORTIC VALVE PEAK VELOCITY: 2.56
AV PEAK GRADIENT: 26.2
AVA (PEAK VEL): 1.37
AVA (VTI): 1.42
BASOPHILS # BLD AUTO: 0.02 X10*3/UL (ref 0–0.1)
BASOPHILS NFR BLD AUTO: 0.2 %
BUN SERPL-MCNC: 12 MG/DL (ref 6–23)
CALCIUM SERPL-MCNC: 9 MG/DL (ref 8.6–10.6)
CHLORIDE SERPL-SCNC: 109 MMOL/L (ref 98–107)
CO2 SERPL-SCNC: 23 MMOL/L (ref 21–32)
CREAT SERPL-MCNC: 0.47 MG/DL (ref 0.5–1.05)
EGFRCR SERPLBLD CKD-EPI 2021: >90 ML/MIN/1.73M*2
EJECTION FRACTION APICAL 4 CHAMBER: 66.8
EJECTION FRACTION: 73
EOSINOPHIL # BLD AUTO: 0.07 X10*3/UL (ref 0–0.7)
EOSINOPHIL NFR BLD AUTO: 0.8 %
ERYTHROCYTE [DISTWIDTH] IN BLOOD BY AUTOMATED COUNT: 16.8 % (ref 11.5–14.5)
GLUCOSE BLD MANUAL STRIP-MCNC: 87 MG/DL (ref 74–99)
GLUCOSE SERPL-MCNC: 80 MG/DL (ref 74–99)
HCT VFR BLD AUTO: 36.3 % (ref 36–46)
HGB BLD-MCNC: 11.2 G/DL (ref 12–16)
IMM GRANULOCYTES # BLD AUTO: 0.08 X10*3/UL (ref 0–0.7)
IMM GRANULOCYTES NFR BLD AUTO: 0.9 % (ref 0–0.9)
INR PPP: 1.1 (ref 0.9–1.1)
LEFT VENTRICLE INTERNAL DIMENSION DIASTOLE: 4.3 (ref 3.5–6)
LEFT VENTRICULAR OUTFLOW TRACT DIAMETER: 1.7
LYMPHOCYTES # BLD AUTO: 0.83 X10*3/UL (ref 1.2–4.8)
LYMPHOCYTES NFR BLD AUTO: 9.4 %
MAGNESIUM SERPL-MCNC: 1.86 MG/DL (ref 1.6–2.4)
MCH RBC QN AUTO: 26.7 PG (ref 26–34)
MCHC RBC AUTO-ENTMCNC: 30.9 G/DL (ref 32–36)
MCV RBC AUTO: 87 FL (ref 80–100)
MITRAL VALVE E/A RATIO: 0.68
MITRAL VALVE E/E' RATIO: 15.75
MONOCYTES # BLD AUTO: 1.04 X10*3/UL (ref 0.1–1)
MONOCYTES NFR BLD AUTO: 11.8 %
NEUTROPHILS # BLD AUTO: 6.76 X10*3/UL (ref 1.2–7.7)
NEUTROPHILS NFR BLD AUTO: 76.9 %
NRBC BLD-RTO: 0 /100 WBCS (ref 0–0)
PLATELET # BLD AUTO: 131 X10*3/UL (ref 150–450)
POTASSIUM SERPL-SCNC: 3.8 MMOL/L (ref 3.5–5.3)
PROTHROMBIN TIME: 12.2 SECONDS (ref 9.8–12.8)
RBC # BLD AUTO: 4.19 X10*6/UL (ref 4–5.2)
RH FACTOR (ANTIGEN D): NORMAL
RIGHT VENTRICLE FREE WALL PEAK S': 16.6
RIGHT VENTRICLE PEAK SYSTOLIC PRESSURE: 36.2
SODIUM SERPL-SCNC: 140 MMOL/L (ref 136–145)
TRICUSPID ANNULAR PLANE SYSTOLIC EXCURSION: 2.7
WBC # BLD AUTO: 8.8 X10*3/UL (ref 4.4–11.3)

## 2024-01-16 PROCEDURE — 2500000004 HC RX 250 GENERAL PHARMACY W/ HCPCS (ALT 636 FOR OP/ED): Performed by: NURSE PRACTITIONER

## 2024-01-16 PROCEDURE — 85610 PROTHROMBIN TIME: CPT | Performed by: NURSE PRACTITIONER

## 2024-01-16 PROCEDURE — 85025 COMPLETE CBC W/AUTO DIFF WBC: CPT | Performed by: NURSE PRACTITIONER

## 2024-01-16 PROCEDURE — 93005 ELECTROCARDIOGRAM TRACING: CPT

## 2024-01-16 PROCEDURE — 99239 HOSP IP/OBS DSCHRG MGMT >30: CPT | Performed by: NURSE PRACTITIONER

## 2024-01-16 PROCEDURE — 93308 TTE F-UP OR LMTD: CPT | Performed by: INTERNAL MEDICINE

## 2024-01-16 PROCEDURE — 2500000001 HC RX 250 WO HCPCS SELF ADMINISTERED DRUGS (ALT 637 FOR MEDICARE OP): Performed by: NURSE PRACTITIONER

## 2024-01-16 PROCEDURE — 93321 DOPPLER ECHO F-UP/LMTD STD: CPT | Performed by: INTERNAL MEDICINE

## 2024-01-16 PROCEDURE — 80048 BASIC METABOLIC PNL TOTAL CA: CPT | Performed by: NURSE PRACTITIONER

## 2024-01-16 PROCEDURE — 93321 DOPPLER ECHO F-UP/LMTD STD: CPT

## 2024-01-16 PROCEDURE — 86901 BLOOD TYPING SEROLOGIC RH(D): CPT | Performed by: NURSE PRACTITIONER

## 2024-01-16 PROCEDURE — 36415 COLL VENOUS BLD VENIPUNCTURE: CPT | Performed by: NURSE PRACTITIONER

## 2024-01-16 PROCEDURE — 83735 ASSAY OF MAGNESIUM: CPT | Performed by: NURSE PRACTITIONER

## 2024-01-16 PROCEDURE — 93325 DOPPLER ECHO COLOR FLOW MAPG: CPT | Performed by: INTERNAL MEDICINE

## 2024-01-16 PROCEDURE — 93010 ELECTROCARDIOGRAM REPORT: CPT | Performed by: INTERNAL MEDICINE

## 2024-01-16 PROCEDURE — 82947 ASSAY GLUCOSE BLOOD QUANT: CPT

## 2024-01-16 RX ORDER — DILTIAZEM HYDROCHLORIDE 120 MG/1
120 CAPSULE, COATED, EXTENDED RELEASE ORAL DAILY
Status: DISCONTINUED | OUTPATIENT
Start: 2024-01-16 | End: 2024-01-16 | Stop reason: HOSPADM

## 2024-01-16 RX ORDER — POTASSIUM CHLORIDE 20 MEQ/1
20 TABLET, EXTENDED RELEASE ORAL ONCE
Status: COMPLETED | OUTPATIENT
Start: 2024-01-16 | End: 2024-01-16

## 2024-01-16 RX ORDER — ACETAMINOPHEN 325 MG/1
650 TABLET ORAL EVERY 6 HOURS PRN
Qty: 30 TABLET | Refills: 0 | COMMUNITY
Start: 2024-01-16

## 2024-01-16 RX ORDER — DILTIAZEM HYDROCHLORIDE 120 MG/1
120 CAPSULE, EXTENDED RELEASE ORAL DAILY
Qty: 30 CAPSULE | Refills: 1 | Status: SHIPPED | OUTPATIENT
Start: 2024-01-16 | End: 2024-02-29 | Stop reason: ALTCHOICE

## 2024-01-16 RX ADMIN — PERFLUTREN 1 ML OF DILUTION: 6.52 INJECTION, SUSPENSION INTRAVENOUS at 08:59

## 2024-01-16 RX ADMIN — POTASSIUM CHLORIDE 20 MEQ: 1500 TABLET, EXTENDED RELEASE ORAL at 09:40

## 2024-01-16 RX ADMIN — GABAPENTIN 600 MG: 300 CAPSULE ORAL at 09:40

## 2024-01-16 RX ADMIN — DILTIAZEM HYDROCHLORIDE 120 MG: 120 CAPSULE, COATED, EXTENDED RELEASE ORAL at 10:38

## 2024-01-16 RX ADMIN — PANTOPRAZOLE SODIUM 40 MG: 40 TABLET, DELAYED RELEASE ORAL at 06:14

## 2024-01-16 ASSESSMENT — COGNITIVE AND FUNCTIONAL STATUS - GENERAL
DRESSING REGULAR UPPER BODY CLOTHING: A LITTLE
CLIMB 3 TO 5 STEPS WITH RAILING: A LOT
MOVING TO AND FROM BED TO CHAIR: A LITTLE
MOBILITY SCORE: 17
STANDING UP FROM CHAIR USING ARMS: A LITTLE
TURNING FROM BACK TO SIDE WHILE IN FLAT BAD: A LITTLE
MOVING FROM LYING ON BACK TO SITTING ON SIDE OF FLAT BED WITH BEDRAILS: A LITTLE
WALKING IN HOSPITAL ROOM: A LITTLE
HELP NEEDED FOR BATHING: A LITTLE
DAILY ACTIVITIY SCORE: 20
TOILETING: A LITTLE
DRESSING REGULAR LOWER BODY CLOTHING: A LITTLE

## 2024-01-16 ASSESSMENT — PAIN - FUNCTIONAL ASSESSMENT: PAIN_FUNCTIONAL_ASSESSMENT: 0-10

## 2024-01-16 ASSESSMENT — PAIN SCALES - GENERAL: PAINLEVEL_OUTOF10: 0 - NO PAIN

## 2024-01-16 NOTE — OP NOTE
Indications.  The patient  is an 68y o obese lady who was recently diagnosed with severe symptomatic aortic stenosis. Patient underwent complete work up, the results were presented and reviewed at the Structural Heart Selection meeting. Patient was thought to most benefit from CORI. Risks, benefits and complications of this procedure were discussed with the patient, and informed consent was obtained.     Findings.  Pre-dilation with 20 True dil balloon was performed without complications. 29 mm Evolut FX valve was deployed under rapid pacing. Post deployment TTE showed low trans valvular gradient, no PVL, and no pericardial effusion.     Description.  Patient was brought to the hybrid OR and placed on a operating table in a supine position. After routine huddle, conscious sedation was established, patient was prepped and draped in a standard fashion. RIJ vein accessed, and a temporary pacing was advanced into the RV and tested. Both right and left femoral arteries were accessed routinely. Patient was heparinized to the ACT over 300 sec. Primary right femoral artery access was protected with two Perclose devices. LV  was accessed with a straight wire, and after appropriate wire exchange, a 20mm TrueDil balloon was advanced into the aortic root. BAV was performed under rapid pacing of 180/min and was well tolerated.  29mm Evolut FX valve was then advanced into the aortic root and deployed routinely under rapid pacing at 160-180min. TTE findings are described above. Patient remained in a NSR. Protamine was given, all cannulas were removed,a nd cannulation sited secured. Patient tolerated this procedure well, and was taken out of the OR to the CICU in a stable condition.

## 2024-01-16 NOTE — DISCHARGE SUMMARY
STRUCTURAL HEART INPATIENT DISCHARGE SUMMARY    BRIEF OVERVIEW  Admitting Provider: Oscar Meyer MD  Discharge Provider: Oscar Meyer MD  Primary Care Physician at Discharge: Jose Garcia [phone]     Admission Date: 1/15/2024     Discharge Date: 1/16/2024    Primary Discharge Diagnosis  Aortic valve stenosis    Discharge Disposition  Home  Code Status at Discharge: Full    Active Issues Requiring Follow-up  Monitor BP and assess need to restart Losartan    Outpatient Follow-Up  Future Appointments   Date Time Provider Department Center   1/25/2024 11:30 AM SH BESS [id number] CARD1 [id number] [facility]   2/14/2024  2:00 PM [facility] 1 [facility]   2/15/2024 11:30 AM SH BESS [id number] CARD1 [id number] [facility]   12/19/2024 10:15 AM [facility] 1 [facility]   1/16/2025 10:00 AM SH BESS [id number] CARD1 [id number] [facility]       [unfilled]    Test Results Pending at Discharge  Pending Labs       Order Current Status    Type And Screen Collected (01/16/24 0706)                 Your medication list        START taking these medications        Instructions Last Dose Given Next Dose Due   acetaminophen 325 mg tablet  Commonly known as: Tylenol      Take 2 tablets (650 mg) by mouth every 6 hours if needed for mild pain (1 - 3), moderate pain (4 - 6), headaches or fever (temp greater than 38.0 C).       dilTIAZem  mg 24 hr capsule  Commonly known as: Tiazac  Replaces: dilTIAZem  mg 24 hr capsule      Take 1 capsule (120 mg) by mouth once daily.              CONTINUE taking these medications        Instructions Last Dose Given Next Dose Due   esomeprazole 20 mg DR capsule  Commonly known as: NexIUM           folic acid 1 mg tablet  Commonly known as: Folvite           gabapentin 600 mg tablet  Commonly known as: Neurontin           methotrexate 2.5 mg tablet  Commonly known as: Trexall           montelukast 10 mg tablet  Commonly known as: Singulair           torsemide 20 mg  tablet  Commonly known as: Demadex           traMADol 50 mg tablet  Commonly known as: Ultram           Ventolin HFA 90 mcg/actuation inhaler  Generic drug: albuterol           warfarin 5 mg tablet  Commonly known as: Coumadin           warfarin 5 mg tablet  Commonly known as: Coumadin                  STOP taking these medications      dilTIAZem  mg 24 hr capsule  Commonly known as: Cardizem CD  Replaced by: dilTIAZem  mg 24 hr capsule        losartan 100 mg tablet  Commonly known as: Cozaar                  Where to Get Your Medications        These medications were sent to [id number] - [place], [place] - [address] AT [facility] [address]  [address], [place] [address]      Phone: [phone]   dilTIAZem  mg 24 hr capsule       You can get these medications from any pharmacy    You don't need a prescription for these medications  acetaminophen 325 mg tablet            DETAILS OF HOSPITAL STAY    Presenting Problem  Patient Active Problem List    Diagnosis Date Noted    S/p TAVR (transcatheter aortic valve replacement), bioprosthetic 01/15/2024    Aortic valve stenosis 12/19/2023        LOS: 1 day     Objective     Vital signs in last 24 hours:  Temp:  [36.2 °C (97.2 °F)-37.8 °C (100 °F)] 36.3 °C (97.4 °F)  Heart Rate:  [68-88] 88  Resp:  [15-20] 20  BP: (111-139)/(54-96) 136/76    Physical Exam at Discharge  Discharge Condition: good  Heart Rate: 88  Resp: 20  BP: 136/76  Temp: 36.3 °C (97.4 °F)  Weight: 104 kg (228 lb 6.3 oz)    Constitutional: Well developed, awake/alert/oriented x3, no distress, cooperative  Eyes: PERRL, EOMI, clear sclera  ENMT: mucous membranes moist  Head/Neck: Neck supple, No JVD  Respiratory/Thorax: Good chest expansion, thorax symmetric, lung sounds clear but with diminished bases  Cardiovascular: Regular rate and rhythm, no murmurs, normal S1 and S2  Gastrointestinal: Nondistended, soft, non-tender, no rebound tenderness or  guarding, no masses palpable, no organomegaly, +BS  Extremities: trace BLE edema, no cyanosis, bilat groins c/d/i with dsd no s/s of hematoma  Neurological: alert and oriented x3, intact senses, motor, response and reflexes, normal strength  Psychological: Appropriate mood and behavior   Skin: Warm and dry, intact.       History of Present Illness  Dottie Fragoso is a 68 y.o. female with a PMH of former smoker with hyperlipidemia, obesity (BMI 43), obstructive sleep apnea. chronic back, and severe non-rheumatic aortic stenosis.  Pt presented on 1/15/24 for elective TAVR.    Hospital Course     Dottie Fragoso is now s/p TAVR Evolut FX 29mm with Predil 20 mm TruDil Balloon via B/L femoral artery (Right primary) on 1/15/24 with Dr. Meyer and Dr. Rasmussen. Final procedure ECHO showed trace PVL, mean grad 7, no PC effusion.  No conduction disturbances, temp wire pulled.  Pt was sent to the nursing floor for monitoring overnight.  Pre EKG - SR 78 (, QRS 86).  Post-EKG - SR 77 (, QRS 82).    POD 1 - No issues overnight.  Vitals (HR 70-80s, -130s with Losartan and Diltiazem), labs, neuro assessment, and groins remained stable.  Pt denies CP, SOB, abd/groin pain, numbness or tingling of BLE.  Euvolemic on assessment with chronic lymphedema, clear lungs with slightly diminished, sats stable on RA.  No signs of acute heart failure. POD 1 EKG - SR 80 (, QRS 88).  No events observed on Tele. POD 1 ECHO - EF 75%, triv PVL/AI, gradients 20/10, no PC effusion.   Pt discharged home on POD 1 after ambulating around the nursing floor.     Plan:  - D/C home today 1/16/2024  - Cont Warfarin (start tonight), no need for ASA  - Resumed home Diltiazem at reduced dose (now 120mg daily)  - Holding Losartan for now, suspect will be able to resume as outpatient at a reduced dose  - Continue home medications otherwise, including PRN diuretic  - follow up with Structural Heart clinic in one week, 1 month, and 1 year.  Pt is  apart of the MRI study.  - f/w Dr. Mariee (Primary Cards) as scheduled or in 6-10 weeks  - f/w Jose Garcia MD in 1-2 weeks  - pt given Lab recs (1 week) and ECHO rec (1 month)       D/w Dr. Betsy KAPOOR spent 60 minutes in the professional and overall care of this patient.     Jerrod Jurado MSN, [facility]  Acute Care Nurse Practitioner  Structural Heart / TAVR Team  Structural Pager: [phone]  (Nights) ED fellow pager: [phone]

## 2024-01-16 NOTE — NURSING NOTE
Nursing Discharge Planning Note 1/16/24 0742    Patient provided with discharge instructions. Instructions reviewed with patient, including medication changes & side effects. VS stable. IV removed with tip intact & no complications. Groin site care & activity restrictions reviewed with patient. Follow-up appointments provided. Patient given outpatient lab & echo scripts. Medication e-submitted to patient's pharmacy. Patient verbalized understanding of discharge instructions. Removed from telemetry & taken via wheelchair with all belongings to [facility] where patient's family provided transportation home. Yesenia Bar RN

## 2024-01-17 NOTE — DOCUMENTATION CLARIFICATION NOTE
PATIENT:               PRISCILA MATHEWS  ACCT #:                  [id number]  MRN:                       [id number]  :                       1955  ADMIT DATE:       1/15/2024 12:23 PM  DISCH DATE:        2024 1:45 PM  RESPONDING PROVIDER #:        [id number]          PROVIDER RESPONSE TEXT:    Low platelet count not requiring treatment or evaluation    CDI QUERY TEXT:    [facility]_Abnormal Studies    Instruction:    Based on your assessment of the patient and the clinical information, please provide the requested documentation by clicking on the appropriate radio button and enter any additional information if prompted.    Question: Is there a diagnosis indicative of the lab values    When answering this query, please exercise your independent professional judgment. The fact that a question is being asked, does not imply that any particular answer is desired or expected.    The patient's clinical indicators include:  Clinical Information: Patient admitted for TAVR on 1/15    Clinical Indicators:  Platelet count, 1/15: 142  Platelet count, : 131    Treatment: Monitoring daily CBC    Risk Factors: s/p TAVR, anticoagulation therapy  Options provided:  -- Thrombocytopenia is clinically significant and required treatment/monitoring  -- Low platelet count not requiring treatment or evaluation  -- Other - I will add my own diagnosis  -- Refer to Clinical Documentation Reviewer    Query created by: Erica Grayson on 2024 12:00 PM      Electronically signed by:  BRANDAN ROJAS 2024 7:22 AM

## 2024-01-22 ENCOUNTER — LAB (OUTPATIENT)
Dept: LAB | Facility: LAB | Age: 69
End: 2024-01-22
Payer: MEDICARE

## 2024-01-22 DIAGNOSIS — Z95.3 S/P TAVR (TRANSCATHETER AORTIC VALVE REPLACEMENT), BIOPROSTHETIC: ICD-10-CM

## 2024-01-22 LAB
ANION GAP SERPL CALC-SCNC: 13 MMOL/L (ref 10–20)
BUN SERPL-MCNC: 8 MG/DL (ref 6–23)
CALCIUM SERPL-MCNC: 10.2 MG/DL (ref 8.6–10.3)
CHLORIDE SERPL-SCNC: 104 MMOL/L (ref 98–107)
CO2 SERPL-SCNC: 27 MMOL/L (ref 21–32)
CREAT SERPL-MCNC: 0.57 MG/DL (ref 0.5–1.05)
EGFRCR SERPLBLD CKD-EPI 2021: >90 ML/MIN/1.73M*2
ERYTHROCYTE [DISTWIDTH] IN BLOOD BY AUTOMATED COUNT: 15.9 % (ref 11.5–14.5)
GLUCOSE SERPL-MCNC: 112 MG/DL (ref 74–99)
HCT VFR BLD AUTO: 37.7 % (ref 36–46)
HGB BLD-MCNC: 11.8 G/DL (ref 12–16)
INR PPP: 1.6 (ref 0.9–1.1)
MCH RBC QN AUTO: 27.8 PG (ref 26–34)
MCHC RBC AUTO-ENTMCNC: 31.3 G/DL (ref 32–36)
MCV RBC AUTO: 89 FL (ref 80–100)
NRBC BLD-RTO: 0 /100 WBCS (ref 0–0)
PLATELET # BLD AUTO: 202 X10*3/UL (ref 150–450)
POTASSIUM SERPL-SCNC: 3.9 MMOL/L (ref 3.5–5.3)
PROTHROMBIN TIME: 18.4 SECONDS (ref 9.8–12.8)
RBC # BLD AUTO: 4.25 X10*6/UL (ref 4–5.2)
SODIUM SERPL-SCNC: 140 MMOL/L (ref 136–145)
WBC # BLD AUTO: 7.1 X10*3/UL (ref 4.4–11.3)

## 2024-01-22 PROCEDURE — 80048 BASIC METABOLIC PNL TOTAL CA: CPT

## 2024-01-22 PROCEDURE — 85610 PROTHROMBIN TIME: CPT

## 2024-01-22 PROCEDURE — 85027 COMPLETE CBC AUTOMATED: CPT

## 2024-01-22 PROCEDURE — 36415 COLL VENOUS BLD VENIPUNCTURE: CPT

## 2024-01-23 LAB
ATRIAL RATE: 78 BPM
ATRIAL RATE: 80 BPM
P AXIS: 48 DEGREES
P AXIS: 52 DEGREES
P OFFSET: 190 MS
P OFFSET: 196 MS
P ONSET: 136 MS
P ONSET: 142 MS
PR INTERVAL: 152 MS
PR INTERVAL: 162 MS
Q ONSET: 217 MS
Q ONSET: 218 MS
QRS COUNT: 13 BEATS
QRS COUNT: 13 BEATS
QRS DURATION: 86 MS
QRS DURATION: 88 MS
QT INTERVAL: 364 MS
QT INTERVAL: 364 MS
QTC CALCULATION(BAZETT): 414 MS
QTC CALCULATION(BAZETT): 419 MS
QTC FREDERICIA: 397 MS
QTC FREDERICIA: 401 MS
R AXIS: -15 DEGREES
R AXIS: 4 DEGREES
T AXIS: 12 DEGREES
T AXIS: 8 DEGREES
T OFFSET: 399 MS
T OFFSET: 400 MS
VENTRICULAR RATE: 78 BPM
VENTRICULAR RATE: 80 BPM

## 2024-01-25 ENCOUNTER — TELEMEDICINE (OUTPATIENT)
Dept: CARDIOLOGY | Facility: CLINIC | Age: 69
End: 2024-01-25
Payer: MEDICARE

## 2024-01-25 DIAGNOSIS — Z95.3 S/P TAVR (TRANSCATHETER AORTIC VALVE REPLACEMENT), BIOPROSTHETIC: Primary | ICD-10-CM

## 2024-01-25 PROBLEM — Z86.73 HISTORY OF CARDIOEMBOLIC STROKE: Status: ACTIVE | Noted: 2020-07-22

## 2024-01-25 PROBLEM — M16.9 OSTEOARTHRITIS OF HIP: Status: ACTIVE | Noted: 2020-07-22

## 2024-01-25 PROBLEM — I10 ESSENTIAL (PRIMARY) HYPERTENSION: Status: ACTIVE | Noted: 2023-12-14

## 2024-01-25 PROBLEM — Q21.12 PATENT FORAMEN OVALE (HHS-HCC): Status: ACTIVE | Noted: 2019-03-11

## 2024-01-25 PROBLEM — I51.7 LVH (LEFT VENTRICULAR HYPERTROPHY): Status: ACTIVE | Noted: 2024-01-25

## 2024-01-25 PROBLEM — E78.2 MIXED HYPERLIPIDEMIA: Status: ACTIVE | Noted: 2024-01-25

## 2024-01-25 PROBLEM — G47.30 SLEEP APNEA: Status: ACTIVE | Noted: 2024-01-25

## 2024-01-25 PROCEDURE — 99214 OFFICE O/P EST MOD 30 MIN: CPT | Performed by: NURSE PRACTITIONER

## 2024-01-25 NOTE — PROGRESS NOTES
Structural Heart Follow up visit      Dottie Fragoso is a 68 y.o.  female. with a PMH of former smoker with hyperlipidemia, obesity (BMI 43), obstructive sleep apnea. chronic back, and severe non-rheumatic aortic stenosis.  s/p Evolut FX 29 Predil 20mm TruDil Balloon Pt presented on 1/15/24 for elective TAVR presents for 1 week follow up      denies SOB,EDDY, fatigue  Recent Hospitalizations  No    patient with   Past Medical History:   Diagnosis Date    Cancer (CMS/[facility])     bilat breat cancer with L mastectomy 2016/ R mastectomy 2002    Heart murmur     pt states she need an aoritc valve replacement    Hyperlipidemia     Hypertension     Personal history of malignant neoplasm, unspecified     History of malignant neoplasm    Personal history of other diseases of the circulatory system     History of hypertension    PFO (patent foramen ovale) 2002    not repaired    Stroke (CMS/[facility]) 2002    stroke (without residual) from PFO, event happened after a port was placed. Info per pt    Unilateral primary osteoarthritis, right hip 11/17/2020    Primary osteoarthritis of right hip    Unilateral primary osteoarthritis, right hip 07/01/2020    Primary osteoarthritis of right hip       Results for orders placed or performed in visit on 01/22/24 (from the past 96 hour(s))   CBC   Result Value Ref Range    WBC 7.1 4.4 - 11.3 x10*3/uL    nRBC 0.0 0.0 - 0.0 /100 WBCs    RBC 4.25 4.00 - 5.20 x10*6/uL    Hemoglobin 11.8 (L) 12.0 - 16.0 g/dL    Hematocrit 37.7 36.0 - 46.0 %    MCV 89 80 - 100 fL    MCH 27.8 26.0 - 34.0 pg    MCHC 31.3 (L) 32.0 - 36.0 g/dL    RDW 15.9 (H) 11.5 - 14.5 %    Platelets 202 150 - 450 x10*3/uL   Protime-INR   Result Value Ref Range    Protime 18.4 (H) 9.8 - 12.8 seconds    INR 1.6 (H) 0.9 - 1.1   Basic Metabolic Panel   Result Value Ref Range    Glucose 112 (H) 74 - 99 mg/dL    Sodium 140 136 - 145 mmol/L    Potassium 3.9 3.5 - 5.3 mmol/L    Chloride 104 98 - 107 mmol/L    Bicarbonate 27 21 - 32  mmol/L    Anion Gap 13 10 - 20 mmol/L    Urea Nitrogen 8 6 - 23 mg/dL    Creatinine 0.57 0.50 - 1.05 mg/dL    eGFR >90 >60 mL/min/1.73m*2    Calcium 10.2 8.6 - 10.3 mg/dL        Transthoracic Echo (TTE) Limited    Result Date: 1/16/2024   [facility], [address] [place] [address]                Tel [phone] and Fax [phone] TRANSTHORACIC ECHOCARDIOGRAM REPORT  Patient Name:      PRISCILA MATHEWS        Reading Physician:    [id number] Maricarmen Bourne MD Study Date:        1/16/2024            Ordering Provider:    [id number] JOY MAHMOOD MRN/PID:           [id number]             Fellow: Accession#:        [id number]         Nurse:                Yari Saldana RN Date of Birth/Age: 1955 / 68 years Sonographer:          Erik Yadav RDCS Gender:            F                    Additional Staff: Height:            154.94 cm            Admit Date:           1/15/2024 Weight:            103.42 kg            Admission Status:     Inpatient -                                                               Routine BSA:               2.00 m2              Encounter#:           [id number]                                         Department Location:  [facility]                                                               Invasive Blood Pressure: 122 /62 mmHg Study Type:    TRANSTHORACIC ECHO (TTE) LIMITED Diagnosis/ICD: Presence of xenogenic heart valve-Z95.3 Indication:    s/p TAVR CPT Code:      Echo Limited-29341; Doppler Limited-96137; Color Doppler-00171 Patient History: Pertinent History: HLD, RADHA, DVT, CVA, AS s/p TAVR Evolut Fx 29mm (1/15/24). Study Detail: The following Echo studies were performed: 2D, M-Mode, Doppler and               color flow. Technically challenging study due to body habitus  and               poor acoustic windows. Definity used as a contrast agent for               endocardial border definition. Total contrast used for this               procedure was 1.0 mL via IV push.  PHYSICIAN INTERPRETATION: Left Ventricle: The left ventricular systolic function is normal, with an estimated ejection fraction of 65-70%. There are no regional wall motion abnormalities. The left ventricular cavity size is normal. Spectral Doppler shows an impaired relaxation pattern of left ventricular diastolic filling. Left Atrium: The left atrium was not assessed. Right Ventricle: The right ventricle is normal in size. There is normal right ventricular global systolic function. Right Atrium: The right atrium was not assessed. Aortic Valve: There is a prosthetic aortic valve present. There is a Medtronic transcatheter aortic valve replacement, with a 29 mm reported size. There is trivial aortic valve regurgitation. The peak instantaneous gradient of the aortic valve is 26.2 mmHg. The mean gradient of the aortic valve is 15.0 mmHg. S/p 29mm Medtronic Evolut FX TAVR with gradients of 26.2/15mmHg and trivial valvular AI. Mitral Valve: The mitral valve is normal in structure. There is moderate mitral annular calcification. There is trace mitral valve regurgitation. Tricuspid Valve: The tricuspid valve is structurally normal. There is mild tricuspid regurgitation. The Doppler estimated RVSP is slightly elevated at 36.2 mmHg. Pulmonic Valve: The pulmonic valve is not well visualized. Pulmonic valve regurgitation was not assessed. Pericardium: There is a trivial pericardial effusion. Aorta: The aortic root is normal. In comparison to the previous echocardiogram(s): Compared with the prior exam ( periprocedural echo from 1/15/2024) the post TAVR gradients were previously 14.3/7mmHg and are higher today. LV systolic function appears similar.  CONCLUSIONS:  1. Left ventricular systolic function is normal with a 65-70%  estimated ejection fraction.  2. Spectral Doppler shows an impaired relaxation pattern of left ventricular diastolic filling.  3. There is moderate mitral annular calcification.  4. Slightly elevated RVSP.  5. S/p 29mm Medtronic Evolut FX TAVR with gradients of 26.2/15mmHg and trivial valvular AI.  6. There is a transcatheter aortic valve replacement.  7. Compared with the prior exam ( periprocedural echo from 1/15/2024) the post TAVR gradients were previously 14.3/7mmHg and are higher today. LV systolic function appears similar. QUANTITATIVE DATA SUMMARY: 2D MEASUREMENTS:                          Normal Ranges: LAs:           3.60 cm   (2.7-4.0cm) IVSd:          0.90 cm   (0.6-1.1cm) LVPWd:         0.90 cm   (0.6-1.1cm) LVIDd:         4.30 cm   (3.9-5.9cm) LVIDs:         2.90 cm LV Mass Index: 61.7 g/m2 LV % FS        32.6 % AORTA MEASUREMENTS:                    Normal Ranges: Asc Ao, d: 3.10 cm (2.1-3.4cm) LV SYSTOLIC FUNCTION BY 2D PLANIMETRY (MOD):                     Normal Ranges: EF-A4C View: 66.8 % (>=55%) EF-A2C View: 78.9 % EF-Biplane:  72.8 % LV DIASTOLIC FUNCTION:                           Normal Ranges: MV Peak E:    1.19 m/s    (0.7-1.2 m/s) MV Peak A:    1.76 m/s    (0.42-0.7 m/s) E/A Ratio:    0.68        (1.0-2.2) MV e'         0.08 m/s    (>8.0) MV lateral e' 0.07 m/s MV medial e'  0.08 m/s MV A Dur:     108.00 msec E/e' Ratio:   15.75       (<8.0) a'            0.17 m/s MV DT:        305 msec    (150-240 msec) MITRAL VALVE:                 Normal Ranges: MV DT: 305 msec (150-240msec) AORTIC VALVE:                                    Normal Ranges: AoV Vmax:                2.56 m/s  (<=1.7m/s) AoV Peak P.2 mmHg (<20mmHg) AoV Mean PG:             15.0 mmHg (1.7-11.5mmHg) LVOT Max Federico:            1.54 m/s  (<=1.1m/s) AoV VTI:                 48.30 cm  (18-25cm) LVOT VTI:                30.30 cm LVOT Diameter:           1.70 cm   (1.8-2.4cm) AoV Area, VTI:           1.42 cm2   (2.5-5.5cm2) AoV Area,Vmax:           1.37 cm2  (2.5-4.5cm2) AoV Dimensionless Index: 0.63  RIGHT VENTRICLE: TAPSE: 26.9 mm RV s'  0.17 m/s TRICUSPID VALVE/RVSP:                             Normal Ranges: Peak TR Velocity: 2.88 m/s RV Syst Pressure: 36.2 mmHg (< 30mmHg) IVC Diam:         1.60 cm  [id number] Maricarmen Bourne MD Electronically signed on 1/16/2024 at 11:39:56 AM  ** Final **     Transthoracic Echo (TTE) Limited    Result Date: 1/15/2024   [facility], [address] [place] [address]                Tel [phone] and Fax [phone] TRANSTHORACIC ECHOCARDIOGRAM REPORT  Patient Name:      PRISCILA MATHEWS        Reading Physician:    [id number] Mark Gentile MD Study Date:        1/15/2024            Ordering Provider:    [id number] JOY MAHMOOD MRN/PID:           [id number]             Fellow: Accession#:        [id number]         Nurse: Date of Birth/Age: 1955 / 68 years Sonographer:          Ev Kearney RDCS Gender:            F                    Additional Staff: Height:            154.94 cm            Admit Date:           1/15/2024 Weight:            104.33 kg            Admission Status:     Inpatient -                                                               Routine BSA:               2.00 m2              Encounter#:           [id number]                                         Department Location:  [facility]                                                               Cath Lab Study Type:    TRANSTHORACIC ECHO (TTE) LIMITED Diagnosis/ICD: Nonrheumatic aortic (valve) stenosis-I35.0 Indication:    AS, Periprocedure TAVR CPT Code:      Echo Limited-95250; Doppler Limited-53205; Color Doppler-46355 Patient History: Pertinent History: HLD, RADHA, DVT, CVA, AS. Study Detail: The following Echo studies were performed: 2D, M-Mode, Doppler and                color flow. Technically challenging study due to body habitus.               Unable to obtain suprasternal notch view.  PHYSICIAN INTERPRETATION: Left Ventricle: The left ventricular systolic function is normal, with an estimated ejection fraction of 60-65%. There are no regional wall motion abnormalities. The left ventricular cavity size is normal. Left ventricular diastolic filling was indeterminate. Left Atrium: The left atrium is normal in size. Right Ventricle: The right ventricle is normal in size. There is normal right ventricular global systolic function. Right Atrium: The right atrium is normal in size. Aortic Valve: The aortic valve was not well visualized. There is no evidence of aortic valve regurgitation. The peak instantaneous gradient of the aortic valve is 62.7 mmHg. The mean gradient of the aortic valve is 37.0 mmHg. Pre-TAVR: Mean transaortic gradient 36 mmHg, peak transaortic velocity 3.9 m/s, dimensionless index 0.24, calculated aortic valve area 0.8 cmï¿½ Post TAVR: Peak velocity 1.9 m/s, mean transaortic gradient 7 mmHg, dimensionless index 0.5 with no evidence of paravalvular regurgitation. Mitral Valve: The mitral valve is abnormal. There is moderate mitral annular calcification. There is no evidence of mitral valve regurgitation. Tricuspid Valve: The tricuspid valve is structurally normal. There is mild tricuspid regurgitation. Pulmonic Valve: The pulmonic valve was not assessed. Pulmonic valve regurgitation was not assessed. Pericardium: There is no pericardial effusion noted. Aorta: The aortic root is normal. Systemic Veins: The inferior vena cava appears to be of normal size. There is IVC inspiratory collapse greater than 50%.  Post Transcatheter Aortic Valve Placement (TAVR): The peak instantaneous gradient of the aortic valve is 14.3 mmHg. The mean gradient of the aortic valve is 7.0 mmHg. There is a Medtronic bioprosthetic aortic valve, with a 29 mm reported size.  CONCLUSIONS:  1.  Left ventricular systolic function is normal with a 60-65% estimated ejection fraction.  2. There is moderate mitral annular calcification.  3. Severe aortic stenosis treated with TAVR bioprosthetic valve; normal hemodynamics of bioprosthetic TAVR valve with no obvious regurgitation. QUANTITATIVE DATA SUMMARY: 2D MEASUREMENTS:                          Normal Ranges: LAs:           3.30 cm   (2.7-4.0cm) IVSd:          0.80 cm   (0.6-1.1cm) LVPWd:         0.80 cm   (0.6-1.1cm) LVIDd:         4.40 cm   (3.9-5.9cm) LVIDs:         2.80 cm LV Mass Index: 54.6 g/m2 LV % FS        36.4 % AORTA MEASUREMENTS:                    Normal Ranges: Asc Ao, d: 2.70 cm (2.1-3.4cm) AORTIC VALVE:                                              Normal Ranges: AoV Vmax:                          3.96 m/s  (<=1.7m/s) AoV Vmax Post TAVR:                1.89 m/s  (<=1.7m/s) AoV Peak P.7 mmHg (<20mmHg) AoV Peak PG Post TAVR:             14.3 mmHg (<20mmHg) AoV Mean P.0 mmHg (1.7-11.5mmHg) AoV Mean PG Post TAVR:             7.0 mmHg  (1.7-11.5mmHg) LVOT Max Federico:                      1.31 m/s  (<=1.1m/s) LVOT Max Federico Post TAVR:            1.31 m/s  (<=1.1m/s) AoV VTI:                           91.20 cm  (18-25cm) AoV VTI Post TAVR:                 36.40 cm  (18-25cm) LVOT VTI:                          28.30 cm LVOT VTI Post TAVR:                28.30 cm LVOT Diameter:                     2.00 cm   (1.8-2.4cm) AoV Area, VTI:                     0.93 cm2  (2.5-5.5cm2) AoV Area,Vmax:                     0.99 cm2  (2.5-4.5cm2) AoV Dimensionless Index:           0.31 AoV Dimensionless Index Post TAVR: 0.78  [id number] Mark Gentile MD Electronically signed on 1/15/2024 at 5:15:00 PM  ** Final **            Heart Failure Follow up    NYHA class 1    Edema Denies  Dyspnea on Exertion Denies  Fatigue Improved  Exercise Intolerance Denies  Orthopnea Denies  PND Denies    Chest pain No  Syncope  No  Palpitations No  Weight gain No  Weight loss No        All organ systems normal             Impression    Doing well clinically, bilat groins healing well.  States she still has some fatigue but improved from prior to intervention, explained this is normal finding at 1 week.  BP /60-77 with HR in 80s    Plan:  - Cont to hold Losartan  - Cont current meds  - Warfarin with goal of 2.5-3.5  - Echo 2/7   - Cont to increase activity as tolerated   - f/u with PCP and Cards  - Life-long Dental SBE prophylaxis needed      Time Spent:I spent 30 minutes of a total visit of 15 minutes in counseling/ direct management/discussion/coordination of Dottie's care.

## 2024-02-07 ENCOUNTER — HOSPITAL ENCOUNTER (OUTPATIENT)
Dept: CARDIOLOGY | Facility: CLINIC | Age: 69
Discharge: HOME | End: 2024-02-07
Payer: MEDICARE

## 2024-02-07 VITALS
SYSTOLIC BLOOD PRESSURE: 144 MMHG | DIASTOLIC BLOOD PRESSURE: 84 MMHG | WEIGHT: 228 LBS | HEIGHT: 61 IN | BODY MASS INDEX: 43.05 KG/M2

## 2024-02-07 DIAGNOSIS — Z95.3 S/P TAVR (TRANSCATHETER AORTIC VALVE REPLACEMENT), BIOPROSTHETIC: ICD-10-CM

## 2024-02-07 LAB
AORTIC VALVE MEAN GRADIENT: 7 MMHG
AORTIC VALVE PEAK VELOCITY: 1.73 M/S
AV PEAK GRADIENT: 12 MMHG
AVA (PEAK VEL): 1.1 CM2
AVA (VTI): 1.2 CM2
EJECTION FRACTION APICAL 4 CHAMBER: 62.5
LEFT VENTRICLE INTERNAL DIMENSION DIASTOLE: 4.88 CM (ref 3.5–6)
LEFT VENTRICULAR OUTFLOW TRACT DIAMETER: 1.7 CM
MITRAL VALVE E/A RATIO: 0.64
RIGHT VENTRICLE PEAK SYSTOLIC PRESSURE: 28.4 MMHG

## 2024-02-07 PROCEDURE — 93306 TTE W/DOPPLER COMPLETE: CPT | Performed by: INTERNAL MEDICINE

## 2024-02-07 PROCEDURE — 93306 TTE W/DOPPLER COMPLETE: CPT

## 2024-02-14 ENCOUNTER — HOSPITAL ENCOUNTER (OUTPATIENT)
Dept: RADIOLOGY | Facility: HOSPITAL | Age: 69
Discharge: HOME | End: 2024-02-14
Payer: MEDICARE

## 2024-02-15 ENCOUNTER — TELEMEDICINE (OUTPATIENT)
Dept: CARDIOLOGY | Facility: CLINIC | Age: 69
End: 2024-02-15
Payer: MEDICARE

## 2024-02-15 DIAGNOSIS — Z95.3 S/P TAVR (TRANSCATHETER AORTIC VALVE REPLACEMENT), BIOPROSTHETIC: Primary | ICD-10-CM

## 2024-02-15 PROCEDURE — 1126F AMNT PAIN NOTED NONE PRSNT: CPT | Performed by: NURSE PRACTITIONER

## 2024-02-15 PROCEDURE — 1111F DSCHRG MED/CURRENT MED MERGE: CPT | Performed by: NURSE PRACTITIONER

## 2024-02-15 PROCEDURE — 1159F MED LIST DOCD IN RCRD: CPT | Performed by: NURSE PRACTITIONER

## 2024-02-15 PROCEDURE — 1036F TOBACCO NON-USER: CPT | Performed by: NURSE PRACTITIONER

## 2024-02-15 PROCEDURE — 99214 OFFICE O/P EST MOD 30 MIN: CPT | Performed by: NURSE PRACTITIONER

## 2024-02-15 NOTE — PROGRESS NOTES
Structural Heart Follow up visit    Priscila Fragoso is a 68 y.o.  female. with a PMH of former smoker with hyperlipidemia, obesity (BMI 43), obstructive sleep apnea. chronic back, and severe non-rheumatic aortic stenosis.  No s/p TAVR - Evolut FX 29 Predil 20mm TruDil Balloon on 1/15/24 with with Dr. Meyer and Dr. Rasmussen.  presents today for 1 month follow up.      denies SOB,EDDY, fatigue  Recent Hospitalizations  No    patient with   Past Medical History:   Diagnosis Date    Cancer (CMS/HCC)     bilat breat cancer with L mastectomy 2016/ R mastectomy 2002    Heart murmur     pt states she need an aoritc valve replacement    Hyperlipidemia     Hypertension     Personal history of malignant neoplasm, unspecified     History of malignant neoplasm    Personal history of other diseases of the circulatory system     History of hypertension    PFO (patent foramen ovale) 2002    not repaired    Stroke (CMS/[facility]) 2002    stroke (without residual) from PFO, event happened after a port was placed. Info per pt    Unilateral primary osteoarthritis, right hip 11/17/2020    Primary osteoarthritis of right hip    Unilateral primary osteoarthritis, right hip 07/01/2020    Primary osteoarthritis of right hip       No results found for this or any previous visit (from the past 96 hour(s)).     Transthoracic Echo (TTE) Complete    Result Date: 2/7/2024              [address], [address], [place] [address]          Tel [phone] Fax [phone] TRANSTHORACIC ECHOCARDIOGRAM REPORT  Patient Name:      PRISCILA FRAGOSO        [place] Physician:    Sonu Bruner DO Study Date:        2/7/2024             Ordering Provider:    [id number] MAHMOOD MRN/PID:           [id number]             Fellow: Accession#:        [id number]         Nurse: Date of  Birth/Age: 1955 / 68 years Sonographer:          Gabby Baumann RDMS,                                                               RCS, RVS Gender:            F                    Additional Staff: Height:            154.94 cm            Admit Date: Weight:            103.42 kg            Admission Status: BSA:               2.00 m2              Department Location:  [facility] Blood Pressure: 144 /84 mmHg Study Type:    TRANSTHORACIC ECHO (TTE) COMPLETE Diagnosis/ICD: 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  PHYSICIAN INTERPRETATION: Left Ventricle: Left ventricular systolic function is normal, with an estimated ejection fraction of 60%. There are no regional wall motion abnormalities. The left ventricular cavity size is normal. The left ventricular septal wall thickness is mildly increased. There is mild concentric left ventricular hypertrophy. Spectral Doppler shows an impaired relaxation pattern of left ventricular diastolic filling. Left Atrium: The left atrium is normal in size. Right Ventricle: The right ventricle is normal in size. There is normal right ventricular global systolic function. Right Atrium: The right atrium is normal in size. Aortic Valve: There is a prosthetic aortic valve present. There is mild aortic valve cusp calcification. There is transcatheter aortic valve replacement, with a #29 mm Medtronic Evolut TAVR reported size. Echo findings are consistent with normal aortic valve prosthesis structure and function. There is trivial aortic valve regurgitation. The peak instantaneous gradient of the aortic valve is 12.0 mmHg. The mean gradient of the aortic valve is 7.0 mmHg. Mitral Valve: The  mitral valve is normal in structure. There is no evidence of mitral valve stenosis. There is normal mitral valve leaflet mobility. There is moderate mitral annular calcification. There is trace mitral valve regurgitation. Tricuspid Valve: The tricuspid valve is structurally normal. There is normal tricuspid valve leaflet mobility. There is mild tricuspid regurgitation. Pulmonic Valve: The pulmonic valve is structurally normal. There is trace to mild pulmonic valve regurgitation. Pericardium: There is no pericardial effusion noted. Aorta: The aortic root is normal. Pulmonary Artery: The main pulmonary artery is normal in size, and position, with normal bifurcation into the left and right pulmonary arteries. The tricuspid regurgitant velocity is 2.52 m/s, and with an estimated right atrial pressure of 3 mmHg, the estimated pulmonary artery pressure is mildly elevated with the RVSP at 28.4 mmHg. Systemic Veins: The inferior vena cava appears to be of normal size. In comparison to the previous echocardiogram(s): The left ventricular function is unchanged. The left ventricular hypertrophy is unchanged.  CONCLUSIONS:  1. Left ventricular systolic function is normal with a 60% estimated ejection fraction.  2. Spectral Doppler shows an impaired relaxation pattern of left ventricular diastolic filling.  3. There is moderate mitral annular calcification.  4. There is no evidence of mitral valve stenosis.  5. Trace mitral valve regurgitation.  6. Mild tricuspid regurgitation is visualized.  7. There is a transcatheter aortic valve replacement.  8. The main pulmonary artery is normal in size, and position, with normal bifurcation into the left and right pulmonary arteries. QUANTITATIVE DATA SUMMARY: 2D MEASUREMENTS:                          Normal Ranges: Ao Root d:     2.60 cm   (2.0-3.7cm) LAs:           3.60 cm   (2.7-4.0cm) RVIDd:         1.98 cm   (0.9-3.6cm) IVSd:          1.12 cm   (0.6-1.1cm) LVPWd:         0.99 cm    (0.6-1.1cm) LVIDd:         4.88 cm   (3.9-5.9cm) LVIDs:         3.13 cm LV Mass Index: 94.3 g/m2 LV % FS        35.9 % AORTA MEASUREMENTS:                    Normal Ranges: Asc Ao, d: 2.20 cm (2.1-3.4cm) LV SYSTOLIC FUNCTION BY 2D PLANIMETRY (MOD):                     Normal Ranges: EF-A4C View: 62.5 % (>=55%) LV DIASTOLIC FUNCTION:                        Normal Ranges: MV Peak E:    1.05 m/s (0.7-1.2 m/s) MV Peak A:    1.64 m/s (0.42-0.7 m/s) E/A Ratio:    0.64     (1.0-2.2) MV lateral e' 0.05 m/s MV medial e'  0.07 m/s AORTIC VALVE:                                    Normal Ranges: AoV Vmax:                1.73 m/s  (<=1.7m/s) AoV Peak P.0 mmHg (<20mmHg) AoV Mean P.0 mmHg  (1.7-11.5mmHg) LVOT Max Federico:            0.84 m/s  (<=1.1m/s) AoV VTI:                 38.20 cm  (18-25cm) LVOT VTI:                20.20 cm LVOT Diameter:           1.70 cm   (1.8-2.4cm) AoV Area, VTI:           1.20 cm2  (2.5-5.5cm2) AoV Area,Vmax:           1.10 cm2  (2.5-4.5cm2) AoV Dimensionless Index: 0.53 TRICUSPID VALVE/RVSP:                             Normal Ranges: Peak TR Velocity: 2.52 m/s Est. RA Pressure: 3 mmHg RV Syst Pressure: 28.4 mmHg (< 30mmHg) PULMONIC VALVE:                      Normal Ranges: PV Max Federico: 0.5 m/s  (0.6-0.9m/s) PV Max P.9 mmHg  [id number] Yosef Bruner DO Electronically signed on 2024 at 12:12:05 PM  ** Final **     Transthoracic Echo (TTE) Limited    Result Date: 2024   [facility], [address] [place] [address]                Tel [phone] and Fax [phone] TRANSTHORACIC ECHOCARDIOGRAM REPORT  Patient Name:      PRISCILA Trinidad Physician:    [id number] Maricarmen Bourne MD Study Date:        2024            Ordering Provider:    [id number] JOY MAHMOOD MRN/PID:           [id number]             Fellow:  Accession#:        [id number]         Nurse:                Yari Saldana RN Date of Birth/Age: 1955 / 68 years Sonographer:          Erik Yadav RDCS Gender:            F                    Additional Staff: Height:            154.94 cm            Admit Date:           1/15/2024 Weight:            103.42 kg            Admission Status:     Inpatient -                                                               Routine BSA:               2.00 m2              Encounter#:           [id number]                                         Department Location:  [facility]                                                               Invasive Blood Pressure: 122 /62 mmHg Study Type:    TRANSTHORACIC ECHO (TTE) LIMITED Diagnosis/ICD: 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 Study Detail: The following Echo studies were performed: 2D, M-Mode, Doppler and               color flow. Technically challenging study due to body habitus and               poor acoustic windows. Definity used as a contrast agent for               endocardial border definition. Total contrast used for this               procedure was 1.0 mL via IV push.  PHYSICIAN INTERPRETATION: Left Ventricle: The left ventricular systolic function is normal, with an estimated ejection fraction of 65-70%. There are no regional wall motion abnormalities. The left ventricular cavity size is normal. Spectral Doppler shows an impaired relaxation pattern of left ventricular diastolic filling. Left Atrium: The left atrium was not assessed. Right Ventricle: The right ventricle is normal in size. There is normal right ventricular global systolic function. Right Atrium: The right atrium was not assessed.  Aortic Valve: There is a prosthetic aortic valve present. There is a Medtronic transcatheter aortic valve replacement, with a 29 mm reported size. There is trivial aortic valve regurgitation. The peak instantaneous gradient of the aortic valve is 26.2 mmHg. The mean gradient of the aortic valve is 15.0 mmHg. S/p 29mm Medtronic Evolut FX TAVR with gradients of 26.2/15mmHg and trivial valvular AI. Mitral Valve: The mitral valve is normal in structure. There is moderate mitral annular calcification. There is trace mitral valve regurgitation. Tricuspid Valve: The tricuspid valve is structurally normal. There is mild tricuspid regurgitation. The Doppler estimated RVSP is slightly elevated at 36.2 mmHg. Pulmonic Valve: The pulmonic valve is not well visualized. Pulmonic valve regurgitation was not assessed. Pericardium: There is a trivial pericardial effusion. Aorta: The aortic root is normal. In comparison to the previous echocardiogram(s): Compared with the prior exam ( periprocedural echo from 1/15/2024) the post TAVR gradients were previously 14.3/7mmHg and are higher today. LV systolic function appears similar.  CONCLUSIONS:  1. Left ventricular systolic function is normal with a 65-70% estimated ejection fraction.  2. Spectral Doppler shows an impaired relaxation pattern of left ventricular diastolic filling.  3. There is moderate mitral annular calcification.  4. Slightly elevated RVSP.  5. S/p 29mm Medtronic Evolut FX TAVR with gradients of 26.2/15mmHg and trivial valvular AI.  6. There is a transcatheter aortic valve replacement.  7. Compared with the prior exam ( periprocedural echo from 1/15/2024) the post TAVR gradients were previously 14.3/7mmHg and are higher today. LV systolic function appears similar. QUANTITATIVE DATA SUMMARY: 2D MEASUREMENTS:                          Normal Ranges: LAs:           3.60 cm   (2.7-4.0cm) IVSd:          0.90 cm   (0.6-1.1cm) LVPWd:         0.90 cm   (0.6-1.1cm) LVIDd:          4.30 cm   (3.9-5.9cm) LVIDs:         2.90 cm LV Mass Index: 61.7 g/m2 LV % FS        32.6 % AORTA MEASUREMENTS:                    Normal Ranges: Asc Ao, d: 3.10 cm (2.1-3.4cm) LV SYSTOLIC FUNCTION BY 2D PLANIMETRY (MOD):                     Normal Ranges: EF-A4C View: 66.8 % (>=55%) EF-A2C View: 78.9 % EF-Biplane:  72.8 % LV DIASTOLIC FUNCTION:                           Normal Ranges: MV Peak E:    1.19 m/s    (0.7-1.2 m/s) MV Peak A:    1.76 m/s    (0.42-0.7 m/s) E/A Ratio:    0.68        (1.0-2.2) MV e'         0.08 m/s    (>8.0) MV lateral e' 0.07 m/s MV medial e'  0.08 m/s MV A Dur:     108.00 msec E/e' Ratio:   15.75       (<8.0) a'            0.17 m/s MV DT:        305 msec    (150-240 msec) MITRAL VALVE:                 Normal Ranges: MV DT: 305 msec (150-240msec) AORTIC VALVE:                                    Normal Ranges: AoV Vmax:                2.56 m/s  (<=1.7m/s) AoV Peak P.2 mmHg (<20mmHg) AoV Mean PG:             15.0 mmHg (1.7-11.5mmHg) LVOT Max Federico:            1.54 m/s  (<=1.1m/s) AoV VTI:                 48.30 cm  (18-25cm) LVOT VTI:                30.30 cm LVOT Diameter:           1.70 cm   (1.8-2.4cm) AoV Area, VTI:           1.42 cm2  (2.5-5.5cm2) AoV Area,Vmax:           1.37 cm2  (2.5-4.5cm2) AoV Dimensionless Index: 0.63  RIGHT VENTRICLE: TAPSE: 26.9 mm RV s'  0.17 m/s TRICUSPID VALVE/RVSP:                             Normal Ranges: Peak TR Velocity: 2.88 m/s RV Syst Pressure: 36.2 mmHg (< 30mmHg) IVC Diam:         1.60 cm  [id number] Maricarmen Sabik MD Electronically signed on 2024 at 11:39:56 AM  ** Final **     Transthoracic Echo (TTE) Limited    Result Date: 1/15/2024   [facility], [address] [place] [address]                Tel [phone] and Fax [phone] TRANSTHORACIC ECHOCARDIOGRAM REPORT  Patient Name:      PRISCILA MATHEWS        Reading Physician:    [id number] Mark Gentile  MD Study Date:        1/15/2024            Ordering Provider:    [id number] JOY JONESIRMA MRN/PID:           [id number]             Fellow: Accession#:        [id number]         Nurse: Date of Birth/Age: 1955 / 68 years Sonographer:          Ev Kearney RDCS Gender:            F                    Additional Staff: Height:            154.94 cm            Admit Date:           1/15/2024 Weight:            104.33 kg            Admission Status:     Inpatient -                                                               Routine BSA:               2.00 m2              Encounter#:           [id number]                                         Department Location:  [facility]                                                               Cath Lab Study Type:    TRANSTHORACIC ECHO (TTE) LIMITED Diagnosis/ICD: Nonrheumatic aortic (valve) stenosis-I35.0 Indication:    AS, Periprocedure TAVR CPT Code:      Echo Limited-33029; Doppler Limited-74461; Color Doppler-78806 Patient History: Pertinent History: HLD, RADHA, DVT, CVA, AS. Study Detail: The following Echo studies were performed: 2D, M-Mode, Doppler and               color flow. Technically challenging study due to body habitus.               Unable to obtain suprasternal notch view.  PHYSICIAN INTERPRETATION: Left Ventricle: The left ventricular systolic function is normal, with an estimated ejection fraction of 60-65%. There are no regional wall motion abnormalities. The left ventricular cavity size is normal. Left ventricular diastolic filling was indeterminate. Left Atrium: The left atrium is normal in size. Right Ventricle: The right ventricle is normal in size. There is normal right ventricular global systolic function. Right Atrium: The right atrium is normal in size. Aortic Valve: The aortic valve was not well visualized. There is no evidence of aortic valve regurgitation. The peak instantaneous  gradient of the aortic valve is 62.7 mmHg. The mean gradient of the aortic valve is 37.0 mmHg. Pre-TAVR: Mean transaortic gradient 36 mmHg, peak transaortic velocity 3.9 m/s, dimensionless index 0.24, calculated aortic valve area 0.8 cmï¿½ Post TAVR: Peak velocity 1.9 m/s, mean transaortic gradient 7 mmHg, dimensionless index 0.5 with no evidence of paravalvular regurgitation. Mitral Valve: The mitral valve is abnormal. There is moderate mitral annular calcification. There is no evidence of mitral valve regurgitation. Tricuspid Valve: The tricuspid valve is structurally normal. There is mild tricuspid regurgitation. Pulmonic Valve: The pulmonic valve was not assessed. Pulmonic valve regurgitation was not assessed. Pericardium: There is no pericardial effusion noted. Aorta: The aortic root is normal. Systemic Veins: The inferior vena cava appears to be of normal size. There is IVC inspiratory collapse greater than 50%.  Post Transcatheter Aortic Valve Placement (TAVR): The peak instantaneous gradient of the aortic valve is 14.3 mmHg. The mean gradient of the aortic valve is 7.0 mmHg. There is a Medtronic bioprosthetic aortic valve, with a 29 mm reported size.  CONCLUSIONS:  1. Left ventricular systolic function is normal with a 60-65% estimated ejection fraction.  2. There is moderate mitral annular calcification.  3. Severe aortic stenosis treated with TAVR bioprosthetic valve; normal hemodynamics of bioprosthetic TAVR valve with no obvious regurgitation. QUANTITATIVE DATA SUMMARY: 2D MEASUREMENTS:                          Normal Ranges: LAs:           3.30 cm   (2.7-4.0cm) IVSd:          0.80 cm   (0.6-1.1cm) LVPWd:         0.80 cm   (0.6-1.1cm) LVIDd:         4.40 cm   (3.9-5.9cm) LVIDs:         2.80 cm LV Mass Index: 54.6 g/m2 LV % FS        36.4 % AORTA MEASUREMENTS:                    Normal Ranges: Asc Ao, d: 2.70 cm (2.1-3.4cm) AORTIC VALVE:                                              Normal Ranges: AoV  Vmax:                          3.96 m/s  (<=1.7m/s) AoV Vmax Post TAVR:                1.89 m/s  (<=1.7m/s) AoV Peak P.7 mmHg (<20mmHg) AoV Peak PG Post TAVR:             14.3 mmHg (<20mmHg) AoV Mean P.0 mmHg (1.7-11.5mmHg) AoV Mean PG Post TAVR:             7.0 mmHg  (1.7-11.5mmHg) LVOT Max Federico:                      1.31 m/s  (<=1.1m/s) LVOT Max Federico Post TAVR:            1.31 m/s  (<=1.1m/s) AoV VTI:                           91.20 cm  (18-25cm) AoV VTI Post TAVR:                 36.40 cm  (18-25cm) LVOT VTI:                          28.30 cm LVOT VTI Post TAVR:                28.30 cm LVOT Diameter:                     2.00 cm   (1.8-2.4cm) AoV Area, VTI:                     0.93 cm2  (2.5-5.5cm2) AoV Area,Vmax:                     0.99 cm2  (2.5-4.5cm2) AoV Dimensionless Index:           0.31 AoV Dimensionless Index Post TAVR: 0.78  [id number] Mark Gentile MD Electronically signed on 1/15/2024 at 5:15:00 PM  ** Final **                  Heart Failure Follow up    NYHA class 1    Edema Stable  Dyspnea on Exertion Denies  Fatigue Denies  Exercise Intolerance Denies  Orthopnea Denies  PND Denies    Chest pain No  Syncope No  Palpitations No    All organ systems normal except: Rheumatoid arthritis               K[facility]Q Questionnaire    1  Heart failure affects different people in different ways. Some feel shortness of breath while others feel fatigue. Please indicate how much you are limited by heart failure (shortness of breath or fatigue) in your ability to do the following activities over the past 2 weeks.     A.) Showering/bathing  5. Not at All  B.) Walking 1 block on level ground 5. Not at All  C.) Hurrying or Jogging   6. Limited for other reastons    2.  Over the past 2 weeks, how many times did you have swelling in your feet, ankles or legs when you woke up in the morning? 5. Never    3.  Over the past 2 weeks, on average, how many times has fatigue limited  your ability to do what you wanted? 7. Never    4.  Over the past 2 weeks, on average, how many times has shortness of breath limited your ability to do what you wanted? 7. Never    5.  Over the past 2 weeks, on average, how many times have you been forced to sleep sitting up in a chair or with at least 3 pillows to prop you up because of shortness of breath? Never    6. Over the past 2 weeks, how much has your heart failure limited your enjoyment of life? It has not limited my enjoyment of life    7. If you had to spend the rest of your life with your heart failure the way it is right now, how would you feel about this? 5. Completely satisfied    8. How much does your heart failure affect your lifestyle? Please indicate how your heart failure may have limited yourparticipation in the following activities over the past 2 weeks    A.)  Hobbies, recreational activities  5. Did not limit at all    B.) Working or doing household chores  5. Did not limit at all    C.) Visiting family or friends out of your home  5. Did not limit at all      Impression  - 1 month s/p TAVR  - Pt appears to be euvolemic with flat neck veins and no BLE edema.  Work of breathing normal with NAD, skin tone without pallor.   - States she is feeling well denies HF symptoms.  States she does have some fatigue but states that is from her arthritis.  - Vitals:  BP much improved without her Diltiazem - now running 100-120/70, HR 70-80, wt stable  - Groins:  Right groins still healing, no signs of infection  - 1 episode of CP the other night, lasted for 5 mins, her BP was 80s/50, resolved after she put her feet up.  No episodes since.  - Visually Ms. Fragoso looks fantastic, clinically doing much better now that her BP is higher.  We discussed the importance of increasing her activity level and would very likely benefit from participating in a Cardiac Rehab program.    1 month ECHO - EF 60%, triv AI, gradients 12/7, trace MR, mild TR, no PC  effusion    Plan:   Cont current medication regimen  Cont Warfarin  Cont to increase activity - Cardiac Rehab ordered  f/u with Structural NP in 1 year - ECHO can be closer to home  f/u with Dr. Mariee (Primary Cards) as scheduled or in 6-10 weeks  Life-long Dental SBE prophylaxis needed - dentist orders      Virtual Visit    Jerrod DEAN, [facility]  Acute Care Nurse Practitioner  Structural Heart / TAVR Team  [phone] (ph)  [phone] (fax)

## 2024-02-23 ENCOUNTER — TELEPHONE (OUTPATIENT)
Dept: CARDIOLOGY | Facility: HOSPITAL | Age: 69
End: 2024-02-23
Payer: MEDICARE

## 2024-02-23 NOTE — TELEPHONE ENCOUNTER
Cardiac Rehab orders received, placed for signature by Dr. Kodi MD [facility] and faxed to cardiac rehab with confirmation received.

## 2024-02-29 ENCOUNTER — OFFICE VISIT (OUTPATIENT)
Dept: CARDIOLOGY | Facility: CLINIC | Age: 69
End: 2024-02-29
Payer: MEDICARE

## 2024-02-29 ENCOUNTER — LAB (OUTPATIENT)
Dept: LAB | Facility: LAB | Age: 69
End: 2024-02-29
Payer: MEDICARE

## 2024-02-29 VITALS
DIASTOLIC BLOOD PRESSURE: 80 MMHG | HEART RATE: 92 BPM | BODY MASS INDEX: 43.29 KG/M2 | SYSTOLIC BLOOD PRESSURE: 148 MMHG | HEIGHT: 61 IN | WEIGHT: 229.3 LBS

## 2024-02-29 DIAGNOSIS — I10 ESSENTIAL (PRIMARY) HYPERTENSION: ICD-10-CM

## 2024-02-29 DIAGNOSIS — I35.0 NONRHEUMATIC AORTIC VALVE STENOSIS: ICD-10-CM

## 2024-02-29 DIAGNOSIS — E78.2 MIXED HYPERLIPIDEMIA: ICD-10-CM

## 2024-02-29 DIAGNOSIS — I10 PRIMARY HYPERTENSION: ICD-10-CM

## 2024-02-29 DIAGNOSIS — M05.79 RHEUMATOID ARTHRITIS WITH RHEUMATOID FACTOR OF MULTIPLE SITES WITHOUT ORGAN OR SYSTEMS INVOLVEMENT (MULTI): Primary | ICD-10-CM

## 2024-02-29 DIAGNOSIS — Z95.3 S/P TAVR (TRANSCATHETER AORTIC VALVE REPLACEMENT), BIOPROSTHETIC: ICD-10-CM

## 2024-02-29 DIAGNOSIS — E66.01 MORBID OBESITY WITH BMI OF 40.0-44.9, ADULT (MULTI): ICD-10-CM

## 2024-02-29 DIAGNOSIS — Z87.891 FORMER SMOKER: ICD-10-CM

## 2024-02-29 DIAGNOSIS — I51.7 LVH (LEFT VENTRICULAR HYPERTROPHY): ICD-10-CM

## 2024-02-29 DIAGNOSIS — G47.30 SLEEP APNEA, UNSPECIFIED TYPE: ICD-10-CM

## 2024-02-29 LAB
ALBUMIN SERPL BCP-MCNC: 4.3 G/DL (ref 3.4–5)
ALP SERPL-CCNC: 136 U/L (ref 33–136)
ALT SERPL W P-5'-P-CCNC: 7 U/L (ref 7–45)
AST SERPL W P-5'-P-CCNC: 12 U/L (ref 9–39)
BILIRUB DIRECT SERPL-MCNC: 0.1 MG/DL (ref 0–0.3)
BILIRUB SERPL-MCNC: 0.4 MG/DL (ref 0–1.2)
ERYTHROCYTE [DISTWIDTH] IN BLOOD BY AUTOMATED COUNT: 15.9 % (ref 11.5–14.5)
HCT VFR BLD AUTO: 43.4 % (ref 36–46)
HGB BLD-MCNC: 13.5 G/DL (ref 12–16)
MCH RBC QN AUTO: 27.2 PG (ref 26–34)
MCHC RBC AUTO-ENTMCNC: 31.1 G/DL (ref 32–36)
MCV RBC AUTO: 88 FL (ref 80–100)
NRBC BLD-RTO: 0 /100 WBCS (ref 0–0)
PLATELET # BLD AUTO: 208 X10*3/UL (ref 150–450)
PROT SERPL-MCNC: 7.6 G/DL (ref 6.4–8.2)
RBC # BLD AUTO: 4.96 X10*6/UL (ref 4–5.2)
WBC # BLD AUTO: 8.1 X10*3/UL (ref 4.4–11.3)

## 2024-02-29 PROCEDURE — 99214 OFFICE O/P EST MOD 30 MIN: CPT | Performed by: INTERNAL MEDICINE

## 2024-02-29 PROCEDURE — 3079F DIAST BP 80-89 MM HG: CPT | Performed by: INTERNAL MEDICINE

## 2024-02-29 PROCEDURE — 80076 HEPATIC FUNCTION PANEL: CPT

## 2024-02-29 PROCEDURE — 85027 COMPLETE CBC AUTOMATED: CPT

## 2024-02-29 PROCEDURE — 3008F BODY MASS INDEX DOCD: CPT | Performed by: INTERNAL MEDICINE

## 2024-02-29 PROCEDURE — 1159F MED LIST DOCD IN RCRD: CPT | Performed by: INTERNAL MEDICINE

## 2024-02-29 PROCEDURE — 3077F SYST BP >= 140 MM HG: CPT | Performed by: INTERNAL MEDICINE

## 2024-02-29 PROCEDURE — 1126F AMNT PAIN NOTED NONE PRSNT: CPT | Performed by: INTERNAL MEDICINE

## 2024-02-29 PROCEDURE — 1036F TOBACCO NON-USER: CPT | Performed by: INTERNAL MEDICINE

## 2024-02-29 PROCEDURE — 36415 COLL VENOUS BLD VENIPUNCTURE: CPT

## 2024-02-29 RX ORDER — LOSARTAN POTASSIUM 50 MG/1
50 TABLET ORAL DAILY
Qty: 90 TABLET | Refills: 3 | Status: SHIPPED | OUTPATIENT
Start: 2024-02-29 | End: 2024-03-14 | Stop reason: SDUPTHER

## 2024-02-29 NOTE — PROGRESS NOTES
Referred by Dr. Hilton ref. provider found provider found for   Chief Complaint   Patient presents with    Follow-up     Follow up after transcath replacement         History of Present Illness  Dottie Fragoso is a 68 y.o. year old female patient with history of hypertension status post recent TAVR.  Her Cardizem was decreased and she is off losartan but the blood pressure today was 148.  I discussed with the patient in great length that we need to readjust her antihypertensive medication.  Will stop Cardizem and go back on losartan 50 mg daily and check blood pressure report back to me.  We will monitor her valve post TAVR.  Will call for any problem and follow-up as scheduled    Past Medical History  Past Medical History:   Diagnosis Date    Cancer (CMS/[facility])     bilat breat cancer with L mastectomy / R mastectomy     Heart murmur     pt states she need an aoritc valve replacement    Hyperlipidemia     Hypertension     Personal history of malignant neoplasm, unspecified     History of malignant neoplasm    Personal history of other diseases of the circulatory system     History of hypertension    PFO (patent foramen ovale)     not repaired    Stroke (CMS/[facility])     stroke (without residual) from PFO, event happened after a port was placed. Info per pt    Unilateral primary osteoarthritis, right hip 2020    Primary osteoarthritis of right hip    Unilateral primary osteoarthritis, right hip 2020    Primary osteoarthritis of right hip       Social History  Social History     Tobacco Use    Smoking status: Former     Types: Cigarettes     Quit date:      Years since quittin.1    Smokeless tobacco: Never   Vaping Use    Vaping Use: Never used   Substance Use Topics    Alcohol use: Never    Drug use: Never       Family History     Family History   Problem Relation Name Age of Onset    Heart attack Mother      Heart attack Father         Review of Systems  As per HPI, all other systems  reviewed and negative.    Allergies:  Allergies   Allergen Reactions    Darvon [Propoxyphene] Hives    Dilaudid [Hydromorphone] Hives    Mobic [Meloxicam] Hives    Penicillins Hives    Sulfasalazine Hives    Percodan [Oxycodone-Aspirin] Palpitations        Outpatient Medications:  Current Outpatient Medications   Medication Instructions    acetaminophen (TYLENOL) 650 mg, oral, Every 6 hours PRN    albuterol (Ventolin HFA) 90 mcg/actuation inhaler 2 puffs, inhalation, Every 4 hours PRN    esomeprazole (NEXIUM) 20 mg, oral, Daily before breakfast, Do not open capsule.    folic acid (FOLVITE) 1 mg, oral, Daily    gabapentin (NEURONTIN) 600 mg, oral, 2 times daily    methotrexate (TREXALL) 20 mg, oral, Weekly, Follow directions carefully, and ask to explain any part you do not understand. Take exactly as directed.    montelukast (SINGULAIR) 10 mg, oral, Nightly    torsemide (DEMADEX) 20 mg, oral, Every Mon/Wed/Fri, As needed     traMADol (ULTRAM) 50 mg, oral, Every 4 hours PRN    warfarin (COUMADIN) 7.5 mg, oral, Daily, EVERY TUES-THURS-SAT-SUN    warfarin (COUMADIN) 10 mg, oral, Every Mon/Wed/Fri, TAKES 10MG ONCE DAILY EVERY MON-WED-FRI         Vitals:  Vitals:    02/29/24 0953   BP: 148/80   Pulse: 92       Physical Exam:  Physical Exam  Vitals and nursing note reviewed.   Constitutional:       Appearance: Normal appearance. She is normal weight.   HENT:      Head: Normocephalic and atraumatic.   Eyes:      Extraocular Movements: Extraocular movements intact.      Pupils: Pupils are equal, round, and reactive to light.   Cardiovascular:      Rate and Rhythm: Normal rate and regular rhythm.      Pulses: Normal pulses.   Pulmonary:      Effort: Pulmonary effort is normal.      Breath sounds: Normal breath sounds.   Musculoskeletal:      Cervical back: Normal range of motion.      Right lower leg: No edema.      Left lower leg: No edema.   Skin:     General: Skin is warm and dry.   Neurological:      General: No focal  deficit present.      Mental Status: She is alert and oriented to person, place, and time.             Assessment/Plan   Diagnoses and all orders for this visit:  Primary hypertension  Essential (primary) hypertension  Mixed hyperlipidemia  S/p TAVR (transcatheter aortic valve replacement), bioprosthetic  LVH (left ventricular hypertrophy)  Nonrheumatic aortic valve stenosis  Morbid obesity with BMI of 40.0-44.9, adult (CMS/HCC)  Sleep apnea, unspecified type  Former smoker          Toshia Mariee MD [facility]  Interventional Cardiology   of [facility]     Thank you for allowing me to participate in the care of this patient. Please do not hesitate to contact me with any further questions or concerns.

## 2024-02-29 NOTE — PATIENT INSTRUCTIONS
Patient to follow up in 6 months with Dr. Toshia Mariee MD [facility]     Please STOP Diltiazem moving forward.   Please RESTART Losartan 50mg once daily. You can split your current 100mg tablets to use those up first.     No other changes today.   Continue same medications and treatments.   Patient educated on proper medication use.   Patient educated on risk factor modification.   Please bring any lab results from other providers / physicians to your next appointment.     Please bring all medicines, vitamins, and herbal supplements with you when you come to the office.     Prescriptions will not be filled unless you are compliant with your follow up appointments or have a follow up appointment scheduled as per instruction of your physician. Refills should be requested at the time of your visit.    IMike RN am scribing for and in the presence of Dr. T[facility]e MD [facility]

## 2024-03-04 ENCOUNTER — CLINICAL SUPPORT (OUTPATIENT)
Dept: CARDIAC REHAB | Facility: HOSPITAL | Age: 69
End: 2024-03-04
Payer: MEDICARE

## 2024-03-04 VITALS
WEIGHT: 231.6 LBS | HEIGHT: 61 IN | BODY MASS INDEX: 43.73 KG/M2 | DIASTOLIC BLOOD PRESSURE: 85 MMHG | OXYGEN SATURATION: 98 % | SYSTOLIC BLOOD PRESSURE: 144 MMHG

## 2024-03-04 DIAGNOSIS — Z95.3 S/P TAVR (TRANSCATHETER AORTIC VALVE REPLACEMENT), BIOPROSTHETIC: ICD-10-CM

## 2024-03-04 ASSESSMENT — DUKE ACTIVITY SCORE INDEX (DASI)
CAN YOU DO MODERATE WORK AROUND THE HOUSE LIKE VACUUMING, SWEEPING FLOORS OR CARRYING GROCERIES: YES
CAN YOU CLIMB A FLIGHT OF STAIRS OR WALK UP A HILL: YES
CAN YOU TAKE CARE OF YOURSELF (EAT, DRESS, BATHE, OR USE TOILET): YES
CAN YOU PARTICIPATE IN STRENOUS SPORTS LIKE SWIMMING, SINGLES TENNIS, FOOTBALL, BASKETBALL, OR SKIING: NO
CAN YOU DO YARD WORK LIKE RAKING LEAVES, WEEDING OR PUSHING A MOWER: YES
DASI METS SCORE: 5.3
TOTAL_SCORE: 20.7
CAN YOU WALK A BLOCK OR TWO ON LEVEL GROUND: NO
CAN YOU WALK INDOORS, SUCH AS AROUND YOUR HOUSE: YES
CAN YOU DO LIGHT WORK AROUND THE HOUSE LIKE DUSTING OR WASHING DISHES: YES
CAN YOU RUN A SHORT DISTANCE: NO
CAN YOU HAVE SEXUAL RELATIONS: NO
CAN YOU DO HEAVY WORK AROUND THE HOUSE LIKE SCRUBBING FLOORS OR LIFTING AND MOVING HEAVY FURNITURE: NO
CAN YOU PARTICIPATE IN MODERATE RECREATIONAL ACTIVITIES LIKE GOLF, BOWLING, DANCING, DOUBLES TENNIS OR THROWING A BASEBALL OR FOOTBALL: NO

## 2024-03-04 ASSESSMENT — PATIENT HEALTH QUESTIONNAIRE - PHQ9
SUM OF ALL RESPONSES TO PHQ QUESTIONS 1-9: 1
5. POOR APPETITE OR OVEREATING: NOT AT ALL
SUM OF ALL RESPONSES TO PHQ9 QUESTIONS 1 & 2: 0
1. LITTLE INTEREST OR PLEASURE IN DOING THINGS: NOT AT ALL
7. TROUBLE CONCENTRATING ON THINGS, SUCH AS READING THE NEWSPAPER OR WATCHING TELEVISION: NOT AT ALL
4. FEELING TIRED OR HAVING LITTLE ENERGY: NOT AT ALL
2. FEELING DOWN, DEPRESSED OR HOPELESS: NOT AT ALL
SUM OF ALL RESPONSES TO PHQ QUESTIONS 1-9: 0
8. MOVING OR SPEAKING SO SLOWLY THAT OTHER PEOPLE COULD HAVE NOTICED. OR THE OPPOSITE, BEING SO FIGETY OR RESTLESS THAT YOU HAVE BEEN MOVING AROUND A LOT MORE THAN USUAL: NOT AT ALL
3. TROUBLE FALLING OR STAYING ASLEEP OR SLEEPING TOO MUCH: NOT AT ALL
6. FEELING BAD ABOUT YOURSELF - OR THAT YOU ARE A FAILURE OR HAVE LET YOURSELF OR YOUR FAMILY DOWN: NOT AT ALL
9. THOUGHTS THAT YOU WOULD BE BETTER OFF DEAD, OR OF HURTING YOURSELF: NOT AT ALL

## 2024-03-04 NOTE — PROGRESS NOTES
"  Cardiac Rehabilitation Initial Treatment Plan    Name: Dottie Fragoso  Medical Record Number: [id number]  YOB: 1955  Age: 68 y.o.    Today’s Date: 3/4/2024  Primary Care Physician: Jose Garcia MD  Referring Physician: Oscar Meyer MD  Program Location: [facility]  Primary Diagnosis:   1. S/p TAVR (transcatheter aortic valve replacement), bioprosthetic  Referral to Cardiac Rehab         Onset/Date of Diagnosis: 1 yr ago    Initial Assessment, not yet started program.    AACVPR Risk Stratification: Moderate     Falls Risk: Medium  Psychosocial Assessment     Pre PHQ-9: 1      Sent PH-Q 9 to MD if score > 20: No; score < 20    Pt reported/currently experiencing stress: No  Patient uses stress management skills: Yes   HCurrently seeing a mental health provider: No  Social Support: Yes, Whom: sister  Quality of Life Survey: SF-36   SF-36 Pre Post   Physical Component Score TBD TBD   Mental Component Score TBD TBD     Learning Assessment:  Learning assessment/barriers: None  Preferred learning method: Visual  Barriers: None  Comments:    Stages of Change:Action    Psychosocial Plan    Goal Status: Initial Assessment; goals not yet started    Psychosocial Goals: Demonstrating proper techniques for stress management and Maintain or lower PH-Q 9 score by discharge    Psychosocial Interventions/Education: To be done in Cardiac Rehab.      Nutrition Assessment:    Hyperlipidemia: Yes     Lipids:   Lab Results   Component Value Date    CHOL 131 01/12/2021    HDL 43.0 01/12/2021    LDLF 75 01/12/2021    TRIG 63 01/12/2021       Current Dietary Guidelines: Low sodium  Barriers to dietary change: no    Diet Habit Survey: Picture Your Plate  Pre:  74%  Post: To be done at discharge.    Diabetes Assessment    Lab Results   Component Value Date    HGBA1C 5.5 11/18/2023       History of Diabetes: No    Weight Management on weight watchers    Height: 154.9 cm (5' 0.98\")  Weight: 105 kg (231 lb 9.6 " oz)  BMI (Calculated): 43.78      Nutrition Plan    Goal Status: Initial Assessment; goals not yet started    Nutrition Goals: Improve Diet Habit Survey score by 5-10 points by discharge, Learn how to read and interpret nutrition labels prior to discharge, and Lose 1lb/week while enrolled in program    Nutrition Interventions/Education:   To be done in Cardiac Rehab.      Exercise Assessment    No  Mode: NA  Frequency: NA  Duration: NA    Exercise Prescription     Exercise Prescription based on: Duke Activity Status Index (DASI)    DASI Score: 20.7   MET Score: 5.3   Frequency:  2 days/week   Mode: NuStep, Arm Ergometer, and Cardiostrider   Duration:  30-40  total aerobic minutes   Intensity: RPE 11-14  Target HR:  To be calculated after 6 attended sessions.  MET Level: 2.1-2.2  Patient wears supplemental O2: No     Modality Workload METs Duration (minutes)   1 Pre-Exercise      2 Arm Ergometer 16 grossman @ lvl 1  2.2 15 :00   3 NuStep 32 grossman @ lvl 2  2.1 15 :00   4 Cardiostrider 40 spm @ lvl 2  2.1 15 :00   6 Post-Exercise        Resistance Training: No   Home Exercise Prescription given: To be given prior to discharge from program.    Exercise Plan    Goal Status: Initial Assessment; goals not yet started    Exercise Goals: Increase exercise MET level by 5-10% each week, Increase total exercise duration to 30-45 minutes, and Initiate strength training 2-3 days a week    Exercise Interventions/Education:   To be done in Cardiac Rehab.      Other Core Components/Risk Factor Assessment:    Medication adherence  Current Medications:   Medication Documentation Review Audit       Reviewed by Mike Veronica RN (Registered Nurse) on 02/29/24 at 1005      Medication Order Taking? Sig Documenting Provider Last Dose Status   acetaminophen (Tylenol) 325 mg tablet [id number] Yes Take 2 tablets (650 mg) by mouth every 6 hours if needed for mild pain (1 - 3), moderate pain (4 - 6), headaches or fever (temp greater than 38.0 C).  Jerrod Jurado, APRN-CNP Taking Active   albuterol (Ventolin HFA) 90 mcg/actuation inhaler [id number] Yes Inhale 2 puffs every 4 hours if needed for wheezing. Jodie Contreras MD Taking Active   Discontinued 02/29/24 1004   esomeprazole (NexIUM) 20 mg DR capsule [id number] Yes Take 1 capsule (20 mg) by mouth once daily in the morning. Take before meals. Do not open capsule. Jodie Contreras MD Taking Active   folic acid (Folvite) 1 mg tablet [id number] Yes Take 1 tablet (1 mg) by mouth once daily. Jodie Contreras MD Taking Active   gabapentin (Neurontin) 600 mg tablet [id number] Yes Take 1 tablet (600 mg) by mouth 2 times a day. Jodie Contreras MD Taking Active   methotrexate (Trexall) 2.5 mg tablet [id number] Yes Take 8 tablets (20 mg total) by mouth 1 (one) time per week.  Follow directions carefully, and ask to explain any part you do not understand. Take exactly as directed. Jodie Contreras MD Taking Active   montelukast (Singulair) 10 mg tablet [id number] Yes Take 1 tablet (10 mg) by mouth once daily at bedtime. Jodie Contreras MD Taking Active   torsemide (Demadex) 20 mg tablet [id number] Yes Take 1 tablet (20 mg) by mouth once a day on Monday, Wednesday, and Friday. As needed Jodie Contreras MD Taking Active   traMADol (Ultram) 50 mg tablet [id number] Yes Take 1 tablet (50 mg) by mouth every 4 hours if needed for severe pain (7 - 10). Jodie Contreras MD Taking Active   warfarin (Coumadin) 5 mg tablet [id number] Yes Take 1.5 tablets (7.5 mg) by mouth once daily. EVERY TUES-THURS-SAT-SUN Jodie Contreras MD Taking Active   warfarin (Coumadin) 5 mg tablet [id number] Yes Take 2 tablets (10 mg) by mouth once a day on Monday, Wednesday, and Friday. TAKES 10MG ONCE DAILY EVERY MON-WED-FRI Jodie Contreras MD Taking Active                                 Medication compliance: Yes   Uses pill box/organizer: No    Carries medication list: Yes     Blood Pressure Management  History  of Hypertension: Yes   Medication Changes: No   Resting BP:  Visit Vitals  /85        Heart Failure Management  Hx of Heart Failure: No    Smoking/Tobacco Assessment  Social History     Tobacco Use   Smoking Status Former    Types: Cigarettes    Quit date:     Years since quittin.1   Smokeless Tobacco Never       Other Core Component Plan    Goal Status: Initial Assessment; goals not yet started    Other Core Component Goals: Verbalize medication usage and drug actions by discharge, Verbalize SL NTG action and proper dosage by discharge, and Achieve resting BP of < 130/80 by discharge    Other Core Component Interventions/Education:   To be done in cardiac rehab      Individual Patient Goals:    Strengthen legs  Increase function    Goal Status: Initial Assessment; goals not yet started    Staff Comments:      Rehab Staff Signature: Mahi Chavira RN

## 2024-03-11 ENCOUNTER — TRANSCRIBE ORDERS (OUTPATIENT)
Dept: CARDIAC REHAB | Facility: HOSPITAL | Age: 69
End: 2024-03-11
Payer: MEDICARE

## 2024-03-11 DIAGNOSIS — Z95.2 S/P AORTIC VALVE REPLACEMENT WITH PROSTHETIC VALVE: Primary | ICD-10-CM

## 2024-03-11 DIAGNOSIS — Z95.2 S/P AORTIC VALVE REPLACEMENT: Primary | ICD-10-CM

## 2024-03-13 ENCOUNTER — CLINICAL SUPPORT (OUTPATIENT)
Dept: CARDIAC REHAB | Facility: HOSPITAL | Age: 69
End: 2024-03-13
Payer: MEDICARE

## 2024-03-13 DIAGNOSIS — Z95.2 S/P AORTIC VALVE REPLACEMENT: ICD-10-CM

## 2024-03-13 DIAGNOSIS — I10 ESSENTIAL (PRIMARY) HYPERTENSION: ICD-10-CM

## 2024-03-13 PROCEDURE — 93798 PHYS/QHP OP CAR RHAB W/ECG: CPT

## 2024-03-13 NOTE — TELEPHONE ENCOUNTER
Patient left voicemail stating that she was switched from Cardizem to Losartan 50 mg every day. She states that her blood pressure has been running 150-160/80's.  She would like to know if the dose needs changed or if she needs another medication.  Please advise.

## 2024-03-14 RX ORDER — LOSARTAN POTASSIUM 100 MG/1
100 TABLET ORAL DAILY
Qty: 90 TABLET | Refills: 3 | Status: SHIPPED | OUTPATIENT
Start: 2024-03-14 | End: 2025-03-14

## 2024-03-14 NOTE — TELEPHONE ENCOUNTER
Per Toshia Mariee M.D. ,  increase Losartan to 100 mg daily.  Call placed to patient and advised.  Patient verbalized understanding.  Rx sent to pharmacy for 100 mg tablet.  She will double up on her current bottle of 50 mg tablets.

## 2024-03-15 ENCOUNTER — CLINICAL SUPPORT (OUTPATIENT)
Dept: CARDIAC REHAB | Facility: HOSPITAL | Age: 69
End: 2024-03-15
Payer: MEDICARE

## 2024-03-15 DIAGNOSIS — Z95.2 S/P AORTIC VALVE REPLACEMENT: ICD-10-CM

## 2024-03-15 PROCEDURE — 93798 PHYS/QHP OP CAR RHAB W/ECG: CPT | Performed by: INTERNAL MEDICINE

## 2024-03-20 ENCOUNTER — APPOINTMENT (OUTPATIENT)
Dept: CARDIAC REHAB | Facility: HOSPITAL | Age: 69
End: 2024-03-20
Payer: MEDICARE

## 2024-03-22 ENCOUNTER — CLINICAL SUPPORT (OUTPATIENT)
Dept: CARDIAC REHAB | Facility: HOSPITAL | Age: 69
End: 2024-03-22
Payer: MEDICARE

## 2024-03-22 DIAGNOSIS — Z95.2 S/P AORTIC VALVE REPLACEMENT: ICD-10-CM

## 2024-03-22 PROCEDURE — 93798 PHYS/QHP OP CAR RHAB W/ECG: CPT | Performed by: INTERNAL MEDICINE

## 2024-03-27 ENCOUNTER — CLINICAL SUPPORT (OUTPATIENT)
Dept: CARDIAC REHAB | Facility: HOSPITAL | Age: 69
End: 2024-03-27
Payer: MEDICARE

## 2024-03-27 DIAGNOSIS — Z95.2 S/P AORTIC VALVE REPLACEMENT: ICD-10-CM

## 2024-03-27 PROCEDURE — 93798 PHYS/QHP OP CAR RHAB W/ECG: CPT | Performed by: INTERNAL MEDICINE

## 2024-03-29 ENCOUNTER — CLINICAL SUPPORT (OUTPATIENT)
Dept: CARDIAC REHAB | Facility: HOSPITAL | Age: 69
End: 2024-03-29
Payer: MEDICARE

## 2024-03-29 DIAGNOSIS — Z95.2 S/P AORTIC VALVE REPLACEMENT: ICD-10-CM

## 2024-03-29 PROCEDURE — 93798 PHYS/QHP OP CAR RHAB W/ECG: CPT | Performed by: INTERNAL MEDICINE

## 2024-04-03 ENCOUNTER — CLINICAL SUPPORT (OUTPATIENT)
Dept: CARDIAC REHAB | Facility: HOSPITAL | Age: 69
End: 2024-04-03
Payer: MEDICARE

## 2024-04-03 DIAGNOSIS — Z95.2 S/P AORTIC VALVE REPLACEMENT: ICD-10-CM

## 2024-04-03 PROCEDURE — 93798 PHYS/QHP OP CAR RHAB W/ECG: CPT

## 2024-04-04 VITALS
WEIGHT: 230.5 LBS | SYSTOLIC BLOOD PRESSURE: 148 MMHG | HEIGHT: 61 IN | BODY MASS INDEX: 43.52 KG/M2 | DIASTOLIC BLOOD PRESSURE: 76 MMHG | OXYGEN SATURATION: 97 %

## 2024-04-04 ASSESSMENT — PATIENT HEALTH QUESTIONNAIRE - PHQ9: SUM OF ALL RESPONSES TO PHQ QUESTIONS 1-9: 1

## 2024-04-04 NOTE — PROGRESS NOTES
Cardiac Rehabilitation 30 Day Reassessment    Name: Dottie Fragoso  Medical Record Number: [id number]  YOB: 1955  Age: 68 y.o.    Today’s Date: 4/4/2024  Primary Care Physician: Jose Garcia MD  Referring Physician: Toshia Mariee MD  Program Location: [facility]  Primary Diagnosis:   1. S/P aortic valve replacement  Follow Up In Cardiac Rehab         Onset/Date of Diagnosis: 1 yr ago    Initial Assessment, not yet started program.    AACVPR Risk Stratification: Moderate     Falls Risk: Medium  Psychosocial Assessment     Pre PHQ-9: 1    Sent PH-Q 9 to MD if score > 20: No; score < 20    Pt reported/currently experiencing stress: No  Patient uses stress management skills: Yes   HCurrently seeing a mental health provider: No  Social Support: Yes, Whom: sister  Quality of Life Survey: SF-36   SF-36 Pre Post   Physical Component Score 33.42 TBD   Mental Component Score 61.79 TBD     Learning Assessment:  Learning assessment/barriers: None  Preferred learning method: Visual  Barriers: None  Comments:    Stages of Change:Action    Psychosocial Plan    Goal Status: Initial Assessment; goals not yet started    Psychosocial Goals: Demonstrating proper techniques for stress management and Maintain or lower PH-Q 9 score by discharge    Psychosocial Interventions/Education:  Patient reports no new stressors at this time. Patient is able to see the program counselor at any given time upon request while enrolled in the program. Encouraged to attend the stress management education.       Nutrition Assessment:    Hyperlipidemia: Yes     Lipids:   Lab Results   Component Value Date    CHOL 131 01/12/2021    HDL 43.0 01/12/2021    LDLF 75 01/12/2021    TRIG 63 01/12/2021       Current Dietary Guidelines: Low sodium  Barriers to dietary change: no    Diet Habit Survey: Picture Your Plate  Pre:  74%  Post: To be done at discharge.    Diabetes Assessment    Lab Results   Component Value Date    HGBA1C  "5.5 11/18/2023       History of Diabetes: No    Weight Management on weight watchers    Height: 154.9 cm (5' 0.98\")  Weight: 105 kg (230 lb 8 oz)  BMI (Calculated): 43.58      Nutrition Plan    Goal Status: In progress    Nutrition Goals: Improve Diet Habit Survey score by 5-10 points by discharge, Learn how to read and interpret nutrition labels prior to discharge, and Lose 1lb/week while enrolled in program    Nutrition Interventions/Education:   Patient attended class with the dietitian, discussed food labels, portions, eating out, BMI, waist circumference, and calorie in and calorie out. Patient discussed nutritional topics during the knowledge quiz review.       Exercise Assessment    No  Mode: NA  Frequency: NA  Duration: NA    Exercise Prescription     Exercise Prescription based on: Duke Activity Status Index (DASI)    DASI Score:     MET Score:     Frequency:  2 days/week   Mode: NuStep, Arm Ergometer, and Cardiostrider   Duration:  30-40  total aerobic minutes   Intensity: RPE 11-14  Target HR:   107-117  MET Level: 2.1-2.3  Patient wears supplemental O2: No     Modality Workload METs Duration (minutes)   1 Pre-Exercise      2 Arm Ergometer 16 grossman 2.2 15 :00   3 NuStep 46 grossman @ lvl 3 2.3 15 :00   4 Cardiostrider 40 spm @ lvl 2  2.1 15 :00   6 Post-Exercise        Resistance Training: No   Home Exercise Prescription given: To be given prior to discharge from program.    Exercise Plan    Goal Status: Initial Assessment; goals not yet started    Exercise Goals: Increase exercise MET level by 5-10% each week, Increase total exercise duration to 30-45 minutes, and Initiate strength training 2-3 days a week    Exercise Interventions/Education:   Patient has been exercising for 30 minute without complaint. Patient is increasing workloads as tolerated. Will continue to increase as tolerated.       Other Core Components/Risk Factor Assessment:    Medication adherence  Current Medications:   Medication " Documentation Review Audit       Reviewed by Mike Veronica RN (Registered Nurse) on 02/29/24 at 1005      Medication Order Taking? Sig Documenting Provider Last Dose Status   acetaminophen (Tylenol) 325 mg tablet [id number] Yes Take 2 tablets (650 mg) by mouth every 6 hours if needed for mild pain (1 - 3), moderate pain (4 - 6), headaches or fever (temp greater than 38.0 C). Jerrod Jurado APRN-CNP Taking Active   albuterol (Ventolin HFA) 90 mcg/actuation inhaler [id number] Yes Inhale 2 puffs every 4 hours if needed for wheezing. Historical MD Ben Taking Active   Discontinued 02/29/24 1004   esomeprazole (NexIUM) 20 mg DR capsule [id number] Yes Take 1 capsule (20 mg) by mouth once daily in the morning. Take before meals. Do not open capsule. Jodie Contreras MD Taking Active   folic acid (Folvite) 1 mg tablet [id number] Yes Take 1 tablet (1 mg) by mouth once daily. Historical Provider, MD Taking Active   gabapentin (Neurontin) 600 mg tablet [id number] Yes Take 1 tablet (600 mg) by mouth 2 times a day. Historical Provider, MD Taking Active   methotrexate (Trexall) 2.5 mg tablet [id number] Yes Take 8 tablets (20 mg total) by mouth 1 (one) time per week.  Follow directions carefully, and ask to explain any part you do not understand. Take exactly as directed. Historical MD Ben Taking Active   montelukast (Singulair) 10 mg tablet [id number] Yes Take 1 tablet (10 mg) by mouth once daily at bedtime. Historical Provider, MD Taking Active   torsemide (Demadex) 20 mg tablet [id number] Yes Take 1 tablet (20 mg) by mouth once a day on Monday, Wednesday, and Friday. As needed Jodie Contreras MD Taking Active   traMADol (Ultram) 50 mg tablet [id number] Yes Take 1 tablet (50 mg) by mouth every 4 hours if needed for severe pain (7 - 10). Jodie Contreras MD Taking Active   warfarin (Coumadin) 5 mg tablet [id number] Yes Take 1.5 tablets (7.5 mg) by mouth once daily. EVERY TUES-THURS-SAT-SUN Jodie Contreras  MD Taking Active   warfarin (Coumadin) 5 mg tablet [id number] Yes Take 2 tablets (10 mg) by mouth once a day on Monday, Wednesday, and Friday. TAKES 10MG ONCE DAILY EVERY MON-WED-FRI Historical Provider, MD Taking Active                                 Medication compliance: Yes   Uses pill box/organizer: No    Carries medication list: Yes     Blood Pressure Management  History of Hypertension: Yes   Medication Changes: No   Resting BP:  Visit Vitals  /76          Heart Failure Management  Hx of Heart Failure: No    Smoking/Tobacco Assessment  Social History     Tobacco Use   Smoking Status Former    Types: Cigarettes    Quit date:     Years since quittin.2   Smokeless Tobacco Never       Other Core Component Plan    Goal Status: Initial Assessment; goals not yet started    Other Core Component Goals: Verbalize medication usage and drug actions by discharge, Verbalize SL NTG action and proper dosage by discharge, and Achieve resting BP of < 130/80 by discharge    Other Core Component Interventions/Education:   Patient's BP has been slightly elevated at rehab, no symptoms have been reported. Patient attended class on medications, discussed proper dosage, usage, and the importance of taking prescribed mediations. Patient reviewed the knowledge quiz as an educational piece, discussed various topics and reviewed the answers.       Individual Patient Goals:    Strengthen legs  Increase function    Goal Status: In progress    Staff Comments:  Patient reports no angina with exercise. Patient has attended 6 sessions of cardiac rehab. She is exercising 30-40 minutes between the nustep and arm ergometer without complaint. Patient has been consistent to rehab and is attended educational classes. Patient's BP has bee slightly elevated at rehab, she states she has no symptoms and is taking her medications. Will continue to increase workloads as tolerated. Will continue with education and exercise.      EDUCATION  Food labels  BMI  Medications  Knowledge Quiz Reviewed    Rehab Staff Signature: JASON FINNEGAN

## 2024-04-10 ENCOUNTER — CLINICAL SUPPORT (OUTPATIENT)
Dept: CARDIAC REHAB | Facility: HOSPITAL | Age: 69
End: 2024-04-10
Payer: MEDICARE

## 2024-04-10 DIAGNOSIS — Z95.2 S/P AORTIC VALVE REPLACEMENT: ICD-10-CM

## 2024-04-10 PROCEDURE — 93798 PHYS/QHP OP CAR RHAB W/ECG: CPT | Performed by: INTERNAL MEDICINE

## 2024-04-12 ENCOUNTER — CLINICAL SUPPORT (OUTPATIENT)
Dept: CARDIAC REHAB | Facility: HOSPITAL | Age: 69
End: 2024-04-12
Payer: MEDICARE

## 2024-04-12 DIAGNOSIS — Z95.2 S/P AORTIC VALVE REPLACEMENT: ICD-10-CM

## 2024-04-12 PROCEDURE — 93798 PHYS/QHP OP CAR RHAB W/ECG: CPT | Performed by: INTERNAL MEDICINE

## 2024-04-17 ENCOUNTER — CLINICAL SUPPORT (OUTPATIENT)
Dept: CARDIAC REHAB | Facility: HOSPITAL | Age: 69
End: 2024-04-17
Payer: MEDICARE

## 2024-04-17 DIAGNOSIS — Z95.2 S/P AORTIC VALVE REPLACEMENT: ICD-10-CM

## 2024-04-17 PROCEDURE — 93798 PHYS/QHP OP CAR RHAB W/ECG: CPT | Performed by: INTERNAL MEDICINE

## 2024-04-19 ENCOUNTER — CLINICAL SUPPORT (OUTPATIENT)
Dept: CARDIAC REHAB | Facility: HOSPITAL | Age: 69
End: 2024-04-19
Payer: MEDICARE

## 2024-04-19 DIAGNOSIS — Z95.2 S/P AORTIC VALVE REPLACEMENT: ICD-10-CM

## 2024-04-19 PROCEDURE — 93798 PHYS/QHP OP CAR RHAB W/ECG: CPT | Performed by: INTERNAL MEDICINE

## 2024-04-24 ENCOUNTER — CLINICAL SUPPORT (OUTPATIENT)
Dept: CARDIAC REHAB | Facility: HOSPITAL | Age: 69
End: 2024-04-24
Payer: MEDICARE

## 2024-04-24 DIAGNOSIS — Z95.2 S/P AORTIC VALVE REPLACEMENT: ICD-10-CM

## 2024-04-24 PROCEDURE — 93798 PHYS/QHP OP CAR RHAB W/ECG: CPT | Performed by: INTERNAL MEDICINE

## 2024-04-26 ENCOUNTER — CLINICAL SUPPORT (OUTPATIENT)
Dept: CARDIAC REHAB | Facility: HOSPITAL | Age: 69
End: 2024-04-26
Payer: MEDICARE

## 2024-04-26 DIAGNOSIS — Z95.2 S/P AORTIC VALVE REPLACEMENT: ICD-10-CM

## 2024-04-26 PROCEDURE — 93798 PHYS/QHP OP CAR RHAB W/ECG: CPT | Performed by: INTERNAL MEDICINE

## 2024-04-26 NOTE — PROGRESS NOTES
Cardiac Rehabilitation 60 Day Reassessment  Name: Dottie Fragoso  Medical Record Number: [id number]  YOB: 1955  Age: 68 y.o.    Today’s Date: 4/26/2024  Primary Care Physician: Jose Garcia MD  Referring Physician: Toshia Mariee MD  Program Location: [facility]  Primary Diagnosis:   1. S/P aortic valve replacement  Follow Up In Cardiac Rehab         Onset/Date of Diagnosis: 1 yr ago        AACVPR Risk Stratification: Moderate       Falls Risk: Medium  Psychosocial Assessment     Initial PHQ-9- 1    Sent PH-Q 9 to MD if score > 20: No; score < 20    Pt reported/currently experiencing stress: No  Patient uses stress management skills: Yes   HCurrently seeing a mental health provider: No  Social Support: Yes, Whom: sister  Quality of Life Survey: SF-36   SF-36 Pre Post   Physical Component Score 33.42 TBD   Mental Component Score 61.79 TBD     Learning Assessment:  Learning assessment/barriers: None  Preferred learning method: Visual  Barriers: None  Comments:    Stages of Change:Action    Psychosocial Plan    Goal Status: In progress    Psychosocial Goals: Demonstrating proper techniques for stress management and Maintain or lower PH-Q 9 score by discharge    Psychosocial Interventions/Education:   *Patient reports no change in psychosocial status.  Patient engaging with staff and peers during class.    Nutrition Assessment:    Hyperlipidemia: Yes     Lipids:   Lab Results   Component Value Date    CHOL 131 01/12/2021    HDL 43.0 01/12/2021    LDLF 75 01/12/2021    TRIG 63 01/12/2021       Current Dietary Guidelines: Low sodium  Barriers to dietary change: no    Diet Habit Survey: Picture Your Plate  Pre:  74%  Post: To be done at discharge.    Diabetes Assessment    Lab Results   Component Value Date    HGBA1C 5.5 11/18/2023       History of Diabetes: No    Weight Management on weight watchers  233.4lbs           Nutrition Plan    Goal Status: In progress    Nutrition Goals:  Improve Diet Habit Survey score by 5-10 points by discharge, Learn how to read and interpret nutrition labels prior to discharge, and Lose 1lb/week while enrolled in program    Nutrition Interventions/Education:   Patient discussed low sodium diet with the CHF navigator.  Patient attended class on cholesterol and discussed ways to improve cholesterol with dietary changes.  Patient scheduled to meet with dietician individually.  Exercise Assessment    No  Mode: NA  Frequency: NA  Duration: NA    Exercise Prescription     Exercise Prescription based on: Duke Activity Status Index (DASI)    DASI Score:     MET Score:     Frequency:  2-3 days/week   Mode: Nustep, Arm Ergometer   Duration:  30-40  total aerobic minutes   Intensity: RPE 11-14  Target HR:   102-112  MET Level: 2.2-2.6  Patient wears supplemental O2: No     Modality Workload METs Duration (minutes)   1 Pre-Exercise   5:00   2 Arm Ergometer 16 grossman 2.2 10 :00   3 NuStep 62 grossman @ lvl 4 2.6 30 :00   4 Education   10:00   6 Post-Exercise   5:00     Resistance Training: No   Home Exercise Prescription given: To be given prior to discharge from program.    Exercise Plan    Goal Status: In progress    Exercise Goals: Increase exercise MET level by 5-10% each week, Increase total exercise duration to 30-45 minutes, and Initiate strength training 2-3 days a week    Exercise Interventions/Education:   Patient is exercising 30-45 minute duration between equipment.  Patient is increasing METS 5-10% each week or as tolerated.  Continued review of equipment use.  *Patient attended class on weight training with the EP and discussed exercise guidelines, proper technique, and safety considerations.    Other Core Components/Risk Factor Assessment:    Medication adherence  Current Medications:   Medication Documentation Review Audit       Reviewed by Mike Veronica RN (Registered Nurse) on 02/29/24 at 1005      Medication Order Taking? Sig Documenting Provider Last Dose  Status   acetaminophen (Tylenol) 325 mg tablet [id number] Yes Take 2 tablets (650 mg) by mouth every 6 hours if needed for mild pain (1 - 3), moderate pain (4 - 6), headaches or fever (temp greater than 38.0 C). Jerrod Jurado, APRN-CNP Taking Active   albuterol (Ventolin HFA) 90 mcg/actuation inhaler [id number] Yes Inhale 2 puffs every 4 hours if needed for wheezing. Historical MD Ben Taking Active   Discontinued 02/29/24 1004   esomeprazole (NexIUM) 20 mg DR capsule [id number] Yes Take 1 capsule (20 mg) by mouth once daily in the morning. Take before meals. Do not open capsule. Jodie Contreras MD Taking Active   folic acid (Folvite) 1 mg tablet [id number] Yes Take 1 tablet (1 mg) by mouth once daily. Jodie Contreras MD Taking Active   gabapentin (Neurontin) 600 mg tablet [id number] Yes Take 1 tablet (600 mg) by mouth 2 times a day. Jodie Contreras MD Taking Active   methotrexate (Trexall) 2.5 mg tablet [id number] Yes Take 8 tablets (20 mg total) by mouth 1 (one) time per week.  Follow directions carefully, and ask to explain any part you do not understand. Take exactly as directed. Jodie Contreras MD Taking Active   montelukast (Singulair) 10 mg tablet [id number] Yes Take 1 tablet (10 mg) by mouth once daily at bedtime. Jodie Contreras MD Taking Active   torsemide (Demadex) 20 mg tablet [id number] Yes Take 1 tablet (20 mg) by mouth once a day on Monday, Wednesday, and Friday. As needed Jodie Contreras MD Taking Active   traMADol (Ultram) 50 mg tablet [id number] Yes Take 1 tablet (50 mg) by mouth every 4 hours if needed for severe pain (7 - 10). Jodie Contreras MD Taking Active   warfarin (Coumadin) 5 mg tablet [id number] Yes Take 1.5 tablets (7.5 mg) by mouth once daily. EVERY TUES-THURS-SAT-SUN Jodie Contreras MD Taking Active   warfarin (Coumadin) 5 mg tablet [id number] Yes Take 2 tablets (10 mg) by mouth once a day on Monday, Wednesday, and Friday. TAKES 10MG ONCE DAILY  EVERY MON-WED-FRI Historical Provider, MD Taking Active                                 Medication compliance: Yes   Uses pill box/organizer: No    Carries medication list: Yes     Blood Pressure Management  History of Hypertension: Yes   Medication Changes: No   Resting BP:  144/70         Heart Failure Management  Hx of Heart Failure: No      Smoking/Tobacco Assessment  Social History     Tobacco Use   Smoking Status Former    Current packs/day: 0.00    Types: Cigarettes    Quit date:     Years since quittin.3   Smokeless Tobacco Never       Other Core Component Plan    Goal Status: In progress    Other Core Component Goals: Verbalize medication usage and drug actions by discharge, Verbalize SL NTG action and proper dosage by discharge, and Achieve resting BP of < 130/80 by discharge    Other Core Component Interventions/Education:   Monitoring patients BP, HR, and Spo2 at each session.  Patient attended class on CHF with the CHF navigator and discussed signs and symptoms of fluid overload,  low sodium diet , and notifying the Doctor.  Discussed Obtaining Daily weights.      Individual Patient Goals:    Strengthen legs  Increase function    Goal Status: In progress    Staff Comments:  Patient reports no angina with exercise. Patient has attended 11 sessions of cardiac rehab. She is exercising 40 minutes between the nustep and arm ergometer without complaint.  Patient's BP remains slightly elevated at rehab, she states she has no symptoms and is taking her medications. Patients weight has increased 3.4lbs from last session. She reported today on 24 that she is feeling slightly swollen from the weight gain and is going to take her PRN demadex 20mg when she gets home. No other symptoms reported.  Patient attended CHF class on 4/10/24 and was given low sodium diet handout and red light green light CHF handout.    EDUCATION  Food labels  BMI  Medications  Knowledge Quiz Reviewed  Weight Training    Cholesterol  CHF    Rehab Staff Signature: Lakshmi Ray RN

## 2024-04-29 ENCOUNTER — TELEPHONE (OUTPATIENT)
Dept: CARDIOLOGY | Facility: CLINIC | Age: 69
End: 2024-04-29
Payer: MEDICARE

## 2024-04-29 NOTE — TELEPHONE ENCOUNTER
Patient last seen with Dr. Toshia Mariee MD F[facility] on 2/29/24:  Dottie Fragoso is a 68 y.o. year old female patient with history of hypertension status post recent TAVR.  Her Cardizem was decreased and she is off losartan but the blood pressure today was 148.  I discussed with the patient in great length that we need to readjust her antihypertensive medication.  Will stop Cardizem and go back on losartan 50 mg daily and check blood pressure report back to me.  We will monitor her valve post TAVR.  Will call for any problem and follow-up as scheduled.     Will route to Dr. Toshia Mariee MD FACC for his review.

## 2024-04-29 NOTE — TELEPHONE ENCOUNTER
Patient called and left a voicemail that she recently saw Dr. Garcia. In his office her BP was 160's/90's. He started her on Lopressor in addition to her Losartan. She is asking if this is okay with Dr. Mariee.

## 2024-04-30 RX ORDER — METOPROLOL SUCCINATE 25 MG/1
25 TABLET, EXTENDED RELEASE ORAL DAILY
COMMUNITY

## 2024-04-30 NOTE — TELEPHONE ENCOUNTER
Patient advised with verbal understanding. Denies questions.     Updated medlist to Lopressor 25mg once daily.

## 2024-05-01 ENCOUNTER — CLINICAL SUPPORT (OUTPATIENT)
Dept: CARDIAC REHAB | Facility: HOSPITAL | Age: 69
End: 2024-05-01
Payer: MEDICARE

## 2024-05-01 DIAGNOSIS — Z95.2 S/P AORTIC VALVE REPLACEMENT: ICD-10-CM

## 2024-05-01 PROCEDURE — 93798 PHYS/QHP OP CAR RHAB W/ECG: CPT | Performed by: INTERNAL MEDICINE

## 2024-05-03 ENCOUNTER — CLINICAL SUPPORT (OUTPATIENT)
Dept: CARDIAC REHAB | Facility: HOSPITAL | Age: 69
End: 2024-05-03
Payer: MEDICARE

## 2024-05-03 DIAGNOSIS — Z95.2 S/P AORTIC VALVE REPLACEMENT: ICD-10-CM

## 2024-05-03 PROCEDURE — 93798 PHYS/QHP OP CAR RHAB W/ECG: CPT | Performed by: INTERNAL MEDICINE

## 2024-05-08 ENCOUNTER — CLINICAL SUPPORT (OUTPATIENT)
Dept: CARDIAC REHAB | Facility: HOSPITAL | Age: 69
End: 2024-05-08
Payer: MEDICARE

## 2024-05-08 DIAGNOSIS — Z95.2 S/P AORTIC VALVE REPLACEMENT: ICD-10-CM

## 2024-05-08 PROCEDURE — 93798 PHYS/QHP OP CAR RHAB W/ECG: CPT | Performed by: INTERNAL MEDICINE

## 2024-05-10 ENCOUNTER — CLINICAL SUPPORT (OUTPATIENT)
Dept: CARDIAC REHAB | Facility: HOSPITAL | Age: 69
End: 2024-05-10
Payer: MEDICARE

## 2024-05-10 DIAGNOSIS — Z95.2 S/P AORTIC VALVE REPLACEMENT: ICD-10-CM

## 2024-05-10 PROCEDURE — 93798 PHYS/QHP OP CAR RHAB W/ECG: CPT | Performed by: INTERNAL MEDICINE

## 2024-05-15 ENCOUNTER — CLINICAL SUPPORT (OUTPATIENT)
Dept: CARDIAC REHAB | Facility: HOSPITAL | Age: 69
End: 2024-05-15
Payer: MEDICARE

## 2024-05-15 DIAGNOSIS — Z95.2 S/P AORTIC VALVE REPLACEMENT: ICD-10-CM

## 2024-05-15 PROCEDURE — 93798 PHYS/QHP OP CAR RHAB W/ECG: CPT | Performed by: INTERNAL MEDICINE

## 2024-05-17 ENCOUNTER — CLINICAL SUPPORT (OUTPATIENT)
Dept: CARDIAC REHAB | Facility: HOSPITAL | Age: 69
End: 2024-05-17
Payer: MEDICARE

## 2024-05-17 DIAGNOSIS — Z95.2 S/P AORTIC VALVE REPLACEMENT: ICD-10-CM

## 2024-05-17 PROCEDURE — 93798 PHYS/QHP OP CAR RHAB W/ECG: CPT | Performed by: INTERNAL MEDICINE

## 2024-05-22 ENCOUNTER — CLINICAL SUPPORT (OUTPATIENT)
Dept: CARDIAC REHAB | Facility: HOSPITAL | Age: 69
End: 2024-05-22
Payer: MEDICARE

## 2024-05-22 DIAGNOSIS — Z95.2 S/P AORTIC VALVE REPLACEMENT: ICD-10-CM

## 2024-05-22 PROCEDURE — 93798 PHYS/QHP OP CAR RHAB W/ECG: CPT | Performed by: INTERNAL MEDICINE

## 2024-05-23 VITALS
OXYGEN SATURATION: 93 % | WEIGHT: 233.1 LBS | BODY MASS INDEX: 44.01 KG/M2 | SYSTOLIC BLOOD PRESSURE: 146 MMHG | DIASTOLIC BLOOD PRESSURE: 85 MMHG | HEIGHT: 61 IN

## 2024-05-23 ASSESSMENT — DUKE ACTIVITY SCORE INDEX (DASI)
CAN YOU DO HEAVY WORK AROUND THE HOUSE LIKE SCRUBBING FLOORS OR LIFTING AND MOVING HEAVY FURNITURE: NO
CAN YOU PARTICIPATE IN STRENOUS SPORTS LIKE SWIMMING, SINGLES TENNIS, FOOTBALL, BASKETBALL, OR SKIING: NO
CAN YOU WALK A BLOCK OR TWO ON LEVEL GROUND: NO
CAN YOU HAVE SEXUAL RELATIONS: NO
CAN YOU DO LIGHT WORK AROUND THE HOUSE LIKE DUSTING OR WASHING DISHES: YES
CAN YOU DO MODERATE WORK AROUND THE HOUSE LIKE VACUUMING, SWEEPING FLOORS OR CARRYING GROCERIES: YES
CAN YOU WALK INDOORS, SUCH AS AROUND YOUR HOUSE: YES
CAN YOU CLIMB A FLIGHT OF STAIRS OR WALK UP A HILL: YES
CAN YOU TAKE CARE OF YOURSELF (EAT, DRESS, BATHE, OR USE TOILET): YES
DASI METS SCORE: 5.3
TOTAL_SCORE: 20.7
CAN YOU PARTICIPATE IN MODERATE RECREATIONAL ACTIVITIES LIKE GOLF, BOWLING, DANCING, DOUBLES TENNIS OR THROWING A BASEBALL OR FOOTBALL: NO
CAN YOU RUN A SHORT DISTANCE: NO
CAN YOU DO YARD WORK LIKE RAKING LEAVES, WEEDING OR PUSHING A MOWER: YES

## 2024-05-23 ASSESSMENT — PATIENT HEALTH QUESTIONNAIRE - PHQ9: SUM OF ALL RESPONSES TO PHQ QUESTIONS 1-9: 1

## 2024-05-23 NOTE — PROGRESS NOTES
Cardiac Rehabilitation 90 Day Reassessment  Name: Dottie Fragoso  Medical Record Number: [id number]  YOB: 1955  Age: 68 y.o.    Today’s Date: 5/23/2024  Primary Care Physician: Jose Garcia MD  Referring Physician: Toshia Mariee MD  Program Location: [facility]  Primary Diagnosis:   1. S/P aortic valve replacement  Follow Up In Cardiac Rehab         Onset/Date of Diagnosis: 1 yr ago        AACVPR Risk Stratification: Moderate Moderate     Falls Risk: Medium  Psychosocial Assessment     Initial PHQ-9- 1    Sent PH-Q 9 to MD if score > 20: No; score < 20    Pt reported/currently experiencing stress: No  Patient uses stress management skills: Yes   HCurrently seeing a mental health provider: No  Social Support: Yes, Whom: sister  Quality of Life Survey: SF-36   SF-36 Pre Post   Physical Component Score 33.42 TBD   Mental Component Score 61.79 TBD     Learning Assessment:  Learning assessment/barriers: None  Preferred learning method: Visual  Barriers: None  Comments:    Stages of Change:Action    Psychosocial Plan    Goal Status: In progress    Psychosocial Goals: Demonstrating proper techniques for stress management and Maintain or lower PH-Q 9 score by discharge    Psychosocial Interventions/Education:   Patient reports no change in psychosocial status.  Patient engaging with staff and peers during class.  Patient attended class on stress management, discussed signs and symptoms of depression, link to heart disease, coping techniques, relaxation techniques, and community resources.   Discussed how lifestyle changes can benefit mental health during the behavior change class.     Nutrition Assessment:    Hyperlipidemia: Yes     Lipids:   Lab Results   Component Value Date    CHOL 131 01/12/2021    HDL 43.0 01/12/2021    LDLF 75 01/12/2021    TRIG 63 01/12/2021       Current Dietary Guidelines: Low sodium  Barriers to dietary change: no    Diet Habit Survey: Picture Your Plate  Pre:   "74%  Post: To be done at discharge.    Diabetes Assessment    Lab Results   Component Value Date    HGBA1C 5.5 11/18/2023       History of Diabetes: No    Weight Management on weight watchers  Height: 154.9 cm (5' 0.98\")  Weight: 106 kg (233 lb 1.6 oz)  BMI (Calculated): 44.07      Nutrition Plan    Goal Status: In progress    Nutrition Goals: Improve Diet Habit Survey score by 5-10 points by discharge, Learn how to read and interpret nutrition labels prior to discharge, and Lose 1lb/week while enrolled in program    Nutrition Interventions/Education:   Patient attended class with the dietitian, discussed heart healthy eating tips, sodium intake tips, and general nutrition.   Discussed low sodium and DASH diet during the hypertension class.   Patient is obtaining weight at rehab weekly    Exercise Assessment    No  Mode: NA  Frequency: NA  Duration: NA    Exercise Prescription     Exercise Prescription based on: Duke Activity Status Index (DASI) and Patient History    DASI Score: 20.7   MET Score: 5.3   Frequency:  2-3 days/week   Mode: Nustep, Arm Ergometer   Duration:  30-40  total aerobic minutes   Intensity: RPE 11-14  Target HR:   102-112  MET Level: 2.2-2.6  Patient wears supplemental O2: No     Modality Workload METs Duration (minutes)   1 Pre-Exercise   5:00   2 Arm Ergometer 16 grossman 2.2 10 :00   3 NuStep 62 grossman @ lvl 4 2.6 30 :00   4 Education   10:00   6 Post-Exercise   5:00     Resistance Training: No   Home Exercise Prescription given: To be given prior to discharge from program.    Exercise Plan    Goal Status: In progress    Exercise Goals: Increase exercise MET level by 5-10% each week, Increase total exercise duration to 30-45 minutes, and Initiate strength training 2-3 days a week    Exercise Interventions/Education:   Patient is exercising 30-45 minute duration between equipment.  Patient is increasing on equipment as tolerated.  Will start to incorporate weight training if tolerated during next " session.     Other Core Components/Risk Factor Assessment:    Medication adherence  Current Medications:   Medication Documentation Review Audit       Reviewed by Mike Veronica RN (Registered Nurse) on 02/29/24 at 1005      Medication Order Taking? Sig Documenting Provider Last Dose Status   acetaminophen (Tylenol) 325 mg tablet [id number] Yes Take 2 tablets (650 mg) by mouth every 6 hours if needed for mild pain (1 - 3), moderate pain (4 - 6), headaches or fever (temp greater than 38.0 C). Jerrod Jurado APRN-CNP Taking Active   albuterol (Ventolin HFA) 90 mcg/actuation inhaler [id number] Yes Inhale 2 puffs every 4 hours if needed for wheezing. Historical Provider, MD Taking Active   Discontinued 02/29/24 1004   esomeprazole (NexIUM) 20 mg DR capsule [id number] Yes Take 1 capsule (20 mg) by mouth once daily in the morning. Take before meals. Do not open capsule. Historical Provider, MD Taking Active   folic acid (Folvite) 1 mg tablet [id number] Yes Take 1 tablet (1 mg) by mouth once daily. Historical Provider, MD Taking Active   gabapentin (Neurontin) 600 mg tablet [id number] Yes Take 1 tablet (600 mg) by mouth 2 times a day. Historical Provider, MD Taking Active   methotrexate (Trexall) 2.5 mg tablet [id number] Yes Take 8 tablets (20 mg total) by mouth 1 (one) time per week.  Follow directions carefully, and ask to explain any part you do not understand. Take exactly as directed. Historical Provider, MD Taking Active   montelukast (Singulair) 10 mg tablet [id number] Yes Take 1 tablet (10 mg) by mouth once daily at bedtime. Historical Provider, MD Taking Active   torsemide (Demadex) 20 mg tablet [id number] Yes Take 1 tablet (20 mg) by mouth once a day on Monday, Wednesday, and Friday. As needed Historical MD Ben Taking Active   traMADol (Ultram) 50 mg tablet [id number] Yes Take 1 tablet (50 mg) by mouth every 4 hours if needed for severe pain (7 - 10). Historical Provider, MD Taking Active   warfarin (Coumadin)  5 mg tablet [id number] Yes Take 1.5 tablets (7.5 mg) by mouth once daily. EVERY TUES-THURS-SAT-SUN Historical Provider, MD Taking Active   warfarin (Coumadin) 5 mg tablet [id number] Yes Take 2 tablets (10 mg) by mouth once a day on Monday, Wednesday, and Friday. TAKES 10MG ONCE DAILY EVERY MON- Historical Provider, MD Taking Active                                 Medication compliance: Yes   Uses pill box/organizer: No    Carries medication list: Yes     Blood Pressure Management  History of Hypertension: Yes   Medication Changes: No   Resting BP:  146/85         Heart Failure Management  Hx of Heart Failure: No      Smoking/Tobacco Assessment  Social History     Tobacco Use   Smoking Status Former    Current packs/day: 0.00    Types: Cigarettes    Quit date:     Years since quittin.4   Smokeless Tobacco Never       Other Core Component Plan    Goal Status: In progress    Other Core Component Goals: Verbalize medication usage and drug actions by discharge, Verbalize SL NTG action and proper dosage by discharge, and Achieve resting BP of < 130/80 by discharge    Other Core Component Interventions/Education:   Monitoring patients BP, HR, and Spo2 at each session.  Patient attended class on behavior change, discussed the benefits of making lifestyle changes, tips on sticking with goals, and ways to make lifestyle changes.       Individual Patient Goals:    Strengthen legs  Increase function    Goal Status: In progress    Staff Comments:  Patient reports no angina with exercise. Patient has attended 19 sessions of cardiac rehab. She is exercising 40 minutes between the nustep and arm ergometer without complaint. Patient's BP remains slightly elevated at rehab, she states she has no symptoms and is taking her medications.  Patient has been attending class and education consistently. Patient is engaging in class with staff and classmates. Patient reports no symptoms during exercise. Will start to encourage  patient to start resistance training. Will continue with exercise and education as tolerated.     EDUCATION  Food labels  BMI  Medications  Knowledge Quiz Reviewed  Weight Training   Cholesterol  CHF  Group diet  HTN  Stress  Behavior change    Rehab Staff Signature: JASON FINNEGAN

## 2024-05-24 ENCOUNTER — CLINICAL SUPPORT (OUTPATIENT)
Dept: CARDIAC REHAB | Facility: HOSPITAL | Age: 69
End: 2024-05-24
Payer: MEDICARE

## 2024-05-24 DIAGNOSIS — Z95.2 S/P AORTIC VALVE REPLACEMENT: ICD-10-CM

## 2024-05-24 PROCEDURE — 93798 PHYS/QHP OP CAR RHAB W/ECG: CPT | Performed by: INTERNAL MEDICINE

## 2024-05-29 ENCOUNTER — CLINICAL SUPPORT (OUTPATIENT)
Dept: CARDIAC REHAB | Facility: HOSPITAL | Age: 69
End: 2024-05-29
Payer: MEDICARE

## 2024-05-29 DIAGNOSIS — Z95.2 S/P AORTIC VALVE REPLACEMENT: ICD-10-CM

## 2024-05-29 PROCEDURE — 93798 PHYS/QHP OP CAR RHAB W/ECG: CPT | Performed by: INTERNAL MEDICINE

## 2024-06-05 ENCOUNTER — CLINICAL SUPPORT (OUTPATIENT)
Dept: CARDIAC REHAB | Facility: HOSPITAL | Age: 69
End: 2024-06-05
Payer: MEDICARE

## 2024-06-05 DIAGNOSIS — Z95.2 S/P AORTIC VALVE REPLACEMENT: ICD-10-CM

## 2024-06-05 PROCEDURE — 93798 PHYS/QHP OP CAR RHAB W/ECG: CPT | Performed by: INTERNAL MEDICINE

## 2024-06-07 ENCOUNTER — CLINICAL SUPPORT (OUTPATIENT)
Dept: CARDIAC REHAB | Facility: HOSPITAL | Age: 69
End: 2024-06-07
Payer: MEDICARE

## 2024-06-07 DIAGNOSIS — Z95.2 S/P AORTIC VALVE REPLACEMENT: ICD-10-CM

## 2024-06-07 PROCEDURE — 93798 PHYS/QHP OP CAR RHAB W/ECG: CPT | Performed by: INTERNAL MEDICINE

## 2024-06-12 ENCOUNTER — CLINICAL SUPPORT (OUTPATIENT)
Dept: CARDIAC REHAB | Facility: HOSPITAL | Age: 69
End: 2024-06-12
Payer: MEDICARE

## 2024-06-12 DIAGNOSIS — Z95.2 S/P AORTIC VALVE REPLACEMENT: ICD-10-CM

## 2024-06-12 PROCEDURE — 93798 PHYS/QHP OP CAR RHAB W/ECG: CPT | Performed by: INTERNAL MEDICINE

## 2024-06-14 ENCOUNTER — CLINICAL SUPPORT (OUTPATIENT)
Dept: CARDIAC REHAB | Facility: HOSPITAL | Age: 69
End: 2024-06-14
Payer: MEDICARE

## 2024-06-14 DIAGNOSIS — Z95.2 S/P AORTIC VALVE REPLACEMENT: ICD-10-CM

## 2024-06-14 PROCEDURE — 93798 PHYS/QHP OP CAR RHAB W/ECG: CPT | Performed by: INTERNAL MEDICINE

## 2024-06-19 ENCOUNTER — CLINICAL SUPPORT (OUTPATIENT)
Dept: CARDIAC REHAB | Facility: HOSPITAL | Age: 69
End: 2024-06-19
Payer: MEDICARE

## 2024-06-19 DIAGNOSIS — Z95.2 S/P AORTIC VALVE REPLACEMENT: ICD-10-CM

## 2024-06-19 PROCEDURE — 93798 PHYS/QHP OP CAR RHAB W/ECG: CPT | Performed by: INTERNAL MEDICINE

## 2024-06-19 NOTE — PROGRESS NOTES
Cardiac Rehabilitation 120 Day Reassessment  Name: Dottie Fragoso  Medical Record Number: [id number]  YOB: 1955  Age: 68 y.o.    Today’s Date: 6/19/2024  Primary Care Physician: Jose Garcia MD  Referring Physician: Toshia Mariee MD  Program Location: [facility]  Primary Diagnosis:   1. S/P aortic valve replacement  Follow Up In Cardiac Rehab         Onset/Date of Diagnosis: 1 yr ago        AACVPR Risk Stratification: Moderate       Falls Risk: Medium  Psychosocial Assessment     Initial PHQ-9- 1    Sent PH-Q 9 to MD if score > 20: No; score < 20    Pt reported/currently experiencing stress: No  Patient uses stress management skills: Yes   HCurrently seeing a mental health provider: No  Social Support: Yes, Whom: sister  Quality of Life Survey: SF-36   SF-36 Pre Post   Physical Component Score 33.42 TBD   Mental Component Score 61.79 TBD     Learning Assessment:  Learning assessment/barriers: None  Preferred learning method: Visual  Barriers: None  Comments:    Stages of Change:Action    Psychosocial Plan    Goal Status: In progress    Psychosocial Goals: Demonstrating proper techniques for stress management and Maintain or lower PH-Q 9 score by discharge    Psychosocial Interventions/Education:   Patient reports no change in psychosocial status.  Patient engaging with staff and peers during class.    Nutrition Assessment:    Hyperlipidemia: Yes     Lipids:   Lab Results   Component Value Date    CHOL 131 01/12/2021    HDL 43.0 01/12/2021    LDLF 75 01/12/2021    TRIG 63 01/12/2021       Current Dietary Guidelines: Low sodium  Barriers to dietary change: no    Diet Habit Survey: Picture Your Plate  Pre:  74%  Post: To be done at discharge.    Diabetes Assessment    Lab Results   Component Value Date    HGBA1C 5.5 11/18/2023       History of Diabetes: No    Weight Management on weight watchers     233.9 lbs    Nutrition Plan    Goal Status: In progress    Nutrition Goals: Improve  Diet Habit Survey score by 5-10 points by discharge, Learn how to read and interpret nutrition labels prior to discharge, and Lose 1lb/week while enrolled in program    Nutrition Interventions/Education:   Patient is obtaining weight at rehab weekly  Patient given heart healthy eating tips handout.  Discussed low sodium diet.    Exercise Assessment    No  Mode: NA  Frequency: NA  Duration: NA    Exercise Prescription     Exercise Prescription based on: Duke Activity Status Index (DASI) and Patient History    DASI Score:     MET Score:     Frequency:  2-3 days/week   Mode: Nustep, Arm Ergometer   Duration:  30-40  total aerobic minutes   Intensity: RPE 11-14  Target HR:    MET Level: 2.2-3.1  Patient wears supplemental O2: No     Modality Workload METs Duration (minutes)   1 Pre-Exercise   5:00   2 Arm Ergometer 16 grossman 2.2 10 :00   3 NuStep 92 grossman @ lvl 6 3.1 30 :00   4 Education   10:00   6 Post-Exercise   5:00     Resistance Training: No   Home Exercise Prescription given: To be given prior to discharge from program.    Exercise Plan    Goal Status: In progress    Exercise Goals: Increase exercise MET level by 5-10% each week, Increase total exercise duration to 30-45 minutes, and Initiate strength training 2-3 days a week    Exercise Interventions/Education:   Patient is exercising 30-45 minute duration between equipment.  Patient is increasing on equipment as tolerated.  Patient attended class on aerobic exercise and discussed pulse taking, target heart rate, and weather precautions.    Other Core Components/Risk Factor Assessment:    Medication adherence  Current Medications:   Medication Documentation Review Audit       Reviewed by Mike Veronica RN (Registered Nurse) on 02/29/24 at 1005      Medication Order Taking? Sig Documenting Provider Last Dose Status   acetaminophen (Tylenol) 325 mg tablet [id number] Yes Take 2 tablets (650 mg) by mouth every 6 hours if needed for mild pain (1 - 3), moderate  pain (4 - 6), headaches or fever (temp greater than 38.0 C). Jerrod Jurado, APRN-CNP Taking Active   albuterol (Ventolin HFA) 90 mcg/actuation inhaler [id number] Yes Inhale 2 puffs every 4 hours if needed for wheezing. Jodie Contreras MD Taking Active   Discontinued 02/29/24 1004   esomeprazole (NexIUM) 20 mg DR capsule [id number] Yes Take 1 capsule (20 mg) by mouth once daily in the morning. Take before meals. Do not open capsule. Jodie Contreras MD Taking Active   folic acid (Folvite) 1 mg tablet [id number] Yes Take 1 tablet (1 mg) by mouth once daily. Jodie Contreras MD Taking Active   gabapentin (Neurontin) 600 mg tablet [id number] Yes Take 1 tablet (600 mg) by mouth 2 times a day. Jodie Contreras MD Taking Active   methotrexate (Trexall) 2.5 mg tablet [id number] Yes Take 8 tablets (20 mg total) by mouth 1 (one) time per week.  Follow directions carefully, and ask to explain any part you do not understand. Take exactly as directed. Jodie Contreras MD Taking Active   montelukast (Singulair) 10 mg tablet [id number] Yes Take 1 tablet (10 mg) by mouth once daily at bedtime. Jodie Contreras MD Taking Active   torsemide (Demadex) 20 mg tablet [id number] Yes Take 1 tablet (20 mg) by mouth once a day on Monday, Wednesday, and Friday. As needed Jodie Contreras MD Taking Active   traMADol (Ultram) 50 mg tablet [id number] Yes Take 1 tablet (50 mg) by mouth every 4 hours if needed for severe pain (7 - 10). Jodie Contreras MD Taking Active   warfarin (Coumadin) 5 mg tablet [id number] Yes Take 1.5 tablets (7.5 mg) by mouth once daily. EVERY TUES-THURS-SAT-SUN Jodie Contreras MD Taking Active   warfarin (Coumadin) 5 mg tablet [id number] Yes Take 2 tablets (10 mg) by mouth once a day on Monday, Wednesday, and Friday. TAKES 10MG ONCE DAILY EVERY MON-WED-FRI Jodie Contreras MD Taking Active                                 Medication compliance: Yes   Uses pill box/organizer: No     Carries medication list: Yes     Blood Pressure Management  History of Hypertension: Yes   Medication Changes: No   Resting BP: 143/70         Heart Failure Management  Hx of Heart Failure: No      Smoking/Tobacco Assessment  Social History     Tobacco Use   Smoking Status Former    Current packs/day: 0.00    Types: Cigarettes    Quit date:     Years since quittin.4   Smokeless Tobacco Never       Other Core Component Plan    Goal Status: In progress    Other Core Component Goals: Verbalize medication usage and drug actions by discharge, Verbalize SL NTG action and proper dosage by discharge, and Achieve resting BP of < 130/80 by discharge    Other Core Component Interventions/Education:   Monitoring patients BP, HR, and Spo2 at each session.  Patient attended class on understanding heart disease and discussed risk factor reduction, Angina S/S, Nitroglycerin sublingual and EMS.  Patients BP has remained elevated at each session. Systolic 140's-150's. Doctor Notification and BP summary sent to Doctor Garcia 2024    Individual Patient Goals:    Strengthen legs  Increase function    Goal Status: In progress    Staff Comments:  Patient reports no angina with exercise. Patient has attended 26 sessions of cardiac rehab. She is exercising 40 minutes between the nustep and arm ergometer without complaint. Patient's BP remains elevated at rehab. Doctor Notification with BP summary sent to Dr. Garcia. Patient to add resistance training to exercise and to begin planning how she will continue exercise post program.  EDUCATION  Food labels  BMI  Medications  Knowledge Quiz Reviewed  Weight Training   Cholesterol  CHF  Group diet  HTN  Stress  Behavior change  Aerobic exercise  CAD/RF; Angina S/S;NTG/EMS    Rehab Staff Signature: REYNALDO Mcdermott

## 2024-06-21 ENCOUNTER — CLINICAL SUPPORT (OUTPATIENT)
Dept: CARDIAC REHAB | Facility: HOSPITAL | Age: 69
End: 2024-06-21
Payer: MEDICARE

## 2024-06-21 DIAGNOSIS — Z95.2 S/P AORTIC VALVE REPLACEMENT: ICD-10-CM

## 2024-06-21 PROCEDURE — 93798 PHYS/QHP OP CAR RHAB W/ECG: CPT | Performed by: INTERNAL MEDICINE

## 2024-06-26 ENCOUNTER — APPOINTMENT (OUTPATIENT)
Dept: CARDIAC REHAB | Facility: HOSPITAL | Age: 69
End: 2024-06-26
Payer: MEDICARE

## 2024-06-26 DIAGNOSIS — Z95.2 S/P AORTIC VALVE REPLACEMENT: ICD-10-CM

## 2024-06-26 PROCEDURE — 93798 PHYS/QHP OP CAR RHAB W/ECG: CPT | Performed by: INTERNAL MEDICINE

## 2024-06-28 ENCOUNTER — LAB (OUTPATIENT)
Dept: LAB | Facility: LAB | Age: 69
End: 2024-06-28
Payer: MEDICARE

## 2024-06-28 ENCOUNTER — APPOINTMENT (OUTPATIENT)
Dept: CARDIAC REHAB | Facility: HOSPITAL | Age: 69
End: 2024-06-28
Payer: MEDICARE

## 2024-06-28 DIAGNOSIS — M05.79 RHEUMATOID ARTHRITIS WITH RHEUMATOID FACTOR OF MULTIPLE SITES WITHOUT ORGAN OR SYSTEMS INVOLVEMENT (MULTI): Primary | ICD-10-CM

## 2024-06-28 DIAGNOSIS — M79.7 FIBROMYALGIA: ICD-10-CM

## 2024-06-28 DIAGNOSIS — Z95.2 S/P AORTIC VALVE REPLACEMENT: ICD-10-CM

## 2024-06-28 LAB
ALBUMIN SERPL BCP-MCNC: 4 G/DL (ref 3.4–5)
ALP SERPL-CCNC: 151 U/L (ref 33–136)
ALT SERPL W P-5'-P-CCNC: 8 U/L (ref 7–45)
ANION GAP SERPL CALC-SCNC: 10 MMOL/L (ref 10–20)
AST SERPL W P-5'-P-CCNC: 12 U/L (ref 9–39)
BILIRUB SERPL-MCNC: 0.4 MG/DL (ref 0–1.2)
BUN SERPL-MCNC: 13 MG/DL (ref 6–23)
CALCIUM SERPL-MCNC: 10.4 MG/DL (ref 8.6–10.3)
CHLORIDE SERPL-SCNC: 107 MMOL/L (ref 98–107)
CO2 SERPL-SCNC: 27 MMOL/L (ref 21–32)
CREAT SERPL-MCNC: 0.64 MG/DL (ref 0.5–1.05)
CRP SERPL-MCNC: 0.83 MG/DL
EGFRCR SERPLBLD CKD-EPI 2021: >90 ML/MIN/1.73M*2
ERYTHROCYTE [DISTWIDTH] IN BLOOD BY AUTOMATED COUNT: 16.7 % (ref 11.5–14.5)
ERYTHROCYTE [SEDIMENTATION RATE] IN BLOOD BY WESTERGREN METHOD: 37 MM/H (ref 0–30)
GLUCOSE SERPL-MCNC: 80 MG/DL (ref 74–99)
HCT VFR BLD AUTO: 42.9 % (ref 36–46)
HGB BLD-MCNC: 13.5 G/DL (ref 12–16)
MCH RBC QN AUTO: 27.3 PG (ref 26–34)
MCHC RBC AUTO-ENTMCNC: 31.5 G/DL (ref 32–36)
MCV RBC AUTO: 87 FL (ref 80–100)
NRBC BLD-RTO: 0 /100 WBCS (ref 0–0)
PLATELET # BLD AUTO: 171 X10*3/UL (ref 150–450)
POTASSIUM SERPL-SCNC: 4.2 MMOL/L (ref 3.5–5.3)
PROT SERPL-MCNC: 6.9 G/DL (ref 6.4–8.2)
RBC # BLD AUTO: 4.94 X10*6/UL (ref 4–5.2)
SODIUM SERPL-SCNC: 140 MMOL/L (ref 136–145)
WBC # BLD AUTO: 5.5 X10*3/UL (ref 4.4–11.3)

## 2024-06-28 PROCEDURE — 80053 COMPREHEN METABOLIC PANEL: CPT

## 2024-06-28 PROCEDURE — 85027 COMPLETE CBC AUTOMATED: CPT

## 2024-06-28 PROCEDURE — 86140 C-REACTIVE PROTEIN: CPT

## 2024-06-28 PROCEDURE — 93798 PHYS/QHP OP CAR RHAB W/ECG: CPT | Performed by: INTERNAL MEDICINE

## 2024-06-28 PROCEDURE — 36415 COLL VENOUS BLD VENIPUNCTURE: CPT

## 2024-06-28 PROCEDURE — 85652 RBC SED RATE AUTOMATED: CPT

## 2024-07-03 ENCOUNTER — APPOINTMENT (OUTPATIENT)
Dept: CARDIAC REHAB | Facility: HOSPITAL | Age: 69
End: 2024-07-03
Payer: MEDICARE

## 2024-07-03 DIAGNOSIS — Z95.2 S/P AORTIC VALVE REPLACEMENT: ICD-10-CM

## 2024-07-03 PROCEDURE — 93798 PHYS/QHP OP CAR RHAB W/ECG: CPT | Performed by: INTERNAL MEDICINE

## 2024-07-05 VITALS
WEIGHT: 234.6 LBS | DIASTOLIC BLOOD PRESSURE: 75 MMHG | SYSTOLIC BLOOD PRESSURE: 141 MMHG | OXYGEN SATURATION: 95 % | BODY MASS INDEX: 44.29 KG/M2 | HEIGHT: 61 IN

## 2024-07-05 ASSESSMENT — PATIENT HEALTH QUESTIONNAIRE - PHQ9
SUM OF ALL RESPONSES TO PHQ QUESTIONS 1-9: 0
SUM OF ALL RESPONSES TO PHQ QUESTIONS 1-9: 1

## 2024-07-05 ASSESSMENT — DUKE ACTIVITY SCORE INDEX (DASI)
CAN YOU HAVE SEXUAL RELATIONS: NO
CAN YOU PARTICIPATE IN MODERATE RECREATIONAL ACTIVITIES LIKE GOLF, BOWLING, DANCING, DOUBLES TENNIS OR THROWING A BASEBALL OR FOOTBALL: NO
CAN YOU DO YARD WORK LIKE RAKING LEAVES, WEEDING OR PUSHING A MOWER: YES
CAN YOU DO LIGHT WORK AROUND THE HOUSE LIKE DUSTING OR WASHING DISHES: YES
CAN YOU WALK A BLOCK OR TWO ON LEVEL GROUND: NO
CAN YOU PARTICIPATE IN STRENOUS SPORTS LIKE SWIMMING, SINGLES TENNIS, FOOTBALL, BASKETBALL, OR SKIING: NO
CAN YOU DO HEAVY WORK AROUND THE HOUSE LIKE SCRUBBING FLOORS OR LIFTING AND MOVING HEAVY FURNITURE: NO
CAN YOU RUN A SHORT DISTANCE: NO
TOTAL_SCORE: 20.7
CAN YOU DO MODERATE WORK AROUND THE HOUSE LIKE VACUUMING, SWEEPING FLOORS OR CARRYING GROCERIES: YES
CAN YOU WALK INDOORS, SUCH AS AROUND YOUR HOUSE: YES
CAN YOU CLIMB A FLIGHT OF STAIRS OR WALK UP A HILL: YES
CAN YOU TAKE CARE OF YOURSELF (EAT, DRESS, BATHE, OR USE TOILET): YES
DASI METS SCORE: 5.3

## 2024-07-05 NOTE — PROGRESS NOTES
"  Cardiac Rehabilitation Discharge Summary  Name: Dottie Fragoso  Medical Record Number: [id number]  YOB: 1955  Age: 68 y.o.    Today’s Date: 7/5/2024  Primary Care Physician: Jose Garcia MD  Referring Physician: Toshia Mariee MD  Program Location: [facility]  Primary Diagnosis:   1. S/P aortic valve replacement  Follow Up In Cardiac Rehab         Onset/Date of Diagnosis: 1 yr ago        AACVPR Risk Stratification: Moderate Moderate     Falls Risk: Medium  Psychosocial Assessment     Pre PHQ-9: 1  Post PHQ-9: 0      Sent PH-Q 9 to MD if score > 20: No; score < 20    Pt reported/currently experiencing stress: No  Patient uses stress management skills: Yes   HCurrently seeing a mental health provider: No  Social Support: Yes, Whom: sister  Quality of Life Survey: SF-36   SF-36 Pre Post   Physical Component Score 33.42 41.32   Mental Component Score 61.79 64.27     Learning Assessment:  Learning assessment/barriers: None  Preferred learning method: Visual  Barriers: None  Comments:    Stages of Change:Action    Psychosocial Plan    Goal Status: Met    Psychosocial Goals: Demonstrating proper techniques for stress management and Maintain or lower PH-Q 9 score by discharge    Psychosocial Interventions/Education:   Patient did not report any stressors while enrolled in the program.   Patient lowered her PHQ-9 score.     Nutrition Assessment:    Hyperlipidemia: Yes     Lipids:   Lab Results   Component Value Date    CHOL 131 01/12/2021    HDL 43.0 01/12/2021    LDLF 75 01/12/2021    TRIG 63 01/12/2021       Current Dietary Guidelines: Low sodium  Barriers to dietary change: no    Diet Habit Survey: Picture Your Plate  Pre:  74%  Post: 74%    Diabetes Assessment    Lab Results   Component Value Date    HGBA1C 5.5 11/18/2023       History of Diabetes: No    Weight Management on weight watchers  Height: 154.9 cm (5' 0.98\")  Weight: 106 kg (234 lb 9.6 oz)  BMI (Calculated): " 44.35      Nutrition Plan    Goal Status: Met    Nutrition Goals: Improve Diet Habit Survey score by 5-10 points by discharge, Learn how to read and interpret nutrition labels prior to discharge, and Lose 1lb/week while enrolled in program    Nutrition Interventions/Education:   Patient scored the same on her diet score as she did prior to starting the program.   Patient attended all of the dietary educations.   Patient did not lose weight while enrolled in the program but maintained her weight.     Exercise Assessment    No  Mode: NA  Frequency: NA  Duration: NA    Exercise Prescription     Exercise Prescription based on: Duke Activity Status Index (DASI) and Patient History    DASI Score: 20.7   MET Score: 5.3   Frequency:  2-3 days/week   Mode: Nustep, Arm Ergometer   Duration:  30-40  total aerobic minutes   Intensity: RPE 11-14  Target HR:  103-113  MET Level: 2.2-3.1  Patient wears supplemental O2: No     Modality Workload METs Duration (minutes)   1 Pre-Exercise   5:00   2 Arm Ergometer 16 grossman 2.2 10 :00   3 NuStep 92 grossman @ lvl 6 3.1 30 :00   4 Education   10:00   6 Post-Exercise   5:00     Resistance Training: No   Home Exercise Prescription given: Yes    Exercise Plan    Goal Status: Met    Exercise Goals: Increase exercise MET level by 5-10% each week, Increase total exercise duration to 30-45 minutes, and Initiate strength training 2-3 days a week    Exercise Interventions/Education:   Patient was exercising 30-45 minute duration between equipment.  Patient stated that she did not want to perform weights  Patient received her home program and discussed with staff.   Patient was given a list of facilities and she stated she would like to join a gym to continue exercise.     Other Core Components/Risk Factor Assessment:    Medication adherence  Current Medications:   Medication Documentation Review Audit       Reviewed by Mike Veronica RN (Registered Nurse) on 02/29/24 at 1005      Medication Order  Taking? Sig Documenting Provider Last Dose Status   acetaminophen (Tylenol) 325 mg tablet [id number] Yes Take 2 tablets (650 mg) by mouth every 6 hours if needed for mild pain (1 - 3), moderate pain (4 - 6), headaches or fever (temp greater than 38.0 C). Jerrod Jurado APRN-CNP Taking Active   albuterol (Ventolin HFA) 90 mcg/actuation inhaler [id number] Yes Inhale 2 puffs every 4 hours if needed for wheezing. Jodie Contreras MD Taking Active   Discontinued 02/29/24 1004   esomeprazole (NexIUM) 20 mg DR capsule [id number] Yes Take 1 capsule (20 mg) by mouth once daily in the morning. Take before meals. Do not open capsule. Jodie Contreras MD Taking Active   folic acid (Folvite) 1 mg tablet [id number] Yes Take 1 tablet (1 mg) by mouth once daily. Jodie Contreras MD Taking Active   gabapentin (Neurontin) 600 mg tablet [id number] Yes Take 1 tablet (600 mg) by mouth 2 times a day. Jodie Contreras MD Taking Active   methotrexate (Trexall) 2.5 mg tablet [id number] Yes Take 8 tablets (20 mg total) by mouth 1 (one) time per week.  Follow directions carefully, and ask to explain any part you do not understand. Take exactly as directed. Jodie Contreras MD Taking Active   montelukast (Singulair) 10 mg tablet [id number] Yes Take 1 tablet (10 mg) by mouth once daily at bedtime. Jodie Contreras MD Taking Active   torsemide (Demadex) 20 mg tablet [id number] Yes Take 1 tablet (20 mg) by mouth once a day on Monday, Wednesday, and Friday. As needed Jodie Contreras MD Taking Active   traMADol (Ultram) 50 mg tablet [id number] Yes Take 1 tablet (50 mg) by mouth every 4 hours if needed for severe pain (7 - 10). Jodie Contreras MD Taking Active   warfarin (Coumadin) 5 mg tablet [id number] Yes Take 1.5 tablets (7.5 mg) by mouth once daily. EVERY TUES-THURS-SAT-SUN Jodie Contreras MD Taking Active   warfarin (Coumadin) 5 mg tablet [id number] Yes Take 2 tablets (10 mg) by mouth once a day on Monday,  Wednesday, and Friday. TAKES 10MG ONCE DAILY EVERY MON-WED-FRI Historical Provider, MD Taking Active                                 Medication compliance: Yes   Uses pill box/organizer: No    Carries medication list: Yes     Blood Pressure Management  History of Hypertension: Yes   Medication Changes: No   Resting BP:  Visit Vitals  /75             Heart Failure Management  Hx of Heart Failure: No      Smoking/Tobacco Assessment  Social History     Tobacco Use   Smoking Status Former    Current packs/day: 0.00    Types: Cigarettes    Quit date:     Years since quittin.5   Smokeless Tobacco Never       Other Core Component Plan    Goal Status: Met    Other Core Component Goals: Verbalize medication usage and drug actions by discharge, Verbalize SL NTG action and proper dosage by discharge, and Achieve resting BP of < 130/80 by discharge    Other Core Component Interventions/Education:   Patient learned about medication usage and drug actions by discharge.   Patient attended all of the educational classes.   Patients BP has remained elevated at each session. Systolic 140's-150's.    Individual Patient Goals:    Strengthen legs  Increase function    Goal Status: Met    Staff Comments:  Patient reported no angina with exercise. Patient has completed all of the sessions of cardiac rehab. She was able to exercise 40 minutes between the nustep and arm ergometer without complaint. Patient's BP remained elevated at rehab, 140's-150's systolic. Patient did not want to add resistance training to her routine. Patient received her home program and discussed with staff. Patient was given a facilities list and stated that she would like to join a gym to continue exercise.     EDUCATION  Food labels  BMI  Medications  Knowledge Quiz Reviewed  Weight Training   Cholesterol  CHF  Group diet  HTN  Stress  Behavior change  Aerobic exercise  CAD/RF; Angina S/S;NTG/EMS  Home program    Rehab Staff Signature: JASON  SIVAN

## 2024-08-29 ENCOUNTER — APPOINTMENT (OUTPATIENT)
Dept: CARDIOLOGY | Facility: CLINIC | Age: 69
End: 2024-08-29
Payer: MEDICARE

## 2024-08-29 VITALS
BODY MASS INDEX: 44.97 KG/M2 | HEART RATE: 80 BPM | DIASTOLIC BLOOD PRESSURE: 80 MMHG | SYSTOLIC BLOOD PRESSURE: 144 MMHG | WEIGHT: 237.9 LBS

## 2024-08-29 DIAGNOSIS — Q21.12 PATENT FORAMEN OVALE (HHS-HCC): ICD-10-CM

## 2024-08-29 DIAGNOSIS — Z87.891 FORMER SMOKER: ICD-10-CM

## 2024-08-29 DIAGNOSIS — G47.30 SLEEP APNEA, UNSPECIFIED TYPE: ICD-10-CM

## 2024-08-29 DIAGNOSIS — I51.7 LVH (LEFT VENTRICULAR HYPERTROPHY): ICD-10-CM

## 2024-08-29 DIAGNOSIS — Z95.3 S/P TAVR (TRANSCATHETER AORTIC VALVE REPLACEMENT), BIOPROSTHETIC: ICD-10-CM

## 2024-08-29 DIAGNOSIS — E66.01 MORBID OBESITY WITH BMI OF 40.0-44.9, ADULT (MULTI): ICD-10-CM

## 2024-08-29 DIAGNOSIS — E78.2 MIXED HYPERLIPIDEMIA: ICD-10-CM

## 2024-08-29 DIAGNOSIS — I10 ESSENTIAL (PRIMARY) HYPERTENSION: ICD-10-CM

## 2024-08-29 PROCEDURE — 3079F DIAST BP 80-89 MM HG: CPT | Performed by: INTERNAL MEDICINE

## 2024-08-29 PROCEDURE — 99213 OFFICE O/P EST LOW 20 MIN: CPT | Performed by: INTERNAL MEDICINE

## 2024-08-29 PROCEDURE — 1036F TOBACCO NON-USER: CPT | Performed by: INTERNAL MEDICINE

## 2024-08-29 PROCEDURE — 1159F MED LIST DOCD IN RCRD: CPT | Performed by: INTERNAL MEDICINE

## 2024-08-29 PROCEDURE — 3077F SYST BP >= 140 MM HG: CPT | Performed by: INTERNAL MEDICINE

## 2024-08-29 RX ORDER — AMLODIPINE BESYLATE 5 MG/1
TABLET ORAL
Qty: 90 TABLET | Refills: 3 | Status: SHIPPED | OUTPATIENT
Start: 2024-08-29

## 2024-08-29 NOTE — PROGRESS NOTES
Referred by Dr. Hilton ref. provider found provider found for   Chief Complaint   Patient presents with    Follow-up     6 month        History of Present Illness  Dottie Fragoso is a 69 y.o. year old female patient doing well from a cardiac standpoint blood pressure is fluctuating.  Apparently blood pressure is elevated today.  She was on Cardizem but had to stop that.  I told the patient to start amlodipine 5 mg p.o. as needed.  She will monitor blood pressure report back to me.  Will call for any problem and follow-up as scheduled    Past Medical History  Past Medical History:   Diagnosis Date    Asthma ([facility])     Cancer ([facility])     bilat breat cancer with L mastectomy / R mastectomy     Heart murmur     pt states she need an aoritc valve replacement    Hyperlipidemia     Hypertension     Personal history of malignant neoplasm, unspecified     History of malignant neoplasm    Personal history of other diseases of the circulatory system     History of hypertension    PFO (patent foramen ovale) ([facility])     not repaired    Stroke ([facility])     stroke (without residual) from PFO, event happened after a port was placed. Info per pt    Unilateral primary osteoarthritis, right hip 2020    Primary osteoarthritis of right hip    Unilateral primary osteoarthritis, right hip 2020    Primary osteoarthritis of right hip       Social History  Social History     Tobacco Use    Smoking status: Former     Current packs/day: 0.00     Types: Cigarettes     Quit date: 1987     Years since quittin.1    Smokeless tobacco: Never   Vaping Use    Vaping status: Never Used   Substance Use Topics    Alcohol use: Never    Drug use: Never       Family History     Family History   Problem Relation Name Age of Onset    Heart attack Mother Denise Fragoso     Heart attack Father Lloyd Fragoso        Review of Systems  As per HPI, all other systems reviewed and negative.    Allergies:  Allergies   Allergen Reactions     Darvon [Propoxyphene] Hives    Dilaudid [Hydromorphone] Hives    Mobic [Meloxicam] Hives    Penicillins Hives    Sulfasalazine Hives    Percodan [Oxycodone-Aspirin] Palpitations        Outpatient Medications:  Current Outpatient Medications   Medication Instructions    albuterol (Ventolin HFA) 90 mcg/actuation inhaler 2 puffs, inhalation, Every 4 hours PRN    esomeprazole (NEXIUM) 20 mg, oral, Daily before breakfast, Do not open capsule.    folic acid (FOLVITE) 1 mg, oral, Daily    gabapentin (NEURONTIN) 600 mg, oral, 2 times daily    losartan (COZAAR) 100 mg, oral, Daily    methotrexate (TREXALL) 20 mg, oral, Once Weekly, Follow directions carefully, and ask to explain any part you do not understand. Take exactly as directed.    montelukast (SINGULAIR) 10 mg, oral, Nightly    torsemide (DEMADEX) 20 mg, oral, Every Mon/Wed/Fri, As needed     traMADol (ULTRAM) 50 mg, oral, Every 4 hours PRN    warfarin (COUMADIN) 7.5 mg, oral, Daily, EVERY TUES-THURS-SAT-SUN    warfarin (COUMADIN) 10 mg, oral, Every Mon/Wed/Fri, TAKES 10MG ONCE DAILY EVERY MON-WED-FRI         Vitals:  Vitals:    08/29/24 0825   BP: 144/80   Pulse: 80       Physical Exam:  Physical Exam  Vitals and nursing note reviewed.   Constitutional:       Appearance: Normal appearance. She is normal weight.   HENT:      Head: Normocephalic and atraumatic.   Eyes:      Extraocular Movements: Extraocular movements intact.      Pupils: Pupils are equal, round, and reactive to light.   Cardiovascular:      Rate and Rhythm: Normal rate and regular rhythm.      Pulses: Normal pulses.   Pulmonary:      Effort: Pulmonary effort is normal.      Breath sounds: Normal breath sounds.   Musculoskeletal:      Cervical back: Normal range of motion.      Right lower leg: No edema.      Left lower leg: No edema.   Skin:     General: Skin is warm and dry.   Neurological:      General: No focal deficit present.      Mental Status: She is alert and oriented to person, place, and  time.             Assessment/Plan   Diagnoses and all orders for this visit:  S/p TAVR (transcatheter aortic valve replacement), bioprosthetic  Patent foramen ovale (HHS-HCC)  LVH (left ventricular hypertrophy)  Mixed hyperlipidemia  Essential (primary) hypertension  Morbid obesity with BMI of 40.0-44.9, adult (Multi)  Sleep apnea, unspecified type  Former smoker          Toshia Mariee MD [facility]  Interventional Cardiology   of [facility]     Thank you for allowing me to participate in the care of this patient. Please do not hesitate to contact me with any further questions or concerns.

## 2024-08-29 NOTE — PATIENT INSTRUCTIONS
Patient to follow up in 9 with Dr. Toshia Mariee MD [facility]     Please START Amlodipine 5mg once daily as needed for hypertension   Check blood pressure daily, if elevated then wait 15-30 minutes and recheck, if still elevated- then take this medication just once daily.     Keep a log for 1 month and send back for Dr. Toshia Mariee MD [facility] to review.     No other changes today.   Continue same medications and treatments.   Patient educated on proper medication use.   Patient educated on risk factor modification.   Please bring any lab results from other providers / physicians to your next appointment.     Please bring all medicines, vitamins, and herbal supplements with you when you come to the office.     Prescriptions will not be filled unless you are compliant with your follow up appointments or have a follow up appointment scheduled as per instruction of your physician. Refills should be requested at the time of your visit.    Mike KAPOOR RN am scribing for and in the presence of MD VALENTINA BuchananC

## 2024-10-15 ENCOUNTER — TELEPHONE (OUTPATIENT)
Dept: CARDIOLOGY | Facility: CLINIC | Age: 69
End: 2024-10-15
Payer: MEDICARE

## 2024-10-15 DIAGNOSIS — Z95.3 S/P TAVR (TRANSCATHETER AORTIC VALVE REPLACEMENT), BIOPROSTHETIC: ICD-10-CM

## 2024-10-15 DIAGNOSIS — I10 ESSENTIAL (PRIMARY) HYPERTENSION: ICD-10-CM

## 2024-10-15 NOTE — TELEPHONE ENCOUNTER
Patient dropped off blood pressure readings for Dr. Toshia Mariee MD [facility] to review.     Patient last seen with Dr. Toshia Mariee MD [facility] on 8/29/2024:  Dottie Fragoso is a 69 y.o. year old female patient doing well from a cardiac standpoint blood pressure is fluctuating.  Apparently blood pressure is elevated today.  She was on Cardizem but had to stop that.  I told the patient to start amlodipine 5 mg p.o. as needed.  She will monitor blood pressure report back to me.  Will call for any problem and follow-up as scheduled.     Per patient BP log- it appears she is taking Amlodipine almost daily instead of PRN.   Parameters were for 5mg once daily if SBP >130.     Placed for Dr. Toshia Mariee MD [facility] to review.

## 2024-10-15 NOTE — TELEPHONE ENCOUNTER
Per Dr. Toshia Mariee MD [facility], BP readings are okay.   Patient can increase Amlodipine 5mg to once daily instead of PRN.     Patient notified- denies questions.   Med list updated for signing- routed to Dr. Toshia Mariee

## 2024-10-16 RX ORDER — AMLODIPINE BESYLATE 5 MG/1
5 TABLET ORAL DAILY
Qty: 90 TABLET | Refills: 3 | Status: SHIPPED | OUTPATIENT
Start: 2024-10-16 | End: 2025-10-16

## 2024-12-19 ENCOUNTER — APPOINTMENT (OUTPATIENT)
Dept: RADIOLOGY | Facility: HOSPITAL | Age: 69
End: 2024-12-19
Payer: MEDICARE

## 2025-01-16 ENCOUNTER — TELEMEDICINE (OUTPATIENT)
Dept: CARDIOLOGY | Facility: CLINIC | Age: 70
End: 2025-01-16
Payer: MEDICARE

## 2025-01-16 DIAGNOSIS — Z95.3 S/P TAVR (TRANSCATHETER AORTIC VALVE REPLACEMENT), BIOPROSTHETIC: Primary | ICD-10-CM

## 2025-01-16 PROCEDURE — 1036F TOBACCO NON-USER: CPT | Performed by: NURSE PRACTITIONER

## 2025-01-16 PROCEDURE — 1159F MED LIST DOCD IN RCRD: CPT | Performed by: NURSE PRACTITIONER

## 2025-01-16 PROCEDURE — 1160F RVW MEDS BY RX/DR IN RCRD: CPT | Performed by: NURSE PRACTITIONER

## 2025-01-16 PROCEDURE — 99214 OFFICE O/P EST MOD 30 MIN: CPT | Performed by: NURSE PRACTITIONER

## 2025-01-16 NOTE — PROGRESS NOTES
Structural Heart Follow up visit      Priscila Fragoso is a 69 y.o.  female PMH of former smoker with hyperlipidemia, obesity (BMI 43), obstructive sleep apnea. chronic back, and severe non-rheumatic aortic stenosis.  s/p Evolut FX 29 Predil 20mm TruDil Balloon Pt presented on 1/15/24 for elective TAVR presents for 1 year follow up      denies SOB,EDDY, fatigue  Recent Hospitalizations  No      No results found for this or any previous visit (from the past 96 hours).     Transthoracic Echo (TTE) Complete    Result Date: 2/7/2024              [address], [address], [place] [address]          Tel [phone] Fax [phone] TRANSTHORACIC ECHOCARDIOGRAM REPORT  Patient Name:      PRISCILA FRAGOSO        Reading Physician:    [id number] Bruner DO Study Date:        2/7/2024             Ordering Provider:    [id number] JOY MAHMOOD MRN/PID:           [id number]             Fellow: Accession#:        [id number]         Nurse: Date of Birth/Age: 1955 / 68 years Sonographer:          Gabby Baumann RDMS,                                                               RCS, RVS Gender:            F                    Additional Staff: Height:            154.94 cm            Admit Date: Weight:            103.42 kg            Admission Status: BSA:               2.00 m2              Department Location:  [facility] Blood Pressure: 144 /84 mmHg Study Type:    TRANSTHORACIC ECHO (TTE) COMPLETE Diagnosis/ICD: Presence of xenogenic heart valve-Z95.3 Indication:    TAVR, #29mm Medtronic Evolut (1/15/2024), HTN, HLD CPT Codes:     Echo Complete w Full [id number]  Study Detail: The following Echo studies were performed: 2D, M-Mode, Doppler and               color flow.  PHYSICIAN  INTERPRETATION: Left Ventricle: Left ventricular systolic function is normal, with an estimated ejection fraction of 60%. There are no regional wall motion abnormalities. The left ventricular cavity size is normal. The left ventricular septal wall thickness is mildly increased. There is mild concentric left ventricular hypertrophy. Spectral Doppler shows an impaired relaxation pattern of left ventricular diastolic filling. Left Atrium: The left atrium is normal in size. Right Ventricle: The right ventricle is normal in size. There is normal right ventricular global systolic function. Right Atrium: The right atrium is normal in size. Aortic Valve: There is a prosthetic aortic valve present. There is mild aortic valve cusp calcification. There is transcatheter aortic valve replacement, with a #29 mm Medtronic Evolut TAVR reported size. Echo findings are consistent with normal aortic valve prosthesis structure and function. There is trivial aortic valve regurgitation. The peak instantaneous gradient of the aortic valve is 12.0 mmHg. The mean gradient of the aortic valve is 7.0 mmHg. Mitral Valve: The mitral valve is normal in structure. There is no evidence of mitral valve stenosis. There is normal mitral valve leaflet mobility. There is moderate mitral annular calcification. There is trace mitral valve regurgitation. Tricuspid Valve: The tricuspid valve is structurally normal. There is normal tricuspid valve leaflet mobility. There is mild tricuspid regurgitation. Pulmonic Valve: The pulmonic valve is structurally normal. There is trace to mild pulmonic valve regurgitation. Pericardium: There is no pericardial effusion noted. Aorta: The aortic root is normal. Pulmonary Artery: The main pulmonary artery is normal in size, and position, with normal bifurcation into the left and right pulmonary arteries. The tricuspid regurgitant velocity is 2.52 m/s, and with an estimated right atrial pressure of 3 mmHg, the estimated  pulmonary artery pressure is mildly elevated with the RVSP at 28.4 mmHg. Systemic Veins: The inferior vena cava appears to be of normal size. In comparison to the previous echocardiogram(s): The left ventricular function is unchanged. The left ventricular hypertrophy is unchanged.  CONCLUSIONS:  1. Left ventricular systolic function is normal with a 60% estimated ejection fraction.  2. Spectral Doppler shows an impaired relaxation pattern of left ventricular diastolic filling.  3. There is moderate mitral annular calcification.  4. There is no evidence of mitral valve stenosis.  5. Trace mitral valve regurgitation.  6. Mild tricuspid regurgitation is visualized.  7. There is a transcatheter aortic valve replacement.  8. The main pulmonary artery is normal in size, and position, with normal bifurcation into the left and right pulmonary arteries. QUANTITATIVE DATA SUMMARY: 2D MEASUREMENTS:                          Normal Ranges: Ao Root d:     2.60 cm   (2.0-3.7cm) LAs:           3.60 cm   (2.7-4.0cm) RVIDd:         1.98 cm   (0.9-3.6cm) IVSd:          1.12 cm   (0.6-1.1cm) LVPWd:         0.99 cm   (0.6-1.1cm) LVIDd:         4.88 cm   (3.9-5.9cm) LVIDs:         3.13 cm LV Mass Index: 94.3 g/m2 LV % FS        35.9 % AORTA MEASUREMENTS:                    Normal Ranges: Asc Ao, d: 2.20 cm (2.1-3.4cm) LV SYSTOLIC FUNCTION BY 2D PLANIMETRY (MOD):                     Normal Ranges: EF-A4C View: 62.5 % (>=55%) LV DIASTOLIC FUNCTION:                        Normal Ranges: MV Peak E:    1.05 m/s (0.7-1.2 m/s) MV Peak A:    1.64 m/s (0.42-0.7 m/s) E/A Ratio:    0.64     (1.0-2.2) MV lateral e' 0.05 m/s MV medial e'  0.07 m/s AORTIC VALVE:                                    Normal Ranges: AoV Vmax:                1.73 m/s  (<=1.7m/s) AoV Peak P.0 mmHg (<20mmHg) AoV Mean P.0 mmHg  (1.7-11.5mmHg) LVOT Max Federico:            0.84 m/s  (<=1.1m/s) AoV VTI:                 38.20 cm  (18-25cm) LVOT VTI:                 20.20 cm LVOT Diameter:           1.70 cm   (1.8-2.4cm) AoV Area, VTI:           1.20 cm2  (2.5-5.5cm2) AoV Area,Vmax:           1.10 cm2  (2.5-4.5cm2) AoV Dimensionless Index: 0.53 TRICUSPID VALVE/RVSP:                             Normal Ranges: Peak TR Velocity: 2.52 m/s Est. RA Pressure: 3 mmHg RV Syst Pressure: 28.4 mmHg (< 30mmHg) PULMONIC VALVE:                      Normal Ranges: PV Max Federico: 0.5 m/s  (0.6-0.9m/s) PV Max P.9 mmHg  [id number] Yosef Zohreh RUBIO Electronically signed on 2024 at 12:12:05 PM  ** Final **            Heart Failure Follow up    NYHA class 1    Edema Stable  Dyspnea on Exertion Denies  Fatigue Denies  Exercise Intolerance Denies  Orthopnea Denies  PND Denies    Chest pain No  Syncope No  Palpitations No  Weight gain No  Weight loss No                 [facility]Q Questionnaire      1  Heart failure affects different people in different ways. Some feel shortness of breath while others feel fatigue. Please indicate how much you are limited by heart failure (shortness of breath or fatigue) in your ability to do the following activities over the past 2 weeks.     A.) Showering/bathing  5. Not at All  B.) Walking 1 block on level ground 5. Not at All  C.) Hurrying or Jogging   6. Limited for other reastons    2.  Over the past 2 weeks, how many times did you have swelling in your feet, ankles or legs when you woke up in the morning? 5. Never    3.  Over the past 2 weeks, on average, how many times has fatigue limited your ability to do what you wanted? 7. Never    4.  Over the past 2 weeks, on average, how many times has shortness of breath limited your ability to do what you wanted? 7. Never    5.  Over the past 2 weeks, on average, how many times have you been forced to sleep sitting up in a chair or with at least 3 pillows to prop you up because of shortness of breath? Never    6. Over the past 2 weeks, how much has your heart failure limited your enjoyment of life? It has not  limited my enjoyment of life    7. If you had to spend the rest of your life with your heart failure the way it is right now, how would you feel about this? 5. Completely satisfied    8. How much does your heart failure affect your lifestyle? Please indicate how your heart failure may have limited yourparticipation in the following activities over the past 2 weeks    A.)  Hobbies, recreational activities  5. Did not limit at all    B.) Working or doing household chores  5. Did not limit at all    C.) Visiting family or friends out of your home  5. Did not limit at all    Impression  Doing well clinically, denies SOB, EDDY or fatigue.  Recently started on Amlodipine for HTN, pressures have been 120s-130s/70s with HR in 70s. Has noticed an increase in swelling of her legs towards end of day, but is relieved with diuretics.  Will need 1 year echo per TVT registry requirements.      Plan:  - Cont current medication regimen  - ASA for life  - Echo now (ordered)  - Cont to to be active  - f/u with PCP and Cards as directed  - Life-long Dental SBE prophylaxis needed      Post procedure echo requirements per TVT registry    -1 month echo to be done 23-75 days post implant  -1 year echo to be done 305-425 days post implant   - Annually after implant

## 2025-02-10 ENCOUNTER — HOSPITAL ENCOUNTER (OUTPATIENT)
Dept: CARDIOLOGY | Facility: CLINIC | Age: 70
Discharge: HOME | End: 2025-02-10
Payer: MEDICARE

## 2025-02-10 DIAGNOSIS — Z95.3 S/P TAVR (TRANSCATHETER AORTIC VALVE REPLACEMENT), BIOPROSTHETIC: ICD-10-CM

## 2025-02-10 PROCEDURE — 93306 TTE W/DOPPLER COMPLETE: CPT

## 2025-02-10 PROCEDURE — 93306 TTE W/DOPPLER COMPLETE: CPT | Performed by: INTERNAL MEDICINE

## 2025-02-11 LAB
AORTIC VALVE MEAN GRADIENT: 6 MMHG
AORTIC VALVE PEAK VELOCITY: 1.61 M/S
AV PEAK GRADIENT: 10 MMHG
AVA (PEAK VEL): 1.83 CM2
AVA (VTI): 1.85 CM2
EJECTION FRACTION APICAL 4 CHAMBER: 55.9
EJECTION FRACTION: 60 %
LEFT VENTRICLE INTERNAL DIMENSION DIASTOLE: 4.2 CM (ref 3.5–6)
LEFT VENTRICULAR OUTFLOW TRACT DIAMETER: 2 CM
LV EJECTION FRACTION BIPLANE: 57 %
MITRAL VALVE E/A RATIO: 0.62
MITRAL VALVE E/E' RATIO: 16.6
RIGHT VENTRICLE PEAK SYSTOLIC PRESSURE: 30.2 MMHG

## 2025-05-16 ENCOUNTER — APPOINTMENT (OUTPATIENT)
Dept: PLASTIC SURGERY | Facility: CLINIC | Age: 70
End: 2025-05-16
Payer: MEDICARE

## 2025-05-29 ENCOUNTER — APPOINTMENT (OUTPATIENT)
Dept: CARDIOLOGY | Facility: CLINIC | Age: 70
End: 2025-05-29
Payer: MEDICARE

## 2025-05-29 VITALS
WEIGHT: 244.5 LBS | HEART RATE: 78 BPM | BODY MASS INDEX: 48 KG/M2 | SYSTOLIC BLOOD PRESSURE: 142 MMHG | DIASTOLIC BLOOD PRESSURE: 78 MMHG | HEIGHT: 60 IN

## 2025-05-29 DIAGNOSIS — I83.813 VARICOSE VEINS OF BOTH LOWER EXTREMITIES WITH PAIN: ICD-10-CM

## 2025-05-29 DIAGNOSIS — E66.01 MORBID OBESITY WITH BMI OF 45.0-49.9, ADULT (MULTI): ICD-10-CM

## 2025-05-29 DIAGNOSIS — E78.2 MIXED HYPERLIPIDEMIA: ICD-10-CM

## 2025-05-29 DIAGNOSIS — I35.0 NONRHEUMATIC AORTIC VALVE STENOSIS: ICD-10-CM

## 2025-05-29 DIAGNOSIS — I10 ESSENTIAL (PRIMARY) HYPERTENSION: ICD-10-CM

## 2025-05-29 DIAGNOSIS — Z87.891 FORMER SMOKER: ICD-10-CM

## 2025-05-29 DIAGNOSIS — G47.30 SLEEP APNEA, UNSPECIFIED TYPE: ICD-10-CM

## 2025-05-29 DIAGNOSIS — I34.0 MODERATE MITRAL REGURGITATION: ICD-10-CM

## 2025-05-29 DIAGNOSIS — Z95.3 S/P TAVR (TRANSCATHETER AORTIC VALVE REPLACEMENT), BIOPROSTHETIC: ICD-10-CM

## 2025-05-29 PROBLEM — I83.90 VARICOSE VEIN OF LEG: Status: ACTIVE | Noted: 2025-05-29

## 2025-05-29 PROCEDURE — 3078F DIAST BP <80 MM HG: CPT | Performed by: INTERNAL MEDICINE

## 2025-05-29 PROCEDURE — 3077F SYST BP >= 140 MM HG: CPT | Performed by: INTERNAL MEDICINE

## 2025-05-29 PROCEDURE — 3008F BODY MASS INDEX DOCD: CPT | Performed by: INTERNAL MEDICINE

## 2025-05-29 PROCEDURE — 1159F MED LIST DOCD IN RCRD: CPT | Performed by: INTERNAL MEDICINE

## 2025-05-29 PROCEDURE — 99214 OFFICE O/P EST MOD 30 MIN: CPT | Performed by: INTERNAL MEDICINE

## 2025-05-29 PROCEDURE — 1036F TOBACCO NON-USER: CPT | Performed by: INTERNAL MEDICINE

## 2025-05-29 NOTE — PATIENT INSTRUCTIONS
Patient to follow up in 1 year with Dr. Toshia Mariee MD [facility]     Office will arrange referral to Dr. Adryan Christensen MD for varicose vein symptoms.     No other changes today.   Continue same medications and treatments.   Patient educated on proper medication use.   Patient educated on risk factor modification.   Please bring any lab results from other providers / physicians to your next appointment.     Please bring all medicines, vitamins, and herbal supplements with you when you come to the office.     Prescriptions will not be filled unless you are compliant with your follow up appointments or have a follow up appointment scheduled as per instruction of your physician. Refills should be requested at the time of your visit.    Mike KAPOOR RN am scribing for and in the presence of Dr. Toshia Mariee MD [facility]

## 2025-05-29 NOTE — PROGRESS NOTES
Referred by Dr. Hilton ref. provider found provider found for   Chief Complaint   Patient presents with    Follow-up     Follow up on aortic valve stenosis, patent foramen ovale, mixed hyperlipidemia, and essential hypertension management          History of Present Illness  Dottie Fragoso is a 69 y.o. year old female patient is here for follow-up.  Had echocardiogram showed adequately functioning TAVR.  She is now about 1-1/2-year following her TAVR.  Doing well no complaint no symptoms of chest pain.  Discussed with the patient we will continue medication will call for any problem and follow-up as scheduled    Past Medical History  Medical History[1]    Social History  Social History[2]    Family History   Family History[3]    Review of Systems  As per HPI, all other systems reviewed and negative.    Allergies:  RX Allergies[4]     Outpatient Medications:  Current Outpatient Medications   Medication Instructions    albuterol (Ventolin HFA) 90 mcg/actuation inhaler 2 puffs, Every 4 hours PRN    amLODIPine (NORVASC) 5 mg, oral, Daily    esomeprazole (NEXIUM) 20 mg, Daily before breakfast    folic acid (FOLVITE) 1 mg, Daily    gabapentin (NEURONTIN) 600 mg, 2 times daily    losartan (COZAAR) 100 mg, oral, Daily    methotrexate (TREXALL) 20 mg, Once Weekly    montelukast (SINGULAIR) 10 mg, Nightly    torsemide (DEMADEX) 20 mg, Every Mon/Wed/Fri    traMADol (ULTRAM) 50 mg, Every 4 hours PRN    warfarin (COUMADIN) 7.5 mg, Daily    warfarin (COUMADIN) 10 mg, Every Mon/Wed/Fri         Vitals:  Vitals:    05/29/25 0827   BP: 142/78   Pulse: 78       Physical Exam:  Physical Exam  Vitals and nursing note reviewed.   Constitutional:       Appearance: Normal appearance. She is normal weight.   HENT:      Head: Normocephalic and atraumatic.   Eyes:      Extraocular Movements: Extraocular movements intact.      Pupils: Pupils are equal, round, and reactive to light.   Cardiovascular:      Rate and Rhythm: Normal rate and regular  rhythm.      Pulses: Normal pulses.   Pulmonary:      Effort: Pulmonary effort is normal.      Breath sounds: Normal breath sounds.   Musculoskeletal:      Cervical back: Normal range of motion.      Right lower leg: No edema.      Left lower leg: No edema.   Skin:     General: Skin is warm and dry.      Comments: Bilateral varicose veins    Neurological:      General: No focal deficit present.      Mental Status: She is alert and oriented to person, place, and time.             Assessment/Plan   Diagnoses and all orders for this visit:  S/p TAVR (transcatheter aortic valve replacement), bioprosthetic  Mixed hyperlipidemia  Essential (primary) hypertension  Nonrheumatic aortic valve stenosis  Moderate mitral regurgitation  Morbid obesity with BMI of 45.0-49.9, adult (Multi)  Varicose veins of both lower extremities with pain  Sleep apnea, unspecified type  Former smoker      IMike RN   am scribing for, and in the presence of Dr[facility]     I, Dr. Toshia Mariee MD [facility] , personally performed the services described in the documentation as scribed by Mike Veronica RN   in my presence, and confirm it is both accurate and complete.      Toshia Mariee MD [facility]  Interventional Cardiology   of [facility]     Thank you for allowing me to participate in the care of this patient. Please do not hesitate to contact me with any further questions or concerns.         [1]   Past Medical History:  Diagnosis Date    Asthma     Cancer (Multi)     bilat breat cancer with L mastectomy 2016/ R mastectomy 2002    Heart murmur     pt states she need an aoritc valve replacement    Hyperlipidemia     Hypertension     Personal history of malignant neoplasm, unspecified     History of malignant neoplasm    Personal history of other diseases of the circulatory system     History of hypertension    PFO (patent foramen ovale) ([facility]) 2002    not repaired    Stroke (Multi) 2002    stroke (without  residual) from PFO, event happened after a port was placed. Info per pt    Unilateral primary osteoarthritis, right hip 2020    Primary osteoarthritis of right hip    Unilateral primary osteoarthritis, right hip 2020    Primary osteoarthritis of right hip   [2]   Social History  Tobacco Use    Smoking status: Former     Current packs/day: 0.00     Types: Cigarettes     Quit date: 1987     Years since quittin.9    Smokeless tobacco: Never   Vaping Use    Vaping status: Never Used   Substance Use Topics    Alcohol use: Never    Drug use: Never   [3]   Family History  Problem Relation Name Age of Onset    Heart attack Mother Denise Fragoso     Heart attack Father Lloyd Fragoso    [4]   Allergies  Allergen Reactions    Darvon [Propoxyphene] Hives    Dilaudid [Hydromorphone] Hives    Mobic [Meloxicam] Hives    Penicillins Hives    Sulfasalazine Hives    Percodan [Oxycodone-Aspirin] Palpitations

## 2025-05-30 ENCOUNTER — TELEPHONE (OUTPATIENT)
Dept: CARDIOLOGY | Facility: CLINIC | Age: 70
End: 2025-05-30
Payer: MEDICARE

## 2025-05-30 NOTE — TELEPHONE ENCOUNTER
I called and left . I schedule her with Dr. Cornelius in [place] and if that day and time doesn't work for her I gave her the [place] number to call and reschedule.

## 2025-07-07 ENCOUNTER — APPOINTMENT (OUTPATIENT)
Dept: PRIMARY CARE | Facility: CLINIC | Age: 70
End: 2025-07-07
Payer: MEDICARE

## 2025-07-07 ENCOUNTER — TELEPHONE (OUTPATIENT)
Dept: VASCULAR SURGERY | Facility: CLINIC | Age: 70
End: 2025-07-07

## 2025-07-09 ENCOUNTER — APPOINTMENT (OUTPATIENT)
Dept: PRIMARY CARE | Facility: CLINIC | Age: 70
End: 2025-07-09
Payer: MEDICARE

## 2025-07-09 VITALS
WEIGHT: 241 LBS | HEART RATE: 84 BPM | BODY MASS INDEX: 47.32 KG/M2 | SYSTOLIC BLOOD PRESSURE: 141 MMHG | HEIGHT: 60 IN | DIASTOLIC BLOOD PRESSURE: 79 MMHG

## 2025-07-09 DIAGNOSIS — Q21.12 PATENT FORAMEN OVALE (HHS-HCC): ICD-10-CM

## 2025-07-09 DIAGNOSIS — I89.0 LYMPHEDEMA: Primary | ICD-10-CM

## 2025-07-09 DIAGNOSIS — E66.01 MORBID OBESITY WITH BMI OF 45.0-49.9, ADULT (MULTI): ICD-10-CM

## 2025-07-09 DIAGNOSIS — I87.321 IDIOPATHIC CHRONIC VENOUS HYPERTENSION OF RIGHT LEG WITH INFLAMMATION: ICD-10-CM

## 2025-07-09 DIAGNOSIS — I83.813 VARICOSE VEINS OF BOTH LOWER EXTREMITIES WITH PAIN: ICD-10-CM

## 2025-07-09 DIAGNOSIS — I87.392 CHRONIC VENOUS HYPERTENSION (IDIOPATHIC) WITH OTHER COMPLICATIONS OF LEFT LOWER EXTREMITY: ICD-10-CM

## 2025-07-09 DIAGNOSIS — G47.33 OBSTRUCTIVE SLEEP APNEA SYNDROME: ICD-10-CM

## 2025-07-09 PROCEDURE — 1036F TOBACCO NON-USER: CPT | Performed by: INTERNAL MEDICINE

## 2025-07-09 PROCEDURE — 99204 OFFICE O/P NEW MOD 45 MIN: CPT | Performed by: INTERNAL MEDICINE

## 2025-07-09 PROCEDURE — 1158F ADVNC CARE PLAN TLK DOCD: CPT | Performed by: INTERNAL MEDICINE

## 2025-07-09 PROCEDURE — 3077F SYST BP >= 140 MM HG: CPT | Performed by: INTERNAL MEDICINE

## 2025-07-09 PROCEDURE — 3008F BODY MASS INDEX DOCD: CPT | Performed by: INTERNAL MEDICINE

## 2025-07-09 PROCEDURE — 3078F DIAST BP <80 MM HG: CPT | Performed by: INTERNAL MEDICINE

## 2025-07-09 PROCEDURE — 1159F MED LIST DOCD IN RCRD: CPT | Performed by: INTERNAL MEDICINE

## 2025-07-09 PROCEDURE — 1160F RVW MEDS BY RX/DR IN RCRD: CPT | Performed by: INTERNAL MEDICINE

## 2025-07-09 PROCEDURE — 1123F ACP DISCUSS/DSCN MKR DOCD: CPT | Performed by: INTERNAL MEDICINE

## 2025-07-09 NOTE — PROGRESS NOTES
Chief Complaints:  New consult for leg symptoms referred by cardiologist.      HPI:    History of Present Illness  Dottie Fragoso is a 69 year old female with a history of breast cancer who presents with chronic right leg swelling.    She has experienced chronic swelling in her right leg since 2013 following bilateral knee replacement surgery. The swelling has progressively worsened, leading to a noticeable difference between her legs. She experiences constant pain in the middle of the back of her legs and describes her right leg as 'misshapen.'    In 2002, she was diagnosed with breast cancer and underwent surgery that included the removal of 21 lymph nodes from under her right arm. Previous doctors have indicated that the swelling is lymphatic in nature, although she has not experienced lymphedema in her arm.    She has a history of rheumatoid arthritis, which has contributed to her decreased physical activity. She retired in 2019 and reports being 'not very active' since then. She takes methotrexate for her rheumatoid arthritis.    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 She underwent nerve decompression surgery in 2019 but reports no improvement in her neuropathy. She takes gabapentin for this condition.    She had a transcatheter aortic valve replacement (TAVR) in January 2024 and has a patent foramen ovale, for which she takes warfarin. She has been on warfarin for 22 years following a neurological event related to a blood clot after breast cancer treatment.    She was diagnosed with sleep apnea over 20 years ago but has not used CPAP for a long time as she feels it no longer bothers her. She quit smoking in 1987.   Social History:  None tobacco Use:    Medications Ordered Prior to Encounter[1]     RX Allergies[2]     Examination:    Visit Vitals  /79 (BP Location: Left arm, Patient Position: Sitting, BP Cuff Size: Adult)   Pulse 84   Ht (!) 1.524 m (5')   Wt  109 kg (241 lb)   BMI 47.07 kg/m²   Smoking Status Former   BSA 2.15 m²        BMI 47.07, well-nourished  with no apparent distress. Alert oriented  Skin:  Normal turgor.  No rash.  Head:  Normocephalic, atraumatic.  Eyes:  Pupils are equal, round,.  No pallor of conjunctivae.  Mucous membranes are moist  Neck:  Supple.  No JVD.  No clubbing  Chest:  Vesicular breathing. Bilaterally moderate air entry.  No wheezing.  No crackles.  Heart:  Regular rate and rhythm.  S1, S2 positive.  No murmur.  Abdomen:  Soft and nontender.  Bowel sounds are positive.  No organomegaly.  Extremities: Patient has bilateral leg swelling but has significant lymphedema in the right leg.  Detailed leg exam below.  Bilaterally 2+ dorsalis pedis pulses.  No calf tenderness. Homans sign is negative.  Neuro Exam: No focal signs. Gait is normal.    Venous Exam:  Varicose veins in left calf present  Varicose veins in left thigh absent  Varicose veins in right calf present  Varicose veins in right thigh present  Reticular veins in left calf absent  Reticular veins in left thigh absent  Reticular veins in right calf absent  Reticular veins in right thigh absent  Telangiectasias in left calf present  Telangiectasias in left thigh absent  Telangiectasias in right calf absent  Telangiectasias in right thigh absent  Right leg 1+ edema present  Left leg trace edema present  Hyperpigmentation in left leg absent  Hyperpigmentation in right leg present  Lipodermatosclerosis in right leg absent  Lipodermatosclerosis in left leg absent  Dermatitis in left leg absent  Dermatitis in right leg absent  Corona phlebectatica in left leg absent).  Corona phlebectatica in right leg absent  Atrophe frances in left leg absent  Atrophe frances in right leg absent  Ulcer(s) in left leg absent  Ulcer(s) in right leg absent    CEAP Classification  Right leg CEAP C 4aS Ep  Left leg CEAP C 3S Ep      Assessment & Plan  Lymphedema  Chronic lymphedema of the right leg,  secondary to probably lymph node removal in the right axilla and venous insufficiency, causing significant swelling and discomfort.  - Refer to lymphedema therapy for management and self-care education.  - Consider long-term lymphedema pump use pending insurance approval after the lymphedema therapy.  - Prescribe Velcro compression stockings.    Venous Insufficiency with Varicose Veins  Varicose veins with potential venous reflux, complicated by patent foramen ovale, limiting certain procedures.  - Advise on graduated compression stockings.  - Discuss potential thermal ablation for large veins after further evaluation.  - Refer to Dr. Bhardwaj for phlebectomy consideration.  Patient will be high risk for foam sclerotherapy because of patient's PFO.  Because of this after lengthy discussion it was decided that patient will be sent to vein physicians who does phlebectomies as routine.  She understands that if there is any question to call me back at any time.  I have indicated to the patient that I will be happy to do the venous ultrasound evaluation and also to do thermal ablation if it is appropriate after the venous ultrasound evaluation.  But the tributaries need to be further addressed preferably by stab phlebectomies as outpatient.    Patent Foramen Ovale  Managed with long-term warfarin due to previous thromboembolic event, complicating superior vena cava thrombosis.    Rheumatoid Arthritis  Chronic rheumatoid arthritis managed with methotrexate, affecting mobility.    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    We discussed the recent documentation that the if the BMI is more than 40 the success of venous procedures are limited. I encouraged the patient that he should the try to improve his health by loosing weight if possible prior to the venous procedures. Given the patient's significant obesity with a BMI of 47.07 I  advised the patient that she should work with the primary care physician and other appropriate consults to lose weight.    Aortic Valve Replacement  Aortic valve replacement via TAVR in January 2023, no immediate concerns.    Sleep Apnea  Sleep apnea previously managed with CPAP, currently not in use, potentially affecting venous insufficiency and cardiovascular health.  - Recommend reassessment of sleep apnea management, possibly reinitiating CPAP therapy.    General Health Maintenance  Lifestyle modifications needed to manage venous insufficiency and lymphedema, focusing on weight loss, physical activity, and smoking cessation.  - Encourage weight loss through programs like Weight Watchers.  - Promote increased physical activity, including walking and aerobic exercises.  - Advise on smoking cessation and management of sleep apnea.    Follow-up  Ongoing management and evaluation of conditions, advised second opinion for venous insufficiency treatment.  - Advise follow-up with Dr. Bhardwaj for venous insufficiency evaluation and potential phlebectomy.  - Encourage contact via "[employer]t for questions or concerns.  - Plan follow-up visits in 3, 6, and 12 months to monitor progress and adjust treatment.         Assessment/Plan :  Problem List Items Addressed This Visit       Patent foramen ovale (HHS-HCC)    Sleep apnea    Morbid obesity with BMI of 45.0-49.9, adult (Multi)    Varicose vein of leg    Relevant Orders    Compression Stockings 30-40 mmHg    Vascular US Lower Extremity Venous Insufficiency Bilateral    Referral to Occupational Therapy    Idiopathic chronic venous hypertension of right leg with inflammation    Relevant Orders    Compression Stockings 30-40 mmHg    Vascular US Lower Extremity Venous Insufficiency Bilateral    Referral to Occupational Therapy    Chronic venous hypertension (idiopathic) with other complications of left lower extremity    Relevant Orders    Compression Stockings 30-40 mmHg     Vascular US Lower Extremity Venous Insufficiency Bilateral    Referral to Occupational Therapy    Lymphedema - Primary    Relevant Orders    Compression Stockings 30-40 mmHg    Vascular US Lower Extremity Venous Insufficiency Bilateral    Referral to Occupational Therapy       Orders Placed This Encounter   Procedures    Compression Stockings 30-40 mmHg     Bilateral Thigh high, velcro    Referral to Occupational Therapy     Standing Status:   Future     Expected Date:   7/9/2025     Expiration Date:   7/9/2026     Referral Priority:   Routine     Referral Type:   Consultation     Referral Reason:   Specialty Services Required     Number of Visits Requested:   1       Plan     1. Varicose veins of bilateral lower extremities with other complications  Start graduated thigh high compression stockings 30 - 40 mm Hg during the wakeup/ day time. .    Discussions    1. Varicose veins of leg with pain and multiple symptoms with complication including lymphedema  Patient has symptoms of chronic venous insufficiency which collaborates with patient's examination as noted above. During this visit, we had a detailed discussion about the pathophysiology of the venous disease and the conservative managements.  We discussed the genetic factors involved in the chronic venous insufficiency and also emphasize the environmental factors such as obesity, lifestyle which could contributes to the worsening of the chronic venous insufficiency. The patient will continue to have the regular activities including walking. The patient also will do all the other conservative management including Velcro graduated compression stockings 30 to 40 mmHg thigh-high during the daytime as we prescribed.     Patient will be high risk for foam sclerotherapy because of patient's PFO.  Because of this after lengthy discussion it was decided that patient will be sent to vein physicians who does phlebectomies as routine.  She understands that if there is any  "question to call me back at any time.  I have indicated to the patient that I will be happy to do the venous ultrasound evaluation and also to do thermal ablation if it is appropriate after the venous ultrasound evaluation.  But the tributaries need to be further addressed preferably by stab phlebectomies as outpatient.    Patient Education  RECOMMENDATIONS FOR BETTER LEG CARE  REGULAR EXERCISE  Walking, running, aerobics, swimming, elliptical machine, or biking for 30 minutes, 5 days per week will help reduce aching, pain and tiredness of your legs.  ELEVATE YOUR LEGS  Elevating your legs, heels slightly above chest level for at least 10 minutes, once or twice daily may diminish aching and swelling.  MOVE YOUR LEGS FREQUENTLY  Flexing your ankles will pump blood out of your legs like walking does.  Repeat this every 10 minutes while standing or sitting and try to walk for at least 2 minutes every 1/2 hour.  AVOID WEARING HEELS  Wearing high heels interferes with the normal pumping action that occurs when you walk and may lead to aching and cramping of the legs.  MAINTAIN PROPER WEIGHT  Even moderate weight loss may reduce aching in the legs due to varicose veins and diminish the rate at which spider veins develop.  WEAR SUPPORT HOSE  Available at pharmacies and medical supply stores.  There are many brands to choose from.  Lighter support hose are available at department stores.  However, it is best to wear stockings that are \"graduated\".  This will most efficiently improve your vein function.  Moderate Strength: 20-30 mm Hg   Heavy Strength: 30-40 mm Hg (prescription required)  Very Heavy Strength: 40-50 mm Hg (prescription required)  SYMPTOM CONTROL WITH MEDICATIONS  Non-steroidal analgesics (NSAIDS) have been useful in controlling symptoms and reducing inflammation.  If you do not have a contraindication, allergy or intolerance to these medications, small doses may be used to alleviate symptoms.  Advice your " practitioner if you have experienced prior adverse effects to these medications as alternative analgesics may be used.  For additional information - go to  [email or web address] and open the patient information tab    Counseling.  The patient was counseled regarding instructions for management, risk factor reductions, prognosis, patient and family education, impressions, risks and benefits of treatment options and importance of compliance with treatment. total time of encounter was 62 minutes and 49 minutes was spent counseling.     This medical note was created with the assistance of artificial intelligence (AI) for documentation purposes. The content has been reviewed and confirmed by the healthcare provider for accuracy and completeness. Patient consented to the use of audio recording and use of AI during their visit.             [1]   Current Outpatient Medications on File Prior to Visit   Medication Sig Dispense Refill    albuterol (Ventolin HFA) 90 mcg/actuation inhaler Inhale 2 puffs every 4 hours if needed for wheezing.      amLODIPine (Norvasc) 5 mg tablet Take 1 tablet (5 mg) by mouth once daily. 90 tablet 3    esomeprazole (NexIUM) 20 mg DR capsule Take 1 capsule (20 mg) by mouth once daily in the morning. Take before meals. Do not open capsule.      folic acid (Folvite) 1 mg tablet Take 1 tablet (1 mg) by mouth once daily.      gabapentin (Neurontin) 600 mg tablet Take 1 tablet (600 mg) by mouth 2 times a day.      losartan (Cozaar) 100 mg tablet Take 1 tablet (100 mg) by mouth once daily. 90 tablet 3    methotrexate (Trexall) 2.5 mg tablet Take 8 tablets (20 mg total) by mouth 1 (one) time per week.  Follow directions carefully, and ask to explain any part you do not understand. Take exactly as directed.      montelukast (Singulair) 10 mg tablet Take 1 tablet (10 mg) by mouth once daily at bedtime.      torsemide (Demadex) 20 mg tablet Take 1 tablet (20 mg) by mouth once a day on Monday, Wednesday, and  Friday. As needed      warfarin (Coumadin) 5 mg tablet Take 1.5 tablets (7.5 mg) by mouth once daily. EVERY TUES-THURS-SAT-SUN      warfarin (Coumadin) 5 mg tablet Take 2 tablets (10 mg) by mouth once a day on Monday, Wednesday, and Friday. TAKES 10MG ONCE DAILY EVERY MON-WED-FRI (Patient taking differently: Take 2 tablets (10 mg) by mouth once a day on Monday, Wednesday, and Friday. TAKES 10MG ONCE DAILY EVERY TUESDAYS, THURSDAYS, SATURDAYS, AND SUNDAYS)      traMADol (Ultram) 50 mg tablet Take 1 tablet (50 mg) by mouth every 4 hours if needed for severe pain (7 - 10). (Patient not taking: Reported on 5/29/2025)       No current facility-administered medications on file prior to visit.   [2]   Allergies  Allergen Reactions    Darvon [Propoxyphene] Hives    Dilaudid [Hydromorphone] Hives    Mobic [Meloxicam] Hives    Penicillins Hives    Sulfasalazine Hives    Percodan [Oxycodone-Aspirin] Palpitations

## 2025-07-12 ENCOUNTER — APPOINTMENT (OUTPATIENT)
Dept: CT IMAGING | Age: 70
End: 2025-07-12
Payer: MEDICARE

## 2025-07-12 ENCOUNTER — APPOINTMENT (OUTPATIENT)
Dept: ULTRASOUND IMAGING | Age: 70
End: 2025-07-12
Payer: MEDICARE

## 2025-07-12 ENCOUNTER — HOSPITAL ENCOUNTER (EMERGENCY)
Age: 70
Discharge: HOME | End: 2025-07-12
Attending: STUDENT IN AN ORGANIZED HEALTH CARE EDUCATION/TRAINING PROGRAM
Payer: MEDICARE

## 2025-07-12 VITALS
SYSTOLIC BLOOD PRESSURE: 106 MMHG | DIASTOLIC BLOOD PRESSURE: 94 MMHG | BODY MASS INDEX: 45.94 KG/M2 | WEIGHT: 243.13 LBS | HEART RATE: 65 BPM | OXYGEN SATURATION: 99 % | TEMPERATURE: 98.6 F | RESPIRATION RATE: 18 BRPM

## 2025-07-12 DIAGNOSIS — M25.532 LEFT WRIST PAIN: Primary | ICD-10-CM

## 2025-07-12 DIAGNOSIS — M06.9 RHEUMATOID ARTHRITIS INVOLVING LEFT WRIST, UNSPECIFIED WHETHER RHEUMATOID FACTOR PRESENT (HCC): ICD-10-CM

## 2025-07-12 DIAGNOSIS — M25.432 PAIN AND SWELLING OF LEFT WRIST: ICD-10-CM

## 2025-07-12 DIAGNOSIS — M25.532 PAIN AND SWELLING OF LEFT WRIST: ICD-10-CM

## 2025-07-12 LAB
ALBUMIN SERPL-MCNC: 4.3 G/DL (ref 3.5–4.6)
ALP SERPL-CCNC: 172 U/L (ref 40–130)
ALT SERPL-CCNC: 11 U/L (ref 0–33)
ANION GAP SERPL CALCULATED.3IONS-SCNC: 13 MEQ/L (ref 9–15)
AST SERPL-CCNC: 17 U/L (ref 0–35)
BASOPHILS # BLD: 0.1 K/UL (ref 0–0.2)
BASOPHILS NFR BLD: 0.6 %
BILIRUB SERPL-MCNC: 0.3 MG/DL (ref 0.2–0.7)
BUN SERPL-MCNC: 7 MG/DL (ref 8–23)
CALCIUM SERPL-MCNC: 10.1 MG/DL (ref 8.5–9.9)
CHLORIDE SERPL-SCNC: 103 MEQ/L (ref 95–107)
CO2 SERPL-SCNC: 23 MEQ/L (ref 20–31)
CREAT SERPL-MCNC: 0.47 MG/DL (ref 0.5–0.9)
CRP SERPL HS-MCNC: 27.5 MG/L (ref 0–5)
EKG ATRIAL RATE: 82 BPM
EKG DIAGNOSIS: NORMAL
EKG P AXIS: 50 DEGREES
EKG P-R INTERVAL: 150 MS
EKG Q-T INTERVAL: 366 MS
EKG QRS DURATION: 72 MS
EKG QTC CALCULATION (BAZETT): 427 MS
EKG R AXIS: -15 DEGREES
EKG T AXIS: -10 DEGREES
EKG VENTRICULAR RATE: 82 BPM
EOSINOPHIL # BLD: 0.3 K/UL (ref 0–0.7)
EOSINOPHIL NFR BLD: 3.1 %
ERYTHROCYTE [DISTWIDTH] IN BLOOD BY AUTOMATED COUNT: 15.8 % (ref 11.5–14.5)
ERYTHROCYTE [SEDIMENTATION RATE] IN BLOOD BY WESTERGREN METHOD: 46 MM (ref 0–30)
GLOBULIN SER CALC-MCNC: 3.6 G/DL (ref 2.3–3.5)
GLUCOSE SERPL-MCNC: 140 MG/DL (ref 70–99)
HCT VFR BLD AUTO: 42.8 % (ref 37–47)
HGB BLD-MCNC: 13.7 G/DL (ref 12–16)
INR PPP: 2.1
LYMPHOCYTES # BLD: 0.8 K/UL (ref 1–4.8)
LYMPHOCYTES NFR BLD: 7.4 %
MAGNESIUM SERPL-MCNC: 2.1 MG/DL (ref 1.7–2.4)
MCH RBC QN AUTO: 27.7 PG (ref 27–31.3)
MCHC RBC AUTO-ENTMCNC: 32 % (ref 33–37)
MCV RBC AUTO: 86.6 FL (ref 79.4–94.8)
MONOCYTES # BLD: 1 K/UL (ref 0.2–0.8)
MONOCYTES NFR BLD: 9.6 %
NEUTROPHILS # BLD: 8.2 K/UL (ref 1.4–6.5)
NEUTS SEG NFR BLD: 78.7 %
PLATELET # BLD AUTO: 163 K/UL (ref 130–400)
POTASSIUM SERPL-SCNC: 3.8 MEQ/L (ref 3.4–4.9)
PROT SERPL-MCNC: 7.9 G/DL (ref 6.3–8)
PROTHROMBIN TIME: 24.6 SEC (ref 12.3–14.9)
RBC # BLD AUTO: 4.94 M/UL (ref 4.2–5.4)
SODIUM SERPL-SCNC: 139 MEQ/L (ref 135–144)
TROPONIN, HIGH SENSITIVITY: <6 NG/L (ref 0–19)
TROPONIN, HIGH SENSITIVITY: <6 NG/L (ref 0–19)
WBC # BLD AUTO: 10.3 K/UL (ref 4.8–10.8)

## 2025-07-12 PROCEDURE — 2500000003 HC RX 250 WO HCPCS: Performed by: PHYSICIAN ASSISTANT

## 2025-07-12 PROCEDURE — 6360000004 HC RX CONTRAST MEDICATION: Performed by: PHYSICIAN ASSISTANT

## 2025-07-12 PROCEDURE — 6370000000 HC RX 637 (ALT 250 FOR IP)

## 2025-07-12 PROCEDURE — 93005 ELECTROCARDIOGRAM TRACING: CPT | Performed by: PHYSICIAN ASSISTANT

## 2025-07-12 PROCEDURE — 85610 PROTHROMBIN TIME: CPT

## 2025-07-12 PROCEDURE — 6360000002 HC RX W HCPCS: Performed by: PHYSICIAN ASSISTANT

## 2025-07-12 PROCEDURE — 84484 ASSAY OF TROPONIN QUANT: CPT

## 2025-07-12 PROCEDURE — 99285 EMERGENCY DEPT VISIT HI MDM: CPT

## 2025-07-12 PROCEDURE — 80053 COMPREHEN METABOLIC PANEL: CPT

## 2025-07-12 PROCEDURE — 83735 ASSAY OF MAGNESIUM: CPT

## 2025-07-12 PROCEDURE — 96375 TX/PRO/DX INJ NEW DRUG ADDON: CPT

## 2025-07-12 PROCEDURE — 86140 C-REACTIVE PROTEIN: CPT

## 2025-07-12 PROCEDURE — 85025 COMPLETE CBC W/AUTO DIFF WBC: CPT

## 2025-07-12 PROCEDURE — 85652 RBC SED RATE AUTOMATED: CPT

## 2025-07-12 PROCEDURE — 96374 THER/PROPH/DIAG INJ IV PUSH: CPT

## 2025-07-12 PROCEDURE — 36415 COLL VENOUS BLD VENIPUNCTURE: CPT

## 2025-07-12 PROCEDURE — 93971 EXTREMITY STUDY: CPT

## 2025-07-12 PROCEDURE — 6360000002 HC RX W HCPCS: Performed by: EMERGENCY MEDICINE

## 2025-07-12 PROCEDURE — 73206 CT ANGIO UPR EXTRM W/O&W/DYE: CPT

## 2025-07-12 RX ORDER — ONDANSETRON 2 MG/ML
4 INJECTION INTRAMUSCULAR; INTRAVENOUS ONCE
Status: COMPLETED | OUTPATIENT
Start: 2025-07-12 | End: 2025-07-12

## 2025-07-12 RX ORDER — MORPHINE SULFATE 4 MG/ML
4 INJECTION, SOLUTION INTRAMUSCULAR; INTRAVENOUS ONCE
Refills: 0 | Status: DISCONTINUED | OUTPATIENT
Start: 2025-07-12 | End: 2025-07-12

## 2025-07-12 RX ORDER — ONDANSETRON 4 MG/1
4 TABLET, ORALLY DISINTEGRATING ORAL ONCE
Status: DISCONTINUED | OUTPATIENT
Start: 2025-07-12 | End: 2025-07-12

## 2025-07-12 RX ORDER — PREDNISONE 20 MG/1
TABLET ORAL
Qty: 15 TABLET | Refills: 0 | Status: SHIPPED | OUTPATIENT
Start: 2025-07-13 | End: 2025-07-23

## 2025-07-12 RX ORDER — ONDANSETRON 2 MG/ML
4 INJECTION INTRAMUSCULAR; INTRAVENOUS ONCE
Status: DISCONTINUED | OUTPATIENT
Start: 2025-07-12 | End: 2025-07-12

## 2025-07-12 RX ORDER — HYDROCODONE BITARTRATE AND ACETAMINOPHEN 5; 325 MG/1; MG/1
1 TABLET ORAL ONCE
Status: COMPLETED | OUTPATIENT
Start: 2025-07-12 | End: 2025-07-12

## 2025-07-12 RX ORDER — TRAMADOL HYDROCHLORIDE 50 MG/1
50 TABLET ORAL EVERY 6 HOURS PRN
Qty: 12 TABLET | Refills: 0 | Status: SHIPPED | OUTPATIENT
Start: 2025-07-12 | End: 2025-07-12 | Stop reason: ALTCHOICE

## 2025-07-12 RX ORDER — MORPHINE SULFATE 4 MG/ML
4 INJECTION, SOLUTION INTRAMUSCULAR; INTRAVENOUS
Refills: 0 | Status: COMPLETED | OUTPATIENT
Start: 2025-07-12 | End: 2025-07-12

## 2025-07-12 RX ORDER — HYDROCODONE BITARTRATE AND ACETAMINOPHEN 5; 325 MG/1; MG/1
1 TABLET ORAL EVERY 6 HOURS PRN
Qty: 12 TABLET | Refills: 0 | Status: SHIPPED | OUTPATIENT
Start: 2025-07-12 | End: 2025-07-15

## 2025-07-12 RX ORDER — IOPAMIDOL 755 MG/ML
75 INJECTION, SOLUTION INTRAVASCULAR
Status: COMPLETED | OUTPATIENT
Start: 2025-07-12 | End: 2025-07-12

## 2025-07-12 RX ORDER — MORPHINE SULFATE 4 MG/ML
4 INJECTION, SOLUTION INTRAMUSCULAR; INTRAVENOUS ONCE
Status: DISCONTINUED | OUTPATIENT
Start: 2025-07-12 | End: 2025-07-12

## 2025-07-12 RX ORDER — FENTANYL CITRATE 50 UG/ML
50 INJECTION, SOLUTION INTRAMUSCULAR; INTRAVENOUS ONCE
Status: COMPLETED | OUTPATIENT
Start: 2025-07-12 | End: 2025-07-12

## 2025-07-12 RX ADMIN — MORPHINE SULFATE 4 MG: 4 INJECTION, SOLUTION INTRAMUSCULAR; INTRAVENOUS at 18:58

## 2025-07-12 RX ADMIN — FENTANYL CITRATE 50 MCG: 50 INJECTION INTRAMUSCULAR; INTRAVENOUS at 17:27

## 2025-07-12 RX ADMIN — ONDANSETRON 4 MG: 2 INJECTION, SOLUTION INTRAMUSCULAR; INTRAVENOUS at 18:58

## 2025-07-12 RX ADMIN — IOPAMIDOL 75 ML: 755 INJECTION, SOLUTION INTRAVENOUS at 19:30

## 2025-07-12 RX ADMIN — METHYLPREDNISOLONE SODIUM SUCCINATE 125 MG: 125 INJECTION INTRAMUSCULAR; INTRAVENOUS at 17:27

## 2025-07-12 RX ADMIN — HYDROCODONE BITARTRATE AND ACETAMINOPHEN 1 TABLET: 5; 325 TABLET ORAL at 21:43

## 2025-07-12 ASSESSMENT — ENCOUNTER SYMPTOMS
EYE PAIN: 0
ABDOMINAL PAIN: 0
NAUSEA: 0
PHOTOPHOBIA: 0
SORE THROAT: 0
RHINORRHEA: 0
DIARRHEA: 0
BACK PAIN: 0
COUGH: 0
VOMITING: 0
SHORTNESS OF BREATH: 0

## 2025-07-12 ASSESSMENT — PAIN DESCRIPTION - ONSET: ONSET: ON-GOING

## 2025-07-12 ASSESSMENT — PAIN SCALES - GENERAL
PAINLEVEL_OUTOF10: 10

## 2025-07-12 ASSESSMENT — PAIN DESCRIPTION - LOCATION
LOCATION: HAND
LOCATION: ARM
LOCATION: HAND
LOCATION: HAND

## 2025-07-12 ASSESSMENT — PAIN DESCRIPTION - FREQUENCY: FREQUENCY: INTERMITTENT

## 2025-07-12 ASSESSMENT — PAIN DESCRIPTION - ORIENTATION
ORIENTATION: LEFT

## 2025-07-12 ASSESSMENT — PAIN DESCRIPTION - PAIN TYPE: TYPE: ACUTE PAIN

## 2025-07-12 ASSESSMENT — PAIN - FUNCTIONAL ASSESSMENT: PAIN_FUNCTIONAL_ASSESSMENT: 0-10

## 2025-07-12 ASSESSMENT — PAIN DESCRIPTION - DESCRIPTORS: DESCRIPTORS: ACHING

## 2025-07-12 NOTE — ED PROVIDER NOTES
12/27/18 1100   Anxiety Tool LEYDA - 7   Feeling Nervous, Anxious, or on Edge 3 - Nearly every day   Not Being Able to Stop or Control Worrying 3 - Nearly every day   Worrying Too Much About Different Things 3 - Nearly every day   Trouble Relaxing 3 - Nearly every day   Being So Restless it is Hard to Sit Still 3 - Nearly every day   Becoming Easily Annoyed or Irritable 3 - Nearly every day   Feeling Afraid as if Something Awful Might Happen 3 - Nearly every day   Total Score 21     Madiha Rojas, LPC    and are negative.      Except as noted above the remainder of the review of systems was reviewed and negative.       PAST MEDICAL HISTORY     Past Medical History:   Diagnosis Date    Arthritis     Asthma     Cancer (HCC) 2002 & 2015    Breast RIGHT (T2/N0/M0) [place] (+) HER2/estela 3+ / chemo / left breast with mastectomy    GERD (gastroesophageal reflux disease)     Hiatal hernia     History of blood transfusion 2013    post op TKR    Hypercholesteremia     Hyperlipidemia     past trx / off meds > 5 yrs    Hypertension     meds > 20 yrs     Patent foramen ovale          SURGICAL HISTORY       Past Surgical History:   Procedure Laterality Date    BREAST SURGERY Bilateral     Mastectomy    CARDIAC CATHETERIZATION  2013    no blockage    COLONOSCOPY  08/31/2016    w/polypectomy     COLONOSCOPY N/A 7/24/2020    COLONOSCOPY DIAGNOSTIC performed by Michael Tyson MD at [facility]    COLONOSCOPY N/A 12/9/2021    COLORECTAL CANCER SCREENING, HIGH RISK WITH POLYPECTOMY performed by Michael Tyson MD at [facility]    ENDOSCOPY, COLON, DIAGNOSTIC      HEMICOLECTOMY Right 8/25/2020    RIGHT COLECTOMY [place] [facility] [place]    HERNIA REPAIR  1997    umbilical    HYSTERECTOMY  1997    JOINT REPLACEMENT Bilateral     Knee    JOINT REPLACEMENT Bilateral 11/04/2013    LAMINECTOMY Right 12/30/2019    RIGHT L4-5 MICRODECOMPRESSION performed by Shawna Delgado [place] [facility] [place]    [place] EXC SKIN BENIG 0.6-1CM REMAINDR BODY N/A 4/6/2018    EXCISION CYST RT. NECK AND EXCISION LESION NOSE performed by Bebo Kolb MD at [facility]    [place] RMVL NARCISA CTR VAD W/SUBQ PORT/ CTR/PRPH INSJ N/A 4/6/2018    REMOVAL VENOUS PORT performed by Bebo Kolb MD at [facility] / has been removed    TONSILLECTOMY      at age 20    UPPER GASTROINTESTINAL ENDOSCOPY  08/31/2016    w/polypectomy,bx     UPPER GASTROINTESTINAL ENDOSCOPY N/A 7/24/2020    EGD DIAGNOSTIC ONLY performed by [place] [facility]  specified.    DISCHARGE MEDICATIONS:  New Prescriptions    HYDROCODONE-ACETAMINOPHEN (NORCO) 5-325 MG PER TABLET    Take 1 tablet by mouth every 6 hours as needed for Pain for up to 3 days. Intended supply: 3 days. Take lowest dose possible to manage pain Max Daily Amount: 4 tablets    PREDNISONE (DELTASONE) 20 MG TABLET    Take 2.5 tablets by mouth daily for 2 days, THEN 2 tablets daily for 2 days, THEN 1.5 tablets daily for 2 days, THEN 1 tablet daily for 2 days, THEN 0.5 tablets daily for 2 days.     Controlled Substances Monitoring:          No data to display                (Please note that portions of this note were completed with a voice recognition program.  Efforts were made to edit the dictations but occasionally words are mis-transcribed.)    Yareli Gaxiola PA-C (electronically signed)  Attending Emergency Physician    Supervising Attending Physician: Dr. Ortiz.       Yareli Gaxiola PA-C  07/12/25 0432

## 2025-07-12 NOTE — ED NOTES
Lab unable to obtain specimen. Patient states \"I am a hard stick and I just want to wait on the blood if I have to get an IV too.\"

## 2025-07-12 NOTE — ED PROVIDER NOTES
Basic Information   Time Seen: 2:50 PM   Primary Care Provider: Michael Klein MD     Chief Complaint   Patient presents with    Hand Pain     Redness to L hand and that started yesterday and pt states throughout the night pain radiated up arm and into the shoulder   Pt has H/O rheumatoid arthritis, carpal tunnel, and breast CA       HPI   Felecia Malin is a 69 yrs female who presents with complaints of left hand swelling, arm and shoulder pain.  Onset x 2 days.  Patient reports history of rheumatoid arthritis and carpal tunnel.  Patient denies injury.   Physical Exam     BP (!) 158/89 (07/12/25 1435)    Temp 98.6 °F (37 °C) (07/12/25 1435)    Pulse 93 (07/12/25 1435)   Resp 18 (07/12/25 1435)    SpO2 99 % (07/12/25 1435)       General: Awake and Alert, no acute distress   CV: RRR, S1, S2   Resp: LCTAB, even and non labored   Other:   Impression and Plan     Labs Reviewed   CBC WITH AUTO DIFFERENTIAL   COMPREHENSIVE METABOLIC PANEL   SEDIMENTATION RATE   C-REACTIVE PROTEIN        No orders to display      Final Impression   I have performed a medical screening exam on Felecia Malin. Based on this patient's chief complaint/symptoms of   Chief Complaint   Patient presents with    Hand Pain     Redness to L hand and that started yesterday and pt states throughout the night pain radiated up arm and into the shoulder   Pt has H/O rheumatoid arthritis, carpal tunnel, and breast CA     and my focused exam, their care will be started and transitioned to provider when room is available       Yolanda Rice, ROSE - CNP  07/12/25 [id number]

## 2025-07-12 NOTE — ED NOTES
Pt's IV flushed when medications were given.   Attempted to pull repeat troponin but was unable to flush the line.

## 2025-07-13 LAB
PERFORMED ON: ABNORMAL
POC CREATININE: 0.5 MG/DL (ref 0.6–1.2)
POC SAMPLE TYPE: ABNORMAL

## 2025-07-14 LAB
EKG ATRIAL RATE: 82 BPM
EKG DIAGNOSIS: NORMAL
EKG P AXIS: 50 DEGREES
EKG P-R INTERVAL: 150 MS
EKG Q-T INTERVAL: 366 MS
EKG QRS DURATION: 72 MS
EKG QTC CALCULATION (BAZETT): 427 MS
EKG R AXIS: -15 DEGREES
EKG T AXIS: -10 DEGREES
EKG VENTRICULAR RATE: 82 BPM

## 2025-07-14 PROCEDURE — 93010 ELECTROCARDIOGRAM REPORT: CPT | Performed by: INTERNAL MEDICINE

## 2026-05-28 ENCOUNTER — APPOINTMENT (OUTPATIENT)
Dept: CARDIOLOGY | Facility: CLINIC | Age: 71
End: 2026-05-28
Payer: MEDICARE

## (undated) DEVICE — COVER LT HNDL BLU PLAS

## (undated) DEVICE — INTENDED FOR TISSUE SEPARATION, AND OTHER PROCEDURES THAT REQUIRE A SHARP SURGICAL BLADE TO PUNCTURE OR CUT.: Brand: BARD-PARKER ® CARBON RIB-BACK BLADES

## (undated) DEVICE — SUTURE NONABSORBABLE BRAIDED 4-0 SH 30 IN BLK PERMA HND K831H

## (undated) DEVICE — PENCIL SMOKE EVAC PUSH BUTTON COATED

## (undated) DEVICE — GUIDEWIRE, INQWIRE, .035, 150CM, STRT TIP

## (undated) DEVICE — SUTURE VCRL SZ 0 L27IN ABSRB UD L26MM CP-2 1/2 CIR SGL J870H

## (undated) DEVICE — CHLORAPREP 26ML ORANGE

## (undated) DEVICE — GLOVE ORANGE PI 8   MSG9080

## (undated) DEVICE — DRAPE EQUIP TRNSPRT CONTAINMENT FOR BK TAB

## (undated) DEVICE — GAUZE,SPONGE,2"X2",8PLY,STERILE,LF,2'S: Brand: MEDLINE

## (undated) DEVICE — TRAY PREP DRY W/ PREM GLV 2 APPL 6 SPNG 2 UNDPD 1 OVERWRAP

## (undated) DEVICE — TOWEL,OR,DSP,ST,BLUE,STD,4/PK,20PK/CS: Brand: MEDLINE

## (undated) DEVICE — SUTURE VCRL SZ 2-0 L27IN ABSRB UD L36MM CP-1 1/2 CIR REV J266H

## (undated) DEVICE — SUTURE MCRYL SZ 4-0 L27IN ABSRB UD L19MM PS-2 1/2 CIR PRIM Y426H

## (undated) DEVICE — SUTURE VCRL + SZ 3-0 L18IN ABSRB UD PS-2 3/8 CIR REV CUT VCP497H

## (undated) DEVICE — SNARE ENDOSCP AD L240CM LOOP W10MM SHTH DIA2.4MM RND INSUL

## (undated) DEVICE — BANDAGE, QUIKCLOT, INTERVENTIONAL HEMO, W/O SLIT

## (undated) DEVICE — STAPLER INT STPL LN H1.8-4.8XL60MM THCK TISS 2 ROW 8 FIRING

## (undated) DEVICE — 3M™ STERI-DRAPE™ INSTRUMENT POUCH 1018: Brand: STERI-DRAPE™

## (undated) DEVICE — ACCESS KIT, S-MAK MINI, 4FR 10CM 0.018IN 40CM, NT/PT, ECHO ENHANCE NEEDLE

## (undated) DEVICE — LABEL MED MINI W/ MARKER

## (undated) DEVICE — ELECTRODE PT RET AD L9FT HI MOIST COND ADH HYDRGEL CORDED

## (undated) DEVICE — YANKAUER,POOLE TIP,STERILE,50/CS: Brand: MEDLINE

## (undated) DEVICE — GUIDEWIRE, SAFARI 2, .035 X 275CM, EXTRA SMALL CURVE, PRE-SHAPED

## (undated) DEVICE — SPONGE,LAP,18"X18",DLX,XR,ST,5/PK,40/PK: Brand: MEDLINE

## (undated) DEVICE — WOUND RETRACTOR AND PROTECTOR: Brand: ALEXIS O WOUND PROTECTOR-RETRACTOR

## (undated) DEVICE — INTRODUCER, SHEATH, FAST-CATH, 7 FR X 23 CM

## (undated) DEVICE — CODMAN® SURGICAL PATTIES 1/2" X 3" (1.27CM X 7.62CM): Brand: CODMAN®

## (undated) DEVICE — GOWN,AURORA,NONREINFORCED,LARGE: Brand: MEDLINE

## (undated) DEVICE — SHEATH, PINNACLE, 10 CM,  6FR INTRODUCER, 6FR DIA, 2.5 CM DIALATOR

## (undated) DEVICE — 3M™ IOBAN™ 2 ANTIMICROBIAL INCISE DRAPE 6650EZ: Brand: IOBAN™ 2

## (undated) DEVICE — NON-WOVEN SPONGES,4 PLY: Brand: DERMACEA

## (undated) DEVICE — GAUZE,SPONGE,4"X4",16PLY,XRAY,STRL,LF: Brand: MEDLINE

## (undated) DEVICE — COUNTER NDL 40 COUNT HLD 70 FOAM BLK ADH W/ MAG

## (undated) DEVICE — CATHETER, ANGIO, IMPULSE, PIGTAIL, 6 FR X 110 CM

## (undated) DEVICE — PACK,LAPAROTOMY,NO GOWNS: Brand: MEDLINE

## (undated) DEVICE — CATHETER, THERMODILUTION, SWAN GANZ, 7 FR, 110CM, STANDARD

## (undated) DEVICE — SPONGE: LAP 4X18 W XR 200/CS: Brand: MEDICAL ACTION INDUSTRIES

## (undated) DEVICE — BINDER ABD UNISX 12IN 85IN 3XL UNIV

## (undated) DEVICE — SYRINGE MED 10ML LUERLOCK TIP W/O SFTY DISP

## (undated) DEVICE — CORD,CAUTERY,BIPOLAR,STERILE: Brand: MEDLINE

## (undated) DEVICE — TTL1LYR 16FR10ML 100%SIL TMPST TR: Brand: MEDLINE

## (undated) DEVICE — 1842 FOAM BLOCK NEEDLE COUNTER: Brand: DEVON

## (undated) DEVICE — GUIDEWIRE, INQWIRE, 3MM J, .035, 150

## (undated) DEVICE — ADAPTER FLSH PMP FLD MGMT GI IRRIG OFP 2 DISPOSABLE

## (undated) DEVICE — GOWN,SIRUS,POLYRNF,BRTHSLV,LG,30/CS: Brand: MEDLINE

## (undated) DEVICE — DRAPE THER FLUID WARMING 66X44 IN FLAT SLUSH DBL DISC ORS

## (undated) DEVICE — GLOVE ORANGE PI 7 1/2   MSG9075

## (undated) DEVICE — Device: Brand: ENDO SMARTCAP

## (undated) DEVICE — SYRINGE IRRIG 60ML SFT PLIABLE BLB EZ TO GRP 1 HND USE W/

## (undated) DEVICE — GUIDE WIRE, 035/190CM, HI-TORGUE SUPRA CORE

## (undated) DEVICE — Z DISCONTINUED USE 2744636  DRESSING AQUACEL 14 IN ALG W3.5XL14IN POLYUR FLM CVR W/ HYDRCOLL

## (undated) DEVICE — GOWN,SIRUS,POLYRNF,BRTHSLV,XLN/XXL,18/CS: Brand: MEDLINE

## (undated) DEVICE — BRUSH ENDO CLN L90.5IN SHTH DIA1.7MM BRIST DIA5-7MM 2-6MM

## (undated) DEVICE — NEEDLE SPINAL 22GA L3.5IN SPINOCAN

## (undated) DEVICE — TRAP POLYP BALEEN

## (undated) DEVICE — SHEET,DRAPE,53X77,STERILE: Brand: MEDLINE

## (undated) DEVICE — SINGLE PORT MANIFOLD: Brand: NEPTUNE 2

## (undated) DEVICE — Z DISCONTINUED APPLICATOR SURG PREP 0.35OZ 2% CHG 70% ISO ALC W/ HI LT

## (undated) DEVICE — GLOVE ORTHO 8   MSG9480

## (undated) DEVICE — DBD-PACK,EENT,SIRUS,PK II: Brand: MEDLINE

## (undated) DEVICE — WAX SURG 2.5GM HEMSTAT BNE BEESWAX PARAFFIN ISO PALMITATE

## (undated) DEVICE — STAPLER INT 75MM CUT LN L73MM STPL LN L77MM LNAR B-FORM

## (undated) DEVICE — RELOAD STPL L75MM OPN STPL H4.5MM CLS STPL H2MM WIRE

## (undated) DEVICE — CABLE, PACING PATIENT BIPOLAR 8'

## (undated) DEVICE — TUBING, SUCTION, 1/4" X 10', STRAIGHT: Brand: MEDLINE

## (undated) DEVICE — ACCESS KIT, S-MAK MINI, 5FR 10CM 0.018IN 40CM, SS/SS, ECHO ENHANCE NEEDLE

## (undated) DEVICE — ALCON SURGICAL BLADE 64: Brand: ALCON

## (undated) DEVICE — DRAPE, C-ARM, BANDS, 41X120: Brand: MEDLINE

## (undated) DEVICE — CATHETER, ANGIO, IMPULSE, AL1, 6 FR X 100 CM

## (undated) DEVICE — 3M™ TEGADERM™ TRANSPARENT FILM DRESSING FRAME STYLE, 1624W, 2-3/8 IN X 2-3/4 IN (6 CM X 7 CM), 100/CT 4CT/CASE: Brand: 3M™ TEGADERM™

## (undated) DEVICE — CATHETER SCLERO L240CM NDL 25GA L4MM SHTH DIA2.3MM CNTRST

## (undated) DEVICE — SLEEVE, REPOSITIONING, W/C-LOCK ADAPTER, 60 CM

## (undated) DEVICE — SUTURE VCRL SZ 4-0 L18IN ABSRB UD L19MM PS-2 3/8 CIR PRIM J496H

## (undated) DEVICE — APPLICATOR MEDICATED 26 CC SOLUTION HI LT ORNG CHLORAPREP

## (undated) DEVICE — KIT, INTRODUCER MICROPUNCTURE, 4FR STIFF, NITINOL WIRE, TUNGSTEN TIP

## (undated) DEVICE — SUTURE PERMAHAND SZ 2-0 L12X18IN NONABSORBABLE BLK SILK A185H

## (undated) DEVICE — 3M™ TEGADERM™ TRANSPARENT FILM DRESSING FRAME STYLE, 1626W, 4 IN X 4-3/4 IN (10 CM X 12 CM), 50/CT 4CT/CASE: Brand: 3M™ TEGADERM™

## (undated) DEVICE — 3M™ STERI-STRIP™ REINFORCED ADHESIVE SKIN CLOSURES, R1541, 1/4 IN X 3 IN (6 MM X 75 MM), 3 STRIPS/ENVELOPE: Brand: 3M™ STERI-STRIP™

## (undated) DEVICE — SNARE HEX 2.4X13MM

## (undated) DEVICE — TUBING, MANIFOLD, LOW PRESSURE

## (undated) DEVICE — SUTURE PDS II SZ 1 L36IN ABSRB VLT CT L40MM 1/2 CIR TAPR Z359T

## (undated) DEVICE — 4-PORT MANIFOLD: Brand: NEPTUNE 2

## (undated) DEVICE — GUIDEWIRE, SAFARI2, EXTRA SUPPORT, EXTRA SMALL CURVE

## (undated) DEVICE — Device: Brand: SPOT EX ENDOSCOPIC TATTOO

## (undated) DEVICE — CATHETER, DIAGNOSTIC, 4 FR-JL 4

## (undated) DEVICE — Device

## (undated) DEVICE — SHEATH, PINNACLE, W/.038 GUIDEWIRE, 10 CM,  7FR INTRODUCER, 7FR DIA, 2.5 CM DIALATOR

## (undated) DEVICE — CODMAN® SURGICAL PATTIES 1/2" X 1/2" (1.27CM X 1.27CM): Brand: CODMAN®

## (undated) DEVICE — 3.0MM PRECISION NEURO (MATCH HEAD)

## (undated) DEVICE — CATHETER, VALVULOPLASTY, TRUE DILATION, 12FR X 21MM X 110CM

## (undated) DEVICE — PENCIL SMK EVAC 10 FT BLADE ELECTRD ROCKER FOR TELSCP

## (undated) DEVICE — CATHETER IV 16 GAX2 IN STR INTROCAN SAFETY

## (undated) DEVICE — SHEATH, PINNACLE, W/.035 GUIDEWIRE, 10 CM,  4FR INTRODUCER, 4FR DIA, 2.5 CM DIALATOR

## (undated) DEVICE — GLOVE SURG SZ 85 STD WHT LTX SYN POLYMER BEAD REINF ANTI RL

## (undated) DEVICE — MARKER SURG SKIN GENTIAN VLT REG TIP W/ 6IN RUL

## (undated) DEVICE — ENDO CARRY-ON PROCEDURE KIT: Brand: ENDO CARRY-ON PROCEDURE KIT

## (undated) DEVICE — NEEDLE HYPO 25GA L1.5IN BLU POLYPR HUB S STL REG BVL STR

## (undated) DEVICE — CATHETER, ANGIO, IMPULSE, PIG 155, 6 FR X 110 CM

## (undated) DEVICE — FORCEPS BX L240CM JAW DIA2.4MM ORNG L CAP W/ NDL DISP RAD

## (undated) DEVICE — SUTURE PERMA-HAND SZ 2-0 L30IN NONABSORBABLE BLK L26MM SH K833H

## (undated) DEVICE — GLOVE SURG SZ 75 STD WHT LTX SYN POLYMER BEAD REINF ANTI RL

## (undated) DEVICE — GUIDEWIRE, AMPLATZ, EXTRA STIFF, .025/145CM/AES

## (undated) DEVICE — INTRODUCER SHEATH, SENTRANT ENSURE SEAL 14FR 28CM

## (undated) DEVICE — DEVICE, CLOSURE, PERCLOSE, PROSTYLE

## (undated) DEVICE — MEDI-VAC NON-CONDUCTIVE SUCTION TUBING: Brand: CARDINAL HEALTH

## (undated) DEVICE — SYSTEM SKIN CLSR 22CM DERMBND PRINEO

## (undated) DEVICE — CONMED SCOPE SAVER BITE BLOCK, 20X27 MM: Brand: SCOPE SAVER

## (undated) DEVICE — CATHETER, DIAGNOSTIC, 4FR-3DRC

## (undated) DEVICE — TUBE SET 96 MM 64 MM H2O PERISTALTIC STD AUX CHANNEL

## (undated) DEVICE — SKIN MARKER,REGULAR TIP WITH RULER: Brand: DEVON

## (undated) DEVICE — HYPODERMIC SAFETY NEEDLE: Brand: MAGELLAN

## (undated) DEVICE — SUTURE VCRL SZ 0 L36IN ABSRB UD L36MM CT-1 1/2 CIR J946H

## (undated) DEVICE — COVER MICSCP W46XL120IN 4 BINOC GLS LENS LEICA

## (undated) DEVICE — SYRINGE MED 30ML STD CLR PLAS LUERLOCK TIP N CTRL DISP

## (undated) DEVICE — GLOVE ORANGE PI 8 1/2   MSG9085

## (undated) DEVICE — NEPTUNE E-SEP 165MM SUCTION SLEEVE: Brand: NEPTUNE E-SEP

## (undated) DEVICE — GUIDEWIRE, INQWIRE, 3MM J, .035, 260

## (undated) DEVICE — E-Z CLEAN, NON-STICK, PTFE COATED, ELECTROSURGICAL BLADE ELECTRODE, 6.5 INCH (16.5 CM): Brand: MEGADYNE

## (undated) DEVICE — CATHETER, DIAGNOSTIC, 4FR-AL 2

## (undated) DEVICE — SUTURE VCRL + SZ 2-0 L36IN ABSRB UD L36MM CT-1 1/2 CIR VCP945H

## (undated) DEVICE — PENCIL ES L3M BTTN SWCH HOLSTER W/ BLDE ELECTRD EDGE